# Patient Record
Sex: FEMALE | Race: WHITE | NOT HISPANIC OR LATINO | Employment: FULL TIME | URBAN - METROPOLITAN AREA
[De-identification: names, ages, dates, MRNs, and addresses within clinical notes are randomized per-mention and may not be internally consistent; named-entity substitution may affect disease eponyms.]

---

## 2019-03-25 ENCOUNTER — HOSPITAL ENCOUNTER (OUTPATIENT)
Facility: HOSPITAL | Age: 49
Setting detail: OBSERVATION
Discharge: HOME | End: 2019-03-26
Attending: EMERGENCY MEDICINE | Admitting: INTERNAL MEDICINE
Payer: COMMERCIAL

## 2019-03-25 ENCOUNTER — APPOINTMENT (EMERGENCY)
Dept: RADIOLOGY | Facility: HOSPITAL | Age: 49
End: 2019-03-25
Payer: COMMERCIAL

## 2019-03-25 DIAGNOSIS — R07.9 CHEST PAIN: Primary | ICD-10-CM

## 2019-03-25 DIAGNOSIS — R06.00 DYSPNEA: ICD-10-CM

## 2019-03-25 DIAGNOSIS — Z72.0 NICOTINE ABUSE: ICD-10-CM

## 2019-03-25 DIAGNOSIS — K21.9 GERD (GASTROESOPHAGEAL REFLUX DISEASE): ICD-10-CM

## 2019-03-25 LAB
ALBUMIN SERPL BCP-MCNC: 3.9 G/DL (ref 3.5–5)
ALP SERPL-CCNC: 108 U/L (ref 46–116)
ALT SERPL W P-5'-P-CCNC: 26 U/L (ref 12–78)
ANION GAP SERPL CALCULATED.3IONS-SCNC: 12 MMOL/L (ref 4–13)
APTT PPP: 27 SECONDS (ref 24–33)
AST SERPL W P-5'-P-CCNC: 8 U/L (ref 5–45)
BASOPHILS # BLD AUTO: 0.03 THOUSANDS/ΜL (ref 0–0.1)
BASOPHILS NFR BLD AUTO: 0 % (ref 0–1)
BILIRUB SERPL-MCNC: 0.4 MG/DL (ref 0.2–1)
BUN SERPL-MCNC: 17 MG/DL (ref 5–25)
CALCIUM SERPL-MCNC: 9.4 MG/DL (ref 8.3–10.1)
CHLORIDE SERPL-SCNC: 106 MMOL/L (ref 100–108)
CO2 SERPL-SCNC: 24 MMOL/L (ref 21–32)
CREAT SERPL-MCNC: 0.75 MG/DL (ref 0.6–1.3)
DEPRECATED D DIMER PPP: 710 NG/ML (FEU) (ref 190–520)
EOSINOPHIL # BLD AUTO: 0.05 THOUSAND/ΜL (ref 0–0.61)
EOSINOPHIL NFR BLD AUTO: 1 % (ref 0–6)
ERYTHROCYTE [DISTWIDTH] IN BLOOD BY AUTOMATED COUNT: 14 % (ref 11.6–15.1)
GFR SERPL CREATININE-BSD FRML MDRD: 93 ML/MIN/1.73SQ M
GLUCOSE SERPL-MCNC: 94 MG/DL (ref 65–140)
HCT VFR BLD AUTO: 46 % (ref 34.8–46.1)
HGB BLD-MCNC: 14.5 G/DL (ref 11.5–15.4)
IMM GRANULOCYTES # BLD AUTO: 0.03 THOUSAND/UL (ref 0–0.2)
IMM GRANULOCYTES NFR BLD AUTO: 0 % (ref 0–2)
INR PPP: 0.94 (ref 0.86–1.16)
LYMPHOCYTES # BLD AUTO: 1.94 THOUSANDS/ΜL (ref 0.6–4.47)
LYMPHOCYTES NFR BLD AUTO: 24 % (ref 14–44)
MCH RBC QN AUTO: 27.7 PG (ref 26.8–34.3)
MCHC RBC AUTO-ENTMCNC: 31.5 G/DL (ref 31.4–37.4)
MCV RBC AUTO: 88 FL (ref 82–98)
MONOCYTES # BLD AUTO: 0.47 THOUSAND/ΜL (ref 0.17–1.22)
MONOCYTES NFR BLD AUTO: 6 % (ref 4–12)
NEUTROPHILS # BLD AUTO: 5.47 THOUSANDS/ΜL (ref 1.85–7.62)
NEUTS SEG NFR BLD AUTO: 69 % (ref 43–75)
NRBC BLD AUTO-RTO: 0 /100 WBCS
NT-PROBNP SERPL-MCNC: 3114 PG/ML
PLATELET # BLD AUTO: 255 THOUSANDS/UL (ref 149–390)
PMV BLD AUTO: 10.5 FL (ref 8.9–12.7)
POTASSIUM SERPL-SCNC: 4 MMOL/L (ref 3.5–5.3)
PROT SERPL-MCNC: 7.8 G/DL (ref 6.4–8.2)
PROTHROMBIN TIME: 9.9 SECONDS (ref 9.4–11.7)
RBC # BLD AUTO: 5.23 MILLION/UL (ref 3.81–5.12)
SODIUM SERPL-SCNC: 142 MMOL/L (ref 136–145)
TROPONIN I SERPL-MCNC: <0.02 NG/ML
TSH SERPL DL<=0.05 MIU/L-ACNC: 2.01 UIU/ML (ref 0.36–3.74)
WBC # BLD AUTO: 7.99 THOUSAND/UL (ref 4.31–10.16)

## 2019-03-25 PROCEDURE — 94760 N-INVAS EAR/PLS OXIMETRY 1: CPT

## 2019-03-25 PROCEDURE — 84484 ASSAY OF TROPONIN QUANT: CPT | Performed by: PHYSICIAN ASSISTANT

## 2019-03-25 PROCEDURE — 84484 ASSAY OF TROPONIN QUANT: CPT | Performed by: EMERGENCY MEDICINE

## 2019-03-25 PROCEDURE — 83880 ASSAY OF NATRIURETIC PEPTIDE: CPT | Performed by: PHYSICIAN ASSISTANT

## 2019-03-25 PROCEDURE — 85025 COMPLETE CBC W/AUTO DIFF WBC: CPT | Performed by: EMERGENCY MEDICINE

## 2019-03-25 PROCEDURE — 85730 THROMBOPLASTIN TIME PARTIAL: CPT | Performed by: PHYSICIAN ASSISTANT

## 2019-03-25 PROCEDURE — 99285 EMERGENCY DEPT VISIT HI MDM: CPT

## 2019-03-25 PROCEDURE — 71275 CT ANGIOGRAPHY CHEST: CPT

## 2019-03-25 PROCEDURE — 71046 X-RAY EXAM CHEST 2 VIEWS: CPT

## 2019-03-25 PROCEDURE — 99220 PR INITIAL OBSERVATION CARE/DAY 70 MINUTES: CPT | Performed by: INTERNAL MEDICINE

## 2019-03-25 PROCEDURE — 80053 COMPREHEN METABOLIC PANEL: CPT | Performed by: EMERGENCY MEDICINE

## 2019-03-25 PROCEDURE — 85379 FIBRIN DEGRADATION QUANT: CPT | Performed by: PHYSICIAN ASSISTANT

## 2019-03-25 PROCEDURE — 87081 CULTURE SCREEN ONLY: CPT | Performed by: STUDENT IN AN ORGANIZED HEALTH CARE EDUCATION/TRAINING PROGRAM

## 2019-03-25 PROCEDURE — 85610 PROTHROMBIN TIME: CPT | Performed by: PHYSICIAN ASSISTANT

## 2019-03-25 PROCEDURE — 93005 ELECTROCARDIOGRAM TRACING: CPT

## 2019-03-25 PROCEDURE — 84484 ASSAY OF TROPONIN QUANT: CPT | Performed by: INTERNAL MEDICINE

## 2019-03-25 PROCEDURE — 84443 ASSAY THYROID STIM HORMONE: CPT | Performed by: INTERNAL MEDICINE

## 2019-03-25 PROCEDURE — 36415 COLL VENOUS BLD VENIPUNCTURE: CPT | Performed by: EMERGENCY MEDICINE

## 2019-03-25 RX ORDER — NICOTINE 21 MG/24HR
1 PATCH, TRANSDERMAL 24 HOURS TRANSDERMAL DAILY
Status: DISCONTINUED | OUTPATIENT
Start: 2019-03-26 | End: 2019-03-26 | Stop reason: HOSPADM

## 2019-03-25 RX ORDER — IPRATROPIUM BROMIDE AND ALBUTEROL SULFATE 2.5; .5 MG/3ML; MG/3ML
3 SOLUTION RESPIRATORY (INHALATION) EVERY 4 HOURS PRN
Status: DISCONTINUED | OUTPATIENT
Start: 2019-03-25 | End: 2019-03-26 | Stop reason: HOSPADM

## 2019-03-25 RX ORDER — ONDANSETRON 2 MG/ML
4 INJECTION INTRAMUSCULAR; INTRAVENOUS EVERY 6 HOURS PRN
Status: DISCONTINUED | OUTPATIENT
Start: 2019-03-25 | End: 2019-03-26 | Stop reason: HOSPADM

## 2019-03-25 RX ORDER — NITROGLYCERIN 0.4 MG/1
0.4 TABLET SUBLINGUAL ONCE
Status: COMPLETED | OUTPATIENT
Start: 2019-03-25 | End: 2019-03-25

## 2019-03-25 RX ORDER — ASPIRIN 81 MG/1
81 TABLET, CHEWABLE ORAL DAILY
Status: DISCONTINUED | OUTPATIENT
Start: 2019-03-26 | End: 2019-03-26 | Stop reason: HOSPADM

## 2019-03-25 RX ORDER — ASPIRIN 81 MG/1
324 TABLET, CHEWABLE ORAL ONCE
Status: COMPLETED | OUTPATIENT
Start: 2019-03-25 | End: 2019-03-25

## 2019-03-25 RX ORDER — METHYLPREDNISOLONE SODIUM SUCCINATE 40 MG/ML
20 INJECTION, POWDER, LYOPHILIZED, FOR SOLUTION INTRAMUSCULAR; INTRAVENOUS EVERY 8 HOURS
Status: DISCONTINUED | OUTPATIENT
Start: 2019-03-25 | End: 2019-03-26 | Stop reason: HOSPADM

## 2019-03-25 RX ORDER — SUMATRIPTAN 100 MG/1
100 TABLET, FILM COATED ORAL AS NEEDED
COMMUNITY
End: 2022-01-27

## 2019-03-25 RX ORDER — PANTOPRAZOLE SODIUM 40 MG/1
40 TABLET, DELAYED RELEASE ORAL
Status: DISCONTINUED | OUTPATIENT
Start: 2019-03-26 | End: 2019-03-26 | Stop reason: HOSPADM

## 2019-03-25 RX ORDER — ACETAMINOPHEN 325 MG/1
650 TABLET ORAL EVERY 4 HOURS PRN
Status: DISCONTINUED | OUTPATIENT
Start: 2019-03-25 | End: 2019-03-26 | Stop reason: HOSPADM

## 2019-03-25 RX ADMIN — IOHEXOL 85 ML: 350 INJECTION, SOLUTION INTRAVENOUS at 13:57

## 2019-03-25 RX ADMIN — METHYLPREDNISOLONE SODIUM SUCCINATE 20 MG: 40 INJECTION, POWDER, FOR SOLUTION INTRAMUSCULAR; INTRAVENOUS at 21:10

## 2019-03-25 RX ADMIN — ACETAMINOPHEN 650 MG: 325 TABLET, FILM COATED ORAL at 17:57

## 2019-03-25 RX ADMIN — NITROGLYCERIN 0.4 MG: 0.4 TABLET SUBLINGUAL at 12:31

## 2019-03-25 RX ADMIN — NITROGLYCERIN 0.4 MG: 0.4 TABLET SUBLINGUAL at 13:00

## 2019-03-25 RX ADMIN — ASPIRIN 81 MG 324 MG: 81 TABLET ORAL at 12:31

## 2019-03-25 NOTE — PLAN OF CARE
Problem: PAIN - ADULT  Goal: Verbalizes/displays adequate comfort level or baseline comfort level  Description  Interventions:  - Encourage patient to monitor pain and request assistance  - Assess pain using appropriate pain scale  - Administer analgesics based on type and severity of pain and evaluate response  - Implement non-pharmacological measures as appropriate and evaluate response  - Notify physician/advanced practitioner if interventions unsuccessful or patient reports new pain   Outcome: Progressing     Problem: INFECTION - ADULT  Goal: Absence or prevention of progression during hospitalization  Description  INTERVENTIONS:  - Assess and monitor for signs and symptoms of infection  - Monitor lab/diagnostic results  - Administer medications as ordered  - Instruct and encourage patient and family to use good hand hygiene technique  - Identify and instruct in appropriate isolation precautions for identified infection/condition   Outcome: Progressing     Problem: SAFETY ADULT  Goal: Patient will remain free of falls  Description  INTERVENTIONS:  - Assess patient frequently for physical needs  -  Identify cognitive and physical deficits and behaviors that affect risk of falls    -  [place] fall precautions as indicated by assessment   - Educate patient/family on patient safety including physical limitations  - Instruct patient to call for assistance with activity based on assessment  - Modify environment to reduce risk of injury  - Consider OT/PT consult to assist with strengthening/mobility  Outcome: Progressing  Goal: Maintain or return to baseline ADL function  Description  INTERVENTIONS:  -  Assess patient's ability to carry out ADLs; assess patient's baseline for ADL function and identify physical deficits which impact ability to perform ADLs (bathing, care of mouth/teeth, toileting, grooming, dressing, etc )  - Assess/evaluate cause of self-care deficits   - Assess range of motion  - Assess patient's mobility; develop plan if impaired  - Assess patient's need for assistive devices and provide as appropriate  - Encourage maximum independence but intervene and supervise when necessary  ¯ Involve family in performance of ADLs  ¯ Assess for home care needs following discharge   ¯ Request OT consult to assist with ADL evaluation and planning for discharge  ¯ Provide patient education as appropriate  Outcome: Progressing  Goal: Maintain or return mobility status to optimal level  Description  INTERVENTIONS:  - Assess patient's baseline mobility status (ambulation, transfers, stairs, etc )    - Identify cognitive and physical deficits and behaviors that affect mobility  - Identify mobility aids required to assist with transfers and/or ambulation (gait belt, sit-to-stand, lift, walker, cane, etc )  - [place] fall precautions as indicated by assessment  - Record patient progress and toleration of activity level on Mobility SBAR; progress patient to next Phase/Stage  - Instruct patient to call for assistance with activity based on assessment  - Request Rehabilitation consult to assist with strengthening/weightbearing, etc   Outcome: Progressing     Problem: DISCHARGE PLANNING  Goal: Discharge to home or other facility with appropriate resources  Description  INTERVENTIONS:  - Identify barriers to discharge w/patient and caregiver  - Arrange for needed discharge resources and transportation as appropriate  - Identify discharge learning needs (meds, wound care, etc )  - Refer to Case Management Department for coordinating discharge planning if the patient needs post-hospital services based on physician/advanced practitioner order or complex needs related to functional status, cognitive ability, or social support system   Outcome: Progressing     Problem: Knowledge Deficit  Goal: Patient/family/caregiver demonstrates understanding of disease process, treatment plan, medications, and discharge instructions  Description  Complete learning assessment and assess knowledge base    Interventions:  - Provide teaching at level of understanding  - Provide teaching via preferred learning methods  Outcome: Progressing

## 2019-03-25 NOTE — RESPIRATORY THERAPY NOTE
RT Protocol Note  Mati Aden 44 y o  female MRN: [id number]  Unit/Bed#: [address] [place], [place] [address] Encounter: [id number]    Assessment    Principal Problem:    Chest pain  Active Problems:    Dyspnea    Nicotine abuse    GERD (gastroesophageal reflux disease)      Home Pulmonary Medications:  none  Home Devices/Therapy: (none)    Past Medical History:   Diagnosis Date    GERD (gastroesophageal reflux disease)     HH (hiatus hernia)      Social History     Socioeconomic History    Marital status: /Civil Union     Spouse name: None    Number of children: None    Years of education: None    Highest education level: None   Occupational History    None   Social Needs    Financial resource strain: None    Food insecurity:     Worry: None     Inability: None    Transportation needs:     Medical: None     Non-medical: None   Tobacco Use    Smoking status: Current Every Day Smoker     Packs/day: 0 50     Years: 33 00     Pack years: 16 50     Types: Cigarettes    Smokeless tobacco: Never Used   Substance and Sexual Activity    Alcohol use:  Yes     Alcohol/week: 0 6 oz     Types: 1 Cans of beer per week     Frequency: Monthly or less    Drug use: No    Sexual activity: None   Lifestyle    Physical activity:     Days per week: None     Minutes per session: None    Stress: None   Relationships    Social connections:     Talks on phone: None     Gets together: None     Attends [employer] service: None     Active member of club or organization: None     Attends meetings of clubs or organizations: None     Relationship status: None    Intimate partner violence:     Fear of current or ex partner: None     Emotionally abused: None     Physically abused: None     Forced sexual activity: None   Other Topics Concern    None   Social History Narrative    None       Subjective    Subjective Data: pt states breathing is fine no distress noted    Objective    Physical Exam:   Assessment Type: Assess only  General Appearance: Alert, Awake  Respiratory Pattern: Normal  Chest Assessment: Chest expansion symmetrical  Bilateral Breath Sounds: Clear  R Breath Sounds: Clear  L Breath Sounds: Clear  Location Specific: No  Cough: Non-productive, Dry, Strong  O2 Device: ra    Vitals:  Blood pressure 133/80, pulse 87, temperature 98 2 °F (36 8 °C), resp  rate 20, height 5' 1" (1 549 m), weight 94 9 kg (209 lb 3 5 oz), SpO2 97 %  Imaging and other studies: I have personally reviewed pertinent reports        O2 Device: ra     Plan    Respiratory Plan: Discontinue Protocol        Resp Comments: pt states breathing is fine

## 2019-03-25 NOTE — H&P
H&P- Marcy Hylton 1970, 44 y o  female MRN: [id number]    Unit/Bed#: [address] [address] Encounter: [id number]    Primary Care Provider: Jc Orozco MD   Date and time admitted to hospital: 3/25/2019 12:13 PM        * Chest pain  Assessment & Plan  Atypical presentation for ACS  Patient's EKG was unremarkable and initial troponin was negative  Monitor serial cardiac enzymes  Check fasting lipid panel  Patient will be on aspirin  Cardiology evaluation  Patient did have an exercise stress test about 2 years ago which was reportedly normal  Patient was scheduled for nuclear stress test and echo in a   Dyspnea  Assessment & Plan  Patient complaining of intermittent dyspnea  CT of the chest rule out PE showed bronchiolitis the patchy air trapping in the areas of ground-glass opacities  Likely viral etiology  Patient will be started on Solu-Medrol 20 milligram IV q 8 hours and duo nebs q 4 hours p r n  For shortness of breath  Pulmonology evaluation    Nicotine abuse  Assessment & Plan  Nicotine patch 14 milligram daily  Smoking cessation    GERD (gastroesophageal reflux disease)  Assessment & Plan  Patient will be started on Protonix 40 milligram p o  Daily    VTE Prophylaxis: Low risk  / reason for no mechanical VTE prophylaxis Low risk   Code Status: Level 1 - Full Code    Anticipated Length of Stay:  Patient will be admitted on an Observation basis with an anticipated length of stay of  less than 2 midnights  Justification for Hospital Stay:  Chest pain, bronchiolitis    Total Time for Visit, including Counseling / Coordination of Care: 1 hour  Greater than 50% of this total time spent on direct patient counseling and coordination of care  Chief Complaint:   Chest Pain (Pt c/o chest pain on and off for a few weeks, this episode starting last pm with difficulty breathing    Pt states she has had indigestion along with ) and Shortness of Breath      History of Present Illness:    Marcy Hylton is a 44 y o  female with a PMH of GERD, hiatal hernia who presents with shortness of breath intermittently for the past 1 week  Patient also reported chest pain this morning  Patient reports she has been having intermittent shortness of breath where she gets really short of breath to the point that she cannot catch her breath  Patient does complain of cough which is dry without any mucus production  Patient denies any fever or chills  Shortness of breath is unrelated to exertion  Patient also reported that she had some chest pressure with some nausea on the left side of her chest this morning  Following which patient had another episode of severe shortness of breath and patient present to the ED  Patient denies any fever, chills, headache, dizziness, urinary complaints or diarrhea  In the ED patient was noted to be tachycardic with elevated blood pressure  Patient had CT of the chest rule out pulmonary embolism which showed ground-glass opacities  Patient's initial troponin was negative and EKG was unremarkable  Review of Systems:    Review of Systems   Constitutional: Negative for activity change, appetite change, chills, diaphoresis, fatigue, fever and unexpected weight change  HENT: Negative for congestion, ear discharge, ear pain, facial swelling, hearing loss, mouth sores, nosebleeds, postnasal drip, rhinorrhea, sinus pressure, sneezing, sore throat, tinnitus, trouble swallowing and voice change  Eyes: Negative for photophobia, discharge, redness and visual disturbance  Respiratory: Negative for cough, chest tightness, shortness of breath, wheezing and stridor  Cardiovascular: Negative for chest pain, palpitations and leg swelling  Gastrointestinal: Negative for abdominal distention, abdominal pain, anal bleeding, blood in stool, constipation, diarrhea, nausea and vomiting  Endocrine: Negative for polydipsia, polyphagia and polyuria     Genitourinary: Negative for decreased urine volume, difficulty urinating, dysuria, flank pain, hematuria, menstrual problem, pelvic pain, urgency, vaginal bleeding and vaginal discharge  Musculoskeletal: Negative for arthralgias, back pain and neck stiffness  Skin: Negative for pallor and rash  Neurological: Negative for dizziness, seizures, facial asymmetry, speech difficulty, light-headedness, numbness and headaches  Hematological: Negative for adenopathy  Psychiatric/Behavioral: Negative for agitation and confusion  Past Medical and Surgical History:     Past Medical History:   Diagnosis Date    GERD (gastroesophageal reflux disease)     HH (hiatus hernia)        Past Surgical History:   Procedure Laterality Date     SECTION      CHOLECYSTECTOMY      HYSTERECTOMY         Meds/Allergies:    Prior to Admission medications    Medication Sig Start Date End Date Taking? Authorizing Provider   SUMAtriptan (IMITREX) 100 mg tablet Take 100 mg by mouth as needed   Yes Historical Provider, MD       Allergies:    Allergies   Allergen Reactions    Percocet [Oxycodone-Acetaminophen]        Social History:     Marital Status: /Civil Union   Substance Use History:   Social History     Substance and Sexual Activity   Alcohol Use Yes    Alcohol/week: 0 6 oz    Types: 1 Cans of beer per week    Frequency: Monthly or less     Social History     Tobacco Use   Smoking Status Current Every Day Smoker    Packs/day: 0 50    Years: 33 00    Pack years: 16 50    Types: Cigarettes   Smokeless Tobacco Never Used     Social History     Substance and Sexual Activity   Drug Use No       Family History:    Family History   Problem Relation Age of Onset    Heart attack Mother     Heart attack Father        Physical Exam:     Vitals:   Blood Pressure: 133/80 (19 1626)  Pulse: 87 (19 1626)  Temperature: 98 2 °F (36 8 °C) (19 1626)  Temp Source: Tympanic (19 1204)  Respirations: 20 (19 1626)  Height: 5' 1" (154 9 cm) (03/25/19 1626)  Weight - Scale: 94 9 kg (209 lb 3 5 oz) (03/25/19 1626)  SpO2: 100 % (03/25/19 1626)    Physical Exam   Constitutional: No distress  HENT:   Head: Normocephalic and atraumatic  Nose: Nose normal    Eyes: Pupils are equal, round, and reactive to light  Conjunctivae are normal    Neck: Normal range of motion  Neck supple  Cardiovascular: Normal rate, regular rhythm and normal heart sounds  Pulmonary/Chest: Effort normal and breath sounds normal  No respiratory distress  She has no wheezes  She has no rales  Abdominal: Soft  Bowel sounds are normal  She exhibits no distension  There is no tenderness  There is no rebound and no guarding  Musculoskeletal: She exhibits no edema  Neurological: She is alert  No cranial nerve deficit  Skin: Skin is warm and dry  No rash noted  Additional Data:     Lab Results: I have personally reviewed pertinent films in PACS    Results from last 7 days   Lab Units 03/25/19  1227   WBC Thousand/uL 7 99   HEMOGLOBIN g/dL 14 5   HEMATOCRIT % 46 0   PLATELETS Thousands/uL 255   NEUTROS PCT % 69     Results from last 7 days   Lab Units 03/25/19  1227   SODIUM mmol/L 142   POTASSIUM mmol/L 4 0   CHLORIDE mmol/L 106   CO2 mmol/L 24   BUN mg/dL 17   CREATININE mg/dL 0 75   CALCIUM mg/dL 9 4   TOTAL BILIRUBIN mg/dL 0 40   ALK PHOS U/L 108   ALT U/L 26   AST U/L 8     Results from last 7 days   Lab Units 03/25/19  1228   INR  0 94     Results from last 7 days   Lab Units 03/25/19  1559   TROPONIN I ng/mL <0 02               Imaging: I have personally reviewed pertinent films in PACS    CTA ED chest PE study   Final Result by Chris Rosenberg MD (03/25 2459)      No evidence of pulmonary emboli  Probable bronchiolitis or reactive small airways disease with patchy areas of gas trapping within groundglass opacities in the lungs  Minimal right pleural effusion  Cholecystectomy        Colonic diverticulosis                  Workstation performed: [id number]         XR chest 2 views   Final Result by Buffy Lake MD (03/25 2914)      No active pulmonary disease on examination which is somewhat limited secondary to low lung volumes  Workstation performed: [id number]             CTA ED chest PE study   Final Result      No evidence of pulmonary emboli  Probable bronchiolitis or reactive small airways disease with patchy areas of gas trapping within groundglass opacities in the lungs  Minimal right pleural effusion  Cholecystectomy  Colonic diverticulosis                  Workstation performed: [id number]         XR chest 2 views   Final Result      No active pulmonary disease on examination which is somewhat limited secondary to low lung volumes  Workstation performed: [id number]             EKG, Pathology, and Other Studies Reviewed on Admission:   · EKG:  EKG shows sinus tachycardia with nonspecific ST-T changes    Allscripts / Epic Records Reviewed: Yes     ** Please Note: This note has been constructed using a voice recognition system   **

## 2019-03-25 NOTE — ED PROVIDER NOTES
History  Chief Complaint   Patient presents with    Chest Pain     Pt c/o chest pain on and off for a few weeks, this episode starting last pm with difficulty breathing  Pt states she has had indigestion along with   Shortness of Breath     49-year-old female presents with chest pain x1 week  She states she has had chest pain on and off for the past week  She states it feels like a heavy pressure on her chest in the middle  She is a half pack per day smoker  Her father  of a heart attack in his 62s  She recently traveled to [place] last month  She is not taking anything for the pain  She states she feels short of breath as well  The shortness of breath is not secondary to pain  She was recently treated for bronchitis  No ripping or tearing sensation in her back  She denies any fevers, chills, sore throat, abdominal pain, nausea, vomiting, diarrhea, constipation, headache, dizziness, lightheadedness, weakness  Prior to Admission Medications   Prescriptions Last Dose Informant Patient Reported? Taking? SUMAtriptan (IMITREX) 100 mg tablet   Yes No   Sig: Take 100 mg by mouth as needed      Facility-Administered Medications: None       Past Medical History:   Diagnosis Date    GERD (gastroesophageal reflux disease)     HH (hiatus hernia)        Past Surgical History:   Procedure Laterality Date     SECTION      CHOLECYSTECTOMY      HYSTERECTOMY         Family History   Problem Relation Age of Onset    Heart attack Mother     Heart attack Father      I have reviewed and agree with the history as documented  Social History     Tobacco Use    Smoking status: Current Every Day Smoker     Packs/day: 0 50     Years: 33 00     Pack years: 16 50     Types: Cigarettes    Smokeless tobacco: Never Used   Substance Use Topics    Alcohol use: No    Drug use: No        Review of Systems   Constitutional: Negative for chills and fever  HENT: Negative for sneezing and sore throat  Respiratory: Positive for shortness of breath and wheezing  Negative for cough  Cardiovascular: Positive for chest pain  Negative for palpitations and leg swelling  Gastrointestinal: Negative for abdominal pain, constipation, diarrhea, nausea and vomiting  Musculoskeletal: Negative for back pain, gait problem and joint swelling  Skin: Negative for color change, pallor, rash and wound  Neurological: Negative for dizziness, syncope, weakness, light-headedness, numbness and headaches  All other systems reviewed and are negative  Physical Exam  Physical Exam   Constitutional: She appears well-developed and well-nourished  No distress  HENT:   Head: Normocephalic and atraumatic  Nose: Nose normal    Eyes: EOM are normal    Neck: Normal range of motion  Cardiovascular: Normal rate, regular rhythm, normal heart sounds and intact distal pulses  Exam reveals no gallop and no friction rub  No murmur heard  Pulmonary/Chest: Effort normal  No stridor  No respiratory distress  She has wheezes in the right upper field and the left middle field  She has no rhonchi  She has no rales  Skin: Skin is warm  Capillary refill takes less than 2 seconds  No rash noted  She is not diaphoretic  No erythema  No pallor  Nursing note and vitals reviewed        Vital Signs  ED Triage Vitals [03/25/19 1204]   Temperature Pulse Respirations Blood Pressure SpO2   97 9 °F (36 6 °C) 98 18 (!) 194/110 97 %      Temp Source Heart Rate Source Patient Position - Orthostatic VS BP Location FiO2 (%)   Tympanic Monitor Lying Left arm --      Pain Score       7           Vitals:    03/25/19 1315 03/25/19 1415 03/25/19 1430 03/25/19 1445   BP: 107/59      Pulse: 76 90 86 86   Patient Position - Orthostatic VS:             Visual Acuity      ED Medications  Medications   aspirin chewable tablet 324 mg (324 mg Oral Given 3/25/19 1231)   nitroglycerin (NITROSTAT) SL tablet 0 4 mg (0 4 mg Sublingual Given 3/25/19 1231) nitroglycerin (NITROSTAT) SL tablet 0 4 mg (0 4 mg Sublingual Given 3/25/19 1300)   iohexol (OMNIPAQUE) 350 MG/ML injection (MULTI-DOSE) 85 mL (85 mL Intravenous Given 3/25/19 1357)       Diagnostic Studies  Results Reviewed     Procedure Component Value Units Date/Time    Troponin I [[id number]]     Lab Status:  No result Specimen:  Blood     B-type natriuretic peptide [[id number]]  (Abnormal) Collected:  03/25/19 1227    Lab Status:  Final result Specimen:  Blood from Arm, Right Updated:  03/25/19 1304     NT-proBNP 3,114 pg/mL     Troponin I [[id number]]  (Normal) Collected:  03/25/19 1227    Lab Status:  Final result Specimen:  Blood from Arm, Right Updated:  03/25/19 1302     Troponin I <0 02 ng/mL     Comprehensive metabolic panel [[id number]] Collected:  03/25/19 1227    Lab Status:  Final result Specimen:  Blood from Arm, Right Updated:  03/25/19 1257     Sodium 142 mmol/L      Potassium 4 0 mmol/L      Chloride 106 mmol/L      CO2 24 mmol/L      ANION GAP 12 mmol/L      BUN 17 mg/dL      Creatinine 0 75 mg/dL      Glucose 94 mg/dL      Calcium 9 4 mg/dL      AST 8 U/L      ALT 26 U/L      Alkaline Phosphatase 108 U/L      Total Protein 7 8 g/dL      Albumin 3 9 g/dL      Total Bilirubin 0 40 mg/dL      eGFR 93 ml/min/1 73sq m     Narrative:       National Kidney Disease Education Program recommendations are as follows:  GFR calculation is accurate only with a steady state creatinine  Chronic Kidney disease less than 60 ml/min/1 73 sq  meters  Kidney failure less than 15 ml/min/1 73 sq  meters      Protime-INR [[id number]]  (Normal) Collected:  03/25/19 1228    Lab Status:  Final result Specimen:  Blood from Arm, Right Updated:  03/25/19 1254     Protime 9 9 seconds      INR 0 94    APTT [[id number]]  (Normal) Collected:  03/25/19 1228    Lab Status:  Final result Specimen:  Blood from Arm, Right Updated:  03/25/19 1254     PTT 27 seconds     D-Dimer [[id number]]  (Abnormal) Collected:  03/25/19 1228    Lab Status:  Final result Specimen:  Blood from Arm, Right Updated:  03/25/19 1254     D-Dimer, Quant 710 ng/ml (FEU)     CBC and differential [[id number]]  (Abnormal) Collected:  03/25/19 1227    Lab Status:  Final result Specimen:  Blood from Arm, Right Updated:  03/25/19 1239     WBC 7 99 Thousand/uL      RBC 5 23 Million/uL      Hemoglobin 14 5 g/dL      Hematocrit 46 0 %      MCV 88 fL      MCH 27 7 pg      MCHC 31 5 g/dL      RDW 14 0 %      MPV 10 5 fL      Platelets 168 Thousands/uL      nRBC 0 /100 WBCs      Neutrophils Relative 69 %      Immat GRANS % 0 %      Lymphocytes Relative 24 %      Monocytes Relative 6 %      Eosinophils Relative 1 %      Basophils Relative 0 %      Neutrophils Absolute 5 47 Thousands/µL      Immature Grans Absolute 0 03 Thousand/uL      Lymphocytes Absolute 1 94 Thousands/µL      Monocytes Absolute 0 47 Thousand/µL      Eosinophils Absolute 0 05 Thousand/µL      Basophils Absolute 0 03 Thousands/µL                  CTA ED chest PE study   Final Result by Salena Asher MD (03/25 3611)      No evidence of pulmonary emboli  Probable bronchiolitis or reactive small airways disease with patchy areas of gas trapping within groundglass opacities in the lungs  Minimal right pleural effusion  Cholecystectomy  Colonic diverticulosis                  Workstation performed: [id number]         XR chest 2 views   Final Result by Ngozi Cheung MD (03/25 9081)      No active pulmonary disease on examination which is somewhat limited secondary to low lung volumes  Workstation performed: [id number]                    Procedures  ECG 12 Lead Documentation  Date/Time: 3/25/2019 2:52 PM  Performed by: Stewart Cadena PA-C  Authorized by:  Stewart Cadena PA-C     Indications / Diagnosis:  Chest pain  ECG reviewed by me, the ED Provider: yes    Patient location:  ED  Interpretation:     Interpretation: normal    Rate:     ECG rate assessment: tachycardic    Rhythm:     Rhythm: sinus tachycardia    Ectopy:     Ectopy: none    QRS:     QRS axis:  Normal    QRS intervals:  Normal  ST segments:     ST segments:  Normal  T waves:     T waves: non-specific             Phone Contacts  ED Phone Contact    ED Course         HEART Risk Score      Most Recent Value   History  1 Filed at: 03/25/2019 1221   ECG  1 Filed at: 03/25/2019 1221   Age  1 Filed at: 03/25/2019 1221   Risk Factors  2 Filed at: 03/25/2019 1221   Troponin  0 Filed at: 03/25/2019 1221   Heart Score Risk Calculator   History  1 Filed at: 03/25/2019 1221   ECG  1 Filed at: 03/25/2019 1221   Age  1 Filed at: 03/25/2019 1221   Risk Factors  2 Filed at: 03/25/2019 1221   Troponin  0 Filed at: 03/25/2019 1221   HEART Score  5 Filed at: 03/25/2019 1221   HEART Score  5 Filed at: 03/25/2019 1221                            MDM  Number of Diagnoses or Management Options  Chest pain:   Diagnosis management comments: Chest pain with HEART score 5, will admit for obs       Amount and/or Complexity of Data Reviewed  Clinical lab tests: ordered and reviewed  Tests in the radiology section of CPT®: ordered and reviewed  Tests in the medicine section of CPT®: reviewed and ordered  Discussion of test results with the performing providers: yes  Review and summarize past medical records: yes  Discuss the patient with other providers: yes  Independent visualization of images, tracings, or specimens: yes        Disposition  Final diagnoses:   Chest pain     Time reflects when diagnosis was documented in both MDM as applicable and the Disposition within this note     Time User Action Codes Description Comment    3/25/2019  2:45 PM Leela Gunn Add [R07 9] Chest pain       ED Disposition     ED Disposition Condition Date/Time Comment    Admit Stable Mon Mar 25, 2019  2:45 PM Case was discussed with Dr Maria Victoria Maurice and the patient's admission status was agreed to be Admission Status: observation status to the service of Dr Maria Victoria Maurice           Follow-up Information None         Patient's Medications   Discharge Prescriptions    No medications on file     No discharge procedures on file      ED Provider  Electronically Signed by           Petey Black PA-C  03/25/19 5313

## 2019-03-25 NOTE — ASSESSMENT & PLAN NOTE
Atypical presentation for ACS  Patient's EKG was unremarkable and initial troponin was negative  Monitor serial cardiac enzymes  Check fasting lipid panel  Patient will be on aspirin  Cardiology evaluation  Patient did have an exercise stress test about 2 years ago which was reportedly normal  Patient was scheduled for nuclear stress test and echo in a

## 2019-03-25 NOTE — ASSESSMENT & PLAN NOTE
Patient complaining of intermittent dyspnea  CT of the chest rule out PE showed bronchiolitis the patchy air trapping in the areas of ground-glass opacities  Likely viral etiology  Patient will be started on Solu-Medrol 20 milligram IV q 8 hours and duo nebs q 4 hours p r n   For shortness of breath  Pulmonology evaluation

## 2019-03-26 ENCOUNTER — APPOINTMENT (OUTPATIENT)
Dept: RADIOLOGY | Facility: HOSPITAL | Age: 49
End: 2019-03-26
Payer: COMMERCIAL

## 2019-03-26 ENCOUNTER — APPOINTMENT (OUTPATIENT)
Dept: NON INVASIVE DIAGNOSTICS | Facility: HOSPITAL | Age: 49
End: 2019-03-26
Payer: COMMERCIAL

## 2019-03-26 VITALS
WEIGHT: 209.22 LBS | OXYGEN SATURATION: 97 % | DIASTOLIC BLOOD PRESSURE: 95 MMHG | HEART RATE: 100 BPM | HEIGHT: 61 IN | TEMPERATURE: 98.8 F | RESPIRATION RATE: 18 BRPM | SYSTOLIC BLOOD PRESSURE: 126 MMHG | BODY MASS INDEX: 39.5 KG/M2

## 2019-03-26 PROBLEM — R07.9 CHEST PAIN: Status: RESOLVED | Noted: 2019-03-25 | Resolved: 2019-03-26

## 2019-03-26 PROBLEM — R06.00 DYSPNEA: Status: RESOLVED | Noted: 2019-03-25 | Resolved: 2019-03-26

## 2019-03-26 LAB
ANION GAP SERPL CALCULATED.3IONS-SCNC: 12 MMOL/L (ref 4–13)
ATRIAL RATE: 107 BPM
BASOPHILS # BLD AUTO: 0.02 THOUSANDS/ΜL (ref 0–0.1)
BASOPHILS NFR BLD AUTO: 0 % (ref 0–1)
BUN SERPL-MCNC: 14 MG/DL (ref 5–25)
CALCIUM SERPL-MCNC: 9.4 MG/DL (ref 8.3–10.1)
CHLORIDE SERPL-SCNC: 106 MMOL/L (ref 100–108)
CHOLEST SERPL-MCNC: 171 MG/DL (ref 50–200)
CO2 SERPL-SCNC: 22 MMOL/L (ref 21–32)
CREAT SERPL-MCNC: 0.71 MG/DL (ref 0.6–1.3)
CRP SERPL QL: 4 MG/L
EOSINOPHIL # BLD AUTO: 0 THOUSAND/ΜL (ref 0–0.61)
EOSINOPHIL NFR BLD AUTO: 0 % (ref 0–6)
ERYTHROCYTE [DISTWIDTH] IN BLOOD BY AUTOMATED COUNT: 13.7 % (ref 11.6–15.1)
ERYTHROCYTE [SEDIMENTATION RATE] IN BLOOD: 10 MM/HOUR (ref 2–25)
FLUAV AG SPEC QL: NOT DETECTED
FLUBV AG SPEC QL: NOT DETECTED
GFR SERPL CREATININE-BSD FRML MDRD: 99 ML/MIN/1.73SQ M
GLUCOSE P FAST SERPL-MCNC: 145 MG/DL (ref 65–99)
GLUCOSE SERPL-MCNC: 145 MG/DL (ref 65–140)
HCT VFR BLD AUTO: 45.4 % (ref 34.8–46.1)
HDLC SERPL-MCNC: 44 MG/DL (ref 40–60)
HGB BLD-MCNC: 14.1 G/DL (ref 11.5–15.4)
IMM GRANULOCYTES # BLD AUTO: 0.04 THOUSAND/UL (ref 0–0.2)
IMM GRANULOCYTES NFR BLD AUTO: 1 % (ref 0–2)
LDLC SERPL CALC-MCNC: 116 MG/DL (ref 0–100)
LYMPHOCYTES # BLD AUTO: 0.82 THOUSANDS/ΜL (ref 0.6–4.47)
LYMPHOCYTES NFR BLD AUTO: 12 % (ref 14–44)
MCH RBC QN AUTO: 27.4 PG (ref 26.8–34.3)
MCHC RBC AUTO-ENTMCNC: 31.1 G/DL (ref 31.4–37.4)
MCV RBC AUTO: 88 FL (ref 82–98)
MONOCYTES # BLD AUTO: 0.07 THOUSAND/ΜL (ref 0.17–1.22)
MONOCYTES NFR BLD AUTO: 1 % (ref 4–12)
NEUTROPHILS # BLD AUTO: 5.93 THOUSANDS/ΜL (ref 1.85–7.62)
NEUTS SEG NFR BLD AUTO: 86 % (ref 43–75)
NRBC BLD AUTO-RTO: 0 /100 WBCS
P AXIS: 37 DEGREES
PLATELET # BLD AUTO: 271 THOUSANDS/UL (ref 149–390)
PMV BLD AUTO: 11.3 FL (ref 8.9–12.7)
POTASSIUM SERPL-SCNC: 3.8 MMOL/L (ref 3.5–5.3)
PR INTERVAL: 150 MS
QRS AXIS: 20 DEGREES
QRSD INTERVAL: 86 MS
QT INTERVAL: 344 MS
QTC INTERVAL: 459 MS
RBC # BLD AUTO: 5.14 MILLION/UL (ref 3.81–5.12)
RSV B RNA SPEC QL NAA+PROBE: NOT DETECTED
SODIUM SERPL-SCNC: 140 MMOL/L (ref 136–145)
T WAVE AXIS: -3 DEGREES
TRIGL SERPL-MCNC: 53 MG/DL
VENTRICULAR RATE: 107 BPM
WBC # BLD AUTO: 6.88 THOUSAND/UL (ref 4.31–10.16)

## 2019-03-26 PROCEDURE — 99253 IP/OBS CNSLTJ NEW/EST LOW 45: CPT | Performed by: INTERNAL MEDICINE

## 2019-03-26 PROCEDURE — 86140 C-REACTIVE PROTEIN: CPT | Performed by: FAMILY MEDICINE

## 2019-03-26 PROCEDURE — 93017 CV STRESS TEST TRACING ONLY: CPT

## 2019-03-26 PROCEDURE — 99244 OFF/OP CNSLTJ NEW/EST MOD 40: CPT | Performed by: INTERNAL MEDICINE

## 2019-03-26 PROCEDURE — 80048 BASIC METABOLIC PNL TOTAL CA: CPT | Performed by: INTERNAL MEDICINE

## 2019-03-26 PROCEDURE — 80061 LIPID PANEL: CPT | Performed by: INTERNAL MEDICINE

## 2019-03-26 PROCEDURE — 87631 RESP VIRUS 3-5 TARGETS: CPT | Performed by: PHYSICIAN ASSISTANT

## 2019-03-26 PROCEDURE — A9502 TC99M TETROFOSMIN: HCPCS

## 2019-03-26 PROCEDURE — 85652 RBC SED RATE AUTOMATED: CPT | Performed by: FAMILY MEDICINE

## 2019-03-26 PROCEDURE — 99217 PR OBSERVATION CARE DISCHARGE MANAGEMENT: CPT | Performed by: FAMILY MEDICINE

## 2019-03-26 PROCEDURE — 78452 HT MUSCLE IMAGE SPECT MULT: CPT

## 2019-03-26 PROCEDURE — 93306 TTE W/DOPPLER COMPLETE: CPT | Performed by: INTERNAL MEDICINE

## 2019-03-26 PROCEDURE — 93010 ELECTROCARDIOGRAM REPORT: CPT | Performed by: INTERNAL MEDICINE

## 2019-03-26 PROCEDURE — 85025 COMPLETE CBC W/AUTO DIFF WBC: CPT | Performed by: INTERNAL MEDICINE

## 2019-03-26 RX ORDER — PREDNISONE 10 MG/1
TABLET ORAL
Qty: 30 TABLET | Refills: 0 | Status: SHIPPED | OUTPATIENT
Start: 2019-03-26 | End: 2019-06-23

## 2019-03-26 RX ORDER — NICOTINE 21 MG/24HR
1 PATCH, TRANSDERMAL 24 HOURS TRANSDERMAL DAILY
Qty: 28 PATCH | Refills: 0 | Status: SHIPPED | OUTPATIENT
Start: 2019-03-27 | End: 2019-06-23

## 2019-03-26 RX ORDER — PANTOPRAZOLE SODIUM 40 MG/1
40 TABLET, DELAYED RELEASE ORAL
Qty: 30 TABLET | Refills: 0 | Status: SHIPPED | OUTPATIENT
Start: 2019-03-27 | End: 2019-06-26 | Stop reason: HOSPADM

## 2019-03-26 RX ORDER — ALBUTEROL SULFATE 90 UG/1
2 AEROSOL, METERED RESPIRATORY (INHALATION) EVERY 4 HOURS PRN
Qty: 1 INHALER | Refills: 0 | Status: SHIPPED | OUTPATIENT
Start: 2019-03-26 | End: 2019-04-25

## 2019-03-26 RX ADMIN — PANTOPRAZOLE SODIUM 40 MG: 40 TABLET, DELAYED RELEASE ORAL at 05:02

## 2019-03-26 RX ADMIN — METHYLPREDNISOLONE SODIUM SUCCINATE 20 MG: 40 INJECTION, POWDER, FOR SOLUTION INTRAMUSCULAR; INTRAVENOUS at 13:02

## 2019-03-26 RX ADMIN — METHYLPREDNISOLONE SODIUM SUCCINATE 20 MG: 40 INJECTION, POWDER, FOR SOLUTION INTRAMUSCULAR; INTRAVENOUS at 05:00

## 2019-03-26 RX ADMIN — ASPIRIN 81 MG 81 MG: 81 TABLET ORAL at 08:56

## 2019-03-26 NOTE — ASSESSMENT & PLAN NOTE
Dsypnea after illness from January  It could be COPD related with smoking history vs  Influenza vs  viral illness vs  Aspiration from GERD  CT scan shows patchy areas of gas trapping within groundglass opacities in the lung  · Will check for influenza A/B  · Currently on Solumedrol 20 mg q8hr  · Continue with duoneb q4hr prn  · Will check Sed rate and CRP

## 2019-03-26 NOTE — UTILIZATION REVIEW
Initial Clinical Review    Admission: Date/Time/Statement:   Admission Orders (From admission, onward)    Ordered        19 1446  Place in Observation  Once     Order ID Start Status   [id number] 19 1446 Completed              Orders Placed This Encounter   Procedures    Place in Observation     Standing Status:   Standing     Number of Occurrences:   1     Order Specific Question:   Admitting Physician     Answer:   Nato Dolan [[id number]]     Order Specific Question:   Level of Care     Answer:   Med Surg [16]     ED: Date/Time/Mode of Arrival:   ED Arrival Information     Expected Arrival Acuity Means of Arrival Escorted By Service Admission Type    - 3/25/2019 11:59 Urgent Walk-In Self Hospitalist Urgent    Arrival Complaint    chest pain;trouble breathing        Chief Complaint:   Chief Complaint   Patient presents with    Chest Pain     Pt c/o chest pain on and off for a few weeks, this episode starting last pm with difficulty breathing  Pt states she has had indigestion along with   Shortness of Breath     Assessment/Plan:   78-year-old female presents with chest pain x1 week  She states she has had chest pain on and off for the past week  She states it feels like a heavy pressure on her chest in the middle  She is a half pack per day smoker  Her father  of a heart attack in his 62s  She recently traveled to [place] last month  She is not taking anything for the pain  She states she feels short of breath as well  The shortness of breath is not secondary to pain  She was recently treated for bronchitis  No ripping or tearing sensation in her back  She denies any fevers, chills, sore throat, abdominal pain, nausea, vomiting, diarrhea, constipation, headache, dizziness, lightheadedness, weakness    Chest pain  Assessment & Plan  Atypical presentation for ACS  Patient's EKG was unremarkable and initial troponin was negative  Monitor serial cardiac enzymes  Check fasting lipid panel  Patient will be on aspirin  Cardiology evaluation  Patient did have an exercise stress test about 2 years ago which was reportedly normal  Patient was scheduled for nuclear stress test and echo in a   Dyspnea  Assessment & Plan  Patient complaining of intermittent dyspnea  CT of the chest rule out PE showed bronchiolitis the patchy air trapping in the areas of ground-glass opacities  Likely viral etiology  Patient will be started on Solu-Medrol 20 milligram IV q 8 hours and duo nebs q 4 hours p r n  For shortness of breath  Pulmonology evaluation  ED Vital Signs:   ED Triage Vitals [03/25/19 1204]   Temperature Pulse Respirations Blood Pressure SpO2   97 9 °F (36 6 °C) 98 18 (!) 194/110 97 %      Temp Source Heart Rate Source Patient Position - Orthostatic VS BP Location FiO2 (%)   Tympanic Monitor Lying Left arm --      Pain Score       7        Wt Readings from Last 1 Encounters:   03/25/19 94 9 kg (209 lb 3 5 oz)     Vital Signs (abnormal):   Pertinent Labs/Diagnostic Test Results:   Trop neg  GLUC 145   CXR=No active pulmonary disease on examination which is somewhat limited secondary to low lung volumes  CTA CHEST=No evidence of pulmonary emboli  Probable bronchiolitis or reactive small airways disease with patchy areas of gas trapping within groundglass opacities in the lungs  Minimal right pleural effusion  Cholecystectomy    Colonic diverticulosis  EKG=ECG rate assessment: tachycardic    Rhythm:     Rhythm: sinus tachycardia    Ectopy:     Ectopy: none    QRS:     QRS axis:  Normal    QRS intervals:  Normal  ST segments:     ST segments:  Normal  T waves:     T waves: non-specific  ED Treatment:   Medication Administration from 03/25/2019 1159 to 03/25/2019 1612       Date/Time Order Dose Route Action Action by Comments     03/25/2019 1231 aspirin chewable tablet 324 mg 324 mg Oral Given Sindi Martinez RN      03/25/2019 1231 nitroglycerin (NITROSTAT) SL tablet 0 4 mg 0 4 mg Sublingual Given Kiara Sagastume RN      03/25/2019 1300 nitroglycerin (NITROSTAT) SL tablet 0 4 mg 0 4 mg Sublingual Given Kiara Sagastume RN      03/25/2019 1357 iohexol (OMNIPAQUE) 350 MG/ML injection (MULTI-DOSE) 85 mL 85 mL Intravenous Given Amanda Sipel V         Past Medical/Surgical History:    Active Ambulatory Problems     Diagnosis Date Noted    No Active Ambulatory Problems     Resolved Ambulatory Problems     Diagnosis Date Noted    No Resolved Ambulatory Problems     Past Medical History:   Diagnosis Date    GERD (gastroesophageal reflux disease)     HH (hiatus hernia)      Admitting Diagnosis: Chest pain [R07 9]  SOB (shortness of breath) [R06 02]  Age/Sex: 44 y o  female  Admission Orders:  TELEMETRY  CONSULT CARDIO  CONSULT PULMONARY  Scheduled Meds:   Current Facility-Administered Medications:  acetaminophen 650 mg Oral Q4H PRN Julia Gay MD   aspirin 81 mg Oral Daily Robert Centeno MD   ipratropium-albuterol 3 mL Nebulization Q4H PRN Julia Gay MD   methylPREDNISolone sodium succinate 20 mg Intravenous Q8H Robert Centeno MD   nicotine 1 patch Transdermal Daily Robert Centeno MD   ondansetron 4 mg Intravenous Q6H PRN Robert Centeno MD   pantoprazole 40 mg Oral Early Morning Robert Centeno MD     Continuous Infusions:    PRN Meds:   acetaminophen    ipratropium-albuterol    ondansetron

## 2019-03-26 NOTE — CONSULTS
Consultation - Pulmonary Medicine  Patt Fatima 44 y o  female MRN: [id number]  Unit/Bed#: 04007 [facility] 414-01 Encounter: [id number]    Assessment/Plan:    Dyspnea  [address] [place] after illness from January  It could be COPD related with smoking history vs  Influenza vs  viral illness vs  Aspiration from GERD  CT scan shows patchy areas of gas trapping within groundglass opacities in the lung  · Will check for influenza A/B  · Currently on Solumedrol 20 mg q8hr  · Continue with duoneb q4hr prn  · Will check Sed rate and CRP  Nicotine abuse  Assessment & Plan  Smokes half pack a day for the past 32 years  · Encourage smoking cessation  * Chest pain  Assessment & Plan  Cardiology consulted  Patient is getting stress test today  Thank you for this consultation; we will be happy to follow with you     ______________________________________________________________________      History of Present Illness   Physician Requesting Consult: Lexi Garcia DO  Reason for Consult / Principal Problem: Dyspnea   HX and PE limited by:     HPI:  Patt Fatima is a 44 y o  female  who presents with history of Nicotine Abuse, GERD presents with shortness of breath for the past 2 weeks  Patient also presented with chest pain  Shortness of breath is intermittent at rest and exertion  Yesterday it was the worse it has been  She does have a nonproductive cough since January  Denies any fevers or chills  Patient does admitted to be being diagnosed with bronchitis in January where she was given two rounds of antibiotics but never fully felt better  No sick contacts  In the ED, patient was tachycardiac with elevated blood pressure  CT chest was done to r/o PE  CT chest showed ground glass opacities  HPI    Smoking history: 1/2 pack a day for 32 years  Travel history: [place] 1 month ago         Review of Systems    Past Medical/Surgical History  Past Medical History:   Diagnosis Date    GERD (gastroesophageal reflux disease)     HH (hiatus hernia)      Past Surgical History:   Procedure Laterality Date     SECTION      CHOLECYSTECTOMY      HYSTERECTOMY         Social History  Social History     Substance and Sexual Activity   Alcohol Use Yes    Alcohol/week: 0 6 oz    Types: 1 Cans of beer per week    Frequency: Monthly or less       Social History     Substance and Sexual Activity   Drug Use No       Social History     Tobacco Use   Smoking Status Current Every Day Smoker    Packs/day: 0 50    Years: 33 00    Pack years: 16 50    Types: Cigarettes   Smokeless Tobacco Never Used       Family History  Family History   Problem Relation Age of Onset    Heart attack Mother     Heart attack Father        Allergies  Allergies   Allergen Reactions    Percocet [Oxycodone-Acetaminophen]        Home Meds:   Medications Prior to Admission   Medication Sig Dispense Refill Last Dose    SUMAtriptan (IMITREX) 100 mg tablet Take 100 mg by mouth as needed   Past Week at Unknown time     Current Meds:   Scheduled Meds:    Current Facility-Administered Medications:  acetaminophen 650 mg Oral Q4H PRN Carlton Stevens MD   aspirin 81 mg Oral Daily Carlton Stevens MD   ipratropium-albuterol 3 mL Nebulization Q4H PRN Julia Gay MD   methylPREDNISolone sodium succinate 20 mg Intravenous Q8H Carlton Stevens MD   nicotine 1 patch Transdermal Daily Carlton Stevens MD   ondansetron 4 mg Intravenous Q6H PRN Carlton Stevens MD   pantoprazole 40 mg Oral Early Morning Carlton Stevens MD     PRN Meds:    acetaminophen 650 mg Q4H PRN   ipratropium-albuterol 3 mL Q4H PRN   ondansetron 4 mg Q6H PRN       ____________________________________________________________________    Objective   Vitals:   Temp:  [97 9 °F (36 6 °C)-98 6 °F (37 °C)] 98 6 °F (37 °C)  HR:  [76-98] 86  Resp:  [12-21] 20  BP: (107-194)/() 118/78  Weight (last 2 days)     Date/Time   Weight    19 8635 94 9 (209 22)    03/25/19 1204   93 4 (206)            Oxygen Therapy  SpO2: 95 %    IV Infusions:        Nutrition:        Diet Orders   (From admission, onward)            Start     Ordered    03/26/19 0001  Diet Cardiovascular; Stress Test (1 Meal)  Diet effective midnight     Question Answer Comment   Diet Type Cardiovascular    Cardiac Stress Test (1 Meal)    RD to adjust diet per protocol? Yes        03/25/19 1706    03/25/19 1615  Room Service  Once     Question:  Type of Service  Answer:  Room Service-Appropriate    03/25/19 1614            Ins/Outs:   I/O     None            Lines/Drains:  Invasive Devices     Peripheral Intravenous Line            Peripheral IV 03/26/19 Left Hand less than 1 day                ____________________________________________________________________      Physical Exam   Constitutional: She is oriented to person, place, and time  She appears well-developed and well-nourished  No distress  HENT:   Head: Normocephalic and atraumatic  Nose: Nose normal    Mouth/Throat: Oropharynx is clear and moist  No oropharyngeal exudate  Eyes: EOM are normal  Right eye exhibits no discharge  Left eye exhibits no discharge  Neck: Neck supple  Cardiovascular: Normal rate, regular rhythm, normal heart sounds and intact distal pulses  No murmur heard  Pulmonary/Chest: Effort normal and breath sounds normal  No respiratory distress  Abdominal: Soft  Bowel sounds are normal  She exhibits no distension  There is no tenderness  Musculoskeletal: Normal range of motion  She exhibits no edema  Neurological: She is alert and oriented to person, place, and time  Skin: Skin is warm  Psychiatric: She has a normal mood and affect   Her behavior is normal        ____________________________________________________________________    Labs:   CBC:   Results from last 7 days   Lab Units 03/26/19  0458 03/25/19  1227   WBC Thousand/uL 6 88 7 99   HEMOGLOBIN g/dL 14 1 14 5   HEMATOCRIT % 45 4 46 0   MCV fL 88 88   PLATELETS Thousands/uL 271 255     CMP:   Results from last 7 days   Lab Units 03/26/19  0458 03/25/19  1227   POTASSIUM mmol/L 3 8 4 0   CHLORIDE mmol/L 106 106   CO2 mmol/L 22 24   BUN mg/dL 14 17   CREATININE mg/dL 0 71 0 75   CALCIUM mg/dL 9 4 9 4   AST U/L  --  8   ALT U/L  --  26   ALK PHOS U/L  --  108   EGFR ml/min/1 73sq m 99 93     Magnesium:     Phosphorous:     Troponin:   Results from last 7 days   Lab Units 03/25/19  1938 03/25/19  1559   TROPONIN I ng/mL <0 02 <0 02     PT/INR:   Results from last 7 days   Lab Units 03/25/19  1228   PTT seconds 27   INR  0 94     Lactic Acid:     BNP:   Results from last 7 days   Lab Units 03/25/19  1227   NT-PRO BNP pg/mL 3,114*     ABG:      Procalcitonin: Invalid input(s): PROCALCITONIN    Imaging:   CTA ED chest PE study   Final Result      No evidence of pulmonary emboli  Probable bronchiolitis or reactive small airways disease with patchy areas of gas trapping within groundglass opacities in the lungs  Minimal right pleural effusion  Cholecystectomy  Colonic diverticulosis                  Workstation performed: [id number]         XR chest 2 views   Final Result      No active pulmonary disease on examination which is somewhat limited secondary to low lung volumes              Workstation performed: [id number]             Micro: No results found for: [facility], [facility], [employer], Nya BanUniversity of New Mexico Hospitalster     ____________________________________________________________________      Code Status: Level 1 - Full Code

## 2019-03-26 NOTE — PLAN OF CARE
Problem: PAIN - ADULT  Goal: Verbalizes/displays adequate comfort level or baseline comfort level  Description  Interventions:  - Encourage patient to monitor pain and request assistance  - Assess pain using appropriate pain scale  - Administer analgesics based on type and severity of pain and evaluate response  - Implement non-pharmacological measures as appropriate and evaluate response  - Notify physician/advanced practitioner if interventions unsuccessful or patient reports new pain   Outcome: Progressing     Problem: INFECTION - ADULT  Goal: Absence or prevention of progression during hospitalization  Description  INTERVENTIONS:  - Assess and monitor for signs and symptoms of infection  - Monitor lab/diagnostic results  - Administer medications as ordered  - Instruct and encourage patient and family to use good hand hygiene technique  - Identify and instruct in appropriate isolation precautions for identified infection/condition   Outcome: Progressing     Problem: SAFETY ADULT  Goal: Patient will remain free of falls  Description  INTERVENTIONS:  - Assess patient frequently for physical needs  -  Identify cognitive and physical deficits and behaviors that affect risk of falls    -  Griffin fall precautions as indicated by assessment   - Educate patient/family on patient safety including physical limitations  - Instruct patient to call for assistance with activity based on assessment  - Modify environment to reduce risk of injury  - Consider OT/PT consult to assist with strengthening/mobility  Outcome: Progressing  Goal: Maintain or return to baseline ADL function  Description  INTERVENTIONS:  -  Assess patient's ability to carry out ADLs; assess patient's baseline for ADL function and identify physical deficits which impact ability to perform ADLs (bathing, care of mouth/teeth, toileting, grooming, dressing, etc )  - Assess/evaluate cause of self-care deficits   - Assess range of motion  - Assess patient's mobility; develop plan if impaired  - Assess patient's need for assistive devices and provide as appropriate  - Encourage maximum independence but intervene and supervise when necessary  ¯ Involve family in performance of ADLs  ¯ Assess for home care needs following discharge   ¯ Request OT consult to assist with ADL evaluation and planning for discharge  ¯ Provide patient education as appropriate  Outcome: Progressing  Goal: Maintain or return mobility status to optimal level  Description  INTERVENTIONS:  - Assess patient's baseline mobility status (ambulation, transfers, stairs, etc )    - Identify cognitive and physical deficits and behaviors that affect mobility  - Identify mobility aids required to assist with transfers and/or ambulation (gait belt, sit-to-stand, lift, walker, cane, etc )  - Portia fall precautions as indicated by assessment  - Record patient progress and toleration of activity level on Mobility SBAR; progress patient to next Phase/Stage  - Instruct patient to call for assistance with activity based on assessment  - Request Rehabilitation consult to assist with strengthening/weightbearing, etc   Outcome: Progressing     Problem: DISCHARGE PLANNING  Goal: Discharge to home or other facility with appropriate resources  Description  INTERVENTIONS:  - Identify barriers to discharge w/patient and caregiver  - Arrange for needed discharge resources and transportation as appropriate  - Identify discharge learning needs (meds, wound care, etc )  - Refer to Case Management Department for coordinating discharge planning if the patient needs post-hospital services based on physician/advanced practitioner order or complex needs related to functional status, cognitive ability, or social support system   Outcome: Progressing     Problem: Knowledge Deficit  Goal: Patient/family/caregiver demonstrates understanding of disease process, treatment plan, medications, and discharge instructions  Description  Complete learning assessment and assess knowledge base    Interventions:  - Provide teaching at level of understanding  - Provide teaching via preferred learning methods  Outcome: Progressing     Problem: RESPIRATORY - ADULT  Goal: Achieves optimal ventilation and oxygenation  Description  INTERVENTIONS:  - Assess for changes in respiratory status  - Assess for changes in mentation and behavior  - Position to facilitate oxygenation and minimize respiratory effort  - Oxygen administration by appropriate delivery method based on oxygen saturation (per order) or ABGs  - Initiate smoking cessation education as indicated  - Encourage broncho-pulmonary hygiene including cough, deep breathe, Incentive Spirometry  - Assess the need for suctioning and aspirate as needed  - Assess and instruct to report SOB or any respiratory difficulty  - Respiratory Therapy support as indicated  Outcome: Progressing

## 2019-03-26 NOTE — CONSULTS
Consultation - Cardiology   Juan Johnson 44 y o  female MRN: [id number]  Unit/Bed#: [address] [facility] 414-01 Encounter: [id number]    Assessment/Plan     Assessment:  1  Chest pain  2  Dyspnea with chest CT concerning for bronchiolitis   3  History of tobacco abuse  4  Family history of premature coronary artery disease    Plan:  Patient has been admitted to the hospitalist service  1  Patient will undergo exercise nuclear stress test to evaluate for any ischemia and also to evaluate her exercise tolerance  2  Continue steroids as per primary team  3  Encourage smoking cessation  4  Continue her home medications  History of Present Illness   Physician Requesting Consult: Armida Edwards DO  Reason for Consult / Principal Problem:  Chest pain, elevated blood pressure, family history of premature coronary artery disease    HPI: Juan Johnson is a 44y o  year old female who presented to the emergency room for evaluation of intermittent shortness of breath for a while and chest heaviness  Patient states that she had and upper respiratory tract infection in January for which she underwent 2 courses of antibiotics but felt she never quite recovered from the episode  She noted shortness of breath, and wheezing  Yesterday she began to feel as if there was a heaviness in the center of her chest and her blood pressure was elevated  She presented to the emergency room for further evaluation  Twelve lead EKG is unremarkable and troponins have been negative  Patient notes that she had a similar episode approximately 2 years ago for which she underwent and non nuclear exercise stress test at [facility] and was told that everything was normal     Patient is a smoker, she states she is extremely active  She does note both mother and father had a history of premature coronary artery disease and also hypertension    CT PE protocol was performed and was negative for PE but to was concerning for some small reactive airway disease  Patient states that since she had a dose of steroids her breathing has improved  Inpatient consult to Cardiology  Consult performed by: PALMA Wakefield  Consult ordered by: Stiven Davey MD          Review of Systems   Constitutional: Negative for activity change, chills, fatigue and unexpected weight change  HENT: Negative for congestion, ear pain, postnasal drip, sore throat and trouble swallowing  Eyes: Negative for photophobia, redness and visual disturbance  Respiratory: Positive for cough, shortness of breath and wheezing  Cardiovascular: Negative for palpitations and leg swelling  Chest heaviness   Gastrointestinal: Negative  Endocrine: Negative for polydipsia, polyphagia and polyuria  Genitourinary: Negative  Musculoskeletal: Negative  Neurological: Negative for dizziness, syncope, speech difficulty, weakness and headaches  Hematological: Negative  Psychiatric/Behavioral: Negative          Historical Information   Past Medical History:   Diagnosis Date    GERD (gastroesophageal reflux disease)     HH (hiatus hernia)      Past Surgical History:   Procedure Laterality Date     SECTION      CHOLECYSTECTOMY      HYSTERECTOMY       Social History     Substance and Sexual Activity   Alcohol Use Yes    Alcohol/week: 0 6 oz    Types: 1 Cans of beer per week    Frequency: Monthly or less     Social History     Substance and Sexual Activity   Drug Use No     Social History     Tobacco Use   Smoking Status Current Every Day Smoker    Packs/day: 0 50    Years: 33 00    Pack years: 16 50    Types: Cigarettes   Smokeless Tobacco Never Used     Family History:   Family History   Problem Relation Age of Onset    Heart attack Mother     Heart attack Father        Meds/Allergies   all current active meds have been reviewed, current meds:   Current Facility-Administered Medications   Medication Dose Route Frequency    acetaminophen (TYLENOL) tablet 650 mg  650 mg Oral Q4H PRN    aspirin chewable tablet 81 mg  81 mg Oral Daily    ipratropium-albuterol (DUO-NEB) 0 5-2 5 mg/3 mL inhalation solution 3 mL  3 mL Nebulization Q4H PRN    methylPREDNISolone sodium succinate (Solu-MEDROL) injection 20 mg  20 mg Intravenous Q8H    nicotine (NICODERM CQ) 14 mg/24hr TD 24 hr patch 1 patch  1 patch Transdermal Daily    ondansetron (ZOFRAN) injection 4 mg  4 mg Intravenous Q6H PRN    pantoprazole (PROTONIX) EC tablet 40 mg  40 mg Oral Early Morning    and PTA meds:   Prior to Admission Medications   Prescriptions Last Dose Informant Patient Reported? Taking? SUMAtriptan (IMITREX) 100 mg tablet Past Week at Unknown time  Yes Yes   Sig: Take 100 mg by mouth as needed      Facility-Administered Medications: None     Allergies   Allergen Reactions    Percocet [Oxycodone-Acetaminophen]        Objective   Vitals: Blood pressure 118/78, pulse 86, temperature 98 6 °F (37 °C), temperature source Tympanic, resp  rate 20, height 5' 1" (1 549 m), weight 94 9 kg (209 lb 3 5 oz), SpO2 95 %  Orthostatic Blood Pressures      Most Recent Value   Blood Pressure  118/78 filed at 03/26/2019 6941   Patient Position - Orthostatic VS  Lying filed at 03/26/2019 0734          No intake or output data in the 24 hours ending 03/26/19 0811    Invasive Devices     Peripheral Intravenous Line            Peripheral IV 03/25/19 Right Antecubital less than 1 day                Physical Exam   Constitutional: She is oriented to person, place, and time  She appears well-developed and well-nourished  No distress  HENT:   Head: Normocephalic and atraumatic  Right Ear: External ear normal    Left Ear: External ear normal    Eyes: Pupils are equal, round, and reactive to light  Conjunctivae and EOM are normal  Right eye exhibits no discharge  Left eye exhibits no discharge  No scleral icterus  Neck: Normal range of motion  No JVD present  No thyromegaly present     Cardiovascular: Normal rate, regular rhythm, normal heart sounds and intact distal pulses  Pulmonary/Chest: Effort normal and breath sounds normal  No respiratory distress  She has no wheezes  She has no rales  Abdominal: Soft  Bowel sounds are normal  She exhibits no distension  There is no guarding  Musculoskeletal: She exhibits no edema  Neurological: She is alert and oriented to person, place, and time  Skin: Skin is warm  Capillary refill takes less than 2 seconds  She is not diaphoretic  No pallor  Psychiatric: She has a normal mood and affect  Lab Results:   I have personally reviewed pertinent lab results      CBC with diff:   Results from last 7 days   Lab Units 03/26/19  0458   WBC Thousand/uL 6 88   RBC Million/uL 5 14*   HEMOGLOBIN g/dL 14 1   HEMATOCRIT % 45 4   MCV fL 88   MCH pg 27 4   MCHC g/dL 31 1*   RDW % 13 7   MPV fL 11 3   PLATELETS Thousands/uL 271     CMP:   Results from last 7 days   Lab Units 03/26/19  0458 03/25/19  1227   SODIUM mmol/L 140 142   POTASSIUM mmol/L 3 8 4 0   CHLORIDE mmol/L 106 106   CO2 mmol/L 22 24   BUN mg/dL 14 17   CREATININE mg/dL 0 71 0 75   CALCIUM mg/dL 9 4 9 4   AST U/L  --  8   ALT U/L  --  26   ALK PHOS U/L  --  108   EGFR ml/min/1 73sq m 99 93     Troponin:   0   Lab Value Date/Time    TROPONINI <0 02 03/25/2019 1938    TROPONINI <0 02 03/25/2019 1559    TROPONINI <0 02 03/25/2019 1227     BNP:   Results from last 7 days   Lab Units 03/26/19  0458   POTASSIUM mmol/L 3 8   CHLORIDE mmol/L 106   CO2 mmol/L 22   BUN mg/dL 14   CREATININE mg/dL 0 71   CALCIUM mg/dL 9 4   EGFR ml/min/1 73sq m 99     Coags:   Results from last 7 days   Lab Units 03/25/19  1228   PTT seconds 27   INR  0 94     TSH:   Results from last 7 days   Lab Units 03/25/19  1938   TSH 3RD GENERATON uIU/mL 2 006       Lipid Profile:   Results from last 7 days   Lab Units 03/26/19  0458   HDL mg/dL 44   LDL CALC mg/dL 116*   TRIGLYCERIDES mg/dL 53     Imaging: I have personally reviewed pertinent reports      EKG:  Sinus rhythm  VTE Prophylaxis: Sequential compression device Yuniel Constance)     Code Status: Level 1 - Full Code  Advance Directive and Living Will:      Power of :    POLST:      PALMA Perrin

## 2019-03-26 NOTE — DISCHARGE SUMMARY
Discharge Summary - [address] [facility]    Patient Information: Azalia Laws 44 y o  female MRN: [id number]  Unit/Bed#: [address] [address] [place] [address]-01 Encounter: [id number]    Discharging Physician / Practitioner: Grecia Larson DO  PCP: Maggy Donald MD  Admission Date: 3/25/2019  Discharge Date: 03/26/19    Reason for Admission: Chest Pain (Pt c/o chest pain on and off for a few weeks, this episode starting last pm with difficulty breathing  Pt states she has had indigestion along with ) and Shortness of Breath      Discharge Diagnoses:     Principal Problem (Resolved):    Chest pain  Active Problems:    Nicotine abuse    GERD (gastroesophageal reflux disease)  Resolved Problems:    Dyspnea        * Chest painresolved as of 3/26/2019  Assessment & Plan  Atypical presentation for ACS  Troponins negative  Patient did have an exercise stress test about 2 years ago which was reportedly normal  Nuclear stress test showed no ischemia  EF was noted to be 35% but as per Cardiology visually appears better  Outpatient echocardiogram and follow up with Cardiology after discharge    GERD (gastroesophageal reflux disease)  Assessment & Plan  Continue Protonix    Nicotine abuse  Assessment & Plan  Nicotine patch 14 milligram daily  Smoking cessation  Dyspnearesolved as of 3/26/2019  Assessment & Plan  CT of the chest rule out PE showed bronchiolitis the patchy air trapping in the areas of ground-glass opacities  Likely viral etiology  She was on IV Solu-Medrol and transitioned to prednisone taper on discharge  Pulmonary follow-up after discharge      Consultations During Hospital Stay:  [address]  IP CONSULT TO PULMONOLOGY    Procedures Performed:     · Stress test    Significant Findings:     · See hospital course and above    Imaging while in hospital:    Xr Chest 2 Views    Result Date: 3/25/2019  Narrative: CHEST INDICATION:   Chest Pain   COMPARISON:  2/7/2011 EXAM PERFORMED/VIEWS:  XR CHEST PA & LATERAL  The frontal view was performed utilizing dual energy radiographic technique  Images: 4 FINDINGS: Cardiomediastinal silhouette appears unremarkable  Lung markings are crowded secondary to low lung volumes  Within limitations of this examination there is no focal airspace opacity to suggest pneumonia  No pneumothorax or pleural effusion  Osseous structures appear within normal limits for patient age  Impression: No active pulmonary disease on examination which is somewhat limited secondary to low lung volumes  Workstation performed: [id number]     Cta Ed Chest Pe Study    Result Date: 3/25/2019  Narrative: CTA - CHEST WITH IV CONTRAST - PULMONARY ANGIOGRAM INDICATION:   chest pain, + d dimer  COMPARISON: None  TECHNIQUE: CTA examination of the chest was performed using angiographic technique according to a protocol specifically tailored to evaluate for pulmonary embolism  Axial, sagittal, and coronal 2D reformatted images were created from the source data and  submitted for interpretation  In addition, coronal 3D MIP postprocessing was performed on the acquisition scanner  Radiation dose length product (DLP) for this visit:  680 13 mGy-cm   This examination, like all CT scans performed in the [facility], was performed utilizing techniques to minimize radiation dose exposure, including the use of iterative  reconstruction and automated exposure control  IV Contrast:  85 mL of iohexol (OMNIPAQUE)  FINDINGS: PULMONARY ARTERIAL TREE:  No pulmonary embolus is seen  LUNGS:  There are groundglass opacities in much of both lower lobes and in both lung apices  There is platelike atelectasis in the lingula  Within the areas of groundglass attenuation there are geographic patchy lucent regions which may represent areas  of gas trapping  The overall pattern is suspicious for small airways disease, either bronchiolitis or reactive small airways disease  Correlate with clinical history  PLEURA:  There is a small right pleural effusion  HEART/GREAT VESSELS:  Unremarkable for patient's age  MEDIASTINUM AND LUANA:  Unremarkable  CHEST WALL AND LOWER NECK:   Unremarkable  VISUALIZED STRUCTURES IN THE UPPER ABDOMEN:  Cholecystectomy noted  No acute upper abdominal pathology identified  There is some colonic diverticulosis present  OSSEOUS STRUCTURES:  No acute fracture or destructive osseous lesion  Impression: No evidence of pulmonary emboli  Probable bronchiolitis or reactive small airways disease with patchy areas of gas trapping within groundglass opacities in the lungs  Minimal right pleural effusion  Cholecystectomy  Colonic diverticulosis Workstation performed: [id number]       Incidental Findings:   · none    Test Results Pending at Discharge (will require follow up):   · As per After Visit Summary     Outpatient Tests Requested:  · Echo    Complications:  See hospital course and above    Hospital Course:     Juan Johnson is a 44 y o  female patient who originally presented to the hospital on 3/25/2019 due to chest pain with intermittent shortness of breath  She also reported a dry cough  Patient was admitted and seen by Cardiology and Pulmonary while here  She underwent a stress test and will get echocardiogram on discharge  She was cleared by all consultants involved in her care prior to discharge      Please see above list of diagnoses and related plan for additional information  Condition at Discharge: stable     Discharge Day Visit / Exam:     Subjective:  Denies any chest pain, shortness of breath    Wants to go home    Vitals: Blood Pressure: 126/95 (03/26/19 1555)  Pulse: 100 (03/26/19 1555)  Temperature: 98 8 °F (37 1 °C) (03/26/19 1555)  Temp Source: Oral (03/26/19 1555)  Respirations: 18 (03/26/19 1555)  Height: 5' 1" (154 9 cm) (03/25/19 1626)  Weight - Scale: 94 9 kg (209 lb 3 5 oz) (03/25/19 1626)  SpO2: 97 % (03/26/19 1555)  Exam:   Physical Exam   Constitutional: She is oriented to person, place, and time  She appears well-developed and well-nourished  No distress  HENT:   Head: Normocephalic and atraumatic  Eyes: EOM are normal  Right eye exhibits no discharge  Left eye exhibits no discharge  No scleral icterus  Neck: Neck supple  Cardiovascular: Normal rate and regular rhythm  Pulmonary/Chest: Effort normal and breath sounds normal  No respiratory distress  She has no wheezes  She has no rales  Abdominal: Soft  Bowel sounds are normal  She exhibits no distension  There is no tenderness  Musculoskeletal: She exhibits no edema  Neurological: She is alert and oriented to person, place, and time  No cranial nerve deficit  Skin: Skin is dry  She is not diaphoretic  Discharge instructions/Information to patient and family:(Discharge Medications and Follow up):   See after visit summary for information provided to patient and family  Provisions for Follow-Up Care:  See after visit summary for information related to follow-up care and any pertinent home health orders  Disposition: Home    Planned Readmission:  No     Discharge Statement:  I spent 30 minutes discharging the patient  This time was spent on the day of discharge  I had direct contact with the patient on the day of discharge  Greater than 50% of the total time was spent examining patient, answering all patient questions, arranging and discussing plan of care with patient as well as directly providing post-discharge instructions  Additional time then spent on discharge activities  Discharge Medications:  See after visit summary for reconciled discharge medications provided to patient and family  ** Please Note: "This note has been constructed using a voice recognition system  Therefore there may be syntax, spelling, and/or grammatical errors   Please call if you have any questions  "**

## 2019-03-26 NOTE — PLAN OF CARE
Problem: PAIN - ADULT  Goal: Verbalizes/displays adequate comfort level or baseline comfort level  Description  Interventions:  - Encourage patient to monitor pain and request assistance  - Assess pain using appropriate pain scale  - Administer analgesics based on type and severity of pain and evaluate response  - Implement non-pharmacological measures as appropriate and evaluate response  - Notify physician/advanced practitioner if interventions unsuccessful or patient reports new pain   Outcome: Progressing     Problem: INFECTION - ADULT  Goal: Absence or prevention of progression during hospitalization  Description  INTERVENTIONS:  - Assess and monitor for signs and symptoms of infection  - Monitor lab/diagnostic results  - Administer medications as ordered  - Instruct and encourage patient and family to use good hand hygiene technique  - Identify and instruct in appropriate isolation precautions for identified infection/condition   Outcome: Progressing     Problem: SAFETY ADULT  Goal: Patient will remain free of falls  Description  INTERVENTIONS:  - Assess patient frequently for physical needs  -  Identify cognitive and physical deficits and behaviors that affect risk of falls    -  [place] fall precautions as indicated by assessment   - Educate patient/family on patient safety including physical limitations  - Instruct patient to call for assistance with activity based on assessment  - Modify environment to reduce risk of injury  - Consider OT/PT consult to assist with strengthening/mobility  Outcome: Progressing  Goal: Maintain or return to baseline ADL function  Description  INTERVENTIONS:  -  Assess patient's ability to carry out ADLs; assess patient's baseline for ADL function and identify physical deficits which impact ability to perform ADLs (bathing, care of mouth/teeth, toileting, grooming, dressing, etc )  - Assess/evaluate cause of self-care deficits   - Assess range of motion  - Assess patient's mobility; develop plan if impaired  - Assess patient's need for assistive devices and provide as appropriate  - Encourage maximum independence but intervene and supervise when necessary  ¯ Involve family in performance of ADLs  ¯ Assess for home care needs following discharge   ¯ Request OT consult to assist with ADL evaluation and planning for discharge  ¯ Provide patient education as appropriate  Outcome: Progressing  Goal: Maintain or return mobility status to optimal level  Description  INTERVENTIONS:  - Assess patient's baseline mobility status (ambulation, transfers, stairs, etc )    - Identify cognitive and physical deficits and behaviors that affect mobility  - Identify mobility aids required to assist with transfers and/or ambulation (gait belt, sit-to-stand, lift, walker, cane, etc )  - [place] fall precautions as indicated by assessment  - Record patient progress and toleration of activity level on Mobility SBAR; progress patient to next Phase/Stage  - Instruct patient to call for assistance with activity based on assessment  - Request Rehabilitation consult to assist with strengthening/weightbearing, etc   Outcome: Progressing     Problem: DISCHARGE PLANNING  Goal: Discharge to home or other facility with appropriate resources  Description  INTERVENTIONS:  - Identify barriers to discharge w/patient and caregiver  - Arrange for needed discharge resources and transportation as appropriate  - Identify discharge learning needs (meds, wound care, etc )  - Refer to Case Management Department for coordinating discharge planning if the patient needs post-hospital services based on physician/advanced practitioner order or complex needs related to functional status, cognitive ability, or social support system   Outcome: Progressing     Problem: Knowledge Deficit  Goal: Patient/family/caregiver demonstrates understanding of disease process, treatment plan, medications, and discharge instructions  Description  Complete learning assessment and assess knowledge base    Interventions:  - Provide teaching at level of understanding  - Provide teaching via preferred learning methods  Outcome: Progressing     Problem: RESPIRATORY - ADULT  Goal: Achieves optimal ventilation and oxygenation  Description  INTERVENTIONS:  - Assess for changes in respiratory status  - Assess for changes in mentation and behavior  - Position to facilitate oxygenation and minimize respiratory effort  - Oxygen administration by appropriate delivery method based on oxygen saturation (per order) or ABGs  - Initiate smoking cessation education as indicated  - Encourage broncho-pulmonary hygiene including cough, deep breathe, Incentive Spirometry  - Assess the need for suctioning and aspirate as needed  - Assess and instruct to report SOB or any respiratory difficulty  - Respiratory Therapy support as indicated  Outcome: Progressing

## 2019-03-26 NOTE — SOCIAL WORK
Per chart review, RN & provider rounds pt is A&Ox4, independently ambulating, no discharge needs identified at this time

## 2019-03-27 LAB
CHEST PAIN STATEMENT: NORMAL
MAX DIASTOLIC BP: 80 MMHG
MAX HEART RATE: 150 BPM
MAX PREDICTED HEART RATE: 169 BPM
MAX. SYSTOLIC BP: 174 MMHG
MRSA NOSE QL CULT: NORMAL
PROTOCOL NAME: NORMAL
REASON FOR TERMINATION: NORMAL
TARGET HR FORMULA: NORMAL
TEST INDICATION: NORMAL
TIME IN EXERCISE PHASE: NORMAL

## 2019-03-27 NOTE — ASSESSMENT & PLAN NOTE
CT of the chest rule out PE showed bronchiolitis the patchy air trapping in the areas of ground-glass opacities  Likely viral etiology  She was on IV Solu-Medrol and transitioned to prednisone taper on discharge      Pulmonary follow-up after discharge

## 2019-06-23 ENCOUNTER — HOSPITAL ENCOUNTER (INPATIENT)
Facility: HOSPITAL | Age: 49
LOS: 3 days | Discharge: HOME | DRG: 287 | End: 2019-06-26
Attending: EMERGENCY MEDICINE | Admitting: FAMILY MEDICINE
Payer: COMMERCIAL

## 2019-06-23 ENCOUNTER — APPOINTMENT (EMERGENCY)
Dept: RADIOLOGY | Facility: HOSPITAL | Age: 49
DRG: 287 | End: 2019-06-23
Payer: COMMERCIAL

## 2019-06-23 DIAGNOSIS — I42.8 NON-ISCHEMIC CARDIOMYOPATHY (HCC): ICD-10-CM

## 2019-06-23 DIAGNOSIS — I50.82 BIVENTRICULAR CONGESTIVE HEART FAILURE (HCC): ICD-10-CM

## 2019-06-23 DIAGNOSIS — I50.9 CHF (CONGESTIVE HEART FAILURE) (HCC): Primary | ICD-10-CM

## 2019-06-23 DIAGNOSIS — K57.92 DIVERTICULITIS: ICD-10-CM

## 2019-06-23 DIAGNOSIS — I50.41 ACUTE COMBINED SYSTOLIC AND DIASTOLIC CONGESTIVE HEART FAILURE (HCC): ICD-10-CM

## 2019-06-23 PROBLEM — E87.6 HYPOKALEMIA: Status: ACTIVE | Noted: 2019-06-23

## 2019-06-23 LAB
ALBUMIN SERPL BCP-MCNC: 3.3 G/DL (ref 3.5–5)
ALP SERPL-CCNC: 121 U/L (ref 46–116)
ALT SERPL W P-5'-P-CCNC: 99 U/L (ref 12–78)
ANION GAP SERPL CALCULATED.3IONS-SCNC: 9 MMOL/L (ref 4–13)
APTT PPP: 25 SECONDS (ref 24–33)
AST SERPL W P-5'-P-CCNC: 49 U/L (ref 5–45)
BACTERIA UR QL AUTO: ABNORMAL /HPF
BASOPHILS # BLD AUTO: 0.03 THOUSANDS/ΜL (ref 0–0.1)
BASOPHILS NFR BLD AUTO: 0 % (ref 0–1)
BILIRUB SERPL-MCNC: 0.7 MG/DL (ref 0.2–1)
BILIRUB UR QL STRIP: NEGATIVE
BUN SERPL-MCNC: 16 MG/DL (ref 5–25)
CALCIUM SERPL-MCNC: 8.3 MG/DL (ref 8.3–10.1)
CHLORIDE SERPL-SCNC: 108 MMOL/L (ref 100–108)
CLARITY UR: CLEAR
CO2 SERPL-SCNC: 23 MMOL/L (ref 21–32)
COLOR UR: YELLOW
CREAT SERPL-MCNC: 0.92 MG/DL (ref 0.6–1.3)
DEPRECATED D DIMER PPP: 710 NG/ML (FEU) (ref 190–520)
EOSINOPHIL # BLD AUTO: 0.03 THOUSAND/ΜL (ref 0–0.61)
EOSINOPHIL NFR BLD AUTO: 0 % (ref 0–6)
ERYTHROCYTE [DISTWIDTH] IN BLOOD BY AUTOMATED COUNT: 15.6 % (ref 11.6–15.1)
GFR SERPL CREATININE-BSD FRML MDRD: 72 ML/MIN/1.73SQ M
GLUCOSE SERPL-MCNC: 183 MG/DL (ref 65–140)
GLUCOSE UR STRIP-MCNC: NEGATIVE MG/DL
HCT VFR BLD AUTO: 39.7 % (ref 34.8–46.1)
HGB BLD-MCNC: 12.2 G/DL (ref 11.5–15.4)
HGB UR QL STRIP.AUTO: ABNORMAL
IMM GRANULOCYTES # BLD AUTO: 0.06 THOUSAND/UL (ref 0–0.2)
IMM GRANULOCYTES NFR BLD AUTO: 1 % (ref 0–2)
INR PPP: 1.09 (ref 0.86–1.16)
KETONES UR STRIP-MCNC: NEGATIVE MG/DL
LACTATE SERPL-SCNC: 1.6 MMOL/L (ref 0.5–2)
LEUKOCYTE ESTERASE UR QL STRIP: NEGATIVE
LIPASE SERPL-CCNC: 49 U/L (ref 73–393)
LYMPHOCYTES # BLD AUTO: 1.44 THOUSANDS/ΜL (ref 0.6–4.47)
LYMPHOCYTES NFR BLD AUTO: 19 % (ref 14–44)
MCH RBC QN AUTO: 27.1 PG (ref 26.8–34.3)
MCHC RBC AUTO-ENTMCNC: 30.7 G/DL (ref 31.4–37.4)
MCV RBC AUTO: 88 FL (ref 82–98)
MONOCYTES # BLD AUTO: 0.25 THOUSAND/ΜL (ref 0.17–1.22)
MONOCYTES NFR BLD AUTO: 3 % (ref 4–12)
MUCOUS THREADS UR QL AUTO: ABNORMAL
NEUTROPHILS # BLD AUTO: 5.75 THOUSANDS/ΜL (ref 1.85–7.62)
NEUTS SEG NFR BLD AUTO: 77 % (ref 43–75)
NITRITE UR QL STRIP: NEGATIVE
NON-SQ EPI CELLS URNS QL MICRO: ABNORMAL /HPF
NRBC BLD AUTO-RTO: 0 /100 WBCS
NT-PROBNP SERPL-MCNC: 7973 PG/ML
PH UR STRIP.AUTO: 6 [PH]
PLATELET # BLD AUTO: 266 THOUSANDS/UL (ref 149–390)
PMV BLD AUTO: 11 FL (ref 8.9–12.7)
POTASSIUM SERPL-SCNC: 3.3 MMOL/L (ref 3.5–5.3)
PROT SERPL-MCNC: 6.3 G/DL (ref 6.4–8.2)
PROT UR STRIP-MCNC: NEGATIVE MG/DL
PROTHROMBIN TIME: 11.4 SECONDS (ref 9.4–11.7)
RBC # BLD AUTO: 4.51 MILLION/UL (ref 3.81–5.12)
RBC #/AREA URNS AUTO: ABNORMAL /HPF
SODIUM SERPL-SCNC: 140 MMOL/L (ref 136–145)
SP GR UR STRIP.AUTO: <=1.005 (ref 1–1.03)
TROPONIN I SERPL-MCNC: 0.02 NG/ML
TROPONIN I SERPL-MCNC: <0.02 NG/ML
TROPONIN I SERPL-MCNC: <0.02 NG/ML
UROBILINOGEN UR QL STRIP.AUTO: 0.2 E.U./DL
WBC # BLD AUTO: 7.56 THOUSAND/UL (ref 4.31–10.16)
WBC #/AREA URNS AUTO: ABNORMAL /HPF

## 2019-06-23 PROCEDURE — 71275 CT ANGIOGRAPHY CHEST: CPT

## 2019-06-23 PROCEDURE — 99285 EMERGENCY DEPT VISIT HI MDM: CPT

## 2019-06-23 PROCEDURE — 80053 COMPREHEN METABOLIC PANEL: CPT | Performed by: EMERGENCY MEDICINE

## 2019-06-23 PROCEDURE — 74177 CT ABD & PELVIS W/CONTRAST: CPT

## 2019-06-23 PROCEDURE — 83690 ASSAY OF LIPASE: CPT | Performed by: EMERGENCY MEDICINE

## 2019-06-23 PROCEDURE — 84484 ASSAY OF TROPONIN QUANT: CPT | Performed by: EMERGENCY MEDICINE

## 2019-06-23 PROCEDURE — 85025 COMPLETE CBC W/AUTO DIFF WBC: CPT | Performed by: EMERGENCY MEDICINE

## 2019-06-23 PROCEDURE — 85610 PROTHROMBIN TIME: CPT | Performed by: EMERGENCY MEDICINE

## 2019-06-23 PROCEDURE — 85730 THROMBOPLASTIN TIME PARTIAL: CPT | Performed by: EMERGENCY MEDICINE

## 2019-06-23 PROCEDURE — 93005 ELECTROCARDIOGRAM TRACING: CPT

## 2019-06-23 PROCEDURE — 96374 THER/PROPH/DIAG INJ IV PUSH: CPT

## 2019-06-23 PROCEDURE — 87081 CULTURE SCREEN ONLY: CPT | Performed by: NURSE PRACTITIONER

## 2019-06-23 PROCEDURE — 85379 FIBRIN DEGRADATION QUANT: CPT | Performed by: EMERGENCY MEDICINE

## 2019-06-23 PROCEDURE — 36415 COLL VENOUS BLD VENIPUNCTURE: CPT | Performed by: EMERGENCY MEDICINE

## 2019-06-23 PROCEDURE — 87040 BLOOD CULTURE FOR BACTERIA: CPT | Performed by: EMERGENCY MEDICINE

## 2019-06-23 PROCEDURE — 99223 1ST HOSP IP/OBS HIGH 75: CPT | Performed by: FAMILY MEDICINE

## 2019-06-23 PROCEDURE — 84484 ASSAY OF TROPONIN QUANT: CPT | Performed by: NURSE PRACTITIONER

## 2019-06-23 PROCEDURE — 83605 ASSAY OF LACTIC ACID: CPT | Performed by: EMERGENCY MEDICINE

## 2019-06-23 PROCEDURE — 87505 NFCT AGENT DETECTION GI: CPT | Performed by: NURSE PRACTITIONER

## 2019-06-23 PROCEDURE — 81001 URINALYSIS AUTO W/SCOPE: CPT | Performed by: EMERGENCY MEDICINE

## 2019-06-23 PROCEDURE — 83880 ASSAY OF NATRIURETIC PEPTIDE: CPT | Performed by: EMERGENCY MEDICINE

## 2019-06-23 PROCEDURE — 87086 URINE CULTURE/COLONY COUNT: CPT | Performed by: EMERGENCY MEDICINE

## 2019-06-23 RX ORDER — FUROSEMIDE 10 MG/ML
40 INJECTION INTRAMUSCULAR; INTRAVENOUS DAILY
Status: DISCONTINUED | OUTPATIENT
Start: 2019-06-24 | End: 2019-06-23

## 2019-06-23 RX ORDER — FUROSEMIDE 10 MG/ML
40 INJECTION INTRAMUSCULAR; INTRAVENOUS ONCE
Status: COMPLETED | OUTPATIENT
Start: 2019-06-23 | End: 2019-06-23

## 2019-06-23 RX ORDER — SODIUM CHLORIDE 9 MG/ML
125 INJECTION, SOLUTION INTRAVENOUS CONTINUOUS
Status: DISCONTINUED | OUTPATIENT
Start: 2019-06-23 | End: 2019-06-23

## 2019-06-23 RX ORDER — ONDANSETRON 2 MG/ML
4 INJECTION INTRAMUSCULAR; INTRAVENOUS EVERY 6 HOURS PRN
Status: DISCONTINUED | OUTPATIENT
Start: 2019-06-23 | End: 2019-06-26 | Stop reason: HOSPADM

## 2019-06-23 RX ORDER — POTASSIUM CHLORIDE 20 MEQ/1
40 TABLET, EXTENDED RELEASE ORAL DAILY
Status: DISCONTINUED | OUTPATIENT
Start: 2019-06-23 | End: 2019-06-24

## 2019-06-23 RX ORDER — NICOTINE 21 MG/24HR
1 PATCH, TRANSDERMAL 24 HOURS TRANSDERMAL DAILY
Status: DISCONTINUED | OUTPATIENT
Start: 2019-06-24 | End: 2019-06-26 | Stop reason: HOSPADM

## 2019-06-23 RX ORDER — FUROSEMIDE 10 MG/ML
40 INJECTION INTRAMUSCULAR; INTRAVENOUS
Status: DISCONTINUED | OUTPATIENT
Start: 2019-06-24 | End: 2019-06-24

## 2019-06-23 RX ORDER — PANTOPRAZOLE SODIUM 40 MG/1
40 TABLET, DELAYED RELEASE ORAL
Status: DISCONTINUED | OUTPATIENT
Start: 2019-06-24 | End: 2019-06-26 | Stop reason: HOSPADM

## 2019-06-23 RX ORDER — CEFTRIAXONE 1 G/50ML
1000 INJECTION, SOLUTION INTRAVENOUS EVERY 24 HOURS
Status: DISCONTINUED | OUTPATIENT
Start: 2019-06-23 | End: 2019-06-26 | Stop reason: HOSPADM

## 2019-06-23 RX ADMIN — IOHEXOL 100 ML: 350 INJECTION, SOLUTION INTRAVENOUS at 14:15

## 2019-06-23 RX ADMIN — FUROSEMIDE 40 MG: 10 INJECTION, SOLUTION INTRAMUSCULAR; INTRAVENOUS at 14:44

## 2019-06-23 RX ADMIN — CEFTRIAXONE 1000 MG: 1 INJECTION, SOLUTION INTRAVENOUS at 21:42

## 2019-06-23 RX ADMIN — POTASSIUM CHLORIDE 40 MEQ: 1500 TABLET, EXTENDED RELEASE ORAL at 17:17

## 2019-06-23 RX ADMIN — SODIUM CHLORIDE 125 ML/HR: 0.9 INJECTION, SOLUTION INTRAVENOUS at 13:26

## 2019-06-23 RX ADMIN — METRONIDAZOLE 500 MG: 500 INJECTION, SOLUTION INTRAVENOUS at 17:17

## 2019-06-23 RX ADMIN — PIPERACILLIN SODIUM AND TAZOBACTAM SODIUM 3.38 G: 36; 4.5 INJECTION, POWDER, FOR SOLUTION INTRAVENOUS at 18:33

## 2019-06-24 ENCOUNTER — APPOINTMENT (INPATIENT)
Dept: NON INVASIVE DIAGNOSTICS | Facility: HOSPITAL | Age: 49
DRG: 287 | End: 2019-06-24
Payer: COMMERCIAL

## 2019-06-24 LAB
ALBUMIN SERPL BCP-MCNC: 3.1 G/DL (ref 3.5–5)
ALP SERPL-CCNC: 113 U/L (ref 46–116)
ALT SERPL W P-5'-P-CCNC: 91 U/L (ref 12–78)
ANION GAP SERPL CALCULATED.3IONS-SCNC: 8 MMOL/L (ref 4–13)
AST SERPL W P-5'-P-CCNC: 35 U/L (ref 5–45)
BASOPHILS # BLD AUTO: 0.04 THOUSANDS/ΜL (ref 0–0.1)
BASOPHILS NFR BLD AUTO: 1 % (ref 0–1)
BILIRUB DIRECT SERPL-MCNC: 0.2 MG/DL (ref 0–0.2)
BILIRUB SERPL-MCNC: 0.6 MG/DL (ref 0.2–1)
BUN SERPL-MCNC: 12 MG/DL (ref 5–25)
CALCIUM SERPL-MCNC: 8.6 MG/DL (ref 8.3–10.1)
CAMPYLOBACTER DNA SPEC NAA+PROBE: NORMAL
CHLORIDE SERPL-SCNC: 108 MMOL/L (ref 100–108)
CO2 SERPL-SCNC: 26 MMOL/L (ref 21–32)
CREAT SERPL-MCNC: 0.99 MG/DL (ref 0.6–1.3)
EOSINOPHIL # BLD AUTO: 0.07 THOUSAND/ΜL (ref 0–0.61)
EOSINOPHIL NFR BLD AUTO: 1 % (ref 0–6)
ERYTHROCYTE [DISTWIDTH] IN BLOOD BY AUTOMATED COUNT: 15.7 % (ref 11.6–15.1)
GFR SERPL CREATININE-BSD FRML MDRD: 66 ML/MIN/1.73SQ M
GLUCOSE SERPL-MCNC: 109 MG/DL (ref 65–140)
HCT VFR BLD AUTO: 40.6 % (ref 34.8–46.1)
HGB BLD-MCNC: 12.3 G/DL (ref 11.5–15.4)
IMM GRANULOCYTES # BLD AUTO: 0.02 THOUSAND/UL (ref 0–0.2)
IMM GRANULOCYTES NFR BLD AUTO: 0 % (ref 0–2)
LYMPHOCYTES # BLD AUTO: 1.96 THOUSANDS/ΜL (ref 0.6–4.47)
LYMPHOCYTES NFR BLD AUTO: 26 % (ref 14–44)
MAGNESIUM SERPL-MCNC: 2.2 MG/DL (ref 1.6–2.6)
MCH RBC QN AUTO: 26.9 PG (ref 26.8–34.3)
MCHC RBC AUTO-ENTMCNC: 30.3 G/DL (ref 31.4–37.4)
MCV RBC AUTO: 89 FL (ref 82–98)
MONOCYTES # BLD AUTO: 0.52 THOUSAND/ΜL (ref 0.17–1.22)
MONOCYTES NFR BLD AUTO: 7 % (ref 4–12)
NEUTROPHILS # BLD AUTO: 4.82 THOUSANDS/ΜL (ref 1.85–7.62)
NEUTS SEG NFR BLD AUTO: 65 % (ref 43–75)
NRBC BLD AUTO-RTO: 0 /100 WBCS
PLATELET # BLD AUTO: 239 THOUSANDS/UL (ref 149–390)
PMV BLD AUTO: 10.9 FL (ref 8.9–12.7)
POTASSIUM SERPL-SCNC: 3.8 MMOL/L (ref 3.5–5.3)
PROT SERPL-MCNC: 6.2 G/DL (ref 6.4–8.2)
RBC # BLD AUTO: 4.57 MILLION/UL (ref 3.81–5.12)
SALMONELLA DNA SPEC QL NAA+PROBE: NORMAL
SHIGA TOXIN STX GENE SPEC NAA+PROBE: NORMAL
SHIGELLA DNA SPEC QL NAA+PROBE: NORMAL
SODIUM SERPL-SCNC: 142 MMOL/L (ref 136–145)
TSH SERPL DL<=0.05 MIU/L-ACNC: 2.48 UIU/ML (ref 0.36–3.74)
WBC # BLD AUTO: 7.43 THOUSAND/UL (ref 4.31–10.16)

## 2019-06-24 PROCEDURE — 99254 IP/OBS CNSLTJ NEW/EST MOD 60: CPT | Performed by: INTERNAL MEDICINE

## 2019-06-24 PROCEDURE — 80048 BASIC METABOLIC PNL TOTAL CA: CPT | Performed by: NURSE PRACTITIONER

## 2019-06-24 PROCEDURE — 83735 ASSAY OF MAGNESIUM: CPT | Performed by: NURSE PRACTITIONER

## 2019-06-24 PROCEDURE — 85025 COMPLETE CBC W/AUTO DIFF WBC: CPT | Performed by: NURSE PRACTITIONER

## 2019-06-24 PROCEDURE — 84443 ASSAY THYROID STIM HORMONE: CPT | Performed by: NURSE PRACTITIONER

## 2019-06-24 PROCEDURE — 80076 HEPATIC FUNCTION PANEL: CPT | Performed by: PHYSICIAN ASSISTANT

## 2019-06-24 PROCEDURE — 99254 IP/OBS CNSLTJ NEW/EST MOD 60: CPT | Performed by: PHYSICIAN ASSISTANT

## 2019-06-24 PROCEDURE — 93306 TTE W/DOPPLER COMPLETE: CPT | Performed by: INTERNAL MEDICINE

## 2019-06-24 PROCEDURE — 93306 TTE W/DOPPLER COMPLETE: CPT

## 2019-06-24 PROCEDURE — 99232 SBSQ HOSP IP/OBS MODERATE 35: CPT | Performed by: NURSE PRACTITIONER

## 2019-06-24 RX ORDER — CARVEDILOL 3.12 MG/1
3.12 TABLET ORAL 2 TIMES DAILY WITH MEALS
Status: DISCONTINUED | OUTPATIENT
Start: 2019-06-24 | End: 2019-06-25

## 2019-06-24 RX ORDER — SPIRONOLACTONE 25 MG/1
25 TABLET ORAL DAILY
Status: DISCONTINUED | OUTPATIENT
Start: 2019-06-24 | End: 2019-06-26 | Stop reason: HOSPADM

## 2019-06-24 RX ORDER — FUROSEMIDE 10 MG/ML
20 INJECTION INTRAMUSCULAR; INTRAVENOUS
Status: DISCONTINUED | OUTPATIENT
Start: 2019-06-24 | End: 2019-06-26

## 2019-06-24 RX ORDER — LOSARTAN POTASSIUM 25 MG/1
25 TABLET ORAL DAILY
Status: DISCONTINUED | OUTPATIENT
Start: 2019-06-24 | End: 2019-06-25 | Stop reason: SDUPTHER

## 2019-06-24 RX ADMIN — SPIRONOLACTONE 25 MG: 25 TABLET ORAL at 10:13

## 2019-06-24 RX ADMIN — LOSARTAN POTASSIUM 25 MG: 25 TABLET, FILM COATED ORAL at 10:14

## 2019-06-24 RX ADMIN — METRONIDAZOLE 500 MG: 500 INJECTION, SOLUTION INTRAVENOUS at 10:04

## 2019-06-24 RX ADMIN — METRONIDAZOLE 500 MG: 500 INJECTION, SOLUTION INTRAVENOUS at 00:55

## 2019-06-24 RX ADMIN — ENOXAPARIN SODIUM 40 MG: 40 INJECTION SUBCUTANEOUS at 10:15

## 2019-06-24 RX ADMIN — CEFTRIAXONE 1000 MG: 1 INJECTION, SOLUTION INTRAVENOUS at 20:27

## 2019-06-24 RX ADMIN — PANTOPRAZOLE SODIUM 40 MG: 40 TABLET, DELAYED RELEASE ORAL at 06:26

## 2019-06-24 RX ADMIN — NICOTINE 1 PATCH: 14 PATCH TRANSDERMAL at 10:16

## 2019-06-24 RX ADMIN — METRONIDAZOLE 500 MG: 500 INJECTION, SOLUTION INTRAVENOUS at 17:21

## 2019-06-24 RX ADMIN — CARVEDILOL 3.12 MG: 3.12 TABLET, FILM COATED ORAL at 17:21

## 2019-06-24 RX ADMIN — FUROSEMIDE 40 MG: 10 INJECTION, SOLUTION INTRAMUSCULAR; INTRAVENOUS at 10:05

## 2019-06-24 RX ADMIN — FUROSEMIDE 20 MG: 10 INJECTION, SOLUTION INTRAMUSCULAR; INTRAVENOUS at 17:21

## 2019-06-25 ENCOUNTER — APPOINTMENT (INPATIENT)
Dept: NON INVASIVE DIAGNOSTICS | Facility: HOSPITAL | Age: 49
DRG: 287 | End: 2019-06-25
Payer: COMMERCIAL

## 2019-06-25 LAB
ANION GAP SERPL CALCULATED.3IONS-SCNC: 10 MMOL/L (ref 4–13)
BACTERIA UR CULT: ABNORMAL
BACTERIA UR CULT: ABNORMAL
BASOPHILS # BLD AUTO: 0.04 THOUSANDS/ΜL (ref 0–0.1)
BASOPHILS NFR BLD AUTO: 1 % (ref 0–1)
BUN SERPL-MCNC: 11 MG/DL (ref 5–25)
CALCIUM SERPL-MCNC: 8.5 MG/DL (ref 8.3–10.1)
CHLORIDE SERPL-SCNC: 106 MMOL/L (ref 100–108)
CO2 SERPL-SCNC: 28 MMOL/L (ref 21–32)
CREAT SERPL-MCNC: 0.97 MG/DL (ref 0.6–1.3)
EOSINOPHIL # BLD AUTO: 0.07 THOUSAND/ΜL (ref 0–0.61)
EOSINOPHIL NFR BLD AUTO: 1 % (ref 0–6)
ERYTHROCYTE [DISTWIDTH] IN BLOOD BY AUTOMATED COUNT: 15.6 % (ref 11.6–15.1)
GFR SERPL CREATININE-BSD FRML MDRD: 68 ML/MIN/1.73SQ M
GLUCOSE SERPL-MCNC: 105 MG/DL (ref 65–140)
HCT VFR BLD AUTO: 40.5 % (ref 34.8–46.1)
HGB BLD-MCNC: 12.5 G/DL (ref 11.5–15.4)
IMM GRANULOCYTES # BLD AUTO: 0.03 THOUSAND/UL (ref 0–0.2)
IMM GRANULOCYTES NFR BLD AUTO: 1 % (ref 0–2)
LYMPHOCYTES # BLD AUTO: 1.82 THOUSANDS/ΜL (ref 0.6–4.47)
LYMPHOCYTES NFR BLD AUTO: 28 % (ref 14–44)
MCH RBC QN AUTO: 27.1 PG (ref 26.8–34.3)
MCHC RBC AUTO-ENTMCNC: 30.9 G/DL (ref 31.4–37.4)
MCV RBC AUTO: 88 FL (ref 82–98)
MONOCYTES # BLD AUTO: 0.41 THOUSAND/ΜL (ref 0.17–1.22)
MONOCYTES NFR BLD AUTO: 6 % (ref 4–12)
MRSA NOSE QL CULT: NORMAL
NEUTROPHILS # BLD AUTO: 4.11 THOUSANDS/ΜL (ref 1.85–7.62)
NEUTS SEG NFR BLD AUTO: 63 % (ref 43–75)
NRBC BLD AUTO-RTO: 0 /100 WBCS
PLATELET # BLD AUTO: 244 THOUSANDS/UL (ref 149–390)
PMV BLD AUTO: 11 FL (ref 8.9–12.7)
POTASSIUM SERPL-SCNC: 3 MMOL/L (ref 3.5–5.3)
RBC # BLD AUTO: 4.62 MILLION/UL (ref 3.81–5.12)
SODIUM SERPL-SCNC: 144 MMOL/L (ref 136–145)
TROPONIN I SERPL-MCNC: 0.03 NG/ML
WBC # BLD AUTO: 6.48 THOUSAND/UL (ref 4.31–10.16)

## 2019-06-25 PROCEDURE — B2111ZZ FLUOROSCOPY OF MULTIPLE CORONARY ARTERIES USING LOW OSMOLAR CONTRAST: ICD-10-PCS | Performed by: INTERNAL MEDICINE

## 2019-06-25 PROCEDURE — C1769 GUIDE WIRE: HCPCS

## 2019-06-25 PROCEDURE — 93458 L HRT ARTERY/VENTRICLE ANGIO: CPT

## 2019-06-25 PROCEDURE — 80048 BASIC METABOLIC PNL TOTAL CA: CPT | Performed by: NURSE PRACTITIONER

## 2019-06-25 PROCEDURE — 93005 ELECTROCARDIOGRAM TRACING: CPT | Performed by: STUDENT IN AN ORGANIZED HEALTH CARE EDUCATION/TRAINING PROGRAM

## 2019-06-25 PROCEDURE — 84484 ASSAY OF TROPONIN QUANT: CPT | Performed by: STUDENT IN AN ORGANIZED HEALTH CARE EDUCATION/TRAINING PROGRAM

## 2019-06-25 PROCEDURE — 85025 COMPLETE CBC W/AUTO DIFF WBC: CPT | Performed by: NURSE PRACTITIONER

## 2019-06-25 PROCEDURE — 4A023N7 MEASUREMENT OF CARDIAC SAMPLING AND PRESSURE, LEFT HEART, PERCUTANEOUS APPROACH: ICD-10-PCS | Performed by: INTERNAL MEDICINE

## 2019-06-25 PROCEDURE — 99232 SBSQ HOSP IP/OBS MODERATE 35: CPT | Performed by: NURSE PRACTITIONER

## 2019-06-25 PROCEDURE — 99152 MOD SED SAME PHYS/QHP 5/>YRS: CPT | Performed by: INTERNAL MEDICINE

## 2019-06-25 PROCEDURE — 99232 SBSQ HOSP IP/OBS MODERATE 35: CPT | Performed by: INTERNAL MEDICINE

## 2019-06-25 PROCEDURE — 93458 L HRT ARTERY/VENTRICLE ANGIO: CPT | Performed by: INTERNAL MEDICINE

## 2019-06-25 PROCEDURE — 94762 N-INVAS EAR/PLS OXIMTRY CONT: CPT

## 2019-06-25 PROCEDURE — B2151ZZ FLUOROSCOPY OF LEFT HEART USING LOW OSMOLAR CONTRAST: ICD-10-PCS | Performed by: INTERNAL MEDICINE

## 2019-06-25 RX ORDER — VERAPAMIL HCL 2.5 MG/ML
AMPUL (ML) INTRAVENOUS CODE/TRAUMA/SEDATION MEDICATION
Status: COMPLETED | OUTPATIENT
Start: 2019-06-25 | End: 2019-06-25

## 2019-06-25 RX ORDER — LIDOCAINE HYDROCHLORIDE 10 MG/ML
INJECTION, SOLUTION INFILTRATION; PERINEURAL CODE/TRAUMA/SEDATION MEDICATION
Status: COMPLETED | OUTPATIENT
Start: 2019-06-25 | End: 2019-06-25

## 2019-06-25 RX ORDER — CARVEDILOL 6.25 MG/1
6.25 TABLET ORAL 2 TIMES DAILY WITH MEALS
Status: DISCONTINUED | OUTPATIENT
Start: 2019-06-25 | End: 2019-06-26 | Stop reason: HOSPADM

## 2019-06-25 RX ORDER — POTASSIUM CHLORIDE 20 MEQ/1
40 TABLET, EXTENDED RELEASE ORAL 2 TIMES DAILY
Status: COMPLETED | OUTPATIENT
Start: 2019-06-25 | End: 2019-06-25

## 2019-06-25 RX ORDER — MIDAZOLAM HYDROCHLORIDE 1 MG/ML
INJECTION INTRAMUSCULAR; INTRAVENOUS CODE/TRAUMA/SEDATION MEDICATION
Status: COMPLETED | OUTPATIENT
Start: 2019-06-25 | End: 2019-06-25

## 2019-06-25 RX ORDER — HEPARIN SODIUM 1000 [USP'U]/ML
INJECTION, SOLUTION INTRAVENOUS; SUBCUTANEOUS CODE/TRAUMA/SEDATION MEDICATION
Status: COMPLETED | OUTPATIENT
Start: 2019-06-25 | End: 2019-06-25

## 2019-06-25 RX ORDER — FUROSEMIDE 10 MG/ML
INJECTION INTRAMUSCULAR; INTRAVENOUS CODE/TRAUMA/SEDATION MEDICATION
Status: COMPLETED | OUTPATIENT
Start: 2019-06-25 | End: 2019-06-25

## 2019-06-25 RX ORDER — NITROGLYCERIN 20 MG/100ML
INJECTION INTRAVENOUS CODE/TRAUMA/SEDATION MEDICATION
Status: COMPLETED | OUTPATIENT
Start: 2019-06-25 | End: 2019-06-25

## 2019-06-25 RX ORDER — FENTANYL CITRATE 50 UG/ML
INJECTION, SOLUTION INTRAMUSCULAR; INTRAVENOUS CODE/TRAUMA/SEDATION MEDICATION
Status: COMPLETED | OUTPATIENT
Start: 2019-06-25 | End: 2019-06-25

## 2019-06-25 RX ADMIN — PANTOPRAZOLE SODIUM 40 MG: 40 TABLET, DELAYED RELEASE ORAL at 05:29

## 2019-06-25 RX ADMIN — MIDAZOLAM HYDROCHLORIDE 1 MG: 1 INJECTION, SOLUTION INTRAMUSCULAR; INTRAVENOUS at 13:58

## 2019-06-25 RX ADMIN — METRONIDAZOLE 500 MG: 500 INJECTION, SOLUTION INTRAVENOUS at 09:04

## 2019-06-25 RX ADMIN — IOHEXOL 95 ML: 350 INJECTION, SOLUTION INTRAVENOUS at 14:19

## 2019-06-25 RX ADMIN — NICOTINE 1 PATCH: 14 PATCH TRANSDERMAL at 09:02

## 2019-06-25 RX ADMIN — FUROSEMIDE 20 MG: 10 INJECTION, SOLUTION INTRAMUSCULAR; INTRAVENOUS at 14:23

## 2019-06-25 RX ADMIN — LIDOCAINE HYDROCHLORIDE 2 ML: 10 INJECTION, SOLUTION INFILTRATION; PERINEURAL at 13:57

## 2019-06-25 RX ADMIN — VERAPAMIL HYDROCHLORIDE 2.5 MG: 2.5 INJECTION, SOLUTION INTRAVENOUS at 13:59

## 2019-06-25 RX ADMIN — SPIRONOLACTONE 25 MG: 25 TABLET ORAL at 08:59

## 2019-06-25 RX ADMIN — METRONIDAZOLE 500 MG: 500 INJECTION, SOLUTION INTRAVENOUS at 00:38

## 2019-06-25 RX ADMIN — FUROSEMIDE 20 MG: 10 INJECTION, SOLUTION INTRAMUSCULAR; INTRAVENOUS at 09:48

## 2019-06-25 RX ADMIN — FENTANYL CITRATE 100 MCG: 50 INJECTION, SOLUTION INTRAMUSCULAR; INTRAVENOUS at 13:57

## 2019-06-25 RX ADMIN — HEPARIN SODIUM 3000 UNITS: 1000 INJECTION, SOLUTION INTRAVENOUS; SUBCUTANEOUS at 13:58

## 2019-06-25 RX ADMIN — LOSARTAN POTASSIUM 25 MG: 25 TABLET, FILM COATED ORAL at 08:58

## 2019-06-25 RX ADMIN — CARVEDILOL 6.25 MG: 6.25 TABLET, FILM COATED ORAL at 16:08

## 2019-06-25 RX ADMIN — NITROGLYCERIN 200 MCG: 20 INJECTION INTRAVENOUS at 13:59

## 2019-06-25 RX ADMIN — FUROSEMIDE 20 MG: 10 INJECTION, SOLUTION INTRAMUSCULAR; INTRAVENOUS at 16:50

## 2019-06-25 RX ADMIN — POTASSIUM CHLORIDE 40 MEQ: 1500 TABLET, EXTENDED RELEASE ORAL at 17:00

## 2019-06-25 RX ADMIN — CARVEDILOL 3.12 MG: 3.12 TABLET, FILM COATED ORAL at 08:59

## 2019-06-25 RX ADMIN — POTASSIUM CHLORIDE 40 MEQ: 1500 TABLET, EXTENDED RELEASE ORAL at 09:10

## 2019-06-25 RX ADMIN — CEFTRIAXONE 1000 MG: 1 INJECTION, SOLUTION INTRAVENOUS at 20:27

## 2019-06-25 RX ADMIN — METRONIDAZOLE 500 MG: 500 INJECTION, SOLUTION INTRAVENOUS at 16:55

## 2019-06-26 VITALS
OXYGEN SATURATION: 93 % | DIASTOLIC BLOOD PRESSURE: 77 MMHG | HEIGHT: 61 IN | SYSTOLIC BLOOD PRESSURE: 125 MMHG | RESPIRATION RATE: 18 BRPM | TEMPERATURE: 98.2 F | HEART RATE: 88 BPM | WEIGHT: 210.76 LBS | BODY MASS INDEX: 39.79 KG/M2

## 2019-06-26 PROBLEM — I42.8 NON-ISCHEMIC CARDIOMYOPATHY (HCC): Status: ACTIVE | Noted: 2019-06-26

## 2019-06-26 PROBLEM — I50.41 ACUTE COMBINED SYSTOLIC AND DIASTOLIC CONGESTIVE HEART FAILURE (HCC): Status: ACTIVE | Noted: 2019-06-23

## 2019-06-26 LAB
ALBUMIN SERPL BCP-MCNC: 3.1 G/DL (ref 3.5–5)
ALP SERPL-CCNC: 101 U/L (ref 46–116)
ALT SERPL W P-5'-P-CCNC: 61 U/L (ref 12–78)
ANION GAP SERPL CALCULATED.3IONS-SCNC: 11 MMOL/L (ref 4–13)
AST SERPL W P-5'-P-CCNC: 21 U/L (ref 5–45)
BASOPHILS # BLD AUTO: 0.03 THOUSANDS/ΜL (ref 0–0.1)
BASOPHILS NFR BLD AUTO: 1 % (ref 0–1)
BILIRUB DIRECT SERPL-MCNC: 0.1 MG/DL (ref 0–0.2)
BILIRUB SERPL-MCNC: 0.4 MG/DL (ref 0.2–1)
BUN SERPL-MCNC: 11 MG/DL (ref 5–25)
CALCIUM SERPL-MCNC: 8.7 MG/DL (ref 8.3–10.1)
CHLORIDE SERPL-SCNC: 105 MMOL/L (ref 100–108)
CO2 SERPL-SCNC: 27 MMOL/L (ref 21–32)
CREAT SERPL-MCNC: 0.95 MG/DL (ref 0.6–1.3)
EOSINOPHIL # BLD AUTO: 0.05 THOUSAND/ΜL (ref 0–0.61)
EOSINOPHIL NFR BLD AUTO: 1 % (ref 0–6)
ERYTHROCYTE [DISTWIDTH] IN BLOOD BY AUTOMATED COUNT: 15.6 % (ref 11.6–15.1)
GFR SERPL CREATININE-BSD FRML MDRD: 70 ML/MIN/1.73SQ M
GLUCOSE SERPL-MCNC: 110 MG/DL (ref 65–140)
HCT VFR BLD AUTO: 41.7 % (ref 34.8–46.1)
HGB BLD-MCNC: 12.8 G/DL (ref 11.5–15.4)
IMM GRANULOCYTES # BLD AUTO: 0.03 THOUSAND/UL (ref 0–0.2)
IMM GRANULOCYTES NFR BLD AUTO: 1 % (ref 0–2)
LYMPHOCYTES # BLD AUTO: 1.43 THOUSANDS/ΜL (ref 0.6–4.47)
LYMPHOCYTES NFR BLD AUTO: 22 % (ref 14–44)
MCH RBC QN AUTO: 26.8 PG (ref 26.8–34.3)
MCHC RBC AUTO-ENTMCNC: 30.7 G/DL (ref 31.4–37.4)
MCV RBC AUTO: 87 FL (ref 82–98)
MONOCYTES # BLD AUTO: 0.4 THOUSAND/ΜL (ref 0.17–1.22)
MONOCYTES NFR BLD AUTO: 6 % (ref 4–12)
NEUTROPHILS # BLD AUTO: 4.46 THOUSANDS/ΜL (ref 1.85–7.62)
NEUTS SEG NFR BLD AUTO: 69 % (ref 43–75)
NRBC BLD AUTO-RTO: 0 /100 WBCS
PLATELET # BLD AUTO: 257 THOUSANDS/UL (ref 149–390)
PMV BLD AUTO: 10.8 FL (ref 8.9–12.7)
POTASSIUM SERPL-SCNC: 3.7 MMOL/L (ref 3.5–5.3)
PROT SERPL-MCNC: 6.2 G/DL (ref 6.4–8.2)
RBC # BLD AUTO: 4.77 MILLION/UL (ref 3.81–5.12)
SODIUM SERPL-SCNC: 143 MMOL/L (ref 136–145)
WBC # BLD AUTO: 6.4 THOUSAND/UL (ref 4.31–10.16)

## 2019-06-26 PROCEDURE — 99239 HOSP IP/OBS DSCHRG MGMT >30: CPT | Performed by: NURSE PRACTITIONER

## 2019-06-26 PROCEDURE — 80076 HEPATIC FUNCTION PANEL: CPT | Performed by: NURSE PRACTITIONER

## 2019-06-26 PROCEDURE — 99232 SBSQ HOSP IP/OBS MODERATE 35: CPT | Performed by: INTERNAL MEDICINE

## 2019-06-26 PROCEDURE — 85025 COMPLETE CBC W/AUTO DIFF WBC: CPT | Performed by: NURSE PRACTITIONER

## 2019-06-26 PROCEDURE — 80048 BASIC METABOLIC PNL TOTAL CA: CPT | Performed by: NURSE PRACTITIONER

## 2019-06-26 RX ORDER — METRONIDAZOLE 500 MG/1
500 TABLET ORAL EVERY 8 HOURS SCHEDULED
Qty: 21 TABLET | Refills: 0 | Status: SHIPPED | OUTPATIENT
Start: 2019-06-26 | End: 2019-07-03

## 2019-06-26 RX ORDER — SPIRONOLACTONE 25 MG/1
25 TABLET ORAL DAILY
Qty: 30 TABLET | Refills: 0 | Status: SHIPPED | OUTPATIENT
Start: 2019-06-27 | End: 2019-07-01 | Stop reason: SDUPTHER

## 2019-06-26 RX ORDER — CEFUROXIME AXETIL 500 MG/1
500 TABLET ORAL EVERY 12 HOURS SCHEDULED
Qty: 14 TABLET | Refills: 0 | Status: SHIPPED | OUTPATIENT
Start: 2019-06-26 | End: 2019-07-03

## 2019-06-26 RX ORDER — TORSEMIDE 10 MG/1
10 TABLET ORAL EVERY OTHER DAY
Status: DISCONTINUED | OUTPATIENT
Start: 2019-06-27 | End: 2019-06-26 | Stop reason: HOSPADM

## 2019-06-26 RX ORDER — TORSEMIDE 10 MG/1
10 TABLET ORAL DAILY
Status: DISCONTINUED | OUTPATIENT
Start: 2019-06-27 | End: 2019-06-26

## 2019-06-26 RX ORDER — TORSEMIDE 10 MG/1
10 TABLET ORAL EVERY OTHER DAY
Qty: 15 TABLET | Refills: 0 | Status: SHIPPED | OUTPATIENT
Start: 2019-06-27 | End: 2019-07-01 | Stop reason: SDUPTHER

## 2019-06-26 RX ORDER — TORSEMIDE 20 MG/1
20 TABLET ORAL DAILY
Status: DISCONTINUED | OUTPATIENT
Start: 2019-06-27 | End: 2019-06-26

## 2019-06-26 RX ORDER — POTASSIUM CHLORIDE 20 MEQ/1
40 TABLET, EXTENDED RELEASE ORAL ONCE
Status: COMPLETED | OUTPATIENT
Start: 2019-06-26 | End: 2019-06-26

## 2019-06-26 RX ORDER — CARVEDILOL 6.25 MG/1
6.25 TABLET ORAL 2 TIMES DAILY WITH MEALS
Qty: 60 TABLET | Refills: 0 | Status: SHIPPED | OUTPATIENT
Start: 2019-06-27 | End: 2019-07-01 | Stop reason: SDUPTHER

## 2019-06-26 RX ADMIN — PANTOPRAZOLE SODIUM 40 MG: 40 TABLET, DELAYED RELEASE ORAL at 06:04

## 2019-06-26 RX ADMIN — CARVEDILOL 6.25 MG: 6.25 TABLET, FILM COATED ORAL at 07:30

## 2019-06-26 RX ADMIN — METRONIDAZOLE 500 MG: 500 INJECTION, SOLUTION INTRAVENOUS at 16:26

## 2019-06-26 RX ADMIN — METRONIDAZOLE 500 MG: 500 INJECTION, SOLUTION INTRAVENOUS at 00:47

## 2019-06-26 RX ADMIN — METRONIDAZOLE 500 MG: 500 INJECTION, SOLUTION INTRAVENOUS at 08:24

## 2019-06-26 RX ADMIN — FUROSEMIDE 20 MG: 10 INJECTION, SOLUTION INTRAMUSCULAR; INTRAVENOUS at 07:30

## 2019-06-26 RX ADMIN — SPIRONOLACTONE 25 MG: 25 TABLET ORAL at 08:26

## 2019-06-26 RX ADMIN — ENOXAPARIN SODIUM 40 MG: 40 INJECTION SUBCUTANEOUS at 08:26

## 2019-06-26 RX ADMIN — SACUBITRIL AND VALSARTAN 1 TABLET: 24; 26 TABLET, FILM COATED ORAL at 17:29

## 2019-06-26 RX ADMIN — POTASSIUM CHLORIDE 40 MEQ: 1500 TABLET, EXTENDED RELEASE ORAL at 16:23

## 2019-06-26 RX ADMIN — CARVEDILOL 6.25 MG: 6.25 TABLET, FILM COATED ORAL at 16:23

## 2019-06-26 RX ADMIN — NICOTINE 1 PATCH: 14 PATCH TRANSDERMAL at 08:29

## 2019-06-26 RX ADMIN — SACUBITRIL AND VALSARTAN 1 TABLET: 24; 26 TABLET, FILM COATED ORAL at 09:35

## 2019-06-27 ENCOUNTER — TELEPHONE (OUTPATIENT)
Dept: CARDIOLOGY CLINIC | Facility: CLINIC | Age: 49
End: 2019-06-27

## 2019-06-28 LAB
ATRIAL RATE: 104 BPM
ATRIAL RATE: 89 BPM
BACTERIA BLD CULT: NORMAL
BACTERIA BLD CULT: NORMAL
P AXIS: 19 DEGREES
PR INTERVAL: 178 MS
QRS AXIS: 28 DEGREES
QRS AXIS: 30 DEGREES
QRSD INTERVAL: 84 MS
QRSD INTERVAL: 94 MS
QT INTERVAL: 454 MS
QT INTERVAL: 486 MS
QTC INTERVAL: 552 MS
QTC INTERVAL: 651 MS
T WAVE AXIS: -56 DEGREES
T WAVE AXIS: 39 DEGREES
VENTRICULAR RATE: 108 BPM
VENTRICULAR RATE: 89 BPM

## 2019-06-28 PROCEDURE — 93010 ELECTROCARDIOGRAM REPORT: CPT | Performed by: INTERNAL MEDICINE

## 2019-07-01 ENCOUNTER — OFFICE VISIT (OUTPATIENT)
Dept: CARDIOLOGY CLINIC | Facility: CLINIC | Age: 49
End: 2019-07-01
Payer: COMMERCIAL

## 2019-07-01 VITALS
OXYGEN SATURATION: 98 % | HEIGHT: 61 IN | DIASTOLIC BLOOD PRESSURE: 66 MMHG | WEIGHT: 203 LBS | BODY MASS INDEX: 38.33 KG/M2 | HEART RATE: 88 BPM | SYSTOLIC BLOOD PRESSURE: 124 MMHG

## 2019-07-01 DIAGNOSIS — I50.41 ACUTE COMBINED SYSTOLIC AND DIASTOLIC CONGESTIVE HEART FAILURE (HCC): ICD-10-CM

## 2019-07-01 DIAGNOSIS — R94.31 ABNORMAL EKG: ICD-10-CM

## 2019-07-01 DIAGNOSIS — I50.1 HEART FAILURE, LEFT, WITH LVEF <=30% (HCC): Primary | ICD-10-CM

## 2019-07-01 DIAGNOSIS — I42.8 NON-ISCHEMIC CARDIOMYOPATHY (HCC): ICD-10-CM

## 2019-07-01 PROCEDURE — 99215 OFFICE O/P EST HI 40 MIN: CPT | Performed by: INTERNAL MEDICINE

## 2019-07-01 RX ORDER — SPIRONOLACTONE 25 MG/1
25 TABLET ORAL DAILY
Qty: 30 TABLET | Refills: 1 | Status: SHIPPED | OUTPATIENT
Start: 2019-07-01 | End: 2019-09-18 | Stop reason: SDUPTHER

## 2019-07-01 RX ORDER — CARVEDILOL 6.25 MG/1
6.25 TABLET ORAL 2 TIMES DAILY WITH MEALS
Qty: 60 TABLET | Refills: 1 | Status: SHIPPED | OUTPATIENT
Start: 2019-07-01 | End: 2019-08-12 | Stop reason: SDUPTHER

## 2019-07-01 RX ORDER — TORSEMIDE 10 MG/1
10 TABLET ORAL EVERY OTHER DAY
Qty: 15 TABLET | Refills: 1 | Status: SHIPPED | OUTPATIENT
Start: 2019-07-01 | End: 2019-08-12

## 2019-07-02 NOTE — PROGRESS NOTES
Cardiology Follow Up  Jonny Rodriguez  1970  [id number]  [address] [facility]  [address] [facility]  [address] [facility], [facility] [address]  [place] [address] [phone]    1  Heart failure, left, with LVEF <=30% [facility])  Ambulatory  Referral to Cardiac Rehabilitation    Basic metabolic panel    Compression Stocking   2  Abnormal EKG  Basic metabolic panel    Compression Stocking   3  Acute combined systolic and diastolic congestive heart failure (HCC)  carvedilol (COREG) 6 25 mg tablet    spironolactone (ALDACTONE) 25 mg tablet    torsemide (DEMADEX) 10 mg tablet   4  Non-ischemic cardiomyopathy (HCC)  carvedilol (COREG) 6 25 mg tablet      Discussion/Plan:  Nonischemic CM EF<30%- improved fluid status  Continue wearing LifeVest   Repeat lab work shows potassium and creatinine function or normal   She will begin cardiac rehab  Tolerating Entresto therapy  Plan for slow up titration if her blood pressures are stable  She will call back in 1 week  She will continue carvedilol 6 25 mg twice a day  She will continue spironolactone 25 mg daily  I discussed with patient the need to avoid prolonged standing  She will need to wear compression socks  She will complete torsemide 10 mg every other day  She will continue low sodium diet  Unclear trigger for the patient's cardiomyopathy  Possibly postviral   If her EF remains below 30% consideration for AICD  Will repeat blood work in 1 month  She will be traveling for wedding  No contraindication as long as she continues her medications  Avoid significant sodium intake    Interval History:  She denies having significant lower extremity edema since discharge  She denies feeling dizziness or lightheadedness  She denies having chest discomfort  Denies having syncopal episodes  Denies having any irregular heart rhythm      Patient Active Problem List   Diagnosis    Nicotine abuse    GERD (gastroesophageal reflux disease)    Acute combined systolic and diastolic congestive heart failure (HCC)    Diverticulitis    Hypokalemia    Non-ischemic cardiomyopathy (HCC)     Past Medical History:   Diagnosis Date    GERD (gastroesophageal reflux disease)     HH (hiatus hernia)      Social History     Socioeconomic History    Marital status: /Civil Union     Spouse name: Not on file    Number of children: Not on file    Years of education: Not on file    Highest education level: Not on file   Occupational History    Not on file   Social Needs    Financial resource strain: Not on file    Food insecurity:     Worry: Not on file     Inability: Not on file    Transportation needs:     Medical: Not on file     Non-medical: Not on file   Tobacco Use    Smoking status: Former Smoker     Packs/day: 0 50     Years: 33 00     Pack years: 16 50     Types: Cigarettes     Last attempt to quit: 2019     Years since quittin 0    Smokeless tobacco: Never Used   Substance and Sexual Activity    Alcohol use: Yes     Frequency: Monthly or less     Comment: 2x year    Drug use: No    Sexual activity: Not on file   Lifestyle    Physical activity:     Days per week: Not on file     Minutes per session: Not on file    Stress: Not on file   Relationships    Social connections:     Talks on phone: Not on file     Gets together: Not on file     Attends Mandaen service: Not on file     Active member of club or organization: Not on file     Attends meetings of clubs or organizations: Not on file     Relationship status: Not on file    Intimate partner violence:     Fear of current or ex partner: Not on file     Emotionally abused: Not on file     Physically abused: Not on file     Forced sexual activity: Not on file   Other Topics Concern    Not on file   Social History Narrative    Not on file      Family History   Problem Relation Age of Onset    Heart attack Mother     Heart attack Father Past Surgical History:   Procedure Laterality Date     SECTION      CHOLECYSTECTOMY      HYSTERECTOMY         Current Outpatient Medications:     carvedilol (COREG) 6 25 mg tablet, Take 1 tablet (6 25 mg total) by mouth 2 (two) times a day with meals, Disp: 60 tablet, Rfl: 1    cefuroxime (CEFTIN) 500 mg tablet, Take 1 tablet (500 mg total) by mouth every 12 (twelve) hours for 7 days, Disp: 14 tablet, Rfl: 0    metroNIDAZOLE (FLAGYL) 500 mg tablet, Take 1 tablet (500 mg total) by mouth every 8 (eight) hours for 7 days, Disp: 21 tablet, Rfl: 0    sacubitril-valsartan (ENTRESTO) 24-26 MG TABS, Take 1 tablet by mouth 2 (two) times a day, Disp: 60 tablet, Rfl: 0    spironolactone (ALDACTONE) 25 mg tablet, Take 1 tablet (25 mg total) by mouth daily, Disp: 30 tablet, Rfl: 1    SUMAtriptan (IMITREX) 100 mg tablet, Take 100 mg by mouth as needed, Disp: , Rfl:     torsemide (DEMADEX) 10 mg tablet, Take 1 tablet (10 mg total) by mouth every other day, Disp: 15 tablet, Rfl: 1  Allergies   Allergen Reactions    Percocet [Oxycodone-Acetaminophen]        Review of Systems:  Review of Systems   Constitutional: Negative  Negative for activity change, appetite change, chills, diaphoresis, fatigue, fever and unexpected weight change  HENT: Negative  Negative for congestion, dental problem, drooling, ear discharge, ear pain, facial swelling, hearing loss, mouth sores, nosebleeds, postnasal drip, rhinorrhea, sinus pressure, sinus pain, sneezing, sore throat, tinnitus, trouble swallowing and voice change  Eyes: Negative  Negative for photophobia, pain, redness, itching and visual disturbance  Respiratory: Positive for shortness of breath  Negative for apnea, cough, choking, chest tightness, wheezing and stridor  Cardiovascular: Positive for leg swelling  Negative for chest pain and palpitations  Gastrointestinal: Negative    Negative for abdominal distention, abdominal pain, anal bleeding, blood in stool, constipation, diarrhea, nausea, rectal pain and vomiting  Endocrine: Negative  Negative for cold intolerance, heat intolerance, polydipsia, polyphagia and polyuria  Genitourinary: Negative  Negative for decreased urine volume, difficulty urinating, dyspareunia, dysuria, enuresis, flank pain, frequency, genital sores, hematuria, menstrual problem, pelvic pain, urgency, vaginal bleeding, vaginal discharge and vaginal pain  Musculoskeletal: Negative  Negative for arthralgias, back pain, gait problem, joint swelling, myalgias, neck pain and neck stiffness  Skin: Negative  Negative for color change, pallor, rash and wound  Allergic/Immunologic: Negative  Negative for environmental allergies, food allergies and immunocompromised state  Neurological: Negative  Negative for dizziness, tremors, seizures, syncope, facial asymmetry, speech difficulty, weakness, light-headedness, numbness and headaches  Hematological: Negative  Negative for adenopathy  Does not bruise/bleed easily  Psychiatric/Behavioral: Negative  Negative for agitation, behavioral problems, confusion, decreased concentration, dysphoric mood, hallucinations, self-injury, sleep disturbance and suicidal ideas  The patient is not nervous/anxious and is not hyperactive  All other systems reviewed and are negative  Vitals:    07/01/19 1552   BP: 124/66   BP Location: Left arm   Patient Position: Sitting   Cuff Size: Standard   Pulse: 88   SpO2: 98%   Weight: 92 1 kg (203 lb)   Height: 5' 1" (1 549 m)     Physical Exam:  Physical Exam   Constitutional: She is oriented to person, place, and time  No distress  HENT:   Head: Normocephalic and atraumatic  Right Ear: External ear normal    Left Ear: External ear normal    Eyes: Pupils are equal, round, and reactive to light  Conjunctivae are normal  Right eye exhibits no discharge  Left eye exhibits no discharge  No scleral icterus  Neck: Normal range of motion  Neck supple  No JVD present  No tracheal deviation present  No thyromegaly present  Cardiovascular: Normal rate and regular rhythm  Exam reveals gallop  Exam reveals no friction rub  No murmur heard  Pulmonary/Chest: Effort normal and breath sounds normal  No stridor  No respiratory distress  She has no wheezes  She has no rales  She exhibits no tenderness  Abdominal: Soft  Bowel sounds are normal  She exhibits no distension and no mass  There is no tenderness  There is no rebound and no guarding  Musculoskeletal: Normal range of motion  She exhibits no edema, tenderness or deformity  Neurological: She is alert and oriented to person, place, and time  She has normal reflexes  She displays normal reflexes  No cranial nerve deficit  She exhibits normal muscle tone  Coordination normal    Skin: Skin is warm and dry  No rash noted  She is not diaphoretic  No erythema  No pallor  Psychiatric: She has a normal mood and affect  Her behavior is normal  Judgment and thought content normal        Labs:     Lab Results   Component Value Date    WBC 6 40 06/26/2019    HGB 12 8 06/26/2019    HCT 41 7 06/26/2019    MCV 87 06/26/2019     06/26/2019     Lab Results   Component Value Date    K 3 7 06/26/2019     06/26/2019    CO2 27 06/26/2019    BUN 11 06/26/2019    CREATININE 0 95 06/26/2019    GLUF 145 (H) 03/26/2019    CALCIUM 8 7 06/26/2019    AST 21 06/26/2019    ALT 61 06/26/2019    ALKPHOS 101 06/26/2019    EGFR 70 06/26/2019     No results found for: CHOL  Lab Results   Component Value Date    HDL 44 03/26/2019     Lab Results   Component Value Date    LDLCALC 116 (H) 03/26/2019     Lab Results   Component Value Date    TRIG 53 03/26/2019     No results found for: HGBA1C    Imaging & Testing   I have personally reviewed pertinent reports  EKG: Personally reviewed        Cardiac testing:   Results for orders placed during the hospital encounter of 06/23/19   Echo complete with contrast if indicated Narrative [address]  [address] [facility] [address]  [phone]    Transthoracic Echocardiogram  2D, M-mode, Doppler, and Color Doppler    Study date:  2019    Patient: Denny Hood  MR number: [id number]  Account number: [de-identified]  : 1970  Age: 44 years  Gender: Female  Status: Inpatient  Location: Bedside  Height: 61 in  Weight: 219 6 lb  BP: 118/ 80 mmHg    Indications: Heart Failure    Diagnoses: I50 9 - Heart failure, unspecified    Sonographer:  ISABELL Bella  Primary Physician:  Que Peacock MD  Referring Physician:  Christianne Benítez DO  Group:  [facility]  Interpreting Physician:  Joseph Perez MD    SUMMARY    LEFT VENTRICLE:  Systolic function was markedly reduced  Ejection fraction was estimated in the range of 25 % to 30 % to be 30 %  There was severe diffuse hypokinesis  Doppler parameters were consistent with abnormal left ventricular relaxation (grade 1 diastolic dysfunction)  RIGHT VENTRICLE:  Systolic function was moderately reduced  LEFT ATRIUM:  The atrium was mildly dilated  RIGHT ATRIUM:  The atrium was mildly dilated  MITRAL VALVE:  There was trace regurgitation  IVC, HEPATIC VEINS:  The inferior vena cava was dilated  HISTORY: PRIOR HISTORY: GERD, Hiatal Hernia    PROCEDURE: The procedure was performed at the bedside  This was a routine study  The transthoracic approach was used  The study included complete 2D imaging, M-mode, complete spectral Doppler, and color Doppler  The heart rate was 92 bpm,  at the start of the study  Image quality was adequate  LEFT VENTRICLE: Size was normal  Systolic function was markedly reduced  Ejection fraction was estimated in the range of 25 % to 30 % to be 30 %  There was severe diffuse hypokinesis  Wall thickness was normal  No evidence of apical  thrombus   DOPPLER: Doppler parameters were consistent with abnormal left ventricular relaxation (grade 1 diastolic dysfunction)  RIGHT VENTRICLE: The size was normal  Systolic function was moderately reduced  Wall thickness was normal     LEFT ATRIUM: The atrium was mildly dilated  RIGHT ATRIUM: The atrium was mildly dilated  MITRAL VALVE: Valve structure was normal  There was normal leaflet separation  DOPPLER: The transmitral velocity was within the normal range  There was no evidence for stenosis  There was trace regurgitation  AORTIC VALVE: The valve was trileaflet  Leaflets exhibited normal thickness and normal cuspal separation  DOPPLER: Transaortic velocity was within the normal range  There was no evidence for stenosis  There was no significant  regurgitation  TRICUSPID VALVE: The valve structure was normal  There was normal leaflet separation  DOPPLER: The transtricuspid velocity was within the normal range  There was no evidence for stenosis  There was no significant regurgitation  PULMONIC VALVE: Leaflets exhibited normal thickness, no calcification, and normal cuspal separation  DOPPLER: The transpulmonic velocity was within the normal range  There was no significant regurgitation  PERICARDIUM: There was no pericardial effusion  The pericardium was normal in appearance  AORTA: The root exhibited normal size  SYSTEMIC VEINS: IVC: The inferior vena cava was dilated  SYSTEM MEASUREMENT TABLES    2D mode  AoR Diam 2D: 3 2 cm  LA Diam (2D): 4 5 cm  LA/Ao (2D): 1 41  FS (2D Teich): 13 7 %  IVSd (2D): 0 82 cm  LVDEV: 193 cmï¾³  LVESV: 138 cmï¾³  LVIDd(2D): 6 19 cm  LVISd (2D): 5 34 cm  LVPWd (2D): 0 81 cm  SV (Teich): 55 cmï¾³    Apical four chamber  LVEF A4C: 28 %    Unspecified Scan Mode  MV Peak E Zach   Mean: 848 mm/s  MVA (PHT): 4 15 cmï¾²  PHT: 53 ms  Max P mm[Hg]  V Max: 2440 mm/s  Vmax: 2500 mm/s  RA Area: 18 1 cmï¾²  RA Volume: 48 8 cmï¾³  TAPSE: 1 1 cm    [employer] Accredited Echocardiography Laboratory    Prepared and electronically signed by    [employer] Morales Ramirez MD  Signed 24-Jun-2019 13:10:35       No results found for this or any previous visit  Shaggy Luna  Please call with any questions or suggestions    A description of the counseling:   Goals and Barriers:  Patient's ability to self care:  Medication side effect reviewed with patient in detail and all their questions answered  "This note has been constructed using a voice recognition system  Therefore there may be syntax, spelling, and/or grammatical errors   Please call if you have any questions  "

## 2019-07-08 ENCOUNTER — TELEPHONE (OUTPATIENT)
Dept: CARDIOLOGY CLINIC | Facility: CLINIC | Age: 49
End: 2019-07-08

## 2019-07-08 NOTE — TELEPHONE ENCOUNTER
Dr Fred Teague, patient called with an update of blood pressures per your request    Blood pressure readings as follows  7/2   105/74  am           104/76  pm     7/3     101/75 am   99/67   pm    7/4       104/74   am   102/74  pm    7/5         94/71   am   99/74     pm    7/6         99/74   am   99/68    pm    7/7         107/80  am    102/74    Pm    Carla Flores denies any associated symptoms  Can you further advise on Entresto dosage?

## 2019-07-08 NOTE — TELEPHONE ENCOUNTER
I would like her to continue the same entresto dosage  Do not raise it  Instead I would like her to increase her carvedilol to 1 5 tablet twice a day for two weeks than increase to 2 tablets (12 5mg) twice a day for two weeks

## 2019-07-11 NOTE — TELEPHONE ENCOUNTER
Patient called back with facial swelling  She states her eyes are puffy, and she feels as though she has sunburn  She states her skin is dry  Also checking in on a letter she states for flying on a plane for her son's wedding

## 2019-07-12 NOTE — TELEPHONE ENCOUNTER
She should hold her Entresto, which could possibly cause facial swelling, and see if it resolves  Who is her letter supposed to go to? Dr Bailey Ornelas indicated she could fly on his last note  However, she should be scheduled again with him as soon as he gets back, so he can decide on whether to resume Entresto or something else  For facial swelling does not improve, she should see her primary care physician

## 2019-07-15 ENCOUNTER — TELEPHONE (OUTPATIENT)
Dept: CARDIOLOGY CLINIC | Facility: CLINIC | Age: 49
End: 2019-07-15

## 2019-07-15 NOTE — TELEPHONE ENCOUNTER
LM for patient to return call and discuss this, will be offered appt with Dr Akash Maurice on 7/18, or in [facility] with Dr Akash Maurice, 7/19  Also printed office note indicating that she could travel

## 2019-07-15 NOTE — TELEPHONE ENCOUNTER
SW patient made aware of recommendations  She states that her reaction has subsided, hence she does not need to discuss medications with physician  She will be in to  office note

## 2019-08-02 LAB
BUN SERPL-MCNC: 20 MG/DL (ref 6–24)
BUN/CREAT SERPL: 20 (ref 9–23)
CALCIUM SERPL-MCNC: 9.4 MG/DL (ref 8.7–10.2)
CHLORIDE SERPL-SCNC: 107 MMOL/L (ref 96–106)
CO2 SERPL-SCNC: 20 MMOL/L (ref 20–29)
CREAT SERPL-MCNC: 1.02 MG/DL (ref 0.57–1)
GLUCOSE SERPL-MCNC: 110 MG/DL (ref 65–99)
POTASSIUM SERPL-SCNC: 4.7 MMOL/L (ref 3.5–5.2)
SL AMB EGFR AFRICAN AMERICAN: 74 ML/MIN/1.73
SL AMB EGFR NON AFRICAN AMERICAN: 64 ML/MIN/1.73
SODIUM SERPL-SCNC: 142 MMOL/L (ref 134–144)

## 2019-08-05 ENCOUNTER — CLINICAL SUPPORT (OUTPATIENT)
Dept: CARDIAC REHAB | Facility: CLINIC | Age: 49
End: 2019-08-05
Payer: COMMERCIAL

## 2019-08-05 VITALS — HEIGHT: 61 IN | BODY MASS INDEX: 37.72 KG/M2 | WEIGHT: 199.8 LBS

## 2019-08-05 DIAGNOSIS — I50.1 HEART FAILURE, LEFT, WITH LVEF <=30% (HCC): ICD-10-CM

## 2019-08-05 PROCEDURE — 93797 PHYS/QHP OP CAR RHAB WO ECG: CPT

## 2019-08-05 NOTE — PROGRESS NOTES
CARDIAC REHAB ASSESSMENT    Today's date: 2019  Patient name: Angélica Denny     : 1970       MRN: [id number]  PCP: Imani Carpenter MD  Referring Physician: Rosie Ortiz MD  Cardiologist: Rosie Ortiz  Surgeon: N/A  Dx:   Encounter Diagnosis   Name Primary?     Heart failure, left, with LVEF <=30% ([facility])        Date of onset: 2019  Cultural needs: none    Height:    Wt Readings from Last 1 Encounters:   19 90 6 kg (199 lb 12 8 oz)      Weight:   Ht Readings from Last 1 Encounters:   19 5' 1" (1 549 m)     Medical History:   Past Medical History:   Diagnosis Date    GERD (gastroesophageal reflux disease)     HH (hiatus hernia)          Physical Limitations: occasional right knee discomfort    Risk Factors   Cholesterol: No  Smoking: Recent user, quit in last 6 months  Relapsed: average ppd:1-2 cigarettes a day  HTN: No  DM: No  Obesity: Yes   Inactivity: Yes  Stress:  perceived  stress: 0/10   Stressors:denies any stress   Goals for Stress Management:continue to be stress free    Family History:  Family History   Problem Relation Age of Onset    Heart attack Mother     Heart attack Father        Allergies: Percocet [oxycodone-acetaminophen]  ETOH:   Social History     Substance and Sexual Activity   Alcohol Use Yes    Frequency: Monthly or less    Comment: 2x year         Current Medications:   Current Outpatient Medications   Medication Sig Dispense Refill    carvedilol (COREG) 6 25 mg tablet Take 1 tablet (6 25 mg total) by mouth 2 (two) times a day with meals 60 tablet 1    sacubitril-valsartan (ENTRESTO) 24-26 MG TABS Take 1 tablet by mouth 2 (two) times a day 60 tablet 0    spironolactone (ALDACTONE) 25 mg tablet Take 1 tablet (25 mg total) by mouth daily 30 tablet 1    SUMAtriptan (IMITREX) 100 mg tablet Take 100 mg by mouth as needed      torsemide (DEMADEX) 10 mg tablet Take 1 tablet (10 mg total) by mouth every other day 15 tablet 1     No current facility-administered medications for this visit  Functional Status Prior to Diagnosis for Treatment   Occupation: full time job   Recreation: gathering with family and friends, outdoor [address] [employer], [place]: able to perform self-care  [place]: able to perform self-care  Exercise: walked 1 to 2 miles 1 to 2 days a week 4 years ago  Other: has treadmill at home    Current Functional Status  Occupation: full time job   Recreation: gathering with family and friends, outdoor [address] [employer], [place]: able to perform self-care  [place]: able to perform self-care  Exercise: walked 1 to 2 miles 1 to 2 days a week 4 years ago  Other: has treadmill at home      Patient Specific Goals:  continue to be active    Short Term Program Goals: continue to follow a low sodium diet    Long Term Goals: continue to decrease weight 5 to 10 pounds in 12 weeks, improve EF    Ability to reach goals/rehabilitation potential:  Very Good     Projected return to function: 8-12 weeks  Objective tests: sub-max TM ETT  6 MWT  sit to stand  arm curl      Nutritional   Reviewed details of Rate your Plate  Discussed key elements of heart healthy eating  Reviewed patient goals for dietary modifications and their clinical implications  Reviewed most recent lipid profile       Goals for dietary modification: low sodium      Emotional/Social  Patient reports feeling some depression since d/c  Reports sufficient emotional support    SOCIAL SUPPORT NETWORK    Marital status:     Rate 1-5:    Marriage: 0   Family: 0   Financial: 0   Relationships: 0   Spirituality: 0   Intellectual: 0      Domestic Violence Screening: states she is safe and free of harm    Comments: initial evaluation completed

## 2019-08-05 NOTE — PROGRESS NOTES
Cardiac Rehabilitation Plan of Care   Care Plan       Today's date: 2019   Visits: Initial 1  Patient name: Dinh Keen      : 1970  Age: 44 y o  MRN: [id number]  Referring Physician: Kelvin Lora MD  Provider: Dmitry Amaral  Clinician: Marino Booker, RN    Dx:   Encounter Diagnosis   Name Primary?  [facility] left, with [facility]      Date of onset: 2019      SUMMARY OF PROGRESS:  Completed initial evaluation  Explained cardiac rehabilitation, cardiac risk factors, how the heart functions, Ejection fraction, and smoking  She stated she is not buying cigarettes but has had a few since she quit on 2019  Completed 6 minute walk , sub max treadmill, arm curl and chair to stand test  Obtained BMI, body fat% and waist measurements  Telemetry monitor NSR with BBB at rest and with exercise  Tolerated exercise well  Denied any complaint of discomfort  She stated she is non compliant with wearing life vest  She sis not wear it for two weeks  Will continue to monitor  Medication compliance: Yes   Comments: she is compliant with medications  Fall Risk: Low   Comments: ambulates with a steady gait    EKG changes: NSR with BBB      EXERCISE ASSESSMENT and PLAN    Current Exercise Program in Rehab:       Frequency: 1 days/week        Minutes: 26         METS: 3 1            HR:    RPE: 3-7         Modalities: Treadmill and Room walking      Exercise Progression 30 Day Goals :    Frequency: 2-3days/week   Minutes: 35-45   METS: 3 1   HR:     RPE: 4-6   Modalities: Treadmill, UBE, Lifecycle, NuStep and Recumbent bike    Strength trainin-3 days / week  12-15 repetitions  1-2 sets per modality   Will be added following 2-3 weeks of monitored exercise sessions    Modalities: Lateral Raise, Arm Curl, Upright Rows, Front Raises and Shoulder Shrugs    Progressing:   In Progress    Home Exercise: Type: walking    Goals: Improved 6MWT distance by 10%  Education: Benefit of exercise for CAD risk factors, home exercise guidelines, signs and sxs and RPE scale   Plan:home exercise 30+ mins 2 days opposite CR  Readiness to change: Contemplation      NUTRITION ASSESSMENT AND PLAN    Weight control:    Starting weight: 199 8   Current weight: Weight - Scale: 90 6 kg (199 lb 12 8 oz)   Waist circumference:    Startin 5   Current: Waist circumference (inches): 45 5 Inches  Diabetes: none  Lipid management: Discussed diet and lipid management and Last lipid profile 3/26/2019  Chol 171  TRG 53  HDL 44    Goals:continue to lose weight 5 to 10 pounds in 12 weeks  Education: heart healthy eating  low sodium diet  Progressing: In Progress  Plan: Decrease SFA  Readiness to change: Action      PSYCHOSOCIAL ASSESSMENT AND PLAN    Emotional: PHQ9 1          1-4 = Minimal Depression  Self-reported stress level: 0   Social support: Very Good  Goals:  increased energy  Education: signs/sxs of depression, benefits of positive support system and coping mechanisms  Progressing:Goal Met  Plan: Practice relaxation techniques  Readiness to change: Action      OTHER CORE COMPONENTS     Tobacco:   Social History     Tobacco Use   Smoking Status Former Smoker    Packs/day: 0 50    Years: 33 00    Pack years: 16 50    Types: Cigarettes    Last attempt to quit: 2019    Years since quittin 1   Smokeless Tobacco Never Used       Tobacco Use Intervention: Referral to tobacco expert:   [place] Quit Line 1-800-QUIT-NOW  Brief counseling by cardiac rehab professional  Date: 2019    Blood pressure:    Restin/74   Exercise: 146/74    Goals: consistent BP < 130/80, reduced dietary sodium <2300mg and Abstain from smoking  Education:  understanding HTN and CAD, low sodium diet and HTN and smoking and heart disease  Progressing: In Progress  Plan: Complete abstention from smoking  Readiness to change: Preparation

## 2019-08-05 NOTE — PROGRESS NOTES
Jeffery Ford   1970     Risk: low     Pre Post % Change Goal   Date: 8/5/2019      Physical       Sub Max ETT (mets) 5 8   10% increase   6MWT (feet) 1430   10% increase   Arm curls 20      Chair to Stands 16      [employer] (est peak O2) 7 25      Peak exercise CR/NJ (mets) 3 1   40% increase   Emotional       PHQ9 (> 10 refer to MD) 1   4 pt decrease   [place] (lower score = improvement)       Total 19   < 27   Feelings 2   < 3   Physical Fitness 4   < 3   Social Support 1   < 3   Daily Activities 2   < 3   Social Activities 1   < 3   Pain 1   < 3   Overall Health 3   < 3   Quality of Life 2   < 3   Change in Health 1   < 3   Dietary       Rate your plate 46   > 58   Measurements       Weight 199 5   2 5 - 5%   BMI 37 7   19 - 25   Waist Circ  45 5   < 40 M / < 35 F   % Body fat 49 9   < 25 M / < 33 F   BP left arm               (systolic) 319   < 861   (diastolic) 64   < 90   Smoking #/day  (if applicable) 1-2   0   Lipids/Glucose (Date) 3/26/2019      Total cholesterol 171   50 - 200   Triglycerides 53   < 150   HDL 44   40 - 60      < 100   A1C    4 0 - 5 6%   Fasting

## 2019-08-07 ENCOUNTER — CLINICAL SUPPORT (OUTPATIENT)
Dept: CARDIAC REHAB | Facility: CLINIC | Age: 49
End: 2019-08-07
Payer: COMMERCIAL

## 2019-08-07 DIAGNOSIS — I50.41 ACUTE COMBINED SYSTOLIC AND DIASTOLIC CONGESTIVE HEART FAILURE (HCC): ICD-10-CM

## 2019-08-07 PROCEDURE — 93798 PHYS/QHP OP CAR RHAB W/ECG: CPT

## 2019-08-12 ENCOUNTER — CLINICAL SUPPORT (OUTPATIENT)
Dept: CARDIAC REHAB | Facility: CLINIC | Age: 49
End: 2019-08-12
Payer: COMMERCIAL

## 2019-08-12 ENCOUNTER — OFFICE VISIT (OUTPATIENT)
Dept: CARDIOLOGY CLINIC | Facility: CLINIC | Age: 49
End: 2019-08-12
Payer: COMMERCIAL

## 2019-08-12 VITALS
SYSTOLIC BLOOD PRESSURE: 104 MMHG | BODY MASS INDEX: 37.19 KG/M2 | WEIGHT: 197 LBS | DIASTOLIC BLOOD PRESSURE: 58 MMHG | HEART RATE: 84 BPM | OXYGEN SATURATION: 96 % | HEIGHT: 61 IN

## 2019-08-12 DIAGNOSIS — I42.8 NON-ISCHEMIC CARDIOMYOPATHY (HCC): ICD-10-CM

## 2019-08-12 DIAGNOSIS — I50.1 HEART FAILURE, LEFT, WITH LVEF <=30% (HCC): Primary | ICD-10-CM

## 2019-08-12 DIAGNOSIS — I50.41 ACUTE COMBINED SYSTOLIC AND DIASTOLIC CONGESTIVE HEART FAILURE (HCC): ICD-10-CM

## 2019-08-12 PROCEDURE — 93798 PHYS/QHP OP CAR RHAB W/ECG: CPT

## 2019-08-12 PROCEDURE — 93000 ELECTROCARDIOGRAM COMPLETE: CPT | Performed by: INTERNAL MEDICINE

## 2019-08-12 PROCEDURE — 99214 OFFICE O/P EST MOD 30 MIN: CPT | Performed by: INTERNAL MEDICINE

## 2019-08-12 RX ORDER — TORSEMIDE 10 MG/1
TABLET ORAL
Qty: 30 TABLET | Refills: 1 | Status: SHIPPED | OUTPATIENT
Start: 2019-08-12 | End: 2019-10-01 | Stop reason: SDUPTHER

## 2019-08-12 RX ORDER — CARVEDILOL 6.25 MG/1
TABLET ORAL
Qty: 180 TABLET | Refills: 1 | Status: SHIPPED | OUTPATIENT
Start: 2019-08-12 | End: 2019-11-25 | Stop reason: SDUPTHER

## 2019-08-12 NOTE — PROGRESS NOTES
Cardiology Follow Up  Rajeev Range  1970  [id number]  [address] [facility]  [address] [facility]  [address] [facility], [facility] [address]  [place] [address] [phone]    1  Heart failure, left, with LVEF <=30% (HCC)  POCT ECG   2  Acute combined systolic and diastolic congestive heart failure (HCC)  POCT ECG   3  Non-ischemic cardiomyopathy (HCC)  POCT ECG      Discussion/Plan:  Nonischemic CM EF<30% NYHA class 2-  No shortness of breath  Continue wearing LifeVest   Repeat lab work shows potassium and creatinine function or normal- K 4 7  Tolerating Entresto therapy- 49-51mg (She will call back to verify)  She will continue carvedilol 6 25 mg twice a day  She will continue spironolactone 25 mg daily  Watch sodium intake- 2gm  She will need to wear compression socks  She will continue low sodium diet  Unclear trigger for the patient's cardiomyopathy  Possibly postviral   No device firing  We will repeat 2D echo  She is in [address] [place] [address] St class 2 at this point  Dry weight is 199 lb  Hyperkalemia- she will watch her potassium intake      Interval History:  She denies having significant lower extremity edema since discharge  She denies feeling dizziness or lightheadedness  She denies having chest discomfort  Denies having syncopal episodes  Denies having any irregular heart rhythm  8/12:  No edema  No syncope  No dizziness or light-headness  Compliant with meds  She has had no device firings  She has been compliant with the up titration of the Entresto  She reports trying to increase her carvedilol but did not feel right  She understands about orthostatic hypotension in the risk of prolonged standing  She has been watching her sodium intake  Her weight has been down trending      Patient Active Problem List   Diagnosis    Nicotine abuse    GERD (gastroesophageal reflux disease)    Acute combined systolic and diastolic congestive heart failure (HCC)    Diverticulitis    Hypokalemia    Non-ischemic cardiomyopathy (HCC)     Past Medical History:   Diagnosis Date    GERD (gastroesophageal reflux disease)     HH (hiatus hernia)      Social History     Socioeconomic History    Marital status: /Civil Union     Spouse name: Not on file    Number of children: Not on file    Years of education: Not on file    Highest education level: Not on file   Occupational History    Not on file   Social Needs    Financial resource strain: Not on file    Food insecurity:     Worry: Not on file     Inability: Not on file    Transportation needs:     Medical: Not on file     Non-medical: Not on file   Tobacco Use    Smoking status: Former Smoker     Packs/day: 0 50     Years: 33 00     Pack years: 16 50     Types: Cigarettes     Last attempt to quit: 2019     Years since quittin 1    Smokeless tobacco: Never Used   Substance and Sexual Activity    Alcohol use: Yes     Frequency: Monthly or less     Comment: 2x year    Drug use: No    Sexual activity: Not on file   Lifestyle    Physical activity:     Days per week: Not on file     Minutes per session: Not on file    Stress: Not on file   Relationships    Social connections:     Talks on phone: Not on file     Gets together: Not on file     Attends Confucianist service: Not on file     Active member of club or organization: Not on file     Attends meetings of clubs or organizations: Not on file     Relationship status: Not on file    Intimate partner violence:     Fear of current or ex partner: Not on file     Emotionally abused: Not on file     Physically abused: Not on file     Forced sexual activity: Not on file   Other Topics Concern    Not on file   Social History Narrative    Not on file      Family History   Problem Relation Age of Onset    Heart attack Mother     Heart attack Father      Past Surgical History:   Procedure Laterality Date     SECTION  CHOLECYSTECTOMY      HYSTERECTOMY         Current Outpatient Medications:     carvedilol (COREG) 6 25 mg tablet, Take 1 tablet (6 25 mg total) by mouth 2 (two) times a day with meals, Disp: 60 tablet, Rfl: 1    sacubitril-valsartan (ENTRESTO) 24-26 MG TABS, Take 1 tablet by mouth 2 (two) times a day, Disp: 60 tablet, Rfl: 0    spironolactone (ALDACTONE) 25 mg tablet, Take 1 tablet (25 mg total) by mouth daily, Disp: 30 tablet, Rfl: 1    SUMAtriptan (IMITREX) 100 mg tablet, Take 100 mg by mouth as needed, Disp: , Rfl:     torsemide (DEMADEX) 10 mg tablet, Take 1 tablet (10 mg total) by mouth every other day, Disp: 15 tablet, Rfl: 1  Allergies   Allergen Reactions    Percocet [Oxycodone-Acetaminophen]        Review of Systems:  Review of Systems   Constitutional: Negative  Negative for activity change, appetite change, chills, diaphoresis, fatigue, fever and unexpected weight change  HENT: Negative  Negative for congestion, dental problem, drooling, ear discharge, ear pain, facial swelling, hearing loss, mouth sores, nosebleeds, postnasal drip, rhinorrhea, sinus pressure, sinus pain, sneezing, sore throat, tinnitus, trouble swallowing and voice change  Eyes: Negative  Negative for photophobia, pain, redness, itching and visual disturbance  Respiratory: Positive for shortness of breath  Negative for apnea, cough, choking, chest tightness, wheezing and stridor  Cardiovascular: Negative for chest pain, palpitations and leg swelling  Gastrointestinal: Negative  Negative for abdominal distention, abdominal pain, anal bleeding, blood in stool, constipation, diarrhea, nausea, rectal pain and vomiting  Endocrine: Negative  Negative for cold intolerance, heat intolerance, polydipsia, polyphagia and polyuria  Genitourinary: Negative    Negative for decreased urine volume, difficulty urinating, dyspareunia, dysuria, enuresis, flank pain, frequency, genital sores, hematuria, menstrual problem, pelvic pain, urgency, vaginal bleeding, vaginal discharge and vaginal pain  Musculoskeletal: Negative  Negative for arthralgias, back pain, gait problem, joint swelling, myalgias, neck pain and neck stiffness  Skin: Negative  Negative for color change, pallor, rash and wound  Allergic/Immunologic: Negative  Negative for environmental allergies, food allergies and immunocompromised state  Neurological: Negative  Negative for dizziness, tremors, seizures, syncope, facial asymmetry, speech difficulty, weakness, light-headedness, numbness and headaches  Hematological: Negative  Negative for adenopathy  Does not bruise/bleed easily  Psychiatric/Behavioral: Negative  Negative for agitation, behavioral problems, confusion, decreased concentration, dysphoric mood, hallucinations, self-injury, sleep disturbance and suicidal ideas  The patient is not nervous/anxious and is not hyperactive  All other systems reviewed and are negative  Vitals:    08/12/19 1320   BP: 104/58   BP Location: Left arm   Patient Position: Sitting   Cuff Size: Large   Pulse: 84   SpO2: 96%   Weight: 89 4 kg (197 lb)   Height: 5' 1" (1 549 m)     Physical Exam:  Physical Exam   Constitutional: She is oriented to person, place, and time  She appears well-developed and well-nourished  No distress  HENT:   Head: Normocephalic and atraumatic  Right Ear: External ear normal    Left Ear: External ear normal    Eyes: Pupils are equal, round, and reactive to light  Conjunctivae are normal  Right eye exhibits no discharge  Left eye exhibits no discharge  No scleral icterus  Neck: Normal range of motion  Neck supple  No JVD present  No tracheal deviation present  No thyromegaly present  Cardiovascular: Normal rate and regular rhythm  Exam reveals gallop  Exam reveals no friction rub  No murmur heard  Pulmonary/Chest: Effort normal and breath sounds normal  No stridor  No respiratory distress  She has no wheezes   She has no rales  She exhibits no tenderness  Abdominal: Soft  Bowel sounds are normal  She exhibits no distension and no mass  There is no tenderness  There is no rebound and no guarding  Musculoskeletal: Normal range of motion  She exhibits no edema, tenderness or deformity  Neurological: She is alert and oriented to person, place, and time  She has normal reflexes  She displays normal reflexes  No cranial nerve deficit  She exhibits normal muscle tone  Coordination normal    Skin: Skin is warm and dry  No rash noted  She is not diaphoretic  No erythema  No pallor  Psychiatric: She has a normal mood and affect  Her behavior is normal  Judgment and thought content normal        Labs:     Lab Results   Component Value Date    WBC 6 40 2019    HGB 12 8 2019    HCT 41 7 2019    MCV 87 2019     2019     Lab Results   Component Value Date    K 4 7 2019     (H) 2019    CO2 20 2019    BUN 20 2019    CREATININE 1 02 (H) 2019    GLUF 145 (H) 2019    CALCIUM 8 7 2019    AST 21 2019    ALT 61 2019    ALKPHOS 101 2019    EGFR 70 2019     No results found for: CHOL  Lab Results   Component Value Date    HDL 44 2019     Lab Results   Component Value Date    LDLCALC 116 (H) 2019     Lab Results   Component Value Date    TRIG 53 2019     No results found for: HGBA1C    Imaging & Testing   I have personally reviewed pertinent reports  EKG: Personally reviewed        Cardiac testing:   Results for orders placed during the hospital encounter of 19   Echo complete with contrast if indicated    Narrative [address]  [address] [facility] 6  [phone]    Transthoracic Echocardiogram  2D, M-mode, Doppler, and Color Doppler    Study date:  2019    Patient: Eileen Ware  MR number: [id number]  Account number: [de-identified]  : 1970  Age: 44 years  Gender: Female  Status: Inpatient  Location: Bedside  Height: 61 in  Weight: 219 6 lb  BP: 118/ 80 mmHg    Indications: Heart Failure    Diagnoses: I50 9 - Heart failure, unspecified    Sonographer:  ISABELL Robins  Primary Physician:  Dhiraj Gil MD  Referring Physician:  Viral Salinas DO  Group:  [facility]  Interpreting Physician:  Sanam Chandra MD    SUMMARY    LEFT VENTRICLE:  Systolic function was markedly reduced  Ejection fraction was estimated in the range of 25 % to 30 % to be 30 %  There was severe diffuse hypokinesis  Doppler parameters were consistent with abnormal left ventricular relaxation (grade 1 diastolic dysfunction)  RIGHT VENTRICLE:  Systolic function was moderately reduced  LEFT ATRIUM:  The atrium was mildly dilated  RIGHT ATRIUM:  The atrium was mildly dilated  MITRAL VALVE:  There was trace regurgitation  IVC, HEPATIC VEINS:  The inferior vena cava was dilated  HISTORY: PRIOR HISTORY: GERD, Hiatal Hernia    PROCEDURE: The procedure was performed at the bedside  This was a routine study  The transthoracic approach was used  The study included complete 2D imaging, M-mode, complete spectral Doppler, and color Doppler  The heart rate was 92 bpm,  at the start of the study  Image quality was adequate  LEFT VENTRICLE: Size was normal  Systolic function was markedly reduced  Ejection fraction was estimated in the range of 25 % to 30 % to be 30 %  There was severe diffuse hypokinesis  Wall thickness was normal  No evidence of apical  thrombus  DOPPLER: Doppler parameters were consistent with abnormal left ventricular relaxation (grade 1 diastolic dysfunction)  RIGHT VENTRICLE: The size was normal  Systolic function was moderately reduced  Wall thickness was normal     LEFT ATRIUM: The atrium was mildly dilated  RIGHT ATRIUM: The atrium was mildly dilated  MITRAL VALVE: Valve structure was normal  There was normal leaflet separation  DOPPLER: The transmitral velocity was within the normal range  There was no evidence for stenosis  There was trace regurgitation  AORTIC VALVE: The valve was trileaflet  Leaflets exhibited normal thickness and normal cuspal separation  DOPPLER: Transaortic velocity was within the normal range  There was no evidence for stenosis  There was no significant  regurgitation  TRICUSPID VALVE: The valve structure was normal  There was normal leaflet separation  DOPPLER: The transtricuspid velocity was within the normal range  There was no evidence for stenosis  There was no significant regurgitation  PULMONIC VALVE: Leaflets exhibited normal thickness, no calcification, and normal cuspal separation  DOPPLER: The transpulmonic velocity was within the normal range  There was no significant regurgitation  PERICARDIUM: There was no pericardial effusion  The pericardium was normal in appearance  AORTA: The root exhibited normal size  SYSTEMIC VEINS: IVC: The inferior vena cava was dilated  SYSTEM MEASUREMENT TABLES    2D mode  AoR Diam 2D: 3 2 cm  LA Diam (2D): 4 5 cm  LA/Ao (2D): 1 41  FS (2D Teich): 13 7 %  IVSd (2D): 0 82 cm  LVDEV: 193 cmï¾³  LVESV: 138 cmï¾³  LVIDd(2D): 6 19 cm  LVISd (2D): 5 34 cm  LVPWd (2D): 0 81 cm  SV (Teich): 55 cmï¾³    Apical four chamber  LVEF A4C: 28 %    Unspecified Scan Mode  MV Peak E Zach  Mean: 848 mm/s  MVA (PHT): 4 15 cmï¾²  PHT: 53 ms  Max P mm[Hg]  V Max: 2440 mm/s  Vmax: 2500 mm/s  RA Area: 18 1 cmï¾²  RA Volume: 48 8 cmï¾³  TAPSE: 1 1 cm    [employer] Accredited Echocardiography Laboratory    Prepared and electronically signed by    Marychuy Stinson MD  Signed 2019 13:10:35       No results found for this or any previous visit      Marychuy Stinson MD [facility]  Please call with any questions or suggestions    A description of the counseling:   Goals and Barriers:  Patient's ability to self care:  Medication side effect reviewed with patient in detail and all their questions answered  "This note has been constructed using a voice recognition system  Therefore there may be syntax, spelling, and/or grammatical errors   Please call if you have any questions  "

## 2019-08-14 ENCOUNTER — CLINICAL SUPPORT (OUTPATIENT)
Dept: CARDIAC REHAB | Facility: CLINIC | Age: 49
End: 2019-08-14
Payer: COMMERCIAL

## 2019-08-14 DIAGNOSIS — I50.41 ACUTE COMBINED SYSTOLIC AND DIASTOLIC CONGESTIVE HEART FAILURE (HCC): ICD-10-CM

## 2019-08-14 PROCEDURE — 93798 PHYS/QHP OP CAR RHAB W/ECG: CPT

## 2019-08-19 ENCOUNTER — CLINICAL SUPPORT (OUTPATIENT)
Dept: CARDIAC REHAB | Facility: CLINIC | Age: 49
End: 2019-08-19
Payer: COMMERCIAL

## 2019-08-19 DIAGNOSIS — I50.41 ACUTE COMBINED SYSTOLIC AND DIASTOLIC CONGESTIVE HEART FAILURE (HCC): ICD-10-CM

## 2019-08-19 PROCEDURE — 93798 PHYS/QHP OP CAR RHAB W/ECG: CPT

## 2019-08-21 ENCOUNTER — CLINICAL SUPPORT (OUTPATIENT)
Dept: CARDIAC REHAB | Facility: CLINIC | Age: 49
End: 2019-08-21
Payer: COMMERCIAL

## 2019-08-21 DIAGNOSIS — I50.41 ACUTE COMBINED SYSTOLIC AND DIASTOLIC CONGESTIVE HEART FAILURE (HCC): ICD-10-CM

## 2019-08-21 PROCEDURE — 93798 PHYS/QHP OP CAR RHAB W/ECG: CPT

## 2019-08-26 ENCOUNTER — CLINICAL SUPPORT (OUTPATIENT)
Dept: CARDIAC REHAB | Facility: CLINIC | Age: 49
End: 2019-08-26
Payer: COMMERCIAL

## 2019-08-26 DIAGNOSIS — I50.41 ACUTE COMBINED SYSTOLIC AND DIASTOLIC CONGESTIVE HEART FAILURE (HCC): ICD-10-CM

## 2019-08-26 PROCEDURE — 93798 PHYS/QHP OP CAR RHAB W/ECG: CPT

## 2019-08-28 ENCOUNTER — CLINICAL SUPPORT (OUTPATIENT)
Dept: CARDIAC REHAB | Facility: CLINIC | Age: 49
End: 2019-08-28
Payer: COMMERCIAL

## 2019-08-28 DIAGNOSIS — I50.41 ACUTE COMBINED SYSTOLIC AND DIASTOLIC CONGESTIVE HEART FAILURE (HCC): ICD-10-CM

## 2019-08-28 PROCEDURE — 93798 PHYS/QHP OP CAR RHAB W/ECG: CPT

## 2019-09-02 ENCOUNTER — APPOINTMENT (OUTPATIENT)
Dept: CARDIAC REHAB | Facility: CLINIC | Age: 49
End: 2019-09-02
Payer: COMMERCIAL

## 2019-09-04 ENCOUNTER — OFFICE VISIT (OUTPATIENT)
Dept: GASTROENTEROLOGY | Facility: CLINIC | Age: 49
End: 2019-09-04
Payer: COMMERCIAL

## 2019-09-04 ENCOUNTER — CLINICAL SUPPORT (OUTPATIENT)
Dept: CARDIAC REHAB | Facility: CLINIC | Age: 49
End: 2019-09-04
Payer: COMMERCIAL

## 2019-09-04 VITALS
DIASTOLIC BLOOD PRESSURE: 78 MMHG | HEART RATE: 78 BPM | TEMPERATURE: 98.2 F | SYSTOLIC BLOOD PRESSURE: 124 MMHG | WEIGHT: 196.25 LBS | BODY MASS INDEX: 37.05 KG/M2 | HEIGHT: 61 IN

## 2019-09-04 DIAGNOSIS — I50.41 ACUTE COMBINED SYSTOLIC AND DIASTOLIC CONGESTIVE HEART FAILURE (HCC): ICD-10-CM

## 2019-09-04 DIAGNOSIS — K57.32 DIVERTICULITIS OF LARGE INTESTINE WITHOUT PERFORATION OR ABSCESS WITHOUT BLEEDING: ICD-10-CM

## 2019-09-04 DIAGNOSIS — K21.9 GASTROESOPHAGEAL REFLUX DISEASE, ESOPHAGITIS PRESENCE NOT SPECIFIED: Primary | ICD-10-CM

## 2019-09-04 PROCEDURE — 93798 PHYS/QHP OP CAR RHAB W/ECG: CPT

## 2019-09-04 PROCEDURE — 99214 OFFICE O/P EST MOD 30 MIN: CPT | Performed by: PHYSICIAN ASSISTANT

## 2019-09-04 NOTE — ASSESSMENT & PLAN NOTE
Patient appears asymptomatic at this point, but reports she had long-standing GERD issues for years prior to her hospitalization in June with CHF and diverticulitis, and she has not had EGD for over 5 years  She says she was told about a hiatal hernia at that time  Should rule out underlying Hsu's esophagus or malignancy    -  Will plan for EGD in addition to colonoscopy    -  Patient was explained about  the risks and benefits of the procedure  Risks including but not limited to bleeding, infection, perforation were explained in detail  Also explained about less than 100% sensitivity with the exam and other alternatives  -  Patient was explained about the lifestyle and dietary modifications  Advised to avoid fatty foods, chocolates, caffeine, alcohol and any other triggering foods  Avoid eating for at least 3 hours before going to bed

## 2019-09-04 NOTE — PROGRESS NOTES
Cardiac Rehabilitation Plan of Care   30 day       Today's date: 2019   Visits: 9  Patient name: Pamela Richardson      : 1970  Age: 44 y o  MRN: [id number]  Referring Physician: Margot Mcdonald MD  Provider: Jules Grove  Clinician: Lm Mcgee RN    Dx:   Encounter Diagnosis   Name Primary?  Acute combined systolic and diastolic congestive heart failure [facility])      Date of onset: 2019      SUMMARY OF PROGRESS:  Mrs Radha Tate completed 9 sessions of the cardiac rehabilitation program  She stated she is not buying cigarettes but continues to smoke 0 -2 cigarettes a day  She has the patch at the home and will restart using the patch  She stated prior she quit smoking for 6 weeks  Discussed alternative tips to smoking and relaxation tips  Also discussed Telemetry monitor NSR with BBB at rest and with exercise  Tolerated exercise well  Exercise MET level increased from 3 1 to 3 8 MET's  She complained of discomfort in her neck  Provided her with neck stretches  Denied any complaint of chest discomfort  She stated she is non compliant with wearing life vest  Will continue to monitor       Medication compliance: Yes   Comments: she is compliant with medications  Fall Risk: Low   Comments: ambulates with a steady gait    EKG changes: NSR with BBB      EXERCISE ASSESSMENT and PLAN    Current Exercise Program in Rehab:       Frequency: 2-3 days/week        Minutes: 45         METS: 3 8            HR:    RPE: 3-7         Modalities: Treadmill, UBE and Recumbent bike      Exercise Progression 30 Day Goals :    Frequency: 2-3days/week   Minutes: 45-50   METS: 3 8-4 8   HR:     RPE: 4-6   Modalities: Treadmill, UBE, Lifecycle, NuStep and Recumbent bike    Strength trainin-3 days / week  12-15 repetitions  1-2 sets per modality   Will be added following 2-3 weeks of monitored exercise sessions    Modalities: Lateral Raise, Arm Curl, Upright Rows, Front Raises and Shoulder Dominique    Progressing: In Progress    Home Exercise: Type: walking    Goals: Improved 6MWT distance by 10%  Education: Benefit of exercise for CAD risk factors, home exercise guidelines, signs and sxs and RPE scale   Plan:home exercise 30+ mins 2 days opposite CR  Readiness to change: Contemplation      NUTRITION ASSESSMENT AND PLAN    Weight control:    Starting weight: 199 8   Current weight:     Waist circumference:    Startin 5   Current:    Diabetes: none  Lipid management: Discussed diet and lipid management and Last lipid profile 3/26/2019  Chol 171  TRG 53  HDL 44    Goals:continue to lose weight 5 to 10 pounds in 12 weeks  Education: heart healthy eating  low sodium diet  Progressing: In Progress  Plan: Decrease SFA  Readiness to change: Action      PSYCHOSOCIAL ASSESSMENT AND PLAN    Emotional: PHQ9 1          1-4 = Minimal Depression  Self-reported stress level: 0   Social support: Very Good  Goals:  increased energy  Education: signs/sxs of depression, benefits of positive support system and coping mechanisms  Progressing:Goal Met  Plan: Practice relaxation techniques  Readiness to change: Action      OTHER CORE COMPONENTS     Tobacco:   Social History     Tobacco Use   Smoking Status Former Smoker    Packs/day: 0 50    Years: 33 00    Pack years: 16 50    Types: Cigarettes    Last attempt to quit: 2019    Years since quittin 2   Smokeless Tobacco Never Used       Tobacco Use Intervention: Referral to tobacco expert:   [place] Quit Line 1-800-QUIT-NOW  Brief counseling by cardiac rehab professional  Date: 2019    Blood pressure:    Restin/74   Exercise: 118/60    Goals: consistent BP < 130/80, reduced dietary sodium <2300mg and Abstain from smoking  Education:  understanding HTN and CAD, low sodium diet and HTN and smoking and heart disease  Progressing: In Progress  Plan: Complete abstention from smoking  Readiness to change: Preparation

## 2019-09-04 NOTE — PROGRESS NOTES
Follow-up Note -  [facility]  Jorge Ricks 1970 44 y o  female         Reason:  Follow-up; recent hospitalization with diverticulitis, history of GERD    HPI:  57-year-old female with history of tobacco abuse, nonischemic CMO with EF <30%, GERD who presents for follow-up; she was hospitalized at the end of June after presenting with increasing shortness of breath, lower extremity swelling, weight gain; At that time she had recently been diagnosed with CHF with echocardiogram estimating ejection fraction of 25-30%  During hospitalization in June, she also was noted with evidence of noncomplicated sigmoid diverticulitis on the CT scan, and she was treated with IV antibiotics  She said her last colonoscopy was over 5 years earlier; denied any prior history of diverticulitis or any family history of colon cancer  Patient says she feels significantly better than she did 3 months ago, she is not having any nausea, vomiting, denies any significant shortness of breath at this time  She Says she occasionally gets some mild left-sided abdominal discomfort which can last for a few hours, but this is uncommon and certainly does not happen every day  She said she used to have severe issues with GERD, acid reflux, lying on Tums, but although this had been going on for a long time, it stopped after her hospitalization in June  She also says she used to have issues with abdominal distention and bloating, but this also improved after her treatment for diverticulitis  She says that she had EGD before, but this was over 5 years ago, and not the same time as her last colonoscopy which was also over 5 years ago  REVIEW OF SYSTEMS:      CONSTITUTIONAL: Denies any fever, chills, or rigors  Good appetite, and no recent weight loss  HEENT: No earache or tinnitus  Denies hearing loss or visual disturbances  CARDIOVASCULAR: No chest pain or palpitations     RESPIRATORY: Denies any cough, hemoptysis, shortness of breath or dyspnea on exertion  GASTROINTESTINAL: As noted in the History of Present Illness  GENITOURINARY: No problems with urination  Denies any hematuria or dysuria  NEUROLOGIC: No dizziness or vertigo, denies headaches  MUSCULOSKELETAL: Denies any muscle or joint pain  SKIN: Denies skin rashes or itching  ENDOCRINE: Denies excessive thirst  Denies intolerance to heat or cold  PSYCHOSOCIAL: Denies depression or anxiety  Denies any recent memory loss       Past Medical History:   Diagnosis Date    CHF (congestive heart failure) (HCC)     Diverticulitis of colon     GERD (gastroesophageal reflux disease)     HH (hiatus hernia)       Past Surgical History:   Procedure Laterality Date     SECTION      CHOLECYSTECTOMY      HYSTERECTOMY       Social History     Socioeconomic History    Marital status: /Civil Union     Spouse name: Not on file    Number of children: Not on file    Years of education: Not on file    Highest education level: Not on file   Occupational History    Not on file   Social Needs    Financial resource strain: Not on file    Food insecurity:     Worry: Not on file     Inability: Not on file    Transportation needs:     Medical: Not on file     Non-medical: Not on file   Tobacco Use    Smoking status: Former Smoker     Packs/day: 0 50     Years: 33 00     Pack years: 16 50     Types: Cigarettes     Last attempt to quit: 2019     Years since quittin 2    Smokeless tobacco: Never Used   Substance and Sexual Activity    Alcohol use: Yes     Frequency: Monthly or less     Comment: 2x year    Drug use: No    Sexual activity: Not on file   Lifestyle    Physical activity:     Days per week: Not on file     Minutes per session: Not on file    Stress: Not on file   Relationships    Social connections:     Talks on phone: Not on file     Gets together: Not on file     Attends [employer] service: Not on file     Active member of club or organization: Not on file     Attends meetings of clubs or organizations: Not on file     Relationship status: Not on file    Intimate partner violence:     Fear of current or ex partner: Not on file     Emotionally abused: Not on file     Physically abused: Not on file     Forced sexual activity: Not on file   Other Topics Concern    Not on file   Social History Narrative    Not on file     Family History   Problem Relation Age of Onset    Heart attack Mother     Heart attack Father      Percocet [oxycodone-acetaminophen]  Current Outpatient Medications   Medication Sig Dispense Refill    carvedilol (COREG) 6 25 mg tablet Take one tablet twice a day 180 tablet 1    sacubitril-valsartan (ENTRESTO) 49-51 MG TABS Take 1 tablet by mouth 2 (two) times a day 180 tablet 3    spironolactone (ALDACTONE) 25 mg tablet Take 1 tablet (25 mg total) by mouth daily 30 tablet 1    SUMAtriptan (IMITREX) 100 mg tablet Take 100 mg by mouth as needed      torsemide (DEMADEX) 10 mg tablet Take one tablet once a day as needed for swelling 30 tablet 1     No current facility-administered medications for this visit  Blood pressure 124/78, pulse 78, temperature 98 2 °F (36 8 °C), height 5' 1" (1 549 m), weight 89 kg (196 lb 4 oz)  PHYSICAL EXAM:      General Appearance:   Alert, cooperative, no distress, appears stated age    HEENT:   Normocephalic, atraumatic, anicteric      Neck:  Supple, symmetrical, trachea midline, no adenopathy;    thyroid: no enlargement/tenderness/nodules; no carotid  bruit or JVD    Lungs:   Clear to auscultation bilaterally; no rales, rhonchi or wheezing; respirations unlabored    Heart[de-identified]   S1 and S2 normal; regular rate and rhythm; no murmur, rub, or gallop     Abdomen:   Soft, obese, Minimally tender in the left lower quadrant without guarding or peritoneal signs, non-distended; normal bowel sounds; no masses, no organomegaly    Extremities: No edema, erythema, wounds, rashes   Rectal:   Deferred                      Lab Results   Component Value Date    WBC 6 40 06/26/2019    HGB 12 8 06/26/2019    HCT 41 7 06/26/2019    MCV 87 06/26/2019     06/26/2019     Lab Results   Component Value Date    CALCIUM 8 7 06/26/2019    K 4 7 07/31/2019    CO2 20 07/31/2019     (H) 07/31/2019    BUN 20 07/31/2019    CREATININE 1 02 (H) 07/31/2019     Lab Results   Component Value Date    ALT 61 06/26/2019    AST 21 06/26/2019    ALKPHOS 101 06/26/2019     Lab Results   Component Value Date    INR 1 09 06/23/2019    INR 0 94 03/25/2019    PROTIME 11 4 06/23/2019    PROTIME 9 9 03/25/2019       Pe Study With Ct Abdomen And Pelvis With Contrast    Result Date: 6/23/2019  Impression: No pulmonary emboli  The constellation of findings in the chest combined with the clinical history suggest possible right and left heart failure with pleural effusions and pulmonary edema and contrast refluxing into IVC and hepatic veins  Mild mediastinal adenopathy  This might be on a passive basis from the congestive changes  Mild acute diverticulitis at the junction of descending and sigmoid colon, without secondary complications  Cholecystectomy and hysterectomy  Minimal free fluid in the abdomen  Workstation performed: [id number]       ASSESSMENT & PLAN:    Diverticulitis of large intestine without perforation or abscess without bleeding  Patient was treated in June for her first known episode of sigmoid diverticulitis; She appears significantly improved after antibiotic therapy, admitting to only occasional mild left lower quadrant discomfort which is not a daily occurrence  Abdominal exam appears benign at this time  Rule out any underlying inflammatory polyps or malignancy, she has not had colonoscopy for over 5 years    - Will schedule patient for colonoscopy  - Patient was explained about  the risks and benefits of the procedure   Risks including but not limited to bleeding, infection, perforation were explained in detail  Also explained about less than 100% sensitivity with the exam and other alternatives  - Patient was given instructions about the colonoscopy prep  -  Advised patient that she may reintroduce high-fiber foods to her diet if she continues to feel well    - I also advised patient that she develops significant left lower quadrant discomfort which is persistent or accompanied with new symptoms such as fevers/chills, night sweats, vomiting, etc , then she should go to the emergency room for further evaluation        GERD (gastroesophageal reflux disease)  Patient appears asymptomatic at this point, but reports she had long-standing GERD issues for years prior to her hospitalization in June with CHF and diverticulitis, and she has not had EGD for over 5 years  She says she was told about a hiatal hernia at that time  Should rule out underlying Hsu's esophagus or malignancy    -  Will plan for EGD in addition to colonoscopy    -  Patient was explained about  the risks and benefits of the procedure  Risks including but not limited to bleeding, infection, perforation were explained in detail  Also explained about less than 100% sensitivity with the exam and other alternatives  -  Patient was explained about the lifestyle and dietary modifications  Advised to avoid fatty foods, chocolates, caffeine, alcohol and any other triggering foods  Avoid eating for at least 3 hours before going to bed

## 2019-09-04 NOTE — ASSESSMENT & PLAN NOTE
Patient was treated in June for her first known episode of sigmoid diverticulitis; She appears significantly improved after antibiotic therapy, admitting to only occasional mild left lower quadrant discomfort which is not a daily occurrence  Abdominal exam appears benign at this time  Rule out any underlying inflammatory polyps or malignancy, she has not had colonoscopy for over 5 years    - Will schedule patient for colonoscopy  - Patient was explained about  the risks and benefits of the procedure  Risks including but not limited to bleeding, infection, perforation were explained in detail  Also explained about less than 100% sensitivity with the exam and other alternatives  - Patient was given instructions about the colonoscopy prep      -  Advised patient that she may reintroduce high-fiber foods to her diet if she continues to feel well    - I also advised patient that she develops significant left lower quadrant discomfort which is persistent or accompanied with new symptoms such as fevers/chills, night sweats, vomiting, etc , then she should go to the emergency room for further evaluation

## 2019-09-05 LAB
BUN SERPL-MCNC: 25 MG/DL (ref 6–24)
BUN/CREAT SERPL: 26 (ref 9–23)
CALCIUM SERPL-MCNC: 9.6 MG/DL (ref 8.7–10.2)
CHLORIDE SERPL-SCNC: 104 MMOL/L (ref 96–106)
CO2 SERPL-SCNC: 20 MMOL/L (ref 20–29)
CREAT SERPL-MCNC: 0.95 MG/DL (ref 0.57–1)
GLUCOSE SERPL-MCNC: 114 MG/DL (ref 65–99)
POTASSIUM SERPL-SCNC: 4.2 MMOL/L (ref 3.5–5.2)
SL AMB EGFR AFRICAN AMERICAN: 80 ML/MIN/1.73
SL AMB EGFR NON AFRICAN AMERICAN: 70 ML/MIN/1.73
SODIUM SERPL-SCNC: 142 MMOL/L (ref 134–144)

## 2019-09-06 ENCOUNTER — APPOINTMENT (OUTPATIENT)
Dept: CARDIAC REHAB | Facility: CLINIC | Age: 49
End: 2019-09-06
Payer: COMMERCIAL

## 2019-09-09 ENCOUNTER — CLINICAL SUPPORT (OUTPATIENT)
Dept: CARDIAC REHAB | Facility: CLINIC | Age: 49
End: 2019-09-09
Payer: COMMERCIAL

## 2019-09-09 DIAGNOSIS — I50.41 ACUTE COMBINED SYSTOLIC AND DIASTOLIC CONGESTIVE HEART FAILURE (HCC): ICD-10-CM

## 2019-09-09 PROCEDURE — 93798 PHYS/QHP OP CAR RHAB W/ECG: CPT

## 2019-09-11 ENCOUNTER — CLINICAL SUPPORT (OUTPATIENT)
Dept: CARDIAC REHAB | Facility: CLINIC | Age: 49
End: 2019-09-11
Payer: COMMERCIAL

## 2019-09-11 DIAGNOSIS — I50.41 ACUTE COMBINED SYSTOLIC AND DIASTOLIC CONGESTIVE HEART FAILURE (HCC): ICD-10-CM

## 2019-09-11 PROCEDURE — 93798 PHYS/QHP OP CAR RHAB W/ECG: CPT

## 2019-09-12 ENCOUNTER — HOSPITAL ENCOUNTER (OUTPATIENT)
Dept: NON INVASIVE DIAGNOSTICS | Facility: HOSPITAL | Age: 49
Discharge: HOME | End: 2019-09-12
Attending: INTERNAL MEDICINE
Payer: COMMERCIAL

## 2019-09-12 DIAGNOSIS — I50.41 ACUTE COMBINED SYSTOLIC AND DIASTOLIC CONGESTIVE HEART FAILURE (HCC): ICD-10-CM

## 2019-09-12 DIAGNOSIS — I50.1 HEART FAILURE, LEFT, WITH LVEF <=30% (HCC): ICD-10-CM

## 2019-09-12 PROCEDURE — 93308 TTE F-UP OR LMTD: CPT

## 2019-09-13 ENCOUNTER — CLINICAL SUPPORT (OUTPATIENT)
Dept: CARDIAC REHAB | Facility: CLINIC | Age: 49
End: 2019-09-13
Payer: COMMERCIAL

## 2019-09-13 DIAGNOSIS — I50.41 ACUTE COMBINED SYSTOLIC AND DIASTOLIC CONGESTIVE HEART FAILURE (HCC): ICD-10-CM

## 2019-09-13 PROCEDURE — 93325 DOPPLER ECHO COLOR FLOW MAPG: CPT | Performed by: INTERNAL MEDICINE

## 2019-09-13 PROCEDURE — 93798 PHYS/QHP OP CAR RHAB W/ECG: CPT

## 2019-09-13 PROCEDURE — 93308 TTE F-UP OR LMTD: CPT | Performed by: INTERNAL MEDICINE

## 2019-09-13 PROCEDURE — 93321 DOPPLER ECHO F-UP/LMTD STD: CPT | Performed by: INTERNAL MEDICINE

## 2019-09-16 ENCOUNTER — CLINICAL SUPPORT (OUTPATIENT)
Dept: CARDIAC REHAB | Facility: CLINIC | Age: 49
End: 2019-09-16
Payer: COMMERCIAL

## 2019-09-16 DIAGNOSIS — I50.41 ACUTE COMBINED SYSTOLIC AND DIASTOLIC CONGESTIVE HEART FAILURE (HCC): ICD-10-CM

## 2019-09-16 PROCEDURE — 93798 PHYS/QHP OP CAR RHAB W/ECG: CPT

## 2019-09-18 ENCOUNTER — CLINICAL SUPPORT (OUTPATIENT)
Dept: CARDIAC REHAB | Facility: CLINIC | Age: 49
End: 2019-09-18
Payer: COMMERCIAL

## 2019-09-18 DIAGNOSIS — I50.41 ACUTE COMBINED SYSTOLIC AND DIASTOLIC CONGESTIVE HEART FAILURE (HCC): ICD-10-CM

## 2019-09-18 PROCEDURE — 93798 PHYS/QHP OP CAR RHAB W/ECG: CPT

## 2019-09-18 RX ORDER — SPIRONOLACTONE 25 MG/1
25 TABLET ORAL DAILY
Qty: 90 TABLET | Refills: 1 | Status: SHIPPED | OUTPATIENT
Start: 2019-09-18 | End: 2019-12-26 | Stop reason: SDUPTHER

## 2019-09-18 NOTE — PROGRESS NOTES
Progress Note - Cardiology Office  [facility]    Forrest Helms 44 y o  female MRN: [id number]  : 1970  Encounter: [id number]      Assessment:     1  Acute combined systolic and diastolic congestive heart failure ([facility] )    2  Non-ischemic cardiomyopathy ([facility] )    3  Nicotine abuse        Discussion Summary and Plan:      Please call [phone] if any questions  Counseling :  A description of the counseling  Goals and Barriers  Patient's ability to self care: Yes  Medication side effect reviewed with patient in detail and all their questions answered to their satisfaction  HPI :     Forrest Helms is a 44y o  year old female who came for follow up   Patient has    Review of Systems    Historical Information   Past Medical History:   Diagnosis Date    CHF (congestive heart failure) ([facility] )     Diverticulitis of colon     GERD (gastroesophageal reflux disease)     HH (hiatus hernia)      Past Surgical History:   Procedure Laterality Date     SECTION      CHOLECYSTECTOMY      HYSTERECTOMY       Social History     Substance and Sexual Activity   Alcohol Use Yes    Frequency: Monthly or less    Comment: 2x year     Social History     Substance and Sexual Activity   Drug Use No     Social History     Tobacco Use   Smoking Status Former Smoker    Packs/day: 0 50    Years: 33 00    Pack years: 16 50    Types: Cigarettes    Last attempt to quit: 2019    Years since quittin 2   Smokeless Tobacco Never Used     Family History:   Family History   Problem Relation Age of Onset    Heart attack Mother     Heart attack Father        Meds/Allergies     Allergies   Allergen Reactions    Percocet [Oxycodone-Acetaminophen]        Current Outpatient Medications:     carvedilol (COREG) 6 25 mg tablet, Take one tablet twice a day, Disp: 180 tablet, Rfl: 1    sacubitril-valsartan (ENTRESTO) 49-51 MG TABS, Take 1 tablet by mouth 2 (two) times a day, Disp: 180 tablet, Rfl: 3    spironolactone (ALDACTONE) 25 mg tablet, Take 1 tablet (25 mg total) by mouth daily, Disp: 90 tablet, Rfl: 1    SUMAtriptan (IMITREX) 100 mg tablet, Take 100 mg by mouth as needed, Disp: , Rfl:     torsemide (DEMADEX) 10 mg tablet, Take one tablet once a day as needed for swelling, Disp: 30 tablet, Rfl: 1    Vitals: There were no vitals taken for this visit  There is no height or weight on file to calculate BMI  There were no vitals filed for this visit  BP Readings from Last 3 Encounters:   19 124/78   19 104/58   19 124/66       Physical Exam:          Diagnostic Studies Review Cardio:      EKG:    Cardiac testing:   Results for orders placed during the hospital encounter of 19   Echo complete with contrast if indicated    Gladys Barron [address]  [address] [facility]  [phone]    Transthoracic Echocardiogram  2D, M-mode, Doppler, and Color Doppler    Study date:  2019    Patient: Carlene Guevara  MR number: [id number]  Account number: [de-identified]  : 1970  Age: 44 years  Gender: Female  Status: Inpatient  Location: Bedside  Height: 61 in  Weight: 219 6 lb  BP: 118/ 80 mmHg    Indications: Heart Failure    Diagnoses: I50 9 - Heart failure, unspecified    Sonographer:  ISABELL Sethi  Primary Physician:  Bryce Jacques MD  Referring Physician:  Fantasma Valdovinos DO  Group:  [facility]  Interpreting Physician:  Calos Silva MD    SUMMARY    LEFT VENTRICLE:  Systolic function was markedly reduced  Ejection fraction was estimated in the range of 25 % to 30 % to be 30 %  There was severe diffuse hypokinesis  Doppler parameters were consistent with abnormal left ventricular relaxation (grade 1 diastolic dysfunction)  RIGHT VENTRICLE:  Systolic function was moderately reduced  LEFT ATRIUM:  The atrium was mildly dilated  RIGHT ATRIUM:  The atrium was mildly dilated      MITRAL VALVE:  There was trace regurgitation  IVC, HEPATIC VEINS:  The inferior vena cava was dilated  HISTORY: PRIOR HISTORY: GERD, Hiatal Hernia    PROCEDURE: The procedure was performed at the bedside  This was a routine study  The transthoracic approach was used  The study included complete 2D imaging, M-mode, complete spectral Doppler, and color Doppler  The heart rate was 92 bpm,  at the start of the study  Image quality was adequate  LEFT VENTRICLE: Size was normal  Systolic function was markedly reduced  Ejection fraction was estimated in the range of 25 % to 30 % to be 30 %  There was severe diffuse hypokinesis  Wall thickness was normal  No evidence of apical  thrombus  DOPPLER: Doppler parameters were consistent with abnormal left ventricular relaxation (grade 1 diastolic dysfunction)  RIGHT VENTRICLE: The size was normal  Systolic function was moderately reduced  Wall thickness was normal     LEFT ATRIUM: The atrium was mildly dilated  RIGHT ATRIUM: The atrium was mildly dilated  MITRAL VALVE: Valve structure was normal  There was normal leaflet separation  DOPPLER: The transmitral velocity was within the normal range  There was no evidence for stenosis  There was trace regurgitation  AORTIC VALVE: The valve was trileaflet  Leaflets exhibited normal thickness and normal cuspal separation  DOPPLER: Transaortic velocity was within the normal range  There was no evidence for stenosis  There was no significant  regurgitation  TRICUSPID VALVE: The valve structure was normal  There was normal leaflet separation  DOPPLER: The transtricuspid velocity was within the normal range  There was no evidence for stenosis  There was no significant regurgitation  PULMONIC VALVE: Leaflets exhibited normal thickness, no calcification, and normal cuspal separation  DOPPLER: The transpulmonic velocity was within the normal range  There was no significant regurgitation  PERICARDIUM: There was no pericardial effusion  The pericardium was normal in appearance  AORTA: The root exhibited normal size  SYSTEMIC VEINS: IVC: The inferior vena cava was dilated  SYSTEM MEASUREMENT TABLES    2D mode  AoR Diam 2D: 3 2 cm  LA Diam (2D): 4 5 cm  LA/Ao (2D): 1 41  FS (2D Teich): 13 7 %  IVSd (2D): 0 82 cm  LVDEV: 193 cmï¾³  LVESV: 138 cmï¾³  LVIDd(2D): 6 19 cm  LVISd (2D): 5 34 cm  LVPWd (2D): 0 81 cm  SV (Teich): 55 cmï¾³    Apical four chamber  LVEF A4C: 28 %    Unspecified Scan Mode  MV Peak E Zach  Mean: 848 mm/s  MVA (PHT): 4 15 cmï¾²  PHT: 53 ms  Max P mm[Hg]  V Max: 2440 mm/s  Vmax: 2500 mm/s  RA Area: 18 1 cmï¾²  RA Volume: 48 8 cmï¾³  TAPSE: 1 1 cm    [facility] Accredited Echocardiography Laboratory    Prepared and electronically signed by    Ramón Berumen MD  Signed 2019 13:10:35         Results for orders placed during the hospital encounter of 19   NM Myocardial Perfusion Spect (Pharmacological Induced Stress and/or Rest)    Narrative Beatrice 39  [address] [facility] [phone]    Rest/Stress Gated SPECT Myocardial Perfusion Imaging After Exercise    Patient: Rola Richey  MR number: [id number]  Account number: [de-identified]  : 1970  Age: 44 years  Gender: Female  Status: Outpatient  Location: Stress lab  Height: 61 in  Weight: 209 lb  BP: 142/ 80 mmHg    Allergies: OXYCODONE-ACETAMINOPHEN    Diagnosis: R06 02 - Shortness of breath, R07 9 - Chest pain, unspecified    Primary Physician:  Evelyn Bah MD  Technician:  Fany Bhandari  RN:  TERRANCE Sutherland  Group:  Elsa Hodges  Report Prepared By[de-identified]  TERRANCE Sutherland  Interpreting Physician:  Cherelle Marley DO    INDICATIONS: Evaluation for coronary artery disease  HISTORY: The patient is a 44year old  female  Chest pain status: chest pain  Other symptoms: dyspnea  Coronary artery disease risk factors: smoking and family history of premature coronary artery disease  Cardiovascular history:  none significant  Co-morbidity: obesity  Medications: aspirin  PHYSICAL EXAM: Baseline physical exam screening: normal     REST ECG: Normal sinus rhythm  Normal baseline ECG  The ECG showed rare premature ventricular contractions  PROCEDURE: The study was performed in the the Stress lab  Treadmill exercise testing was performed, using the Sal protocol  Gated SPECT myocardial perfusion imaging was performed after stress and at rest  Systolic blood pressure was 142  mmHg, at the start of the study  Diastolic blood pressure was 80 mmHg, at the start of the study  The heart rate was 95 bpm, at the start of the study  Patient was not experiencing chest pain at the time of the injection of the  radiopharmaceutical  IV double checked  SAL PROTOCOL:  HR bpm SBP mmHg DBP mmHg Symptoms ST change Rhythm/conduct  Baseline 95 142 80 none none NSR, no ectopy, NSR, rare PVC's  Stage 1 137 154 80 mild dyspnea -- --  Stage 2 150 160 80 moderate dyspnea -- --  Immediate 131 174 80 same as above -- --  Recovery 1 102 156 80 subsiding -- --  Recovery 2 97 130 80 none -- --  No medications or fluids given  STRESS SUMMARY: Duration of exercise was 6 min  The patient exercised to protocol stage 2  Maximal work rate was 7 METs  Functional capacity was normal  Maximal heart rate during stress was 150 bpm ( 89 % of maximal predicted heart rate)  The heart rate response to stress was normal  There was normal resting blood pressure with an appropriate response to stress  The rate-pressure product for the peak heart rate and blood pressure was 55203  There was no chest pain during  stress  The stress test was terminated due to achievement of target heart rate and moderate dyspnea  Pre oxygen saturation: 97 %  Peak oxygen saturation: 96 %  The stress ECG was normal  There were no stress arrhythmias or conduction  abnormalities      ISOTOPE ADMINISTRATION:  Resting isotope administration Stress isotope administration  Agent Sestamibi Sestamibi  Dose 10 9 mCi 30 4 mCi  Date 03/26/2019 03/26/2019  Injection time 08:30 10:55    The radiopharmaceutical was injected at the peak effect of pharmacologic stress  Radiopharmaceutical was administered 4 min, 30 sec into the stress protocol  There was 2 min of exercise after the injection  MYOCARDIAL PERFUSION IMAGING:  The image quality was good  Left ventricular size was normal     PERFUSION DEFECTS:  -  There were no perfusion defects  GATED SPECT:  The calculated left ventricular ejection fraction was 35 %  Left ventricular ejection fraction was within normal limits by visual estimate  There was no diagnostic evidence for left ventricular regional abnormality  SUMMARY:  -  Stress results: Duration of exercise was 6 min  Target heart rate was achieved  There was no chest pain during stress  -  ECG conclusions: The stress ECG was normal   -  Perfusion imaging: There were no perfusion defects   -  Gated SPECT: The calculated left ventricular ejection fraction was 35 %  Left ventricular ejection fraction was within normal limits by visual estimate  There was no diagnostic evidence for left ventricular regional abnormality  IMPRESSIONS: Probably normal study after maximal exercise  There was no evidence of ischemia or infarction present  The calculated EF was 35% but visually appears better  Recommend 2D echocardiogram for further evaluation  Prepared and signed by    Mary Cunningham DO  Signed 03/26/2019 15:30:54           Imaging:  Chest X-Ray:   Xr Chest 2 Views    Result Date: 3/25/2019  Impression No active pulmonary disease on examination which is somewhat limited secondary to low lung volumes  Workstation performed: [id number]       CT-scan of the chest:     Pe Study With Ct Abdomen And Pelvis With Contrast    Result Date: 6/23/2019  Impression No pulmonary emboli   The constellation of findings in the chest combined with the clinical history suggest possible right and left heart failure with pleural effusions and pulmonary edema and contrast refluxing into IVC and hepatic veins  Mild mediastinal adenopathy  This might be on a passive basis from the congestive changes  Mild acute diverticulitis at the junction of descending and sigmoid colon, without secondary complications  Cholecystectomy and hysterectomy  Minimal free fluid in the abdomen  Workstation performed: [id number]     Lab Review   Lab Results   Component Value Date    WBC 6 40 06/26/2019    HGB 12 8 06/26/2019    HCT 41 7 06/26/2019    MCV 87 06/26/2019    RDW 15 6 (H) 06/26/2019     06/26/2019     BMP:  Lab Results   Component Value Date    SODIUM 142 09/04/2019    K 4 2 09/04/2019     09/04/2019    CO2 20 09/04/2019    BUN 25 (H) 09/04/2019    CREATININE 0 95 09/04/2019    GLUC 114 (H) 09/04/2019    GLUF 145 (H) 03/26/2019    CALCIUM 8 7 06/26/2019    EGFR 70 06/26/2019    MG 2 2 06/24/2019     LFT:  Lab Results   Component Value Date    AST 21 06/26/2019    ALT 61 06/26/2019    ALKPHOS 101 06/26/2019    TP 6 2 (L) 06/26/2019    ALB 3 1 (L) 06/26/2019      Lab Results   Component Value Date    HTL7LYQEEHFP 2 482 06/24/2019     No results found for: HGBA1C  Lipid Profile:   Lab Results   Component Value Date    CHOLESTEROL 171 03/26/2019    HDL 44 03/26/2019    LDLCALC 116 (H) 03/26/2019    TRIG 53 03/26/2019     Lab Results   Component Value Date    CHOLESTEROL 171 03/26/2019     Lab Results   Component Value Date    TROPONINI 0 03 06/25/2019     Lab Results   Component Value Date    NTBNP 7,973 (H) 06/23/2019              Dr Ivett Aguayo MD [facility]      "This note has been constructed using a voice recognition system  Therefore there may be syntax, spelling, and/or grammatical errors   Please call if you have any questions  "

## 2019-09-18 NOTE — TELEPHONE ENCOUNTER
----- Message from Gaudencio Syed MD sent at 9/18/2019 12:59 PM EDT -----  Jaz Meneses is normal and stable   Tried to call her about echo 2d results

## 2019-09-20 ENCOUNTER — CLINICAL SUPPORT (OUTPATIENT)
Dept: CARDIAC REHAB | Facility: CLINIC | Age: 49
End: 2019-09-20
Payer: COMMERCIAL

## 2019-09-20 DIAGNOSIS — I50.41 ACUTE COMBINED SYSTOLIC AND DIASTOLIC CONGESTIVE HEART FAILURE (HCC): ICD-10-CM

## 2019-09-20 PROCEDURE — 93798 PHYS/QHP OP CAR RHAB W/ECG: CPT

## 2019-09-23 ENCOUNTER — OFFICE VISIT (OUTPATIENT)
Dept: CARDIOLOGY CLINIC | Facility: CLINIC | Age: 49
End: 2019-09-23
Payer: COMMERCIAL

## 2019-09-23 ENCOUNTER — CLINICAL SUPPORT (OUTPATIENT)
Dept: CARDIAC REHAB | Facility: CLINIC | Age: 49
End: 2019-09-23
Payer: COMMERCIAL

## 2019-09-23 VITALS
HEIGHT: 61 IN | SYSTOLIC BLOOD PRESSURE: 98 MMHG | OXYGEN SATURATION: 96 % | BODY MASS INDEX: 36.82 KG/M2 | DIASTOLIC BLOOD PRESSURE: 60 MMHG | HEART RATE: 72 BPM | WEIGHT: 195 LBS

## 2019-09-23 DIAGNOSIS — Z72.0 NICOTINE ABUSE: ICD-10-CM

## 2019-09-23 DIAGNOSIS — I50.41 ACUTE COMBINED SYSTOLIC AND DIASTOLIC CONGESTIVE HEART FAILURE (HCC): Primary | ICD-10-CM

## 2019-09-23 DIAGNOSIS — I50.41 ACUTE COMBINED SYSTOLIC AND DIASTOLIC CONGESTIVE HEART FAILURE (HCC): ICD-10-CM

## 2019-09-23 DIAGNOSIS — I50.1 HEART FAILURE, LEFT, WITH LVEF <=30% (HCC): ICD-10-CM

## 2019-09-23 DIAGNOSIS — I42.8 NON-ISCHEMIC CARDIOMYOPATHY (HCC): ICD-10-CM

## 2019-09-23 PROCEDURE — 93798 PHYS/QHP OP CAR RHAB W/ECG: CPT

## 2019-09-23 PROCEDURE — 99214 OFFICE O/P EST MOD 30 MIN: CPT | Performed by: INTERNAL MEDICINE

## 2019-09-23 NOTE — PROGRESS NOTES
Cardiology Follow Up  Lelia Cooks  1970  [id number]  [address] [facility]  [address]  [address] [facility], [facility] [address]  [place] [phone]    1  Acute combined systolic and diastolic congestive heart failure ([address] )     2  Non-ischemic cardiomyopathy ([facility] [address] )     3  Nicotine abuse        Discussion/Plan:  Nonischemic CM EF<30% NYHA class 2-  Repeat echo with decreased LV size- EF remains near 30%  Does not want to wear lifevest  No shortness of breath  LifeVest   Repeat lab work shows potassium and creatinine function or normal- K 4 2  Tolerating Entresto therapy- 49-51mg (She will call back to verify)  She will continue carvedilol 6 25 mg twice a day 1 1/2 tablet twice a day  She will continue spironolactone 25 mg daily  Watch sodium intake- 2gm  She will need to wear compression socks  Cardiac cath negative cad  She will continue low sodium diet  Unclear trigger for the patient's cardiomyopathy  No significant recovery? Possible dilated familial cardiomyopathy? Cath was negative for CAD  Reports multiple family members with MI/heart failure  TSH  No alcohol  No HIV risk Dry weight is 199 lb  We will perform a MRI in two months for EF assessment/scar burden  Consideration for AICD    Hyperkalemia- she will watch her potassium intake      Interval History:  She denies having significant lower extremity edema since discharge  She denies feeling dizziness or lightheadedness  She denies having chest discomfort  Denies having syncopal episodes  Denies having any irregular heart rhythm  8/12:  No edema  No syncope  No dizziness or light-headness  Compliant with meds  She has had no device firings  She has been compliant with the up titration of the Entresto  She reports trying to increase her carvedilol but did not feel right    She understands about orthostatic hypotension in the risk of prolonged standing  She has been watching her sodium intake  Her weight has been down trending      Patient Active Problem List   Diagnosis    Nicotine abuse    GERD (gastroesophageal reflux disease)    Acute combined systolic and diastolic congestive heart failure (HCC)    Diverticulitis    Hypokalemia    Non-ischemic cardiomyopathy ([facility] [address] )    Diverticulitis of large intestine without perforation or abscess without bleeding     Past Medical History:   Diagnosis Date    CHF (congestive heart failure) (HCC)     Diverticulitis of colon     GERD (gastroesophageal reflux disease)     HH (hiatus hernia)      Social History     Socioeconomic History    Marital status: /Civil Union     Spouse name: Not on file    Number of children: Not on file    Years of education: Not on file    Highest education level: Not on file   Occupational History    Not on file   Social Needs    Financial resource strain: Not on file    Food insecurity:     Worry: Not on file     Inability: Not on file    Transportation needs:     Medical: Not on file     Non-medical: Not on file   Tobacco Use    Smoking status: Former Smoker     Packs/day: 0 50     Years: 33 00     Pack years: 16 50     Types: Cigarettes     Last attempt to quit: 2019     Years since quittin 2    Smokeless tobacco: Never Used   Substance and Sexual Activity    Alcohol use: Yes     Frequency: Monthly or less     Comment: 2x year    Drug use: No    Sexual activity: Not on file   Lifestyle    Physical activity:     Days per week: Not on file     Minutes per session: Not on file    Stress: Not on file   Relationships    Social connections:     Talks on phone: Not on file     Gets together: Not on file     Attends Sabianist service: Not on file     Active member of club or organization: Not on file     Attends meetings of clubs or organizations: Not on file     Relationship status: Not on file    Intimate partner violence:     Fear of current or ex partner: Not on file     Emotionally abused: Not on file     Physically abused: Not on file     Forced sexual activity: Not on file   Other Topics Concern    Not on file   Social History Narrative    Not on file      Family History   Problem Relation Age of Onset    Heart attack Mother     Heart attack Father      Past Surgical History:   Procedure Laterality Date     SECTION      CHOLECYSTECTOMY      HYSTERECTOMY         Current Outpatient Medications:     carvedilol (COREG) 6 25 mg tablet, Take one tablet twice a day, Disp: 180 tablet, Rfl: 1    sacubitril-valsartan (ENTRESTO) 49-51 MG TABS, Take 1 tablet by mouth 2 (two) times a day, Disp: 180 tablet, Rfl: 3    spironolactone (ALDACTONE) 25 mg tablet, Take 1 tablet (25 mg total) by mouth daily, Disp: 90 tablet, Rfl: 1    torsemide (DEMADEX) 10 mg tablet, Take one tablet once a day as needed for swelling, Disp: 30 tablet, Rfl: 1    SUMAtriptan (IMITREX) 100 mg tablet, Take 100 mg by mouth as needed, Disp: , Rfl:   Allergies   Allergen Reactions    Percocet [Oxycodone-Acetaminophen]        Review of Systems:  Review of Systems   Constitutional: Negative  Negative for activity change, appetite change, chills, diaphoresis, fatigue, fever and unexpected weight change  HENT: Negative  Negative for congestion, dental problem, drooling, ear discharge, ear pain, facial swelling, hearing loss, mouth sores, nosebleeds, postnasal drip, rhinorrhea, sinus pressure, sinus pain, sneezing, sore throat, tinnitus, trouble swallowing and voice change  Eyes: Negative  Negative for photophobia, pain, redness, itching and visual disturbance  Respiratory: Positive for shortness of breath  Negative for apnea, cough, choking, chest tightness, wheezing and stridor  Cardiovascular: Negative for chest pain, palpitations and leg swelling  Gastrointestinal: Negative    Negative for abdominal distention, abdominal pain, anal bleeding, blood in stool, constipation, diarrhea, nausea, rectal pain and vomiting  Endocrine: Negative  Negative for cold intolerance, heat intolerance, polydipsia, polyphagia and polyuria  Genitourinary: Negative  Negative for decreased urine volume, difficulty urinating, dyspareunia, dysuria, enuresis, flank pain, frequency, genital sores, hematuria, menstrual problem, pelvic pain, urgency, vaginal bleeding, vaginal discharge and vaginal pain  Musculoskeletal: Negative  Negative for arthralgias, back pain, gait problem, joint swelling, myalgias, neck pain and neck stiffness  Skin: Negative  Negative for color change, pallor, rash and wound  Allergic/Immunologic: Negative  Negative for environmental allergies, food allergies and immunocompromised state  Neurological: Negative  Negative for dizziness, tremors, seizures, syncope, facial asymmetry, speech difficulty, weakness, light-headedness, numbness and headaches  Hematological: Negative  Negative for adenopathy  Does not bruise/bleed easily  Psychiatric/Behavioral: Negative  Negative for agitation, behavioral problems, confusion, decreased concentration, dysphoric mood, hallucinations, self-injury, sleep disturbance and suicidal ideas  The patient is not nervous/anxious and is not hyperactive  All other systems reviewed and are negative  Vitals:    09/23/19 1552   BP: 98/60   BP Location: Right arm   Patient Position: Sitting   Cuff Size: Standard   Pulse: 72   SpO2: 96%   Weight: 88 5 kg (195 lb)   Height: 5' 1" (1 549 m)     Physical Exam:  Physical Exam   Constitutional: She is oriented to person, place, and time  She appears well-developed and well-nourished  No distress  HENT:   Head: Normocephalic and atraumatic  Right Ear: External ear normal    Left Ear: External ear normal    Eyes: Pupils are equal, round, and reactive to light  Conjunctivae are normal  Right eye exhibits no discharge  Left eye exhibits no discharge  No scleral icterus  Neck: Normal range of motion  Neck supple  No JVD present  No tracheal deviation present  No thyromegaly present  Cardiovascular: Normal rate and regular rhythm  Exam reveals gallop  Exam reveals no friction rub  No murmur heard  Pulmonary/Chest: Effort normal and breath sounds normal  No stridor  No respiratory distress  She has no wheezes  She has no rales  She exhibits no tenderness  Abdominal: Soft  Bowel sounds are normal  She exhibits no distension and no mass  There is no tenderness  There is no rebound and no guarding  Musculoskeletal: Normal range of motion  She exhibits no edema, tenderness or deformity  Neurological: She is alert and oriented to person, place, and time  She has normal reflexes  She displays normal reflexes  No cranial nerve deficit  She exhibits normal muscle tone  Coordination normal    Skin: Skin is warm and dry  No rash noted  She is not diaphoretic  No erythema  No pallor  Psychiatric: She has a normal mood and affect  Her behavior is normal  Judgment and thought content normal        Labs:     Lab Results   Component Value Date    WBC 6 40 06/26/2019    HGB 12 8 06/26/2019    HCT 41 7 06/26/2019    MCV 87 06/26/2019     06/26/2019     Lab Results   Component Value Date    K 4 2 09/04/2019     09/04/2019    CO2 20 09/04/2019    BUN 25 (H) 09/04/2019    CREATININE 0 95 09/04/2019    GLUF 145 (H) 03/26/2019    CALCIUM 8 7 06/26/2019    AST 21 06/26/2019    ALT 61 06/26/2019    ALKPHOS 101 06/26/2019    EGFR 70 06/26/2019     No results found for: CHOL  Lab Results   Component Value Date    HDL 44 03/26/2019     Lab Results   Component Value Date    LDLCALC 116 (H) 03/26/2019     Lab Results   Component Value Date    TRIG 53 03/26/2019     No results found for: HGBA1C    Imaging & Testing   I have personally reviewed pertinent reports  EKG: Personally reviewed        Cardiac testing:   Results for orders placed during the hospital encounter of 06/23/19 Echo complete with contrast if indicated    Narrative [address]  [address] [facility]  [phone]    Transthoracic Echocardiogram  2D, M-mode, Doppler, and Color Doppler    Study date:  2019    Patient: Dianna Fall  MR number: [id number]  Account number: [de-identified]  : 1970  Age: 44 years  Gender: Female  Status: Inpatient  Location: Bedside  Height: 61 in  Weight: 219 6 lb  BP: 118/ 80 mmHg    Indications: Heart Failure    Diagnoses: I50 9 - Heart failure, unspecified    Sonographer:  ISABELL Lockwood  Primary Physician:  Cheri Osullivan MD  Referring Physician:  Cortney Tesfaye DO  Group:  [facility]  Interpreting Physician:  Leela Lloyd MD    SUMMARY    LEFT VENTRICLE:  Systolic function was markedly reduced  Ejection fraction was estimated in the range of 25 % to 30 % to be 30 %  There was severe diffuse hypokinesis  Doppler parameters were consistent with abnormal left ventricular relaxation (grade 1 diastolic dysfunction)  RIGHT VENTRICLE:  Systolic function was moderately reduced  LEFT ATRIUM:  The atrium was mildly dilated  RIGHT ATRIUM:  The atrium was mildly dilated  MITRAL VALVE:  There was trace regurgitation  IVC, HEPATIC VEINS:  The inferior vena cava was dilated  HISTORY: PRIOR HISTORY: GERD, Hiatal Hernia    PROCEDURE: The procedure was performed at the bedside  This was a routine study  The transthoracic approach was used  The study included complete 2D imaging, M-mode, complete spectral Doppler, and color Doppler  The heart rate was 92 bpm,  at the start of the study  Image quality was adequate  LEFT VENTRICLE: Size was normal  Systolic function was markedly reduced  Ejection fraction was estimated in the range of 25 % to 30 % to be 30 %  There was severe diffuse hypokinesis  Wall thickness was normal  No evidence of apical  thrombus   DOPPLER: Doppler parameters were consistent with abnormal left ventricular relaxation (grade 1 diastolic dysfunction)  RIGHT VENTRICLE: The size was normal  Systolic function was moderately reduced  Wall thickness was normal     LEFT ATRIUM: The atrium was mildly dilated  RIGHT ATRIUM: The atrium was mildly dilated  MITRAL VALVE: Valve structure was normal  There was normal leaflet separation  DOPPLER: The transmitral velocity was within the normal range  There was no evidence for stenosis  There was trace regurgitation  AORTIC VALVE: The valve was trileaflet  Leaflets exhibited normal thickness and normal cuspal separation  DOPPLER: Transaortic velocity was within the normal range  There was no evidence for stenosis  There was no significant  regurgitation  TRICUSPID VALVE: The valve structure was normal  There was normal leaflet separation  DOPPLER: The transtricuspid velocity was within the normal range  There was no evidence for stenosis  There was no significant regurgitation  PULMONIC VALVE: Leaflets exhibited normal thickness, no calcification, and normal cuspal separation  DOPPLER: The transpulmonic velocity was within the normal range  There was no significant regurgitation  PERICARDIUM: There was no pericardial effusion  The pericardium was normal in appearance  AORTA: The root exhibited normal size  SYSTEMIC VEINS: IVC: The inferior vena cava was dilated  SYSTEM MEASUREMENT TABLES    2D mode  AoR Diam 2D: 3 2 cm  LA Diam (2D): 4 5 cm  LA/Ao (2D): 1 41  FS (2D Teich): 13 7 %  IVSd (2D): 0 82 cm  LVDEV: 193 cmï¾³  LVESV: 138 cmï¾³  LVIDd(2D): 6 19 cm  LVISd (2D): 5 34 cm  LVPWd (2D): 0 81 cm  SV (Teich): 55 cmï¾³    Apical four chamber  LVEF A4C: 28 %    Unspecified Scan Mode  MV Peak E Zach   Mean: 848 mm/s  MVA (PHT): 4 15 cmï¾²  PHT: 53 ms  Max P mm[Hg]  V Max: 2440 mm/s  Vmax: 2500 mm/s  RA Area: 18 1 cmï¾²  RA Volume: 48 8 cmï¾³  TAPSE: 1 1 cm    [facility]    Prepared and electronically signed by    Tony Syed MD  Signed 24-Jun-2019 13:10:35       No results found for this or any previous visit  Tony Luna  Please call with any questions or suggestions    A description of the counseling:   Goals and Barriers:  Patient's ability to self care:  Medication side effect reviewed with patient in detail and all their questions answered  "This note has been constructed using a voice recognition system  Therefore there may be syntax, spelling, and/or grammatical errors   Please call if you have any questions  "

## 2019-09-25 ENCOUNTER — CLINICAL SUPPORT (OUTPATIENT)
Dept: CARDIAC REHAB | Facility: CLINIC | Age: 49
End: 2019-09-25
Payer: COMMERCIAL

## 2019-09-25 ENCOUNTER — TELEPHONE (OUTPATIENT)
Dept: CARDIOLOGY CLINIC | Facility: CLINIC | Age: 49
End: 2019-09-25

## 2019-09-25 DIAGNOSIS — I50.41 ACUTE COMBINED SYSTOLIC AND DIASTOLIC CONGESTIVE HEART FAILURE (HCC): ICD-10-CM

## 2019-09-25 PROCEDURE — 93798 PHYS/QHP OP CAR RHAB W/ECG: CPT

## 2019-09-25 NOTE — TELEPHONE ENCOUNTER
Dr Vana Buerger,   For clarification  Patient was seen this week and given entresto samples of 24/26  Should she be doubling up to equal the 49/51 as indicated on your office note?

## 2019-09-27 ENCOUNTER — CLINICAL SUPPORT (OUTPATIENT)
Dept: CARDIAC REHAB | Facility: CLINIC | Age: 49
End: 2019-09-27
Payer: COMMERCIAL

## 2019-09-27 DIAGNOSIS — I50.41 ACUTE COMBINED SYSTOLIC AND DIASTOLIC CONGESTIVE HEART FAILURE (HCC): ICD-10-CM

## 2019-09-27 PROCEDURE — 93798 PHYS/QHP OP CAR RHAB W/ECG: CPT

## 2019-09-30 ENCOUNTER — CLINICAL SUPPORT (OUTPATIENT)
Dept: CARDIAC REHAB | Facility: CLINIC | Age: 49
End: 2019-09-30
Payer: COMMERCIAL

## 2019-09-30 DIAGNOSIS — I50.41 ACUTE COMBINED SYSTOLIC AND DIASTOLIC CONGESTIVE HEART FAILURE (HCC): ICD-10-CM

## 2019-09-30 PROCEDURE — 93798 PHYS/QHP OP CAR RHAB W/ECG: CPT

## 2019-10-01 DIAGNOSIS — I50.41 ACUTE COMBINED SYSTOLIC AND DIASTOLIC CONGESTIVE HEART FAILURE (HCC): ICD-10-CM

## 2019-10-01 RX ORDER — TORSEMIDE 10 MG/1
TABLET ORAL
Qty: 90 TABLET | Refills: 3 | Status: SHIPPED | OUTPATIENT
Start: 2019-10-01 | End: 2019-11-25

## 2019-10-02 ENCOUNTER — CLINICAL SUPPORT (OUTPATIENT)
Dept: CARDIAC REHAB | Facility: CLINIC | Age: 49
End: 2019-10-02
Payer: COMMERCIAL

## 2019-10-02 DIAGNOSIS — I50.41 ACUTE COMBINED SYSTOLIC AND DIASTOLIC CONGESTIVE HEART FAILURE (HCC): ICD-10-CM

## 2019-10-02 PROCEDURE — 93798 PHYS/QHP OP CAR RHAB W/ECG: CPT

## 2019-10-02 NOTE — PROGRESS NOTES
Cardiac Rehabilitation Plan of Care   60 day      Today's date: 10/2/2019   Visits: 20  Patient name: Puneet Hyatt      : 1970  Age: 44 y o  MRN: [id number]  Referring Physician: Primitivo Williamson MD  Provider: Virgilio Lew  Clinician: Rafita Boyer RN    Dx:   Encounter Diagnosis   Name Primary?  Acute combined systolic and diastolic congestive heart failure [facility])      Date of onset: 2019      SUMMARY OF PROGRESS:  Mrs Raquel Stapleton completed 20 sessions of the cardiac rehabilitation program  She stated she is not buying cigarettes but continues to smoke 0 -2 cigarettes a day  She has the patch at the home and will restart using the patch  She stated prior she quit smoking for 6 weeks  Discussed alternative tips to smoking and relaxation tips  Also discussed Telemetry monitor NSR with BBB and PVC at rest and with exercise  Tolerated exercise well  Exercise MET level increased from 3 1 to 3 8 MET's  She complained of discomfort in her neck  Provided her with neck stretches  Denied any complaint of chest discomfort  She stated she is non compliant with wearing life vest  Will continue to monitor       Medication compliance: Yes   Comments: she is compliant with medications  Fall Risk: Low   Comments: ambulates with a steady gait    EKG changes: NSR with BBB      EXERCISE ASSESSMENT and PLAN    Current Exercise Program in Rehab:       Frequency: 2-3 days/week        Minutes: 45         METS: 3 8            HR:    RPE: 3-7         Modalities: Treadmill, UBE and Recumbent bike      Exercise Progression 30 Day Goals :    Frequency: 2-3days/week   Minutes: 45-50   METS: 3 8-4 8   HR:     RPE: 4-6   Modalities: Treadmill, UBE, Lifecycle, NuStep and Recumbent bike    Strength trainin-3 days / week  12-15 repetitions  1-2 sets per modality   Will be added following 2-3 weeks of monitored exercise sessions    Modalities: Lateral Raise, Arm Curl, Upright Rows, Front Raises and Shoulder Dominique    Progressing: In Progress    Home Exercise: Type: walking    Goals: Improved 6MWT distance by 10%  Education: Benefit of exercise for CAD risk factors, home exercise guidelines, signs and sxs and RPE scale   Plan:home exercise 30+ mins 2 days opposite CR  Readiness to change: Contemplation      NUTRITION ASSESSMENT AND PLAN    Weight control:    Starting weight: 199 8   Current weight:     Waist circumference:    Startin 5   Current:    Diabetes: none  Lipid management: Discussed diet and lipid management and Last lipid profile 3/26/2019  Chol 171  TRG 53  HDL 44    Goals:continue to lose weight 5 to 10 pounds in 12 weeks  Education: heart healthy eating  low sodium diet  How to read food labels  My plate  Healthy fats  Progressing: In Progress  Plan: Decrease SFA  Readiness to change: Action      PSYCHOSOCIAL ASSESSMENT AND PLAN    Emotional: PHQ9 1          1-4 = Minimal Depression  Self-reported stress level: 0   Social support: Very Good  Goals:  increased energy  Education: signs/sxs of depression, benefits of positive support system and coping mechanisms, stress and relaxation tips  Progressing:Goal Met  Plan: Practice relaxation techniques  Readiness to change: Action      OTHER CORE COMPONENTS     Tobacco:   Social History     Tobacco Use   Smoking Status Former Smoker    Packs/day: 0 50    Years: 33 00    Pack years: 16 50    Types: Cigarettes    Last attempt to quit: 2019    Years since quittin 2   Smokeless Tobacco Never Used       Tobacco Use Intervention: Referral to tobacco expert:   [place] Quit Line 1-800-QUIT-NOW  Brief counseling by cardiac rehab professional  Date: 2019    Blood pressure:    Restin/70   Exercise: 98/60    Goals: consistent BP < 130/80, reduced dietary sodium <2300mg and Abstain from smoking  Education:  Common, medication, cardiac risk factorsunderstanding HTN and CAD, low sodium diet and HTN and smoking and heart disease  Progressing: In Progress  Plan: Complete abstention from smoking  Readiness to change: Preparation

## 2019-10-04 ENCOUNTER — CLINICAL SUPPORT (OUTPATIENT)
Dept: CARDIAC REHAB | Facility: CLINIC | Age: 49
End: 2019-10-04
Payer: COMMERCIAL

## 2019-10-04 DIAGNOSIS — I50.41 ACUTE COMBINED SYSTOLIC AND DIASTOLIC CONGESTIVE HEART FAILURE (HCC): ICD-10-CM

## 2019-10-04 PROCEDURE — 93798 PHYS/QHP OP CAR RHAB W/ECG: CPT

## 2019-10-07 ENCOUNTER — CLINICAL SUPPORT (OUTPATIENT)
Dept: CARDIAC REHAB | Facility: CLINIC | Age: 49
End: 2019-10-07
Payer: COMMERCIAL

## 2019-10-07 DIAGNOSIS — I50.41 ACUTE COMBINED SYSTOLIC AND DIASTOLIC CONGESTIVE HEART FAILURE (HCC): ICD-10-CM

## 2019-10-07 PROCEDURE — 93798 PHYS/QHP OP CAR RHAB W/ECG: CPT

## 2019-10-09 ENCOUNTER — CLINICAL SUPPORT (OUTPATIENT)
Dept: CARDIAC REHAB | Facility: CLINIC | Age: 49
End: 2019-10-09
Payer: COMMERCIAL

## 2019-10-09 DIAGNOSIS — I50.41 ACUTE COMBINED SYSTOLIC AND DIASTOLIC CONGESTIVE HEART FAILURE (HCC): ICD-10-CM

## 2019-10-09 PROCEDURE — 93798 PHYS/QHP OP CAR RHAB W/ECG: CPT

## 2019-10-11 ENCOUNTER — APPOINTMENT (OUTPATIENT)
Dept: CARDIAC REHAB | Facility: CLINIC | Age: 49
End: 2019-10-11
Payer: COMMERCIAL

## 2019-10-14 ENCOUNTER — TELEPHONE (OUTPATIENT)
Dept: GASTROENTEROLOGY | Facility: CLINIC | Age: 49
End: 2019-10-14

## 2019-10-14 ENCOUNTER — CLINICAL SUPPORT (OUTPATIENT)
Dept: CARDIAC REHAB | Facility: CLINIC | Age: 49
End: 2019-10-14
Payer: COMMERCIAL

## 2019-10-14 DIAGNOSIS — K57.32 DIVERTICULITIS LARGE INTESTINE W/O PERFORATION OR ABSCESS W/O BLEEDING: Primary | ICD-10-CM

## 2019-10-14 DIAGNOSIS — I50.41 ACUTE COMBINED SYSTOLIC AND DIASTOLIC CONGESTIVE HEART FAILURE (HCC): ICD-10-CM

## 2019-10-14 PROCEDURE — 93798 PHYS/QHP OP CAR RHAB W/ECG: CPT

## 2019-10-14 NOTE — TELEPHONE ENCOUNTER
Patient came into the office requesting the prep for her colonoscopy sent to her pharmacy the [facility] in [place] stated she was supposed to receive the suprep for her apt on 10/28

## 2019-10-16 ENCOUNTER — CLINICAL SUPPORT (OUTPATIENT)
Dept: CARDIAC REHAB | Facility: CLINIC | Age: 49
End: 2019-10-16
Payer: COMMERCIAL

## 2019-10-16 DIAGNOSIS — I50.41 ACUTE COMBINED SYSTOLIC AND DIASTOLIC CONGESTIVE HEART FAILURE (HCC): ICD-10-CM

## 2019-10-16 PROCEDURE — 93798 PHYS/QHP OP CAR RHAB W/ECG: CPT

## 2019-10-18 ENCOUNTER — CLINICAL SUPPORT (OUTPATIENT)
Dept: CARDIAC REHAB | Facility: CLINIC | Age: 49
End: 2019-10-18
Payer: COMMERCIAL

## 2019-10-18 DIAGNOSIS — I50.41 ACUTE COMBINED SYSTOLIC AND DIASTOLIC CONGESTIVE HEART FAILURE (HCC): ICD-10-CM

## 2019-10-18 PROCEDURE — 93798 PHYS/QHP OP CAR RHAB W/ECG: CPT

## 2019-10-21 ENCOUNTER — CLINICAL SUPPORT (OUTPATIENT)
Dept: CARDIAC REHAB | Facility: CLINIC | Age: 49
End: 2019-10-21
Payer: COMMERCIAL

## 2019-10-21 DIAGNOSIS — I50.41 ACUTE COMBINED SYSTOLIC AND DIASTOLIC CONGESTIVE HEART FAILURE (HCC): ICD-10-CM

## 2019-10-21 PROCEDURE — 93798 PHYS/QHP OP CAR RHAB W/ECG: CPT

## 2019-10-23 ENCOUNTER — CLINICAL SUPPORT (OUTPATIENT)
Dept: CARDIAC REHAB | Facility: CLINIC | Age: 49
End: 2019-10-23
Payer: COMMERCIAL

## 2019-10-23 DIAGNOSIS — I50.41 ACUTE COMBINED SYSTOLIC AND DIASTOLIC CONGESTIVE HEART FAILURE (HCC): ICD-10-CM

## 2019-10-23 PROCEDURE — 93798 PHYS/QHP OP CAR RHAB W/ECG: CPT

## 2019-10-25 ENCOUNTER — APPOINTMENT (OUTPATIENT)
Dept: CARDIAC REHAB | Facility: CLINIC | Age: 49
End: 2019-10-25
Payer: COMMERCIAL

## 2019-10-28 ENCOUNTER — CLINICAL SUPPORT (OUTPATIENT)
Dept: CARDIAC REHAB | Facility: CLINIC | Age: 49
End: 2019-10-28
Payer: COMMERCIAL

## 2019-10-28 DIAGNOSIS — I50.41 ACUTE COMBINED SYSTOLIC AND DIASTOLIC CONGESTIVE HEART FAILURE (HCC): ICD-10-CM

## 2019-10-28 PROCEDURE — 93798 PHYS/QHP OP CAR RHAB W/ECG: CPT

## 2019-10-30 ENCOUNTER — CLINICAL SUPPORT (OUTPATIENT)
Dept: CARDIAC REHAB | Facility: CLINIC | Age: 49
End: 2019-10-30
Payer: COMMERCIAL

## 2019-10-30 DIAGNOSIS — I50.41 ACUTE COMBINED SYSTOLIC AND DIASTOLIC CONGESTIVE HEART FAILURE (HCC): ICD-10-CM

## 2019-10-30 PROCEDURE — 93798 PHYS/QHP OP CAR RHAB W/ECG: CPT

## 2019-11-01 ENCOUNTER — APPOINTMENT (OUTPATIENT)
Dept: CARDIAC REHAB | Facility: CLINIC | Age: 49
End: 2019-11-01
Payer: COMMERCIAL

## 2019-11-01 NOTE — PROGRESS NOTES
Cardiac Rehabilitation Plan of Care   90 day       Today's date: 2019   Visits: 30  Patient name: Viridiana Villarreal      : 1970  Age: 44 y o  MRN: [id number]  Referring Physician: Paulo Nelson MD  Provider: Priscilla Caba  Clinician: Ghada Delaney RN    Dx:   Encounter Diagnosis   Name Primary?  Acute combined systolic and diastolic congestive heart failure [facility])      Date of onset: 2019      SUMMARY OF PROGRESS:  Mrs Jannet Scheuermann completed 30 sessions of the cardiac rehabilitation program  She stated she is not buying cigarettes but continues to smoke 4-5 cigarettes a day  She has the patch at the home and will restart using the patch  She stated prior she quit smoking for 6 weeks  Discussed alternative tips to smoking and relaxation tips  Also discussed Telemetry monitor NSR with BBB and PVC at rest and with exercise  Tolerated exercise well  Exercise MET level increased from 3 1 to 3 8 MET's  Denied any complaint of chest discomfort  Will continue to monitor  Medication compliance: Yes   Comments: she is compliant with medications  Fall Risk: Low   Comments: ambulates with a steady gait    EKG changes: NSR with BBB      EXERCISE ASSESSMENT and PLAN    Current Exercise Program in Rehab:       Frequency: 2-3 days/week        Minutes: 50         METS: 3 8            HR:    RPE: 3-7         Modalities: Treadmill, UBE and Recumbent bike      Exercise Progression 30 Day Goals :    Frequency: 2-3days/week   Minutes: 45-50   METS: 3 8-4 8   HR:     RPE: 4-6   Modalities: Treadmill, UBE, Lifecycle, NuStep and Recumbent bike    Strength trainin-3 days / week  12-15 repetitions  1-2 sets per modality   Will be added following 2-3 weeks of monitored exercise sessions    Modalities: Lateral Raise, Arm Curl, Upright Rows, Front Raises and Shoulder Shrugs    Progressing:   In Progress    Home Exercise: Type: walking    Goals: Improved 6MWT distance by 10%  Education: Benefit of exercise for CAD risk factors, home exercise guidelines, signs and sxs and RPE scale   Plan:home exercise 30+ mins 2 days opposite CR  Readiness to change: Contemplation      NUTRITION ASSESSMENT AND PLAN    Weight control:    Starting weight: 199 8   Current weight:     Waist circumference:    Startin 5   Current:    Diabetes: none  Lipid management: Discussed diet and lipid management and Last lipid profile 3/26/2019  Chol 171  TRG 53  HDL 44    Goals:continue to lose weight 5 to 10 pounds in 12 weeks  Education: heart healthy eating  low sodium diet  How to read food labels  My plate  Healthy fats  Low sodium recipes  Progressing: In Progress  Plan: Decrease SFA  Readiness to change: Action      PSYCHOSOCIAL ASSESSMENT AND PLAN    Emotional: PHQ9 1          1-4 = Minimal Depression  Self-reported stress level: 0   Social support: Very Good  Goals:  increased energy  Education: signs/sxs of depression, benefits of positive support system and coping mechanisms, stress and relaxation tips  Progressing:Goal Met  Plan: Practice relaxation techniques  Readiness to change: Action      OTHER CORE COMPONENTS     Tobacco:   Social History     Tobacco Use   Smoking Status Former Smoker    Packs/day: 0 50    Years: 33 00    Pack years: 16 50    Types: Cigarettes    Last attempt to quit: 2019    Years since quittin 3   Smokeless Tobacco Never Used       Tobacco Use Intervention: Referral to tobacco expert:   [place] Quit Line 1-800-QUIT-NOW  Brief counseling by cardiac rehab professional  Date: 2019    Blood pressure:    Restin/72   Exercise: 118/68    Goals: consistent BP < 130/80, reduced dietary sodium <2300mg and Abstain from smoking  Education:  Common, medication, cardiac risk factorsunderstanding HTN and CAD, low sodium diet and HTN and smoking and heart disease  Progressing: In Progress  Plan: Complete abstention from smoking  Readiness to change: Preparation

## 2019-11-04 ENCOUNTER — CLINICAL SUPPORT (OUTPATIENT)
Dept: CARDIAC REHAB | Facility: CLINIC | Age: 49
End: 2019-11-04
Payer: COMMERCIAL

## 2019-11-04 DIAGNOSIS — I50.41 ACUTE COMBINED SYSTOLIC AND DIASTOLIC CONGESTIVE HEART FAILURE (HCC): ICD-10-CM

## 2019-11-04 PROCEDURE — 93798 PHYS/QHP OP CAR RHAB W/ECG: CPT

## 2019-11-06 ENCOUNTER — CLINICAL SUPPORT (OUTPATIENT)
Dept: CARDIAC REHAB | Facility: CLINIC | Age: 49
End: 2019-11-06
Payer: COMMERCIAL

## 2019-11-06 DIAGNOSIS — I50.41 ACUTE COMBINED SYSTOLIC AND DIASTOLIC CONGESTIVE HEART FAILURE (HCC): ICD-10-CM

## 2019-11-06 PROCEDURE — 93798 PHYS/QHP OP CAR RHAB W/ECG: CPT

## 2019-11-08 ENCOUNTER — APPOINTMENT (OUTPATIENT)
Dept: CARDIAC REHAB | Facility: CLINIC | Age: 49
End: 2019-11-08
Payer: COMMERCIAL

## 2019-11-11 ENCOUNTER — CLINICAL SUPPORT (OUTPATIENT)
Dept: CARDIAC REHAB | Facility: CLINIC | Age: 49
End: 2019-11-11
Payer: COMMERCIAL

## 2019-11-11 DIAGNOSIS — I50.41 ACUTE COMBINED SYSTOLIC AND DIASTOLIC CONGESTIVE HEART FAILURE (HCC): ICD-10-CM

## 2019-11-11 PROCEDURE — 93798 PHYS/QHP OP CAR RHAB W/ECG: CPT

## 2019-11-13 ENCOUNTER — CLINICAL SUPPORT (OUTPATIENT)
Dept: CARDIAC REHAB | Facility: CLINIC | Age: 49
End: 2019-11-13
Payer: COMMERCIAL

## 2019-11-13 DIAGNOSIS — I50.41 ACUTE COMBINED SYSTOLIC AND DIASTOLIC CONGESTIVE HEART FAILURE (HCC): ICD-10-CM

## 2019-11-13 PROCEDURE — 93798 PHYS/QHP OP CAR RHAB W/ECG: CPT

## 2019-11-15 ENCOUNTER — CLINICAL SUPPORT (OUTPATIENT)
Dept: CARDIAC REHAB | Facility: CLINIC | Age: 49
End: 2019-11-15
Payer: COMMERCIAL

## 2019-11-15 VITALS — HEIGHT: 61 IN | BODY MASS INDEX: 37.19 KG/M2 | WEIGHT: 197 LBS

## 2019-11-15 DIAGNOSIS — I50.41 ACUTE COMBINED SYSTOLIC AND DIASTOLIC CONGESTIVE HEART FAILURE (HCC): ICD-10-CM

## 2019-11-15 PROCEDURE — 93798 PHYS/QHP OP CAR RHAB W/ECG: CPT

## 2019-11-18 ENCOUNTER — CLINICAL SUPPORT (OUTPATIENT)
Dept: CARDIAC REHAB | Facility: CLINIC | Age: 49
End: 2019-11-18
Payer: COMMERCIAL

## 2019-11-18 DIAGNOSIS — I50.41 ACUTE COMBINED SYSTOLIC AND DIASTOLIC CONGESTIVE HEART FAILURE (HCC): ICD-10-CM

## 2019-11-18 PROCEDURE — 93798 PHYS/QHP OP CAR RHAB W/ECG: CPT

## 2019-11-18 NOTE — PROGRESS NOTES
Marco Jarrett   1970     Risk: low     Pre Post % Change Goal   Date: 8/5/2019 11/15/2019     Physical       Sub Max ETT (mets) 5 8 5 8 0 0% 10% increase   6MWT (feet) 1430 1440 0 7% 10% increase   Arm curls 20 24     Chair to Stands 16 16     NOVA Al (est peak O2) 7 25 7 99 10 2%    Peak exercise CR/SD (mets) 3 1 3 8 22 6% 40% increase   Emotional       PHQ9 (> 10 refer to MD) 1 0 -100 0% 4 pt decrease   [place] (lower score = improvement)       Total 19 16 -15 8% < 27   Feelings 2 1 -50 0% < 3   Physical Fitness 4 3 -25 0% < 3   Social Support 1 1 0 0% < 3   Daily Activities 2 1 -50 0% < 3   Social Activities 1 1 0 0% < 3   Pain 1 3 200 0% < 3   Overall Health 3 3 0 0% < 3   Quality of Life 2 2 0 0% < 3   Change in Health 1 1 0 0% < 3   Dietary       Rate your plate 46 55 79 7% > 58   Measurements       Weight 199 5 197 -1 3% 2 5 - 5%   BMI 37 7 37 3 -1 1% 19 - 25   Waist Circ  45 5 44 -3 3% < 40 M / < 35 F   % Body fat 49 9 49 7 -0 4% < 25 M / < 33 F   BP left arm               (systolic) 746 921 -0 3% < 448   (diastolic) 64 68 7 1% < 90   Smoking #/day  (if applicable) 1-2 4-5  0   Lipids/Glucose (Date) 3/26/2019      Total cholesterol 171   50 - 200   Triglycerides 53   < 150   HDL 44   40 - 60      < 100   A1C    4 0 - 5 6%   Fasting

## 2019-11-18 NOTE — PROGRESS NOTES
Cardiac Rehabilitation Plan of Care   Discharge      Today's date: 2019   Visits: 36  Patient name: Tati Dumont      : 1970  Age: 44 y o  MRN: [id number]  Referring Physician: Rangel Gannon MD  Provider: Homer Hughes  Clinician: Dayton Hager RN    Dx:   Encounter Diagnosis   Name Primary?  Acute combined systolic and diastolic congestive heart failure [facility])      Date of onset: 2019      SUMMARY OF PROGRESS:  Mrs Reid Wright completed the 36 sessions of the cardiac rehabilitation program  She stated she is not buying cigarettes but continues to smoke 4-5 cigarettes a day  She has the patch at the home and will restart using the patch  She stated prior she quit smoking for 6 weeks  Discussed alternative tips to smoking and relaxation tips  Also discussed Telemetry monitor NSR with BBB and PVC at rest and with exercise  Tolerated exercise well  Exercise MET level increased from 3 1 to 3 8 MET's  Denied any complaint of chest discomfort  6 minutes walk test slightly increased from 1430 to 1440 feet in 6 minutes  Sub max treadmill test remained the same at 5 8 MET's  Upper body strength improved  She increased her arm curls from 20 to 24 curls in 30 seconds  [facility] activity score improved from 7 25 to 7 99 MET's  [facility] score improved from 19 to 16 score  Rate your plate score improved from 46 to 55 score  She will continue to exercise at a gym 3 times a week for 60 minutes  She will continue to work on cessation of smoking habit  She stated she feels 100% better  She notice an improvement in her breathing and energy   She will continue to follow up with her cardiologist      Medication compliance: Yes   Comments: she is compliant with medications  Fall Risk: Low   Comments: ambulates with a steady gait    EKG changes: NSR with BBB      EXERCISE ASSESSMENT and PLAN    Current Exercise Program in Rehab:       Frequency: 2-3 days/week        Minutes: 50         METS: 3 8            HR:    RPE: 3-7         Modalities: Treadmill, UBE and Recumbent bike          Home Exercise: Type: walking and fitness center    Goals: Improved 6MWT distance by 10% (did not meet goal)  Education: Benefit of exercise for CAD risk factors, home exercise guidelines, signs and sxs and RPE scale   Plan:home exercise 30+ mins 2 days opposite CR  Readiness to change: Action      NUTRITION ASSESSMENT AND PLAN    Weight control:    Starting weight: 199 8   Current weight:     Waist circumference:    Startin 5   Current:    Diabetes: none  Lipid management: Discussed diet and lipid management and Last lipid profile 3/26/2019  Chol 171  TRG 53  HDL 44    Goals:continue to lose weight 5 to 10 pounds in 12 weeks  Education: heart healthy eating  low sodium diet  How to read food labels  My plate  Healthy fats  Low sodium recipes  Progressing: In Progress  Plan: Decrease SFA  Readiness to change: Action      PSYCHOSOCIAL ASSESSMENT AND PLAN    Emotional: PHQ9 1          1-4 = Minimal Depression  Self-reported stress level: 0   Social support: Very Good  Goals:  increased energy  Education: signs/sxs of depression, benefits of positive support system and coping mechanisms, stress and relaxation tips  Progressing:Goal Met  Plan: Practice relaxation techniques  Readiness to change: Action      OTHER CORE COMPONENTS     Tobacco:   Social History     Tobacco Use   Smoking Status Former Smoker    Packs/day: 0 50    Years: 33 00    Pack years: 16 50    Types: Cigarettes    Last attempt to quit: 2019    Years since quittin 4   Smokeless Tobacco Never Used       Tobacco Use Intervention: Referral to tobacco expert:   [place] Quit Line 1-800-QUIT-NOW  Brief counseling by cardiac rehab professional  Date: 2019    Blood pressure:    Restin/60   Exercise: 110/60    Goals: consistent BP < 130/80, reduced dietary sodium <2300mg and Abstain from smoking(continues to smoke)  Education: Common, medication, cardiac risk factorsunderstanding HTN and CAD, low sodium diet and HTN and smoking and heart disease  Progressing: In Progress  Plan: Complete abstention from smoking  Readiness to change: Preparation

## 2019-11-21 LAB
BUN SERPL-MCNC: 29 MG/DL (ref 6–24)
BUN/CREAT SERPL: 23 (ref 9–23)
CALCIUM SERPL-MCNC: 9.4 MG/DL (ref 8.7–10.2)
CHLORIDE SERPL-SCNC: 105 MMOL/L (ref 96–106)
CO2 SERPL-SCNC: 22 MMOL/L (ref 20–29)
CREAT SERPL-MCNC: 1.25 MG/DL (ref 0.57–1)
GLUCOSE SERPL-MCNC: 103 MG/DL (ref 65–99)
POTASSIUM SERPL-SCNC: 4.2 MMOL/L (ref 3.5–5.2)
SL AMB EGFR AFRICAN AMERICAN: 58 ML/MIN/1.73
SL AMB EGFR NON AFRICAN AMERICAN: 50 ML/MIN/1.73
SODIUM SERPL-SCNC: 140 MMOL/L (ref 134–144)

## 2019-11-25 ENCOUNTER — APPOINTMENT (OUTPATIENT)
Dept: CARDIAC REHAB | Facility: CLINIC | Age: 49
End: 2019-11-25
Payer: COMMERCIAL

## 2019-11-25 ENCOUNTER — OFFICE VISIT (OUTPATIENT)
Dept: CARDIOLOGY CLINIC | Facility: CLINIC | Age: 49
End: 2019-11-25
Payer: COMMERCIAL

## 2019-11-25 VITALS
BODY MASS INDEX: 37.38 KG/M2 | DIASTOLIC BLOOD PRESSURE: 70 MMHG | WEIGHT: 198 LBS | SYSTOLIC BLOOD PRESSURE: 120 MMHG | OXYGEN SATURATION: 97 % | HEART RATE: 71 BPM | HEIGHT: 61 IN

## 2019-11-25 DIAGNOSIS — I50.41 ACUTE COMBINED SYSTOLIC AND DIASTOLIC CONGESTIVE HEART FAILURE (HCC): ICD-10-CM

## 2019-11-25 DIAGNOSIS — I42.8 NON-ISCHEMIC CARDIOMYOPATHY (HCC): Primary | ICD-10-CM

## 2019-11-25 DIAGNOSIS — N17.9 ACUTE RENAL FAILURE SUPERIMPOSED ON STAGE 3 CHRONIC KIDNEY DISEASE, UNSPECIFIED ACUTE RENAL FAILURE TYPE: ICD-10-CM

## 2019-11-25 DIAGNOSIS — I50.1 HEART FAILURE, LEFT, WITH LVEF <=30% (HCC): ICD-10-CM

## 2019-11-25 DIAGNOSIS — N18.3 ACUTE RENAL FAILURE SUPERIMPOSED ON STAGE 3 CHRONIC KIDNEY DISEASE, UNSPECIFIED ACUTE RENAL FAILURE TYPE: ICD-10-CM

## 2019-11-25 PROCEDURE — 93000 ELECTROCARDIOGRAM COMPLETE: CPT | Performed by: INTERNAL MEDICINE

## 2019-11-25 PROCEDURE — 99214 OFFICE O/P EST MOD 30 MIN: CPT | Performed by: INTERNAL MEDICINE

## 2019-11-25 RX ORDER — CARVEDILOL 6.25 MG/1
TABLET ORAL
Qty: 270 TABLET | Refills: 3 | Status: SHIPPED | OUTPATIENT
Start: 2019-11-25 | End: 2020-05-13 | Stop reason: ALTCHOICE

## 2019-11-25 NOTE — PROGRESS NOTES
Cardiology Follow Up  Mercedes Bonilla  1970  [id number]  [facility]  [address]  [address] [facility], [address] [address] [phone]    1  Non-ischemic cardiomyopathy (HCC)  POCT ECG    carvedilol (COREG) 6 25 mg tablet    NM cardiac blood pool MUGA (at rest)    Basic metabolic panel    UA (URINE) with reflex to Scope    Protein / creatinine ratio, urine   2  Acute combined systolic and diastolic congestive heart failure (HCC)  carvedilol (COREG) 6 25 mg tablet    NM cardiac blood pool MUGA (at rest)    Basic metabolic panel    UA (URINE) with reflex to Scope    Protein / creatinine ratio, urine   3  Heart failure, left, with LVEF <=30% (HCC)  NM cardiac blood pool MUGA (at rest)    Basic metabolic panel    UA (URINE) with reflex to Scope    Protein / creatinine ratio, urine   4  Acute renal failure superimposed on stage 3 chronic kidney disease, unspecified acute renal failure type (HCC)  Basic metabolic panel    UA (URINE) with reflex to Scope    Protein / creatinine ratio, urine      Discussion/Plan:  Nonischemic CM EF<30% NYHA class 2-  Repeat echo with decreased LV size- EF remains near 30%  Does not want to wear lifevest  No shortness of breath  LifeVest   Repeat lab work shows potassium and creatinine function or normal- K 4 2  Tolerating Entresto therapy- 49-51mg (She will call back to verify)  Mildly rising creatinine- we will need to monitor  She will continue carvedilol 6 25 mg twice a day 1 1/2 tablet twice a day  She will continue spironolactone 25 mg daily  Watch sodium intake- 2gm  She will need to wear compression socks  Cardiac cath negative cad  She will continue low sodium diet  Unclear trigger for the patient's cardiomyopathy  No significant recovery? Possible dilated familial cardiomyopathy? Cath was negative for CAD  Reports multiple family members with MI/heart failure  TSH  No alcohol  No HIV risk Dry weight is 199 lb  She is doing cardiac rehab  MUGA scan for EF calculation- echo with difficult to measure biplane  Consideration for AICD    Acute on chronic kidney disease- cardio-renal  Monitor  Repeat bmp in one month    Diverticulitis- currently high risk for procedure  Would delay until improved EF  Hyperkalemia- she will watch her potassium intake      Interval History:  She denies having significant lower extremity edema since discharge  She denies feeling dizziness or lightheadedness  She denies having chest discomfort  Denies having syncopal episodes  Denies having any irregular heart rhythm  8/12:  No edema  No syncope  No dizziness or light-headness  Compliant with meds  She has had no device firings  She has been compliant with the up titration of the Entresto  She reports trying to increase her carvedilol but did not feel right  She understands about orthostatic hypotension in the risk of prolonged standing  She has been watching her sodium intake  Her weight has been down trending  11/25:  She is up titrated her carvedilol without significant dizziness lightheadedness  She does feel some orthostatic symptoms  She stands up slowly  She denies having significant lower extremity swelling  She is compliant with her medications  She is wearing compression socks when she is at work      Patient Active Problem List   Diagnosis    Nicotine abuse    GERD (gastroesophageal reflux disease)    Acute combined systolic and diastolic congestive heart failure (HCC)    Diverticulitis    Hypokalemia    Non-ischemic cardiomyopathy ([address] )    Diverticulitis of large intestine without perforation or abscess without bleeding     Past Medical History:   Diagnosis Date    CHF (congestive heart failure) (HCC)     Diverticulitis of colon     GERD (gastroesophageal reflux disease)     HH (hiatus hernia)      Social History     Socioeconomic History    Marital status: /Civil Union     Spouse name: Not on file    Number of children: Not on file    Years of education: Not on file    Highest education level: Not on file   Occupational History    Not on file   Social Needs    Financial resource strain: Not on file    Food insecurity:     Worry: Not on file     Inability: Not on file    Transportation needs:     Medical: Not on file     Non-medical: Not on file   Tobacco Use    Smoking status: Former Smoker     Packs/day: 0 50     Years: 33 00     Pack years: 16 50     Types: Cigarettes     Last attempt to quit: 2019     Years since quittin 4    Smokeless tobacco: Never Used   Substance and Sexual Activity    Alcohol use: Yes     Frequency: Monthly or less     Comment: 2x year    Drug use: No    Sexual activity: Not on file   Lifestyle    Physical activity:     Days per week: Not on file     Minutes per session: Not on file    Stress: Not on file   Relationships    Social connections:     Talks on phone: Not on file     Gets together: Not on file     Attends [employer] service: Not on file     Active member of club or organization: Not on file     Attends meetings of clubs or organizations: Not on file     Relationship status: Not on file    Intimate partner violence:     Fear of current or ex partner: Not on file     Emotionally abused: Not on file     Physically abused: Not on file     Forced sexual activity: Not on file   Other Topics Concern    Not on file   Social History Narrative    Not on file      Family History   Problem Relation Age of Onset    Heart attack Mother     Heart attack Father      Past Surgical History:   Procedure Laterality Date     SECTION      CHOLECYSTECTOMY      HYSTERECTOMY         Current Outpatient Medications:     carvedilol (COREG) 6 25 mg tablet, Take one tablet twice a day, Disp: 180 tablet, Rfl: 1    sacubitril-valsartan (ENTRESTO) 49-51 MG TABS, Take 1 tablet by mouth 2 (two) times a day, Disp: 180 tablet, Rfl: 3    spironolactone (ALDACTONE) 25 mg tablet, Take 1 tablet (25 mg total) by mouth daily, Disp: 90 tablet, Rfl: 1    torsemide (DEMADEX) 10 mg tablet, Take one tablet once a day as needed for swelling, Disp: 90 tablet, Rfl: 3    Na Sulfate-K Sulfate-Mg Sulf (SUPREP BOWEL PREP KIT) 17 5-3 13-1 6 GM/177ML SOLN, Take 2 Bottles by mouth see administration instructions Suprep bowel prep  Please follow the instructions from the office (Patient not taking: Reported on 11/25/2019), Disp: 2 Bottle, Rfl: 0    SUMAtriptan (IMITREX) 100 mg tablet, Take 100 mg by mouth as needed, Disp: , Rfl:   Allergies   Allergen Reactions    Percocet [Oxycodone-Acetaminophen]        Review of Systems:  Review of Systems   Constitutional: Negative  Negative for activity change, appetite change, chills, diaphoresis, fatigue, fever and unexpected weight change  HENT: Negative  Negative for congestion, dental problem, drooling, ear discharge, ear pain, facial swelling, hearing loss, mouth sores, nosebleeds, postnasal drip, rhinorrhea, sinus pressure, sinus pain, sneezing, sore throat, tinnitus, trouble swallowing and voice change  Eyes: Negative  Negative for photophobia, pain, redness, itching and visual disturbance  Respiratory: Positive for shortness of breath  Negative for apnea, cough, choking, chest tightness, wheezing and stridor  Cardiovascular: Negative for chest pain, palpitations and leg swelling  Gastrointestinal: Negative  Negative for abdominal distention, abdominal pain, anal bleeding, blood in stool, constipation, diarrhea, nausea, rectal pain and vomiting  Endocrine: Negative  Negative for cold intolerance, heat intolerance, polydipsia, polyphagia and polyuria  Genitourinary: Negative    Negative for decreased urine volume, difficulty urinating, dyspareunia, dysuria, enuresis, flank pain, frequency, genital sores, hematuria, menstrual problem, pelvic pain, urgency, vaginal bleeding, vaginal discharge and vaginal pain  Musculoskeletal: Negative  Negative for arthralgias, back pain, gait problem, joint swelling, myalgias, neck pain and neck stiffness  Skin: Negative  Negative for color change, pallor, rash and wound  Allergic/Immunologic: Negative  Negative for environmental allergies, food allergies and immunocompromised state  Neurological: Negative  Negative for dizziness, tremors, seizures, syncope, facial asymmetry, speech difficulty, weakness, light-headedness, numbness and headaches  Hematological: Negative  Negative for adenopathy  Does not bruise/bleed easily  Psychiatric/Behavioral: Negative  Negative for agitation, behavioral problems, confusion, decreased concentration, dysphoric mood, hallucinations, self-injury, sleep disturbance and suicidal ideas  The patient is not nervous/anxious and is not hyperactive  All other systems reviewed and are negative  Vitals:    11/25/19 1534   BP: 120/70   BP Location: Right arm   Patient Position: Sitting   Cuff Size: Large   Pulse: 71   SpO2: 97%   Weight: 89 8 kg (198 lb)   Height: 5' 1" (1 549 m)     Physical Exam:  Physical Exam   Constitutional: She is oriented to person, place, and time  She appears well-developed and well-nourished  No distress  HENT:   Head: Normocephalic and atraumatic  Right Ear: External ear normal    Left Ear: External ear normal    Eyes: Pupils are equal, round, and reactive to light  Conjunctivae are normal  Right eye exhibits no discharge  Left eye exhibits no discharge  No scleral icterus  Neck: Normal range of motion  Neck supple  No JVD present  No tracheal deviation present  No thyromegaly present  Cardiovascular: Normal rate and regular rhythm  Exam reveals gallop  Exam reveals no friction rub  No murmur heard  Pulmonary/Chest: Effort normal and breath sounds normal  No stridor  No respiratory distress  She has no wheezes  She has no rales   She exhibits no tenderness  Abdominal: Soft  Bowel sounds are normal  She exhibits no distension and no mass  There is no tenderness  There is no rebound and no guarding  Musculoskeletal: Normal range of motion  She exhibits no edema, tenderness or deformity  Neurological: She is alert and oriented to person, place, and time  She has normal reflexes  She displays normal reflexes  No cranial nerve deficit  She exhibits normal muscle tone  Coordination normal    Skin: Skin is warm and dry  No rash noted  She is not diaphoretic  No erythema  No pallor  Psychiatric: She has a normal mood and affect  Her behavior is normal  Judgment and thought content normal        Labs:     Lab Results   Component Value Date    WBC 6 40 2019    HGB 12 8 2019    HCT 41 7 2019    MCV 87 2019     2019     Lab Results   Component Value Date    K 4 2 2019     2019    CO2 22 2019    BUN 29 (H) 2019    CREATININE 1 25 (H) 2019    GLUF 145 (H) 2019    CALCIUM 8 7 2019    AST 21 2019    ALT 61 2019    ALKPHOS 101 2019    EGFR 70 2019     No results found for: CHOL  Lab Results   Component Value Date    HDL 44 2019     Lab Results   Component Value Date    LDLCALC 116 (H) 2019     Lab Results   Component Value Date    TRIG 53 2019     No results found for: HGBA1C    Imaging & Testing   I have personally reviewed pertinent reports  EKG: Personally reviewed        Cardiac testing:   Results for orders placed during the hospital encounter of 19   Echo complete with contrast if indicated    [address]  [address] [facility]  [phone]    Transthoracic Echocardiogram  2D, M-mode, Doppler, and Color Doppler    Study date:  2019    Patient: Nimo Prieto  MR number: [id number]  Account number: [de-identified]  : 1970  Age: 44 years  Gender: Female  Status: Inpatient  Location: Bedside  Height: 61 in  Weight: 219 6 lb  BP: 118/ 80 mmHg    Indications: Heart Failure    Diagnoses: I50 9 - Heart failure, unspecified    Sonographer:  ISABELL Kessler  Primary Physician:  Neil Elizabeth MD  Referring Physician:  Tami Stanton DO  Group:  [id number]  Interpreting Physician:  Meghan Cameron MD    SUMMARY    LEFT VENTRICLE:  Systolic function was markedly reduced  Ejection fraction was estimated in the range of 25 % to 30 % to be 30 %  There was severe diffuse hypokinesis  Doppler parameters were consistent with abnormal left ventricular relaxation (grade 1 diastolic dysfunction)  RIGHT VENTRICLE:  Systolic function was moderately reduced  LEFT ATRIUM:  The atrium was mildly dilated  RIGHT ATRIUM:  The atrium was mildly dilated  MITRAL VALVE:  There was trace regurgitation  IVC, HEPATIC VEINS:  The inferior vena cava was dilated  HISTORY: PRIOR HISTORY: GERD, Hiatal Hernia    PROCEDURE: The procedure was performed at the bedside  This was a routine study  The transthoracic approach was used  The study included complete 2D imaging, M-mode, complete spectral Doppler, and color Doppler  The heart rate was 92 bpm,  at the start of the study  Image quality was adequate  LEFT VENTRICLE: Size was normal  Systolic function was markedly reduced  Ejection fraction was estimated in the range of 25 % to 30 % to be 30 %  There was severe diffuse hypokinesis  Wall thickness was normal  No evidence of apical  thrombus  DOPPLER: Doppler parameters were consistent with abnormal left ventricular relaxation (grade 1 diastolic dysfunction)  RIGHT VENTRICLE: The size was normal  Systolic function was moderately reduced  Wall thickness was normal     LEFT ATRIUM: The atrium was mildly dilated  RIGHT ATRIUM: The atrium was mildly dilated  MITRAL VALVE: Valve structure was normal  There was normal leaflet separation   DOPPLER: The transmitral velocity was within the normal range  There was no evidence for stenosis  There was trace regurgitation  AORTIC VALVE: The valve was trileaflet  Leaflets exhibited normal thickness and normal cuspal separation  DOPPLER: Transaortic velocity was within the normal range  There was no evidence for stenosis  There was no significant  regurgitation  TRICUSPID VALVE: The valve structure was normal  There was normal leaflet separation  DOPPLER: The transtricuspid velocity was within the normal range  There was no evidence for stenosis  There was no significant regurgitation  PULMONIC VALVE: Leaflets exhibited normal thickness, no calcification, and normal cuspal separation  DOPPLER: The transpulmonic velocity was within the normal range  There was no significant regurgitation  PERICARDIUM: There was no pericardial effusion  The pericardium was normal in appearance  AORTA: The root exhibited normal size  SYSTEMIC VEINS: IVC: The inferior vena cava was dilated  SYSTEM MEASUREMENT TABLES    2D mode  AoR Diam 2D: 3 2 cm  LA Diam (2D): 4 5 cm  LA/Ao (2D): 1 41  FS (2D Teich): 13 7 %  IVSd (2D): 0 82 cm  LVDEV: 193 cmï¾³  LVESV: 138 cmï¾³  LVIDd(2D): 6 19 cm  LVISd (2D): 5 34 cm  LVPWd (2D): 0 81 cm  SV (Teich): 55 cmï¾³    Apical four chamber  LVEF A4C: 28 %    Unspecified Scan Mode  MV Peak E Zach  Mean: 848 mm/s  MVA (PHT): 4 15 cmï¾²  PHT: 53 ms  Max P mm[Hg]  V Max: 2440 mm/s  Vmax: 2500 mm/s  RA Area: 18 1 cmï¾²  RA Volume: 48 8 cmï¾³  TAPSE: 1 1 cm    [employer] Accredited Echocardiography Laboratory    Prepared and electronically signed by    Carin Sawyer MD  Signed 2019 13:10:35       Carin Luna  Please call with any questions or suggestions    A description of the counseling:   Goals and Barriers:  Patient's ability to self care:  Medication side effect reviewed with patient in detail and all their questions answered      "This note has been constructed using a voice recognition system  Therefore there may be syntax, spelling, and/or grammatical errors   Please call if you have any questions  "

## 2019-12-02 ENCOUNTER — TELEPHONE (OUTPATIENT)
Dept: CARDIOLOGY CLINIC | Facility: CLINIC | Age: 49
End: 2019-12-02

## 2019-12-02 NOTE — TELEPHONE ENCOUNTER
Pharmacist stated the Coreg 6 25 mg tablet take 1 5 tablet take twice per day was picked up today at 1:58 pm

## 2019-12-02 NOTE — TELEPHONE ENCOUNTER
Please call pharm: patient is down 5 pills and they will not refill this medication for her     carvedilol (COREG) 6 25 mg tablet

## 2019-12-09 ENCOUNTER — HOSPITAL ENCOUNTER (OUTPATIENT)
Dept: RADIOLOGY | Facility: HOSPITAL | Age: 49
Discharge: HOME | End: 2019-12-09
Attending: INTERNAL MEDICINE
Payer: COMMERCIAL

## 2019-12-09 DIAGNOSIS — I50.41 ACUTE COMBINED SYSTOLIC AND DIASTOLIC CONGESTIVE HEART FAILURE (HCC): ICD-10-CM

## 2019-12-09 DIAGNOSIS — I42.8 NON-ISCHEMIC CARDIOMYOPATHY (HCC): ICD-10-CM

## 2019-12-09 DIAGNOSIS — I50.1 HEART FAILURE, LEFT, WITH LVEF <=30% (HCC): ICD-10-CM

## 2019-12-09 PROCEDURE — 78472 GATED HEART PLANAR SINGLE: CPT

## 2019-12-09 PROCEDURE — A9560 TC99M LABELED RBC: HCPCS

## 2019-12-10 ENCOUNTER — TELEPHONE (OUTPATIENT)
Dept: CARDIOLOGY CLINIC | Facility: CLINIC | Age: 49
End: 2019-12-10

## 2019-12-10 NOTE — TELEPHONE ENCOUNTER
FATOU patient; made aware  Patient also inquired about quantity of carvedilolol  Spoke with pharmacy in regards to a ninety day supply     Pharmacy will push  For a ninety day supply next time

## 2019-12-10 NOTE — TELEPHONE ENCOUNTER
----- Message from Helen Plascencia MD sent at 12/10/2019  3:46 PM EST -----  Her EF has improved from <30% to 34% on recent MUGA study  She need to continue with her heart failure meds and low salt diet   They are helping

## 2019-12-26 ENCOUNTER — TELEPHONE (OUTPATIENT)
Dept: CARDIOLOGY CLINIC | Facility: CLINIC | Age: 49
End: 2019-12-26

## 2019-12-26 DIAGNOSIS — I50.41 ACUTE COMBINED SYSTOLIC AND DIASTOLIC CONGESTIVE HEART FAILURE (HCC): ICD-10-CM

## 2019-12-26 LAB
APPEARANCE UR: CLEAR
BACTERIA URNS QL MICRO: ABNORMAL
BILIRUB UR QL STRIP: NEGATIVE
BUN SERPL-MCNC: 19 MG/DL (ref 6–24)
BUN/CREAT SERPL: 20 (ref 9–23)
CALCIUM SERPL-MCNC: 9.6 MG/DL (ref 8.7–10.2)
CHLORIDE SERPL-SCNC: 103 MMOL/L (ref 96–106)
CO2 SERPL-SCNC: 21 MMOL/L (ref 20–29)
COLOR UR: YELLOW
CREAT SERPL-MCNC: 0.93 MG/DL (ref 0.57–1)
CREAT UR-MCNC: 155.6 MG/DL
EPI CELLS #/AREA URNS HPF: ABNORMAL /HPF (ref 0–10)
GLUCOSE SERPL-MCNC: 104 MG/DL (ref 65–99)
GLUCOSE UR QL: NEGATIVE
HGB UR QL STRIP: NEGATIVE
KETONES UR QL STRIP: NEGATIVE
LEUKOCYTE ESTERASE UR QL STRIP: ABNORMAL
MICRO URNS: ABNORMAL
MUCOUS THREADS URNS QL MICRO: PRESENT
NITRITE UR QL STRIP: NEGATIVE
PH UR STRIP: 5.5 [PH] (ref 5–7.5)
POTASSIUM SERPL-SCNC: 5.2 MMOL/L (ref 3.5–5.2)
PROT UR QL STRIP: NEGATIVE
PROT UR-MCNC: 16.2 MG/DL
PROT/CREAT UR: 104 MG/G CREAT (ref 0–200)
RBC #/AREA URNS HPF: ABNORMAL /HPF (ref 0–2)
SL AMB EGFR AFRICAN AMERICAN: 82 ML/MIN/1.73
SL AMB EGFR NON AFRICAN AMERICAN: 71 ML/MIN/1.73
SODIUM SERPL-SCNC: 139 MMOL/L (ref 134–144)
SP GR UR: 1.02 (ref 1–1.03)
UROBILINOGEN UR STRIP-ACNC: 0.2 MG/DL (ref 0.2–1)
WBC #/AREA URNS HPF: ABNORMAL /HPF (ref 0–5)

## 2019-12-26 RX ORDER — SPIRONOLACTONE 25 MG/1
25 TABLET ORAL EVERY OTHER DAY
Qty: 45 TABLET | Refills: 1
Start: 2019-12-26 | End: 2020-01-20 | Stop reason: SDUPTHER

## 2019-12-26 NOTE — TELEPHONE ENCOUNTER
----- Message from Rod Jc MD sent at 12/26/2019  4:44 PM EST -----  Her labs look good except for her potassium  She need to cut out high potassium food such as tomatoes, potatoes, and bananas? Can she do this?  I will also have her take the spironolactone every other day for now

## 2020-01-20 ENCOUNTER — OFFICE VISIT (OUTPATIENT)
Dept: CARDIOLOGY CLINIC | Facility: CLINIC | Age: 50
End: 2020-01-20
Payer: COMMERCIAL

## 2020-01-20 ENCOUNTER — TELEPHONE (OUTPATIENT)
Dept: CARDIOLOGY CLINIC | Facility: CLINIC | Age: 50
End: 2020-01-20

## 2020-01-20 VITALS
DIASTOLIC BLOOD PRESSURE: 60 MMHG | SYSTOLIC BLOOD PRESSURE: 102 MMHG | HEART RATE: 68 BPM | WEIGHT: 200 LBS | HEIGHT: 61 IN | OXYGEN SATURATION: 95 % | BODY MASS INDEX: 37.76 KG/M2

## 2020-01-20 DIAGNOSIS — I50.1 HEART FAILURE, LEFT, WITH LVEF 31-40% (HCC): ICD-10-CM

## 2020-01-20 DIAGNOSIS — I42.8 NON-ISCHEMIC CARDIOMYOPATHY (HCC): Primary | ICD-10-CM

## 2020-01-20 DIAGNOSIS — R07.9 CHEST PAIN IN ADULT: ICD-10-CM

## 2020-01-20 DIAGNOSIS — I50.1 HEART FAILURE, LEFT, WITH LVEF <=30% (HCC): ICD-10-CM

## 2020-01-20 DIAGNOSIS — I50.41 ACUTE COMBINED SYSTOLIC AND DIASTOLIC CONGESTIVE HEART FAILURE (HCC): ICD-10-CM

## 2020-01-20 DIAGNOSIS — E87.5 HYPERKALEMIA: ICD-10-CM

## 2020-01-20 PROCEDURE — 99214 OFFICE O/P EST MOD 30 MIN: CPT | Performed by: INTERNAL MEDICINE

## 2020-01-20 RX ORDER — SPIRONOLACTONE 25 MG/1
25 TABLET ORAL EVERY OTHER DAY
Qty: 45 TABLET | Refills: 1
Start: 2020-01-20 | End: 2020-03-23

## 2020-01-20 RX ORDER — CARVEDILOL 12.5 MG/1
12.5 TABLET ORAL 2 TIMES DAILY WITH MEALS
Qty: 60 TABLET | Refills: 1 | Status: SHIPPED | OUTPATIENT
Start: 2020-01-20 | End: 2020-02-12

## 2020-01-20 NOTE — PROGRESS NOTES
Cardiology Follow Up  Sharmila Ortega  1970  [id number]  [address] [facility]  [address]  [address] [facility], [facility] [address]  [place] [address] [phone]    1  Non-ischemic cardiomyopathy ([address] )     2  Acute combined systolic and diastolic congestive heart failure (HCC)     3  Heart failure, left, with LVEF 31-40% (HCC)        Discussion/Plan:  Nonischemic CM EF34% NYHA class 2-  Does not want to wear lifevest  No shortness of breath  LifeVest   Repeat lab work shows potassium and creatinine function or normal- K 4 2  Tolerating Entresto therapy- 49-51mg (She will call back to verify)  Mildly rising creatinine- we will need to monitor  She will continue carvedilol 6 25 mg twice a day- increase gradually to 12 5mg in morning and 12 5mg at night  She will continue spironolactone 25 mg daily  Watch sodium intake- 2gm  She will need to wear compression socks  Cardiac cath negative cad  She will continue low sodium diet  Unclear trigger for the patient's cardiomyopathy  No significant recovery? Possible dilated familial cardiomyopathy? Cath was negative for CAD  Reports multiple family members with MI/heart failure  TSH  No alcohol  No HIV risk Dry weight is 199 lb  She is doing cardiac rehab  She is able to walk a mile without symptoms  No current indication for AICD  Holter monitor one week to rule out significant arrhythmia/indication for AICD  Acute on chronic kidney disease- improved  Recheck bmp    Diverticulitis- currently high risk for procedure  Would delay until improved EF  Hyperkalemia- she will watch her potassium intake      Interval History:  She denies having significant lower extremity edema since discharge  She denies feeling dizziness or lightheadedness  She denies having chest discomfort  Denies having syncopal episodes  Denies having any irregular heart rhythm  8/12:  No edema   No syncope  No dizziness or light-headness  Compliant with meds  She has had no device firings  She has been compliant with the up titration of the Entresto  She reports trying to increase her carvedilol but did not feel right  She understands about orthostatic hypotension in the risk of prolonged standing  She has been watching her sodium intake  Her weight has been down trending  11/25:  She is up titrated her carvedilol without significant dizziness lightheadedness  She does feel some orthostatic symptoms  She stands up slowly  She denies having significant lower extremity swelling  She is compliant with her medications  She is wearing compression socks when she is at work  01/20/2020:  Reviewed through her imaging study which shows her EF has improved to near 34%  She is walking a mi without significant shortness of breath  She denies having any dizziness or lightheadedness  She is wearing her compression socks at work  She is compliant with her medications  Her last blood work showed mildly elevated potassium  She is watching her potassium foods  Her kidney function has returned back to normal Holter a shin      Patient Active Problem List   Diagnosis    Nicotine abuse    GERD (gastroesophageal reflux disease)    Acute combined systolic and diastolic congestive heart failure (HCC)    Diverticulitis    Hypokalemia    Non-ischemic cardiomyopathy ([facility] [address] [facility] )    Diverticulitis of large intestine without perforation or abscess without bleeding     Past Medical History:   Diagnosis Date    CHF (congestive heart failure) (HCC)     Diverticulitis of colon     GERD (gastroesophageal reflux disease)     HH (hiatus hernia)      Social History     Socioeconomic History    Marital status: /Civil Union     Spouse name: Not on file    Number of children: Not on file    Years of education: Not on file    Highest education level: Not on file   Occupational History    Not on file   Social Needs    Financial resource strain: Not on file    Food insecurity:     Worry: Not on file     Inability: Not on file    Transportation needs:     Medical: Not on file     Non-medical: Not on file   Tobacco Use    Smoking status: Former Smoker     Packs/day: 0 50     Years: 33 00     Pack years: 16 50     Types: Cigarettes     Last attempt to quit: 2019     Years since quittin 5    Smokeless tobacco: Never Used   Substance and Sexual Activity    Alcohol use: Yes     Frequency: Monthly or less     Comment: 2x year    Drug use: No    Sexual activity: Not on file   Lifestyle    Physical activity:     Days per week: Not on file     Minutes per session: Not on file    Stress: Not on file   Relationships    Social connections:     Talks on phone: Not on file     Gets together: Not on file     Attends Baptism service: Not on file     Active member of club or organization: Not on file     Attends meetings of clubs or organizations: Not on file     Relationship status: Not on file    Intimate partner violence:     Fear of current or ex partner: Not on file     Emotionally abused: Not on file     Physically abused: Not on file     Forced sexual activity: Not on file   Other Topics Concern    Not on file   Social History Narrative    Not on file      Family History   Problem Relation Age of Onset    Heart attack Mother     Heart attack Father      Past Surgical History:   Procedure Laterality Date     SECTION      CHOLECYSTECTOMY      HYSTERECTOMY         Current Outpatient Medications:     carvedilol (COREG) 6 25 mg tablet, Take 1 5 tablets twice a day, Disp: 270 tablet, Rfl: 3    sacubitril-valsartan (ENTRESTO) 49-51 MG TABS, Take 1 tablet by mouth 2 (two) times a day, Disp: 180 tablet, Rfl: 3    spironolactone (ALDACTONE) 25 mg tablet, Take 1 tablet (25 mg total) by mouth every other day, Disp: 45 tablet, Rfl: 1    Na Sulfate-K Sulfate-Mg Sulf (SUPREP BOWEL PREP KIT) 17 5-3 13-1 6 GM/177ML SOLN, Take 2 Bottles by mouth see administration instructions Suprep bowel prep  Please follow the instructions from the office (Patient not taking: Reported on 11/25/2019), Disp: 2 Bottle, Rfl: 0    SUMAtriptan (IMITREX) 100 mg tablet, Take 100 mg by mouth as needed, Disp: , Rfl:   Allergies   Allergen Reactions    Percocet [Oxycodone-Acetaminophen]        Review of Systems:  Review of Systems   Constitutional: Negative  Negative for activity change, appetite change, chills, diaphoresis, fatigue, fever and unexpected weight change  HENT: Negative  Negative for congestion, dental problem, drooling, ear discharge, ear pain, facial swelling, hearing loss, mouth sores, nosebleeds, postnasal drip, rhinorrhea, sinus pressure, sinus pain, sneezing, sore throat, tinnitus, trouble swallowing and voice change  Eyes: Negative  Negative for photophobia, pain, redness, itching and visual disturbance  Respiratory: Positive for shortness of breath  Negative for apnea, cough, choking, chest tightness, wheezing and stridor  Cardiovascular: Negative for chest pain, palpitations and leg swelling  Gastrointestinal: Negative  Negative for abdominal distention, abdominal pain, anal bleeding, blood in stool, constipation, diarrhea, nausea, rectal pain and vomiting  Endocrine: Negative  Negative for cold intolerance, heat intolerance, polydipsia, polyphagia and polyuria  Genitourinary: Negative  Negative for decreased urine volume, difficulty urinating, dyspareunia, dysuria, enuresis, flank pain, frequency, genital sores, hematuria, menstrual problem, pelvic pain, urgency, vaginal bleeding, vaginal discharge and vaginal pain  Musculoskeletal: Negative  Negative for arthralgias, back pain, gait problem, joint swelling, myalgias, neck pain and neck stiffness  Skin: Negative  Negative for color change, pallor, rash and wound  Allergic/Immunologic: Negative    Negative for environmental allergies, food allergies and immunocompromised state  Neurological: Negative  Negative for dizziness, tremors, seizures, syncope, facial asymmetry, speech difficulty, weakness, light-headedness, numbness and headaches  Hematological: Negative  Negative for adenopathy  Does not bruise/bleed easily  Psychiatric/Behavioral: Negative  Negative for agitation, behavioral problems, confusion, decreased concentration, dysphoric mood, hallucinations, self-injury, sleep disturbance and suicidal ideas  The patient is not nervous/anxious and is not hyperactive  All other systems reviewed and are negative  Vitals:    01/20/20 1249   BP: 102/60   BP Location: Right arm   Patient Position: Sitting   Cuff Size: Large   Pulse: 68   SpO2: 95%   Weight: 90 7 kg (200 lb)   Height: 5' 1" (1 549 m)     Physical Exam:  Physical Exam   Constitutional: She is oriented to person, place, and time  She appears well-developed and well-nourished  No distress  HENT:   Head: Normocephalic and atraumatic  Right Ear: External ear normal    Left Ear: External ear normal    Eyes: Pupils are equal, round, and reactive to light  Conjunctivae are normal  Right eye exhibits no discharge  Left eye exhibits no discharge  No scleral icterus  Neck: Normal range of motion  Neck supple  No JVD present  No tracheal deviation present  No thyromegaly present  Cardiovascular: Normal rate and regular rhythm  Exam reveals gallop  Exam reveals no friction rub  No murmur heard  Pulmonary/Chest: Effort normal and breath sounds normal  No stridor  No respiratory distress  She has no wheezes  She has no rales  She exhibits no tenderness  Abdominal: Soft  Bowel sounds are normal  She exhibits no distension and no mass  There is no tenderness  There is no rebound and no guarding  Musculoskeletal: Normal range of motion  She exhibits no edema, tenderness or deformity  Neurological: She is alert and oriented to person, place, and time   She has normal reflexes  She displays normal reflexes  No cranial nerve deficit  She exhibits normal muscle tone  Coordination normal    Skin: Skin is warm and dry  No rash noted  She is not diaphoretic  No erythema  No pallor  Psychiatric: She has a normal mood and affect  Her behavior is normal  Judgment and thought content normal        Labs:     Lab Results   Component Value Date    WBC 6 40 2019    HGB 12 8 2019    HCT 41 7 2019    MCV 87 2019     2019     Lab Results   Component Value Date    K 5 2 2019     2019    CO2 21 2019    BUN 19 2019    CREATININE 0 93 2019    GLUF 145 (H) 2019    CALCIUM 8 7 2019    AST 21 2019    ALT 61 2019    ALKPHOS 101 2019    EGFR 70 2019     No results found for: CHOL  Lab Results   Component Value Date    HDL 44 2019     Lab Results   Component Value Date    LDLCALC 116 (H) 2019     Lab Results   Component Value Date    TRIG 53 2019     No results found for: HGBA1C    Imaging & Testing   I have personally reviewed pertinent reports  EKG: Personally reviewed        Cardiac testing:   Results for orders placed during the hospital encounter of 19   Echo complete with contrast if indicated    Narrative [address]  [address] [facility] 6 [phone]    Transthoracic Echocardiogram  2D, M-mode, Doppler, and Color Doppler    Study date:  2019    Patient: Guanaco Galo  MR number: [id number]  Account number: [de-identified]  : 1970  Age: 44 years  Gender: Female  Status: Inpatient  Location: Bedside  Height: 61 in  Weight: 219 6 lb  BP: 118/ 80 mmHg    Indications: Heart Failure    Diagnoses: I50 9 - Heart failure, unspecified    Sonographer:  ISABELL Katz  Primary Physician:  Edwina Kraus MD  Referring Physician:  Luzmaria Heller DO  Group:  [facility]  Interpreting Physician: Belen mEmanuel MD    SUMMARY    LEFT VENTRICLE:  Systolic function was markedly reduced  Ejection fraction was estimated in the range of 25 % to 30 % to be 30 %  There was severe diffuse hypokinesis  Doppler parameters were consistent with abnormal left ventricular relaxation (grade 1 diastolic dysfunction)  RIGHT VENTRICLE:  Systolic function was moderately reduced  LEFT ATRIUM:  The atrium was mildly dilated  RIGHT ATRIUM:  The atrium was mildly dilated  MITRAL VALVE:  There was trace regurgitation  IVC, HEPATIC VEINS:  The inferior vena cava was dilated  HISTORY: PRIOR HISTORY: GERD, Hiatal Hernia    PROCEDURE: The procedure was performed at the bedside  This was a routine study  The transthoracic approach was used  The study included complete 2D imaging, M-mode, complete spectral Doppler, and color Doppler  The heart rate was 92 bpm,  at the start of the study  Image quality was adequate  LEFT VENTRICLE: Size was normal  Systolic function was markedly reduced  Ejection fraction was estimated in the range of 25 % to 30 % to be 30 %  There was severe diffuse hypokinesis  Wall thickness was normal  No evidence of apical  thrombus  DOPPLER: Doppler parameters were consistent with abnormal left ventricular relaxation (grade 1 diastolic dysfunction)  RIGHT VENTRICLE: The size was normal  Systolic function was moderately reduced  Wall thickness was normal     LEFT ATRIUM: The atrium was mildly dilated  RIGHT ATRIUM: The atrium was mildly dilated  MITRAL VALVE: Valve structure was normal  There was normal leaflet separation  DOPPLER: The transmitral velocity was within the normal range  There was no evidence for stenosis  There was trace regurgitation  AORTIC VALVE: The valve was trileaflet  Leaflets exhibited normal thickness and normal cuspal separation  DOPPLER: Transaortic velocity was within the normal range  There was no evidence for stenosis   There was no significant  regurgitation  TRICUSPID VALVE: The valve structure was normal  There was normal leaflet separation  DOPPLER: The transtricuspid velocity was within the normal range  There was no evidence for stenosis  There was no significant regurgitation  PULMONIC VALVE: Leaflets exhibited normal thickness, no calcification, and normal cuspal separation  DOPPLER: The transpulmonic velocity was within the normal range  There was no significant regurgitation  PERICARDIUM: There was no pericardial effusion  The pericardium was normal in appearance  AORTA: The root exhibited normal size  SYSTEMIC VEINS: IVC: The inferior vena cava was dilated  SYSTEM MEASUREMENT TABLES    2D mode  AoR Diam 2D: 3 2 cm  LA Diam (2D): 4 5 cm  LA/Ao (2D): 1 41  FS (2D Teich): 13 7 %  IVSd (2D): 0 82 cm  LVDEV: 193 cmï¾³  LVESV: 138 cmï¾³  LVIDd(2D): 6 19 cm  LVISd (2D): 5 34 cm  LVPWd (2D): 0 81 cm  SV (Teich): 55 cmï¾³    Apical four chamber  LVEF A4C: 28 %    Unspecified Scan Mode  MV Peak E Zach  Mean: 848 mm/s  MVA (PHT): 4 15 cmï¾²  PHT: 53 ms  Max P mm[Hg]  V Max: 2440 mm/s  Vmax: 2500 mm/s  RA Area: 18 1 cmï¾²  RA Volume: 48 8 cmï¾³  TAPSE: 1 1 cm    [employer] Accredited Echocardiography Laboratory    Prepared and electronically signed by    Abdi Pierce MD  Signed 2019 13:10:35       Abdi Pierce MD [facility]  Please call with any questions or suggestions    A description of the counseling:   Goals and Barriers:  Patient's ability to self care:  Medication side effect reviewed with patient in detail and all their questions answered  "This note has been constructed using a voice recognition system  Therefore there may be syntax, spelling, and/or grammatical errors   Please call if you have any questions  "

## 2020-02-12 ENCOUNTER — TELEPHONE (OUTPATIENT)
Dept: CARDIOLOGY CLINIC | Facility: CLINIC | Age: 50
End: 2020-02-12

## 2020-02-12 DIAGNOSIS — I50.1 HEART FAILURE, LEFT, WITH LVEF 31-40% (HCC): ICD-10-CM

## 2020-02-12 DIAGNOSIS — I50.41 ACUTE COMBINED SYSTOLIC AND DIASTOLIC CONGESTIVE HEART FAILURE (HCC): ICD-10-CM

## 2020-02-12 DIAGNOSIS — I42.8 NON-ISCHEMIC CARDIOMYOPATHY (HCC): ICD-10-CM

## 2020-02-12 RX ORDER — CARVEDILOL 12.5 MG/1
TABLET ORAL
Qty: 60 TABLET | Refills: 1 | Status: SHIPPED | OUTPATIENT
Start: 2020-02-12 | End: 2020-03-10

## 2020-02-12 NOTE — TELEPHONE ENCOUNTER
----- Message from Jaren Dawkins MD sent at 2/11/2020  5:34 PM EST -----  Regarding: Event monitor  Her event monitor was negative for significant arrhythmias  This is very good news  There is no current indication for a defibrillator  She should continue with her cardiac medications

## 2020-02-21 ENCOUNTER — CLINICAL SUPPORT (OUTPATIENT)
Dept: CARDIOLOGY CLINIC | Facility: CLINIC | Age: 50
End: 2020-02-21
Payer: COMMERCIAL

## 2020-02-21 DIAGNOSIS — E87.5 HYPERKALEMIA: ICD-10-CM

## 2020-02-21 DIAGNOSIS — R07.9 CHEST PAIN IN ADULT: ICD-10-CM

## 2020-02-21 DIAGNOSIS — I50.1 HEART FAILURE, LEFT, WITH LVEF <=30% (HCC): ICD-10-CM

## 2020-02-21 DIAGNOSIS — I42.8 NON-ISCHEMIC CARDIOMYOPATHY (HCC): ICD-10-CM

## 2020-02-21 DIAGNOSIS — I50.1 HEART FAILURE, LEFT, WITH LVEF 31-40% (HCC): ICD-10-CM

## 2020-02-21 PROCEDURE — 0298T PR EXT ECG > 48HR TO 21 DAY REVIEW AND INTERPRETATN: CPT | Performed by: INTERNAL MEDICINE

## 2020-03-10 DIAGNOSIS — G47.33 OSA (OBSTRUCTIVE SLEEP APNEA): Primary | ICD-10-CM

## 2020-03-10 DIAGNOSIS — G47.19 EXCESSIVE DAYTIME SLEEPINESS: ICD-10-CM

## 2020-03-10 DIAGNOSIS — I50.1 HEART FAILURE, LEFT, WITH LVEF 31-40% (HCC): ICD-10-CM

## 2020-03-10 DIAGNOSIS — I42.8 NON-ISCHEMIC CARDIOMYOPATHY (HCC): ICD-10-CM

## 2020-03-10 DIAGNOSIS — I50.41 ACUTE COMBINED SYSTOLIC AND DIASTOLIC CONGESTIVE HEART FAILURE (HCC): ICD-10-CM

## 2020-03-10 DIAGNOSIS — R06.83 SNORING: ICD-10-CM

## 2020-03-10 RX ORDER — CARVEDILOL 12.5 MG/1
TABLET ORAL
Qty: 60 TABLET | Refills: 1 | Status: SHIPPED | OUTPATIENT
Start: 2020-03-10 | End: 2020-04-05

## 2020-03-13 DIAGNOSIS — I50.41 ACUTE COMBINED SYSTOLIC AND DIASTOLIC CONGESTIVE HEART FAILURE (HCC): ICD-10-CM

## 2020-03-19 ENCOUNTER — TELEPHONE (OUTPATIENT)
Dept: SLEEP CENTER | Facility: CLINIC | Age: 50
End: 2020-03-19

## 2020-03-19 NOTE — TELEPHONE ENCOUNTER
----- Message from Arne Spatz, DO sent at 3/18/2020  4:14 PM EDT -----  approved  ----- Message -----  From: Kimmy Og MA  Sent: 3/17/2020   8:08 AM EDT  To: Sleep Medicine [facility] Provider    This sleep study needs approval  Res Med Results from [facility] 6/25/19  If approved please sign and return to clerical pool  If denied please include reasons why  Also provide alternative testing if warranted  Please sign and return to clerical pool

## 2020-03-23 RX ORDER — SPIRONOLACTONE 25 MG/1
TABLET ORAL
Qty: 90 TABLET | Refills: 1 | Status: SHIPPED | OUTPATIENT
Start: 2020-03-23 | End: 2020-09-10

## 2020-04-02 ENCOUNTER — TELEPHONE (OUTPATIENT)
Dept: CARDIOLOGY CLINIC | Facility: CLINIC | Age: 50
End: 2020-04-02

## 2020-04-04 DIAGNOSIS — I50.41 ACUTE COMBINED SYSTOLIC AND DIASTOLIC CONGESTIVE HEART FAILURE (HCC): ICD-10-CM

## 2020-04-04 DIAGNOSIS — I50.1 HEART FAILURE, LEFT, WITH LVEF 31-40% (HCC): ICD-10-CM

## 2020-04-04 DIAGNOSIS — I42.8 NON-ISCHEMIC CARDIOMYOPATHY (HCC): ICD-10-CM

## 2020-04-05 RX ORDER — CARVEDILOL 12.5 MG/1
TABLET ORAL
Qty: 60 TABLET | Refills: 1 | Status: SHIPPED | OUTPATIENT
Start: 2020-04-05 | End: 2020-05-13 | Stop reason: ALTCHOICE

## 2020-04-13 ENCOUNTER — TELEPHONE (OUTPATIENT)
Dept: SLEEP CENTER | Facility: CLINIC | Age: 50
End: 2020-04-13

## 2020-04-17 ENCOUNTER — TELEMEDICINE (OUTPATIENT)
Dept: SLEEP CENTER | Facility: CLINIC | Age: 50
End: 2020-04-17
Payer: COMMERCIAL

## 2020-04-17 DIAGNOSIS — I42.8 NON-ISCHEMIC CARDIOMYOPATHY (HCC): ICD-10-CM

## 2020-04-17 DIAGNOSIS — I50.41 ACUTE COMBINED SYSTOLIC AND DIASTOLIC CONGESTIVE HEART FAILURE (HCC): ICD-10-CM

## 2020-04-17 DIAGNOSIS — R06.81 WITNESSED EPISODE OF APNEA: ICD-10-CM

## 2020-04-17 DIAGNOSIS — K21.9 GASTROESOPHAGEAL REFLUX DISEASE, ESOPHAGITIS PRESENCE NOT SPECIFIED: ICD-10-CM

## 2020-04-17 DIAGNOSIS — R06.3 CENTRAL SLEEP APNEA DUE TO CHEYNE-STOKES RESPIRATION: ICD-10-CM

## 2020-04-17 DIAGNOSIS — G47.33 OSA (OBSTRUCTIVE SLEEP APNEA): Primary | ICD-10-CM

## 2020-04-17 DIAGNOSIS — E66.9 OBESITY (BMI 30-39.9): ICD-10-CM

## 2020-04-17 PROCEDURE — 99204 OFFICE O/P NEW MOD 45 MIN: CPT | Performed by: INTERNAL MEDICINE

## 2020-04-29 DIAGNOSIS — I50.1 HEART FAILURE, LEFT, WITH LVEF 31-40% (HCC): ICD-10-CM

## 2020-04-29 DIAGNOSIS — I50.41 ACUTE COMBINED SYSTOLIC AND DIASTOLIC CONGESTIVE HEART FAILURE (HCC): ICD-10-CM

## 2020-04-29 DIAGNOSIS — I42.8 NON-ISCHEMIC CARDIOMYOPATHY (HCC): ICD-10-CM

## 2020-05-04 ENCOUNTER — TELEMEDICINE (OUTPATIENT)
Dept: CARDIOLOGY CLINIC | Facility: CLINIC | Age: 50
End: 2020-05-04
Payer: COMMERCIAL

## 2020-05-04 VITALS — WEIGHT: 200 LBS | BODY MASS INDEX: 37.79 KG/M2

## 2020-05-04 DIAGNOSIS — I50.1 HEART FAILURE, LEFT, WITH LVEF 31-40% (HCC): Primary | ICD-10-CM

## 2020-05-04 PROCEDURE — 99213 OFFICE O/P EST LOW 20 MIN: CPT | Performed by: INTERNAL MEDICINE

## 2020-05-04 RX ORDER — MELATONIN
1000 DAILY
COMMUNITY

## 2020-05-04 RX ORDER — ASCORBIC ACID 500 MG
500 TABLET ORAL DAILY
COMMUNITY

## 2020-05-06 RX ORDER — CARVEDILOL 12.5 MG/1
TABLET ORAL
Qty: 60 TABLET | Refills: 1 | OUTPATIENT
Start: 2020-05-06

## 2020-05-13 DIAGNOSIS — I42.8 NON-ISCHEMIC CARDIOMYOPATHY (HCC): ICD-10-CM

## 2020-05-13 DIAGNOSIS — I50.41 ACUTE COMBINED SYSTOLIC AND DIASTOLIC CONGESTIVE HEART FAILURE (HCC): ICD-10-CM

## 2020-05-13 DIAGNOSIS — I50.1 HEART FAILURE, LEFT, WITH LVEF 31-40% (HCC): ICD-10-CM

## 2020-05-15 RX ORDER — CARVEDILOL 12.5 MG/1
12.5 TABLET ORAL 2 TIMES DAILY WITH MEALS
Qty: 180 TABLET | Refills: 3 | Status: SHIPPED | OUTPATIENT
Start: 2020-05-15 | End: 2020-09-10 | Stop reason: SDUPTHER

## 2020-05-29 ENCOUNTER — HOSPITAL ENCOUNTER (OUTPATIENT)
Dept: SLEEP CENTER | Facility: CLINIC | Age: 50
Discharge: HOME | End: 2020-05-29
Payer: COMMERCIAL

## 2020-05-29 DIAGNOSIS — R06.81 WITNESSED EPISODE OF APNEA: ICD-10-CM

## 2020-05-29 DIAGNOSIS — R06.3 CENTRAL SLEEP APNEA DUE TO CHEYNE-STOKES RESPIRATION: ICD-10-CM

## 2020-05-29 DIAGNOSIS — I42.8 NON-ISCHEMIC CARDIOMYOPATHY (HCC): ICD-10-CM

## 2020-05-29 DIAGNOSIS — G47.33 OSA (OBSTRUCTIVE SLEEP APNEA): ICD-10-CM

## 2020-05-29 PROCEDURE — 95810 POLYSOM 6/> YRS 4/> PARAM: CPT

## 2020-06-03 ENCOUNTER — TELEPHONE (OUTPATIENT)
Dept: SLEEP CENTER | Facility: CLINIC | Age: 50
End: 2020-06-03

## 2020-06-06 ENCOUNTER — OFFICE VISIT (OUTPATIENT)
Dept: SLEEP CENTER | Facility: CLINIC | Age: 50
End: 2020-06-06
Payer: COMMERCIAL

## 2020-06-06 VITALS
BODY MASS INDEX: 38.33 KG/M2 | SYSTOLIC BLOOD PRESSURE: 99 MMHG | HEART RATE: 68 BPM | WEIGHT: 203 LBS | DIASTOLIC BLOOD PRESSURE: 65 MMHG | HEIGHT: 61 IN

## 2020-06-06 DIAGNOSIS — I50.41 ACUTE COMBINED SYSTOLIC AND DIASTOLIC CONGESTIVE HEART FAILURE (HCC): ICD-10-CM

## 2020-06-06 DIAGNOSIS — G47.33 OSA (OBSTRUCTIVE SLEEP APNEA): Primary | ICD-10-CM

## 2020-06-06 DIAGNOSIS — G47.31 CENTRAL SLEEP APNEA: ICD-10-CM

## 2020-06-06 DIAGNOSIS — K21.9 GASTROESOPHAGEAL REFLUX DISEASE, ESOPHAGITIS PRESENCE NOT SPECIFIED: ICD-10-CM

## 2020-06-06 DIAGNOSIS — I42.8 NON-ISCHEMIC CARDIOMYOPATHY (HCC): ICD-10-CM

## 2020-06-06 DIAGNOSIS — G47.09 OTHER INSOMNIA: ICD-10-CM

## 2020-06-06 DIAGNOSIS — E66.9 OBESITY (BMI 30-39.9): ICD-10-CM

## 2020-06-06 PROCEDURE — 99214 OFFICE O/P EST MOD 30 MIN: CPT | Performed by: INTERNAL MEDICINE

## 2020-06-09 ENCOUNTER — TELEPHONE (OUTPATIENT)
Dept: SLEEP CENTER | Facility: CLINIC | Age: 50
End: 2020-06-09

## 2020-08-07 LAB
BUN SERPL-MCNC: 22 MG/DL (ref 6–24)
BUN/CREAT SERPL: 27 (ref 9–23)
CALCIUM SERPL-MCNC: 9.2 MG/DL (ref 8.7–10.2)
CHLORIDE SERPL-SCNC: 106 MMOL/L (ref 96–106)
CO2 SERPL-SCNC: 21 MMOL/L (ref 20–29)
CREAT SERPL-MCNC: 0.82 MG/DL (ref 0.57–1)
GLUCOSE SERPL-MCNC: 104 MG/DL (ref 65–99)
POTASSIUM SERPL-SCNC: 4.3 MMOL/L (ref 3.5–5.2)
SL AMB EGFR AFRICAN AMERICAN: 95 ML/MIN/1.73
SL AMB EGFR NON AFRICAN AMERICAN: 83 ML/MIN/1.73
SODIUM SERPL-SCNC: 141 MMOL/L (ref 134–144)

## 2020-08-20 ENCOUNTER — OFFICE VISIT (OUTPATIENT)
Dept: SLEEP CENTER | Facility: CLINIC | Age: 50
End: 2020-08-20
Payer: COMMERCIAL

## 2020-08-20 VITALS
WEIGHT: 206 LBS | SYSTOLIC BLOOD PRESSURE: 97 MMHG | BODY MASS INDEX: 38.89 KG/M2 | HEART RATE: 59 BPM | DIASTOLIC BLOOD PRESSURE: 60 MMHG | HEIGHT: 61 IN

## 2020-08-20 DIAGNOSIS — G47.09 OTHER INSOMNIA: ICD-10-CM

## 2020-08-20 DIAGNOSIS — E66.9 OBESITY (BMI 30-39.9): ICD-10-CM

## 2020-08-20 DIAGNOSIS — I42.8 NON-ISCHEMIC CARDIOMYOPATHY (HCC): ICD-10-CM

## 2020-08-20 DIAGNOSIS — I50.41 ACUTE COMBINED SYSTOLIC AND DIASTOLIC CONGESTIVE HEART FAILURE (HCC): ICD-10-CM

## 2020-08-20 DIAGNOSIS — K21.9 GASTROESOPHAGEAL REFLUX DISEASE, ESOPHAGITIS PRESENCE NOT SPECIFIED: ICD-10-CM

## 2020-08-20 DIAGNOSIS — G47.33 OSA (OBSTRUCTIVE SLEEP APNEA): Primary | ICD-10-CM

## 2020-08-20 DIAGNOSIS — G47.31 CENTRAL SLEEP APNEA: ICD-10-CM

## 2020-08-20 PROCEDURE — 99214 OFFICE O/P EST MOD 30 MIN: CPT | Performed by: INTERNAL MEDICINE

## 2020-08-20 NOTE — PROGRESS NOTES
Follow-Up Note - [place] Elroy Landers  44 y o  female  :1970  TEN:[id number]    CC: I saw this patient for follow-up in clinic today for Sleep disordered breathing, Coexisting Sleep and Medical Problems  Results of prior study:  A diagnostic study in April of this year demonstrated severe obstructive sleep apnea: AHI 43 /hour made up of central and obstructive events  Minimum oxygen saturation 80 % and 8 6 minutes of total sleep time was spent with saturations less than 90%  There were mild periodic limb movements of sleep  She had difficulty both initiating and maintaining sleep     PFSH, Problem List, Medications & Allergies were reviewed in EMR  Interval changes: none reported  She  has a past medical history of CHF (congestive heart failure) ([facility] [address] ), Diverticulitis of colon, GERD (gastroesophageal reflux disease), and HH (hiatus hernia)  She has a current medication list which includes the following prescription(s): ascorbic acid, carvedilol, cholecalciferol, na sulfate-k sulfate-mg sulf, sacubitril-valsartan, spironolactone, and sumatriptan  ROS: constitutional, psychiatric, ENT, respiratory,CVS, GI, UGS, CNS, MSK, integumentary, endocrine, hematological reviewed  Significant for approximately 3 lb weight gain since her last visit  She reports no respiratory or cardiac symptoms  Enid Og DATA REVIEWED:  using PAP > 4 hours/night 80% of the time  Estimated NANDO 6 9/hour at pressure of 12 1cm H2O @90th percentile  SUBJECTIVE: Regarding use of PAP, Jone Huerta reports:   · She is experiencing some adverse effects: dry mouth/throat, dry nose, nasal congestion, mask leaks  and pressure too high  · She is   benefiting from use: no longer snoring / having breathing difficulties   Sleep Routine: She reports getting 4-6 hrs sleep  ; she has difficulty initiating and maintaining sleep   She awakens spontaneously and is not always refreshed  She denied excessive drowsiness    She rated herself at Total score: 0 /24 on the Kimmell sleepiness scale  Habits: reports that she quit smoking about 13 months ago  Her smoking use included cigarettes  She has a 16 50 pack-year smoking history  She has never used smokeless tobacco ,  reports current alcohol use ,  reports no history of drug use , Caffeine use: excessive until around 6:00 p m , Exercise routine: regular    EXAM: BP 97/60   Pulse 59   Ht 5' 1" (1 549 m)   Wt 93 4 kg (206 lb)   BMI 38 92 kg/m²     Patient is well groomed; well appearing  Skin/Extrem: warm & dry; col & hydration normal; no edema  Psych: cooperativeand in no distress  Mental state appears normal   CNS: Alert, orientated, clear & coherent speech  H&N: EOMI; NC/AT:no facial pressure marks, no rashes  cm    ENMT Mucus membranes appear normal Nasal airway:patent  Oral airway:  crowded  Resp:effort is normal CVS: RRR ABD:truncal obesity MSK:Gait normal     IMPRESSION: Primary Sleep/Secondary(to Medical or Psych conditions) & comorbidities   1  TAE (obstructive sleep apnea)  PAP DME Pressure Change    PAP DME Resupply/Reorder   2  Central sleep apnea     3  Other insomnia     4  Non-ischemic cardiomyopathy ([facility] [address] )     5  Acute combined systolic and diastolic congestive heart failure ([facility] [address] )     6  Gastroesophageal reflux disease, esophagitis presence not specified     7  Obesity (BMI 30-39  9)       PLAN:  1  Treatment with  PAP is medically necessary and Getachew Islas is agreable to continue use  2  Care of equipment, methods to improve comfort using PAP and importance of compliance with therapy were discussed  3  Pressure setting: continue 9-14 cmH2O     4  Rx provided to replace supplies and Care coordinated with DME provider  5  Cognitive behavioral therapy, Sleep Hygiene and behavioral techniques to manage Insomnia were discussed  6  Strategies for weight reduction were discussed      7  Follow-up is advised in 1 year or sooner if needed to monitor progress, compliance and to adjust therapy  Thank you for allowing me to participate in the care of this patient      Sincerely,    Authenticated electronically by Carrol Viramontes MD on 46/38/29   Board Certified Specialist

## 2020-08-20 NOTE — PATIENT INSTRUCTIONS

## 2020-08-20 NOTE — PROGRESS NOTES
Review of Systems      Genitourinary none   Cardiology none   Gastrointestinal none   Neurology frequent headaches and awaken with headache   Constitutional none   Integumentary none   Psychiatry none   Musculoskeletal none   Pulmonary none   ENT throat clearing   Endocrine none   Hematological none

## 2020-08-21 ENCOUNTER — TELEPHONE (OUTPATIENT)
Dept: SLEEP CENTER | Facility: CLINIC | Age: 50
End: 2020-08-21

## 2020-08-21 NOTE — TELEPHONE ENCOUNTER
Juan's representative aware of DME pressure change     DME resupply script sent to [facility]

## 2020-09-10 ENCOUNTER — OFFICE VISIT (OUTPATIENT)
Dept: CARDIOLOGY CLINIC | Facility: CLINIC | Age: 50
End: 2020-09-10
Payer: COMMERCIAL

## 2020-09-10 VITALS
BODY MASS INDEX: 38.51 KG/M2 | HEIGHT: 61 IN | OXYGEN SATURATION: 97 % | WEIGHT: 204 LBS | DIASTOLIC BLOOD PRESSURE: 72 MMHG | HEART RATE: 64 BPM | TEMPERATURE: 97.4 F | SYSTOLIC BLOOD PRESSURE: 104 MMHG

## 2020-09-10 DIAGNOSIS — I50.41 ACUTE COMBINED SYSTOLIC AND DIASTOLIC CONGESTIVE HEART FAILURE (HCC): ICD-10-CM

## 2020-09-10 DIAGNOSIS — I42.8 NON-ISCHEMIC CARDIOMYOPATHY (HCC): ICD-10-CM

## 2020-09-10 DIAGNOSIS — R07.89 CHEST TIGHTNESS: Primary | ICD-10-CM

## 2020-09-10 DIAGNOSIS — G47.33 OSA (OBSTRUCTIVE SLEEP APNEA): ICD-10-CM

## 2020-09-10 DIAGNOSIS — I50.1 HEART FAILURE, LEFT, WITH LVEF 31-40% (HCC): ICD-10-CM

## 2020-09-10 PROCEDURE — 99214 OFFICE O/P EST MOD 30 MIN: CPT | Performed by: INTERNAL MEDICINE

## 2020-09-10 PROCEDURE — 93000 ELECTROCARDIOGRAM COMPLETE: CPT | Performed by: INTERNAL MEDICINE

## 2020-09-10 RX ORDER — CARVEDILOL 12.5 MG/1
12.5 TABLET ORAL 2 TIMES DAILY WITH MEALS
Qty: 180 TABLET | Refills: 3 | Status: SHIPPED | OUTPATIENT
Start: 2020-09-10 | End: 2021-03-15 | Stop reason: SDUPTHER

## 2020-09-10 RX ORDER — SPIRONOLACTONE 25 MG/1
TABLET ORAL
Qty: 90 TABLET | Refills: 1 | Status: SHIPPED | OUTPATIENT
Start: 2020-09-10 | End: 2021-03-15 | Stop reason: SDUPTHER

## 2020-09-10 NOTE — PROGRESS NOTES
Cardiology Follow Up  Paulette Todd  1970  [id number]  [address] [facility]  [address]  [address] [facility], [address]  [place] [address] [phone]    1  Chest tightness  POCT ECG   2  Non-ischemic cardiomyopathy ([address] )  POCT ECG   3  Acute combined systolic and diastolic congestive heart failure (HCC)  POCT ECG   4  Heart failure, left, with LVEF 31-40% (HCC)  POCT ECG   5  TAE (obstructive sleep apnea)  POCT ECG      Discussion/Plan:  Nonischemic CM EF34% NYHA class 2-  Does not want to wear lifevest  No shortness of breath  LifeVest   Repeat lab work shows potassium and creatinine function or normal- K 4 2  Tolerating Entresto therapy- 49-51mg (   Mildly rising creatinine- we will need to monitor  Carvedilol 12 5mg in morning and 12 5mg at night  She will continue spironolactone 25 mg daily  Watch sodium intake- 2gm  She will need to wear compression socks  Cardiac cath negative cad  She will continue low sodium diet  Unclear trigger for the patient's cardiomyopathy  No significant recovery? Possible dilated familial cardiomyopathy? Cath was negative for CAD  Reports multiple family members with MI/heart failure  TSH  No alcohol  No HIV risk Dry weight is 204ilbs  Acute on chronic kidney disease- improved  Electrolytes stable  Diverticulitis- currently high risk for procedure  Would delay until improved EF  Hyperkalemia- she will watch her potassium intake    TAE- trouble with cpap  Positive pressure  Interval History:  She denies having significant lower extremity edema since discharge  She denies feeling dizziness or lightheadedness  She denies having chest discomfort  Denies having syncopal episodes  Denies having any irregular heart rhythm  8/12:  No edema  No syncope  No dizziness or light-headness  Compliant with meds  She has had no device firings    She has been compliant with the up titration of the Entresto  She reports trying to increase her carvedilol but did not feel right  She understands about orthostatic hypotension in the risk of prolonged standing  She has been watching her sodium intake  Her weight has been down trending  11/25:  She is up titrated her carvedilol without significant dizziness lightheadedness  She does feel some orthostatic symptoms  She stands up slowly  She denies having significant lower extremity swelling  She is compliant with her medications  She is wearing compression socks when she is at work  01/20/2020:  Reviewed through her imaging study which shows her EF has improved to near 34%  She is walking a mi without significant shortness of breath  She denies having any dizziness or lightheadedness  She is wearing her compression socks at work  She is compliant with her medications  Her last blood work showed mildly elevated potassium  She is watching her potassium foods  Her kidney function has returned back to normal Holter a shin  9/10/20:  Still with trouble with cpap  Feels gassy  No dizziness or light-headness        Patient Active Problem List   Diagnosis    Nicotine abuse    GERD (gastroesophageal reflux disease)    Acute combined systolic and diastolic congestive heart failure (HCC)    Diverticulitis    Hypokalemia    Non-ischemic cardiomyopathy ([address] )    Diverticulitis of large intestine without perforation or abscess without bleeding    TAE (obstructive sleep apnea)     Past Medical History:   Diagnosis Date    CHF (congestive heart failure) (HCC)     Diverticulitis of colon     GERD (gastroesophageal reflux disease)     HH (hiatus hernia)      Social History     Socioeconomic History    Marital status: /Civil Union     Spouse name: Not on file    Number of children: Not on file    Years of education: Not on file    Highest education level: Not on file   Occupational History    Not on file Social Needs    Financial resource strain: Not on file    Food insecurity     Worry: Not on file     Inability: Not on file    Transportation needs     Medical: Not on file     Non-medical: Not on file   Tobacco Use    Smoking status: Former Smoker     Packs/day: 0 50     Years: 33 00     Pack years: 16 50     Types: Cigarettes     Last attempt to quit: 2019     Years since quittin 2    Smokeless tobacco: Never Used   Substance and Sexual Activity    Alcohol use: Yes     Frequency: Monthly or less     Comment: 2x year    Drug use: No    Sexual activity: Not on file   Lifestyle    Physical activity     Days per week: Not on file     Minutes per session: Not on file    Stress: Not on file   Relationships    Social connections     Talks on phone: Not on file     Gets together: Not on file     Attends Scientologist service: Not on file     Active member of club or organization: Not on file     Attends meetings of clubs or organizations: Not on file     Relationship status: Not on file    Intimate partner violence     Fear of current or ex partner: Not on file     Emotionally abused: Not on file     Physically abused: Not on file     Forced sexual activity: Not on file   Other Topics Concern    Not on file   Social History Narrative    Not on file      Family History   Problem Relation Age of Onset    Heart attack Mother     Heart attack Father      Past Surgical History:   Procedure Laterality Date     SECTION      CHOLECYSTECTOMY      HYSTERECTOMY         Current Outpatient Medications:     ascorbic acid (VITAMIN C) 500 mg tablet, Take 500 mg by mouth daily, Disp: , Rfl:     carvedilol (COREG) 12 5 mg tablet, Take 1 tablet (12 5 mg total) by mouth 2 (two) times a day with meals, Disp: 180 tablet, Rfl: 3    cholecalciferol (VITAMIN D3) 1,000 units tablet, Take 1,000 Units by mouth daily, Disp: , Rfl:     sacubitril-valsartan (ENTRESTO) 49-51 MG TABS, Take 1 tablet by mouth 2 (two) times a day, Disp: 180 tablet, Rfl: 3    spironolactone (ALDACTONE) 25 mg tablet, TAKE 1 TABLET BY MOUTH EVERY DAY, Disp: 90 tablet, Rfl: 1    Na Sulfate-K Sulfate-Mg Sulf (SUPREP BOWEL PREP KIT) 17 5-3 13-1 6 GM/177ML SOLN, Take 2 Bottles by mouth see administration instructions Suprep bowel prep  Please follow the instructions from the office (Patient not taking: Reported on 5/4/2020), Disp: 2 Bottle, Rfl: 0    SUMAtriptan (IMITREX) 100 mg tablet, Take 100 mg by mouth as needed, Disp: , Rfl:   Allergies   Allergen Reactions    Percocet [Oxycodone-Acetaminophen]        Review of Systems:  Review of Systems   Constitutional: Positive for fatigue  Negative for activity change, appetite change, chills, diaphoresis, fever and unexpected weight change  HENT: Negative  Negative for congestion, dental problem, drooling, ear discharge, ear pain, facial swelling, hearing loss, mouth sores, nosebleeds, postnasal drip, rhinorrhea, sinus pressure, sinus pain, sneezing, sore throat, tinnitus, trouble swallowing and voice change  Eyes: Negative  Negative for photophobia, pain, redness, itching and visual disturbance  Respiratory: Positive for shortness of breath  Negative for apnea, cough, choking, chest tightness, wheezing and stridor  Cardiovascular: Negative for chest pain, palpitations and leg swelling  Gastrointestinal: Negative  Negative for abdominal distention, abdominal pain, anal bleeding, blood in stool, constipation, diarrhea, nausea, rectal pain and vomiting  Endocrine: Negative  Negative for cold intolerance, heat intolerance, polydipsia, polyphagia and polyuria  Genitourinary: Negative  Negative for decreased urine volume, difficulty urinating, dyspareunia, dysuria, enuresis, flank pain, frequency, genital sores, hematuria, menstrual problem, pelvic pain, urgency, vaginal bleeding, vaginal discharge and vaginal pain  Musculoskeletal: Negative    Negative for arthralgias, back pain, gait problem, joint swelling, myalgias, neck pain and neck stiffness  Skin: Negative  Negative for color change, pallor, rash and wound  Allergic/Immunologic: Negative  Negative for environmental allergies, food allergies and immunocompromised state  Neurological: Negative  Negative for dizziness, tremors, seizures, syncope, facial asymmetry, speech difficulty, weakness, light-headedness, numbness and headaches  Hematological: Negative  Negative for adenopathy  Does not bruise/bleed easily  Psychiatric/Behavioral: Negative  Negative for agitation, behavioral problems, confusion, decreased concentration, dysphoric mood, hallucinations, self-injury, sleep disturbance and suicidal ideas  The patient is not nervous/anxious and is not hyperactive  All other systems reviewed and are negative  Vitals:    09/10/20 1506   BP: 104/72   BP Location: Right arm   Patient Position: Sitting   Cuff Size: Large   Pulse: 64   Temp: (!) 97 4 °F (36 3 °C)   SpO2: 97%   Weight: 92 5 kg (204 lb)   Height: 5' 1" (1 549 m)     Physical Exam:  Physical Exam  Constitutional:       General: She is not in acute distress  Appearance: She is well-developed  She is not diaphoretic  HENT:      Head: Normocephalic and atraumatic  Right Ear: External ear normal       Left Ear: External ear normal    Eyes:      General: No scleral icterus  Right eye: No discharge  Left eye: No discharge  Conjunctiva/sclera: Conjunctivae normal       Pupils: Pupils are equal, round, and reactive to light  Neck:      Musculoskeletal: Normal range of motion and neck supple  Thyroid: No thyromegaly  Vascular: No JVD  Trachea: No tracheal deviation  Cardiovascular:      Rate and Rhythm: Normal rate and regular rhythm  Heart sounds: No murmur  No friction rub  Gallop present  Pulmonary:      Effort: Pulmonary effort is normal  No respiratory distress  Breath sounds: Normal breath sounds   No stridor  No wheezing or rales  Chest:      Chest wall: No tenderness  Abdominal:      General: Bowel sounds are normal  There is no distension  Palpations: Abdomen is soft  There is no mass  Tenderness: There is no abdominal tenderness  There is no guarding or rebound  Musculoskeletal: Normal range of motion  General: No tenderness or deformity  Skin:     General: Skin is warm and dry  Coloration: Skin is not pale  Findings: No erythema or rash  Neurological:      Mental Status: She is alert and oriented to person, place, and time  Cranial Nerves: No cranial nerve deficit  Motor: No abnormal muscle tone  Coordination: Coordination normal       Deep Tendon Reflexes: Reflexes are normal and symmetric  Reflexes normal    Psychiatric:         Behavior: Behavior normal          Thought Content: Thought content normal          Judgment: Judgment normal          Labs:     Lab Results   Component Value Date    WBC 6 40 06/26/2019    HGB 12 8 06/26/2019    HCT 41 7 06/26/2019    MCV 87 06/26/2019     06/26/2019     Lab Results   Component Value Date    K 4 3 08/04/2020     08/04/2020    CO2 21 08/04/2020    BUN 22 08/04/2020    CREATININE 0 82 08/04/2020    GLUF 145 (H) 03/26/2019    CALCIUM 8 7 06/26/2019    AST 21 06/26/2019    ALT 61 06/26/2019    ALKPHOS 101 06/26/2019    EGFR 70 06/26/2019     No results found for: CHOL  Lab Results   Component Value Date    HDL 44 03/26/2019     Lab Results   Component Value Date    LDLCALC 116 (H) 03/26/2019     Lab Results   Component Value Date    TRIG 53 03/26/2019     No results found for: HGBA1C    Imaging & Testing   I have personally reviewed pertinent reports  EKG: Personally reviewed        Cardiac testing:   Results for orders placed during the hospital encounter of 06/23/19   Echo complete with contrast if indicated    Narrative [address] [facility] [address]  [phone]    Transthoracic Echocardiogram  2D, M-mode, Doppler, and Color Doppler    Study date:  2019    Patient: Deisy Sunshine  MR number: [id number]  Account number: [de-identified]  : 1970  Age: 44 years  Gender: Female  Status: Inpatient  Location: Bedside  Height: 61 in  Weight: 219 6 lb  BP: 118/ 80 mmHg    Indications: Heart Failure    Diagnoses: I50 9 - Heart failure, unspecified    Sonographer:  ISABELL Adrian  Primary Physician:  Nadeem Mathew MD  Referring Physician:  Aditi Lucia DO  Group:  [facility]  Interpreting Physician:  Martinez Tolentino MD    SUMMARY    LEFT VENTRICLE:  Systolic function was markedly reduced  Ejection fraction was estimated in the range of 25 % to 30 % to be 30 %  There was severe diffuse hypokinesis  Doppler parameters were consistent with abnormal left ventricular relaxation (grade 1 diastolic dysfunction)  RIGHT VENTRICLE:  Systolic function was moderately reduced  LEFT ATRIUM:  The atrium was mildly dilated  RIGHT ATRIUM:  The atrium was mildly dilated  MITRAL VALVE:  There was trace regurgitation  IVC, HEPATIC VEINS:  The inferior vena cava was dilated  HISTORY: PRIOR HISTORY: GERD, Hiatal Hernia    PROCEDURE: The procedure was performed at the bedside  This was a routine study  The transthoracic approach was used  The study included complete 2D imaging, M-mode, complete spectral Doppler, and color Doppler  The heart rate was 92 bpm,  at the start of the study  Image quality was adequate  LEFT VENTRICLE: Size was normal  Systolic function was markedly reduced  Ejection fraction was estimated in the range of 25 % to 30 % to be 30 %  There was severe diffuse hypokinesis  Wall thickness was normal  No evidence of apical  thrombus  DOPPLER: Doppler parameters were consistent with abnormal left ventricular relaxation (grade 1 diastolic dysfunction)      RIGHT VENTRICLE: The size was normal  Systolic function was moderately reduced  Wall thickness was normal     LEFT ATRIUM: The atrium was mildly dilated  RIGHT ATRIUM: The atrium was mildly dilated  MITRAL VALVE: Valve structure was normal  There was normal leaflet separation  DOPPLER: The transmitral velocity was within the normal range  There was no evidence for stenosis  There was trace regurgitation  AORTIC VALVE: The valve was trileaflet  Leaflets exhibited normal thickness and normal cuspal separation  DOPPLER: Transaortic velocity was within the normal range  There was no evidence for stenosis  There was no significant  regurgitation  TRICUSPID VALVE: The valve structure was normal  There was normal leaflet separation  DOPPLER: The transtricuspid velocity was within the normal range  There was no evidence for stenosis  There was no significant regurgitation  PULMONIC VALVE: Leaflets exhibited normal thickness, no calcification, and normal cuspal separation  DOPPLER: The transpulmonic velocity was within the normal range  There was no significant regurgitation  PERICARDIUM: There was no pericardial effusion  The pericardium was normal in appearance  AORTA: The root exhibited normal size  SYSTEMIC VEINS: IVC: The inferior vena cava was dilated  SYSTEM MEASUREMENT TABLES    2D mode  AoR Diam 2D: 3 2 cm  LA Diam (2D): 4 5 cm  LA/Ao (2D): 1 41  FS (2D Teich): 13 7 %  IVSd (2D): 0 82 cm  LVDEV: 193 cmï¾³  LVESV: 138 cmï¾³  LVIDd(2D): 6 19 cm  LVISd (2D): 5 34 cm  LVPWd (2D): 0 81 cm  SV (Teich): 55 cmï¾³    Apical four chamber  LVEF A4C: 28 %    Unspecified Scan Mode  MV Peak E Zach   Mean: 848 mm/s  MVA (PHT): 4 15 cmï¾²  PHT: 53 ms  Max P mm[Hg]  V Max: 2440 mm/s  Vmax: 2500 mm/s  RA Area: 18 1 cmï¾²  RA Volume: 48 8 cmï¾³  TAPSE: 1 1 cm    [employer] Accredited Echocardiography Laboratory    Prepared and electronically signed by    Valery Foreman MD  Signed 2019 13:10:35       ekg sinus rhythm with LBBB periodic bean Luna  Please call with any questions or suggestions    A description of the counseling:   Goals and Barriers:  Patient's ability to self care:  Medication side effect reviewed with patient in detail and all their questions answered  "This note has been constructed using a voice recognition system  Therefore there may be syntax, spelling, and/or grammatical errors   Please call if you have any questions  "

## 2020-11-12 ENCOUNTER — APPOINTMENT (OUTPATIENT)
Dept: RADIOLOGY | Facility: CLINIC | Age: 50
End: 2020-11-12
Payer: COMMERCIAL

## 2020-11-12 ENCOUNTER — OFFICE VISIT (OUTPATIENT)
Dept: URGENT CARE | Facility: CLINIC | Age: 50
End: 2020-11-12
Payer: COMMERCIAL

## 2020-11-12 VITALS
DIASTOLIC BLOOD PRESSURE: 72 MMHG | HEIGHT: 61 IN | HEART RATE: 72 BPM | TEMPERATURE: 98.6 F | WEIGHT: 211 LBS | BODY MASS INDEX: 39.84 KG/M2 | SYSTOLIC BLOOD PRESSURE: 124 MMHG

## 2020-11-12 DIAGNOSIS — M79.671 RIGHT FOOT PAIN: ICD-10-CM

## 2020-11-12 DIAGNOSIS — S92.154A CLOSED NONDISPLACED AVULSION FRACTURE OF RIGHT TALUS, INITIAL ENCOUNTER: Primary | ICD-10-CM

## 2020-11-12 DIAGNOSIS — Z23 NEED FOR INFLUENZA VACCINATION: ICD-10-CM

## 2020-11-12 PROCEDURE — 29405 APPL SHORT LEG CAST: CPT

## 2020-11-12 PROCEDURE — 73630 X-RAY EXAM OF FOOT: CPT

## 2020-11-12 PROCEDURE — 99213 OFFICE O/P EST LOW 20 MIN: CPT | Performed by: PHYSICIAN ASSISTANT

## 2020-11-12 PROCEDURE — NC001 PR NO CHARGE

## 2020-11-12 PROCEDURE — 90682 RIV4 VACC RECOMBINANT DNA IM: CPT

## 2020-11-12 PROCEDURE — 90471 IMMUNIZATION ADMIN: CPT

## 2020-11-25 ENCOUNTER — OFFICE VISIT (OUTPATIENT)
Dept: OBGYN CLINIC | Facility: CLINIC | Age: 50
End: 2020-11-25
Payer: COMMERCIAL

## 2020-11-25 ENCOUNTER — APPOINTMENT (OUTPATIENT)
Dept: RADIOLOGY | Facility: CLINIC | Age: 50
End: 2020-11-25
Payer: COMMERCIAL

## 2020-11-25 VITALS
WEIGHT: 212.2 LBS | HEIGHT: 61 IN | SYSTOLIC BLOOD PRESSURE: 115 MMHG | DIASTOLIC BLOOD PRESSURE: 76 MMHG | BODY MASS INDEX: 40.06 KG/M2 | HEART RATE: 74 BPM

## 2020-11-25 DIAGNOSIS — M79.671 PAIN IN RIGHT FOOT: Primary | ICD-10-CM

## 2020-11-25 DIAGNOSIS — S92.154A CLOSED NONDISPLACED AVULSION FRACTURE OF RIGHT TALUS, INITIAL ENCOUNTER: ICD-10-CM

## 2020-11-25 DIAGNOSIS — M79.671 PAIN IN RIGHT FOOT: ICD-10-CM

## 2020-11-25 DIAGNOSIS — S93.409A SPRAIN OF ANKLE, INITIAL ENCOUNTER: ICD-10-CM

## 2020-11-25 PROCEDURE — 99204 OFFICE O/P NEW MOD 45 MIN: CPT | Performed by: ORTHOPAEDIC SURGERY

## 2020-11-25 PROCEDURE — 73630 X-RAY EXAM OF FOOT: CPT

## 2020-12-31 ENCOUNTER — TELEPHONE (OUTPATIENT)
Dept: OBGYN CLINIC | Facility: HOSPITAL | Age: 50
End: 2020-12-31

## 2021-01-07 ENCOUNTER — EVALUATION (OUTPATIENT)
Dept: PHYSICAL THERAPY | Facility: CLINIC | Age: 51
End: 2021-01-07
Payer: COMMERCIAL

## 2021-01-07 DIAGNOSIS — S93.409A SPRAIN OF ANKLE, INITIAL ENCOUNTER: ICD-10-CM

## 2021-01-07 DIAGNOSIS — S92.154A CLOSED NONDISPLACED AVULSION FRACTURE OF RIGHT TALUS, INITIAL ENCOUNTER: ICD-10-CM

## 2021-01-07 PROCEDURE — 97161 PT EVAL LOW COMPLEX 20 MIN: CPT

## 2021-01-07 NOTE — PROGRESS NOTES
PT Evaluation     Today's date: 2021  Patient name: Sunny Luevano  : 1970  MRN: [id number]  Referring provider: Destiny Diaz  Dx:   Encounter Diagnosis     ICD-10-CM    1  Sprain of ankle, initial encounter  S93 409A Ambulatory referral to Physical Therapy   2  Closed nondisplaced avulsion fracture of right talus, initial encounter  S92 154A Ambulatory referral to Physical Therapy                  Assessment  Assessment details: Sunny Luevano is a 44 y o  female who presents with signs and symptoms consistent with the referring diagnoses of sprain of right ankle and closed nondisplaced avulsion fracture of right talus resulting from a fall that occurred in late 2020  Patient presents with the following impairments: right ankle pain, decreased strength, decreased ROM, decreased flexibility of gastrocs musculature and increased edema  Due to these impairments, patient has difficulty performing the following: ADL's, recreational activities, work-related activities, engaging in social activities, ambulation, stair negotiation, lifting/carrying, prolonged standing, lying supine, squatting, household chores, yard work, and sleeping  Patient has been educated in home exercise program and plan of care  Patient would benefit from skilled physical therapy services to address the above functional limitations and progress towards prior level of function and independence with home exercise program   Impairments: abnormal gait, abnormal muscle firing, abnormal or restricted ROM, activity intolerance, impaired balance, impaired physical strength, lacks appropriate home exercise program, pain with function, safety issue, weight-bearing intolerance, poor posture  and poor body mechanics  Understanding of Dx/Px/POC: good   Prognosis: good    Goals  Short Term Goals (3 weeks; target date: 2/15/21)  1  Patient will be independent with HEP    2  Patient will demonstrate an increase in right ankle dorsiflexion AROM by 5°    3  Patient will demonstrate an increase in right ankle strength of 1/2 grade on MMT  Long Term Goals (6 weeks; target date: 3/15/21)  1  Patient will demonstrate an increase in right ankle AROM to WNL in order to promote self-care activities pain-free  2  Patient will demonstrate an increase in right ankle strength of 4+/5 on MMT  3  Patient will be able to perform a full, functional squat with proper mechanics  4  Patient will be able to ascend/descend 15 stairs reciprocally without pain  5  Patient will no longer have disturbed sleep due to right ankle/foot pain  Plan  Plan details: Patient has been educated on the facility's COVID precautions and procedures  Patient has also been educated on the facility's cancellation policy and has verbalized agreement with this     Patient would benefit from: skilled physical therapy  Planned modality interventions: cryotherapy, electrical stimulation/Russian stimulation, TENS, ultrasound, high voltage pulsed current: pain management, thermotherapy: hydrocollator packs, unattended electrical stimulation and high voltage pulsed current: spasm management  Planned therapy interventions: flexibility, functional ROM exercises, graded exercise, home exercise program, joint mobilization, manual therapy, neuromuscular re-education, patient education, strengthening, stretching, therapeutic exercise, therapeutic activities, activity modification, postural training, body mechanics training, graded activity, self care, muscle pump exercises, abdominal trunk stabilization, ADL retraining, ADL training, IADL retraining, breathing training, behavior modification, therapeutic training, transfer training, Rogers taping, gait training and graded motor  Frequency: 2x week  Duration in weeks: 6  Plan of Care beginning date: 1/7/2021  Plan of Care expiration date: 4/7/2021  Treatment plan discussed with: patient        Subjective Evaluation    History of Present Illness  Date of onset: 10/27/2020  Mechanism of injury: Pt reports chronic right ankle pain due to a fall that happened on 10/27/20 while walking outside  Pt states that near her home, she was walking downhill on wet grass on a rainy day and slipped  Pt states her right ankle turned inward (demonstrated inversion) and she fell down  Pt denies hitting her head or LOC  Pt states she has a past right knee problems that were also aggravated by this fall  Pt states that about 2 weeks after the fall, she went to urgent care and x-rays were (+) for fracture in the ankle (talus)  Pt states she was given a temporary splint, which she states was not that supportive  Pt states she was only able to keep this on for two days because she got it wet  Pt states she was also advised to use crutches, however she did not use them  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  Pt states her primary complaint is "burning" and "throbbing" along the anterior aspect of her right foot  Pt states that going down the stairs and especially lying down relaxed and sleeping are painful and difficult  Pt states she feels the "black and blue" in this area has not improved since the injury  Pt saw orthopedics in 2020 and new x-rays at this time were (+) for fracture of the talus  Pt states that she was unable to start PT due to exposure to COVID while at work  Pt states she pushed back her follow up appointment with Dr Katty Crouch to 20 due to just beginning PT now     Pain  Current pain ratin  At best pain ratin  At worst pain ratin  Location: Right foot  Quality: burning and throbbing  Relieving factors: ice and medications  Aggravating factors: stair climbing  Progression: no change    Social Support  Stairs in house: yes   12    Employment status: working (Beautician)  Exercise history: Not in a while; walks when nice out, stationary bike at home (not lately)      Diagnostic Tests  X-ray: abnormal  Treatments  Previous treatment: immobilization (Temporary splint for 2 days)  Patient Goals  Patient goals for therapy: decreased pain          Objective     Tenderness     Right Ankle/Foot   Tenderness in the anterior ankle  No tenderness in the anterior talofibular ligament and lateral malleolus  Active Range of Motion     Right Ankle/Foot   Dorsiflexion (kf): 0 degrees   Plantar flexion: 50 degrees with pain  Inversion: 15 degrees with pain  Eversion: 10 degrees with pain    Additional Active Range of Motion Details  Pt notes pain in the anterior TC joint with eversion > inversion > plantarflexion  Pt notes no pain in the lateral ankle     Strength/Myotome Testing     Right Ankle/Foot   Dorsiflexion: 4  Plantar flexion: 4-  Inversion: 3+  Eversion: 3    Functional Assessment        Comments  Squat: bends forward at the waist and avoids knee flexion/dorsiflexion  Step up: [facility] bilaterally  Ambulates with right out-toeing and mod pronation    General Comments:       Ankle/Foot Comments   Mild to moderate swelling at the anterior TC joint             Precautions: Cardiac       EVAL 2 3 4 5   Manuals 1/7/21       STM/MFR        Manual flexibility        Joint mobs ankle        Neuro Re-Ed        Balance        Clamshells        SLR        Lateral walks        Bridging                        Ther Ex        Upright bike        Ankle pumps Instructed       Ankle circles Instructed       Gastroc stretch Instructed Prostretch      Ankle 4-way        Towel toe curls        Rockerboard  Seated      Step up        Step down                        Ther Activity        Pt education POC, HEP       Stairs        Squats        Gait                                Modalities

## 2021-01-12 ENCOUNTER — OFFICE VISIT (OUTPATIENT)
Dept: PHYSICAL THERAPY | Facility: CLINIC | Age: 51
End: 2021-01-12
Payer: COMMERCIAL

## 2021-01-12 DIAGNOSIS — S92.154A CLOSED NONDISPLACED AVULSION FRACTURE OF RIGHT TALUS, INITIAL ENCOUNTER: ICD-10-CM

## 2021-01-12 DIAGNOSIS — S93.409A SPRAIN OF ANKLE, INITIAL ENCOUNTER: Primary | ICD-10-CM

## 2021-01-12 PROCEDURE — 97110 THERAPEUTIC EXERCISES: CPT

## 2021-01-12 PROCEDURE — 97140 MANUAL THERAPY 1/> REGIONS: CPT

## 2021-01-12 NOTE — PROGRESS NOTES
Daily Note     Today's date: 2021  Patient name: Janelle Heller  : 1970  MRN: [id number]  Referring provider: Renard Tatum  Dx:   Encounter Diagnosis     ICD-10-CM    1  Sprain of ankle, initial encounter  S93 409A    2  Closed nondisplaced avulsion fracture of right talus, initial encounter  S92 154A              Subjective: Pain level is 3/10  Objective: See treatment diary below      Assessment: Tolerated treatment well  Patient demonstrated fatigue post treatment, exhibited good technique with therapeutic exercises and would benefit from continued PT      Plan: Continue per plan of care  Precautions: Cardiac       EVAL 2 3 4 5   Manuals 21      STM/MFR  IM       Manual flexibility  IM       Joint mobs ankle        Neuro Re-Ed        Balance        Clamshells  2 x 15       SLR  2 x 10      Lateral walks        Bridging  2 x 10                       Ther Ex        Upright bike  10 min lvl 2       Ankle pumps Instructed       Ankle circles Instructed       Gastroc stretch Instructed Prostretch 10s x 4      Ankle 4-way  2 x 15 ea   GTB      Towel toe curls  X 30       Rockerboard  Seated      Step up  1 insert x 10       Step down  1 insert x 10                       Ther Activity        Pt education POC, HEP       Stairs        Squats        Gait                                Modalities

## 2021-01-14 ENCOUNTER — OFFICE VISIT (OUTPATIENT)
Dept: PHYSICAL THERAPY | Facility: CLINIC | Age: 51
End: 2021-01-14
Payer: COMMERCIAL

## 2021-01-14 DIAGNOSIS — S92.154A CLOSED NONDISPLACED AVULSION FRACTURE OF RIGHT TALUS, INITIAL ENCOUNTER: ICD-10-CM

## 2021-01-14 DIAGNOSIS — S93.409A SPRAIN OF ANKLE, INITIAL ENCOUNTER: Primary | ICD-10-CM

## 2021-01-14 PROCEDURE — 97140 MANUAL THERAPY 1/> REGIONS: CPT

## 2021-01-14 PROCEDURE — 97110 THERAPEUTIC EXERCISES: CPT

## 2021-01-14 PROCEDURE — 97112 NEUROMUSCULAR REEDUCATION: CPT

## 2021-01-14 NOTE — PROGRESS NOTES
Daily Note      Today's date: 2021  Patient name: Mercedes Bonilla  : 1970  MRN: [id number]  Referring provider: Deborah Benitez  Dx:   Encounter Diagnosis     ICD-10-CM    1  Sprain of ankle, initial encounter  S93 409A    2  Closed nondisplaced avulsion fracture of right talus, initial encounter  S92 154A        Subjective: Pt reports that her ankle has been feeling good  Pt states that she has pain while lying down on her right side in the lateral right ankle  Objective: See treatment diary below    Assessment: Pt tolerated treatment well  Pt introduced to standing rockerboard and felt hesitant/cautious to attempt this  Pt educated to perform this exercise while holding on and with PT nearby  Pt noted that due to past hx of vertigo, she sometimes feels dizzy after standing up quickly from supine and with stepping backwards  Pt introduced to standing heel/toe raises and noted limited strength  Plan: Continue per plan of care  Progress treatment as tolerated  Precautions: Cardiac, hx of vertigo        EVAL 2 3 4 5   Manuals 21     STM/MFR  IM       Manual flexibility  IM  R gastrocs     Joint mobs ankle   TC distraction, posterior glide     PROM R ankle   DF, INV, EV     Neuro Re-Ed        Balance        Clamshells  2 x 15  20x supine green     SLR  2 x 10      Lateral walks        Bridging  2 x 10  2x10                      Ther Ex        Upright bike  10 min lvl 2  Nustep 10' L1     Ankle pumps Instructed       Ankle circles Instructed       Gastroc stretch Instructed Prostretch 10s x 4 5x10s     Ankle 4-way  2 x 15 ea  GTB      Towel toe curls  X 30       Rockerboard  Seated Standing A-P and M-L 20x holding bar     Step up  1 insert x 10  20x 6"     Step down  1 insert x 10  20x 4"     Standing heel/toe raises   20x ea                Ther Activity        Pt education POC, HEP       Stairs        Squats        Gait                                Modalities

## 2021-01-19 ENCOUNTER — OFFICE VISIT (OUTPATIENT)
Dept: PHYSICAL THERAPY | Facility: CLINIC | Age: 51
End: 2021-01-19
Payer: COMMERCIAL

## 2021-01-19 DIAGNOSIS — S93.409A SPRAIN OF ANKLE, INITIAL ENCOUNTER: Primary | ICD-10-CM

## 2021-01-19 DIAGNOSIS — S92.154A CLOSED NONDISPLACED AVULSION FRACTURE OF RIGHT TALUS, INITIAL ENCOUNTER: ICD-10-CM

## 2021-01-19 PROCEDURE — 97110 THERAPEUTIC EXERCISES: CPT

## 2021-01-19 PROCEDURE — 97140 MANUAL THERAPY 1/> REGIONS: CPT

## 2021-01-19 PROCEDURE — 97112 NEUROMUSCULAR REEDUCATION: CPT

## 2021-01-19 NOTE — PROGRESS NOTES
Daily Note      Today's date: 2021  Patient name: Kwadwo Aguirre  : 1970  MRN: [id number]  Referring provider: Cloyce Kayser  Dx:   Encounter Diagnosis     ICD-10-CM    1  Sprain of ankle, initial encounter  S93 409A    2  Closed nondisplaced avulsion fracture of right talus, initial encounter  S92 154A        Subjective: Pt reports that "everything is getting better" with the R ankle  Pt states that she currently has no pain  Pt notes that she will be following up with orthopedics soon  Pt notes that she feels that the right hip and knee are also feeling better, but acknowledges her right knee has "a long way to go "       Objective: See treatment diary below    Assessment: Pt tolerated treatment well  Pt presents with pain free right ankle motion  Pt continues to be limited in ankle eversion AROM and ankle strength in all planes  Pt introduced to lateral step downs and noted no difference between pain on right or left ankle or hip  Pt is progressing well so far and would benefit from continued PT in order to improve ankle strength and static/dynamic balance  Plan: Continue per plan of care  Progress treatment as tolerated  Precautions: Cardiac, hx of vertigo        EVAL 2 3 4 5   Manuals 21    STM/MFR  IM       Manual flexibility  IM  R gastrocs R gastrocs    Joint mobs ankle   TC distraction, posterior glide TC distraction, posterior glide    PROM R ankle   DF, INV, EV DF, INV, EV    Neuro Re-Ed        Balance        Clamshells  2 x 15  20x supine green     SLR  2 x 10      Lateral walks    4x10 ft RTB at ankles    Bridging  2 x 10  2x10  2x10    Glute med heel tap    20x B/L 4" step    SLS        Tandem walk        Ther Ex        Upright bike  10 min lvl 2  Nustep 10' L1 Bike 10' L3    Ankle pumps Instructed       Ankle circles Instructed       Gastroc stretch Instructed Prostretch 10s x 4 5x10s 5x10s    Ankle 4-way  2 x 15 ea   GTB  10x all planes PT resistance    Towel toe curls  X 30       Rockerboard  Seated Standing A-P and M-L 20x holding bar Seated 20x A-P and M-L    Step up  1 insert x 10  20x 6" 20x 6"    Step down  1 insert x 10  20x 4" 20x 4"    Standing heel/toe raises   20x ea    20x            Ther Activity        Pt education POC, HEP   Reviewed HEP    Squats        Gait                                Modalities

## 2021-01-21 ENCOUNTER — OFFICE VISIT (OUTPATIENT)
Dept: PHYSICAL THERAPY | Facility: CLINIC | Age: 51
End: 2021-01-21
Payer: COMMERCIAL

## 2021-01-21 DIAGNOSIS — S93.409A SPRAIN OF ANKLE, INITIAL ENCOUNTER: Primary | ICD-10-CM

## 2021-01-21 DIAGNOSIS — S92.154A CLOSED NONDISPLACED AVULSION FRACTURE OF RIGHT TALUS, INITIAL ENCOUNTER: ICD-10-CM

## 2021-01-21 PROCEDURE — 97140 MANUAL THERAPY 1/> REGIONS: CPT

## 2021-01-21 PROCEDURE — 97110 THERAPEUTIC EXERCISES: CPT

## 2021-01-21 NOTE — PROGRESS NOTES
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, to stand for long periods, to walk up and down stairs, and to do household chores  Pt states she follows up with orthopedics early next week  Pt states she feels ready to continue exercises independently  Objective: See treatment diary below    Ankle AROM  Right   Dorsiflexion 3°   Plantarflexion 50°   Inversion 24°   Eversion 18°     Ankle MMT  Right   Dorsiflexion 4/5   Plantarflexion 4/5   Inversion 4/5   Eversion 4-/5     FOTO score (21): 99    Assessment:   Cherry Kirby has been seen in skilled outpatient physical therapy for 5 sessions for the referring diagnosis of right ankle sprain and right talus avulsion fracture  Patient has received therapeutic exercises, neuromuscular re-education, therapeutic activities, and manual therapy  Patient is progressing nicely toward short term and long term goals and notes that her right ankle no longer impacts her daily function  Pt notes that prolonged standing, hair dressing, household chores, and shopping no longer provoke ankle pain  Pt has been educated in a comprehensive HEP this visit  Pt is scheduled to follow up with orthopedics on 21  Pt will be discharged from PT pending follow up exam by orthopedics  Plan: Continue per plan of care  Discussed D/C planning pending ortho examination           Precautions: Cardiac, hx of vertigo        EVAL 2 3 4 5   Manuals 21 1/19/21 1/21/21   STM/MFR  IM       Manual flexibility  IM  R gastrocs R gastrocs R gastrocs   Joint mobs ankle   TC distraction, posterior glide TC distraction, posterior glide TC distraction, posterior glide   PROM R ankle   DF, INV, EV DF, INV, EV DF, INV, EV   Neuro Re-Ed        Balance        Clamshells  2 x 15  20x supine green     SLR  2 x 10      Lateral walks    4x10 ft RTB at ankles    Bridging  2 x 10  2x10  2x10    Glute med heel tap    20x B/L 4" step 20x B/L 4" step   SLS        Tandem walk        Ther Ex        Upright bike  10 min lvl 2  Nustep 10' L1 Bike 10' L3 10' L3   Ankle pumps Instructed       Ankle circles Instructed       Gastroc stretch Instructed Prostretch 10s x 4 5x10s 5x10s 5x15s   Ankle 4-way  2 x 15 ea  GTB  10x all planes PT resistance 20x RTB   Towel toe curls  X 30       Rockerboard  Seated Standing A-P and M-L 20x holding bar Seated 20x A-P and M-L Seated 20x ea  Step up  1 insert x 10  20x 6" 20x 6" 20x 6"   Step down  1 insert x 10  20x 4" 20x 4" 20x 4"    Standing heel/toe raises   20x ea  20x 20x ea              Ther Activity        Pt education POC, HEP   Reviewed HEP Comprehensive HEP and D/C planning   Squats     2x10 bar squats   Gait                                Modalities

## 2021-01-25 ENCOUNTER — OFFICE VISIT (OUTPATIENT)
Dept: OBGYN CLINIC | Facility: CLINIC | Age: 51
End: 2021-01-25
Payer: COMMERCIAL

## 2021-01-25 VITALS
WEIGHT: 210.8 LBS | DIASTOLIC BLOOD PRESSURE: 69 MMHG | HEIGHT: 61 IN | HEART RATE: 59 BPM | SYSTOLIC BLOOD PRESSURE: 110 MMHG | BODY MASS INDEX: 39.8 KG/M2

## 2021-01-25 DIAGNOSIS — S93.409D SPRAIN OF ANKLE, SUBSEQUENT ENCOUNTER: ICD-10-CM

## 2021-01-25 DIAGNOSIS — S92.154A CLOSED NONDISPLACED AVULSION FRACTURE OF RIGHT TALUS, INITIAL ENCOUNTER: Primary | ICD-10-CM

## 2021-01-25 PROCEDURE — 99213 OFFICE O/P EST LOW 20 MIN: CPT | Performed by: ORTHOPAEDIC SURGERY

## 2021-01-25 NOTE — PROGRESS NOTES
Assessment/Plan:  1  Closed nondisplaced avulsion fracture of right talus, initial encounter     2  Sprain of ankle, subsequent encounter         Scribe Attestation    I,:  Haydee Wu am acting as a scribe while in the presence of the attending physician :       I,:  Candelario Habermann, MD personally performed the services described in this documentation    as scribed in my presence :             Oneida's physical therapy reports were reviewed  She is doing very well  The discomfort while sleeping is due to her injury and should resolve over time  She will continue with her home exercise program provided by her therapist   She can follow up with me as needed  Subjective:   Doyle Casey is a 46 y o  female who presents to the office today for follow-up evaluation of her right ankle talus avulsion fracture and lateral ankle sprain  Elida Starks states she is doing very well  She has completed physical therapy  She has no reports of excessive pain  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 without difficulty throughout her day  Her only complains of the intermittent pain described as an ache about the anterior lateral aspect of the right ankle when the foot rests in an everted position while sleeping  This pain will wake her at night and she is questioning why it continues  She denies distal paresthesias  Review of Systems   Constitutional: Negative for chills, fever and unexpected weight change  HENT: Negative for hearing loss, nosebleeds and sore throat  Eyes: Negative for pain, redness and visual disturbance  Respiratory: Negative for cough, shortness of breath and wheezing  Cardiovascular: Negative for chest pain, palpitations and leg swelling  Gastrointestinal: Negative for abdominal pain, nausea and vomiting  Endocrine: Negative for polydipsia and polyuria  Genitourinary: Negative for dysuria and hematuria     Musculoskeletal:        See HPI   Skin: Negative for rash and wound  Neurological: Negative for dizziness, numbness and headaches  Psychiatric/Behavioral: Negative for decreased concentration and suicidal ideas  The patient is not nervous/anxious  Past Medical History:   Diagnosis Date    CHF (congestive heart failure) (HCC)     Diverticulitis of colon     GERD (gastroesophageal reflux disease)     HH (hiatus hernia)        Past Surgical History:   Procedure Laterality Date     SECTION      CHOLECYSTECTOMY      HYSTERECTOMY         Family History   Problem Relation Age of Onset    Heart attack Mother     Heart attack Father        Social History     Occupational History    Not on file   Tobacco Use    Smoking status: Former Smoker     Packs/day: 0 50     Years: 33 00     Pack years: 16 50     Types: Cigarettes     Quit date: 2019     Years since quittin 5    Smokeless tobacco: Never Used   Substance and Sexual Activity    Alcohol use: Yes     Frequency: Monthly or less     Comment: 2x year    Drug use: No    Sexual activity: Not on file         Current Outpatient Medications:     ascorbic acid (VITAMIN C) 500 mg tablet, Take 500 mg by mouth daily, Disp: , Rfl:     carvedilol (COREG) 12 5 mg tablet, Take 1 tablet (12 5 mg total) by mouth 2 (two) times a day with meals, Disp: 180 tablet, Rfl: 3    cholecalciferol (VITAMIN D3) 1,000 units tablet, Take 1,000 Units by mouth daily, Disp: , Rfl:     Na Sulfate-K Sulfate-Mg Sulf (SUPREP BOWEL PREP KIT) 17 5-3 13-1 6 GM/177ML SOLN, Take 2 Bottles by mouth see administration instructions Suprep bowel prep    Please follow the instructions from the office, Disp: 2 Bottle, Rfl: 0    sacubitril-valsartan (ENTRESTO) 49-51 MG TABS, Take 1 tablet by mouth 2 (two) times a day, Disp: 180 tablet, Rfl: 3    spironolactone (ALDACTONE) 25 mg tablet, TAKE 1 TABLET BY MOUTH EVERY DAY, Disp: 90 tablet, Rfl: 1    SUMAtriptan (IMITREX) 100 mg tablet, Take 100 mg by mouth as needed, Disp: , Rfl:     Allergies   Allergen Reactions    Percocet [Oxycodone-Acetaminophen]        Objective:  Vitals:    01/25/21 1058   BP: 110/69   Pulse: 59       Right Ankle Exam     Tenderness   The patient is experiencing tenderness in the ATF  Swelling: mild    Range of Motion   Dorsiflexion: normal   Plantar flexion: normal   Eversion: normal   Inversion: normal     Muscle Strength   The patient has normal right ankle strength  Dorsiflexion:  5/5  Plantar flexion:  5/5  Anterior tibial:  5/5  Posterior tibial:  5/5  Gastrocsoleus:  5/5  Peroneal muscle:  5/5    Tests   Anterior drawer: negative  Varus tilt: negative    Other   Erythema: absent  Sensation: normal  Pulse: present (2+ DP)           Strength/Myotome Testing     Right Ankle/Foot   Normal strength  Dorsiflexion: 5  Plantar flexion: 5      Physical Exam  Vitals signs reviewed  Constitutional:       Appearance: She is well-developed  HENT:      Head: Normocephalic and atraumatic  Eyes:      General:         Right eye: No discharge  Left eye: No discharge  Conjunctiva/sclera: Conjunctivae normal    Neck:      Musculoskeletal: Normal range of motion and neck supple  Cardiovascular:      Rate and Rhythm: Regular rhythm  Pulmonary:      Effort: Pulmonary effort is normal  No respiratory distress  Breath sounds: No stridor  Skin:     General: Skin is warm and dry  Neurological:      Mental Status: She is alert and oriented to person, place, and time  Psychiatric:         Behavior: Behavior normal          I have personally reviewed pertinent films in PACS and my interpretation is as follows:   No images to review today

## 2021-01-26 ENCOUNTER — APPOINTMENT (OUTPATIENT)
Dept: PHYSICAL THERAPY | Facility: CLINIC | Age: 51
End: 2021-01-26
Payer: COMMERCIAL

## 2021-01-28 ENCOUNTER — APPOINTMENT (OUTPATIENT)
Dept: PHYSICAL THERAPY | Facility: CLINIC | Age: 51
End: 2021-01-28
Payer: COMMERCIAL

## 2021-02-21 DIAGNOSIS — I50.1 HEART FAILURE, LEFT, WITH LVEF <=30% (HCC): ICD-10-CM

## 2021-02-21 DIAGNOSIS — I50.41 ACUTE COMBINED SYSTOLIC AND DIASTOLIC CONGESTIVE HEART FAILURE (HCC): ICD-10-CM

## 2021-02-22 RX ORDER — SACUBITRIL AND VALSARTAN 49; 51 MG/1; MG/1
TABLET, FILM COATED ORAL
Qty: 180 TABLET | Refills: 3 | Status: SHIPPED | OUTPATIENT
Start: 2021-02-22 | End: 2021-10-28 | Stop reason: SDUPTHER

## 2021-03-10 DIAGNOSIS — Z23 ENCOUNTER FOR IMMUNIZATION: ICD-10-CM

## 2021-03-10 LAB
BUN SERPL-MCNC: 17 MG/DL (ref 6–24)
BUN/CREAT SERPL: 19 (ref 9–23)
CALCIUM SERPL-MCNC: 9.7 MG/DL (ref 8.7–10.2)
CHLORIDE SERPL-SCNC: 101 MMOL/L (ref 96–106)
CO2 SERPL-SCNC: 24 MMOL/L (ref 20–29)
CREAT SERPL-MCNC: 0.9 MG/DL (ref 0.57–1)
GLUCOSE SERPL-MCNC: 106 MG/DL (ref 65–99)
POTASSIUM SERPL-SCNC: 4.5 MMOL/L (ref 3.5–5.2)
SL AMB EGFR AFRICAN AMERICAN: 84 ML/MIN/1.73
SL AMB EGFR NON AFRICAN AMERICAN: 73 ML/MIN/1.73
SODIUM SERPL-SCNC: 141 MMOL/L (ref 134–144)

## 2021-03-15 ENCOUNTER — TELEPHONE (OUTPATIENT)
Dept: CARDIOLOGY CLINIC | Facility: CLINIC | Age: 51
End: 2021-03-15

## 2021-03-15 ENCOUNTER — OFFICE VISIT (OUTPATIENT)
Dept: CARDIOLOGY CLINIC | Facility: CLINIC | Age: 51
End: 2021-03-15
Payer: COMMERCIAL

## 2021-03-15 VITALS
TEMPERATURE: 98.7 F | HEIGHT: 61 IN | HEART RATE: 65 BPM | BODY MASS INDEX: 39.65 KG/M2 | DIASTOLIC BLOOD PRESSURE: 78 MMHG | WEIGHT: 210 LBS | OXYGEN SATURATION: 97 % | SYSTOLIC BLOOD PRESSURE: 120 MMHG

## 2021-03-15 DIAGNOSIS — I42.8 NON-ISCHEMIC CARDIOMYOPATHY (HCC): Primary | ICD-10-CM

## 2021-03-15 DIAGNOSIS — I50.1 HEART FAILURE, LEFT, WITH LVEF 31-40% (HCC): ICD-10-CM

## 2021-03-15 DIAGNOSIS — I50.41 ACUTE COMBINED SYSTOLIC AND DIASTOLIC CONGESTIVE HEART FAILURE (HCC): ICD-10-CM

## 2021-03-15 DIAGNOSIS — I50.1 HEART FAILURE, LEFT, WITH LVEF <=30% (HCC): ICD-10-CM

## 2021-03-15 PROCEDURE — 99214 OFFICE O/P EST MOD 30 MIN: CPT | Performed by: INTERNAL MEDICINE

## 2021-03-15 PROCEDURE — 3008F BODY MASS INDEX DOCD: CPT | Performed by: INTERNAL MEDICINE

## 2021-03-15 PROCEDURE — 1036F TOBACCO NON-USER: CPT | Performed by: INTERNAL MEDICINE

## 2021-03-15 PROCEDURE — 93000 ELECTROCARDIOGRAM COMPLETE: CPT | Performed by: INTERNAL MEDICINE

## 2021-03-15 RX ORDER — CARVEDILOL 12.5 MG/1
12.5 TABLET ORAL 2 TIMES DAILY WITH MEALS
Qty: 180 TABLET | Refills: 3 | Status: SHIPPED | OUTPATIENT
Start: 2021-03-15 | End: 2021-10-28 | Stop reason: SDUPTHER

## 2021-03-15 RX ORDER — SPIRONOLACTONE 25 MG/1
25 TABLET ORAL DAILY
Qty: 90 TABLET | Refills: 3 | Status: SHIPPED | OUTPATIENT
Start: 2021-03-15 | End: 2021-10-28 | Stop reason: SDUPTHER

## 2021-03-15 NOTE — PROGRESS NOTES
Cardiology Follow Up  Trevor Wells  1970  [id number]  [facility]  [address]  [address] [facility], [facility] [address]  [place] [address] [phone]    1  Non-ischemic cardiomyopathy ([address] )  POCT ECG   2  Heart failure, left, with LVEF <=30% (HCC)  POCT ECG   3  Acute combined systolic and diastolic congestive heart failure (HCC)  POCT ECG   4  Heart failure, left, with LVEF 31-40% (HCC)  POCT ECG      Discussion/Plan:  Nonischemic CM EF34% NYHA class 2-  plan for cardiac MRI to evaluate ejection fraction  Also evaluate for scar  Possible dilated cardiomyopathy- no major family history? Possible viral etiology? Her last heart monitor showed some short burst of NSVT- possibly secondary to sleep apnea  She is now wearing her CPAP  Tolerating Entresto therapy- 49-51mg (   Mildly rising creatinine- we will need to monitor  Carvedilol 12 5mg in morning and 12 5mg at night  She will continue spironolactone 25 mg daily  Watch sodium intake- 2gm  She will need to wear compression socks  Cardiac cath negative cad  She will continue low sodium diet  Unclear trigger for the patient's cardiomyopathy  No significant recovery? Possible dilated familial cardiomyopathy? Cath was negative for CAD  Reports multiple family members with MI/heart failure  TSH  No alcohol  No HIV risk Dry weight is 204ilbs  NSVT- now that she is wearing her CPAP every night  We will recheck her heart monitor  If she has any evidence episodes of SVT  Consideration for a defibrillator  Acute on chronic kidney disease- improved  Electrolytes stable  Diverticulitis- currently high risk for procedure  Would delay until improved EF  Hyperkalemia- she will watch her potassium intake    TAE- trouble with cpap  Positive pressure  Interval History:  She denies having significant lower extremity edema since discharge    She denies feeling dizziness or lightheadedness  She denies having chest discomfort  Denies having syncopal episodes  Denies having any irregular heart rhythm  8/12:  No edema  No syncope  No dizziness or light-headness  Compliant with meds  She has had no device firings  She has been compliant with the up titration of the Entresto  She reports trying to increase her carvedilol but did not feel right  She understands about orthostatic hypotension in the risk of prolonged standing  She has been watching her sodium intake  Her weight has been down trending  11/25:  She is up titrated her carvedilol without significant dizziness lightheadedness  She does feel some orthostatic symptoms  She stands up slowly  She denies having significant lower extremity swelling  She is compliant with her medications  She is wearing compression socks when she is at work  01/20/2020:  Reviewed through her imaging study which shows her EF has improved to near 34%  She is walking a mi without significant shortness of breath  She denies having any dizziness or lightheadedness  She is wearing her compression socks at work  She is compliant with her medications  Her last blood work showed mildly elevated potassium  She is watching her potassium foods  Her kidney function has returned back to normal Holter a shin  9/10/20:  Still with trouble with cpap  Feels gassy  No dizziness or light-headness  03/15/2021:  She denies feeling any chest tightness  She denies feeling dizziness or lightheadedness  I reviewed with her her last studies  She is compliant with her medications      Patient Active Problem List   Diagnosis    Nicotine abuse    GERD (gastroesophageal reflux disease)    Acute combined systolic and diastolic congestive heart failure (HCC)    Diverticulitis    Hypokalemia    Non-ischemic cardiomyopathy ([address] )    Diverticulitis of large intestine without perforation or abscess without bleeding    TAE (obstructive sleep apnea)     Past Medical History:   Diagnosis Date    CHF (congestive heart failure) (HCC)     Diverticulitis of colon     GERD (gastroesophageal reflux disease)     HH (hiatus hernia)      Social History     Socioeconomic History    Marital status: /Civil Union     Spouse name: Not on file    Number of children: Not on file    Years of education: Not on file    Highest education level: Not on file   Occupational History    Not on file   Social Needs    Financial resource strain: Not on file    Food insecurity     Worry: Not on file     Inability: Not on file   [employer] needs     Medical: Not on file     Non-medical: Not on file   Tobacco Use    Smoking status: Former Smoker     Packs/day: 0 50     Years: 33 00     Pack years: 16 50     Types: Cigarettes     Quit date: 2019     Years since quittin 7    Smokeless tobacco: Never Used   Substance and Sexual Activity    Alcohol use: Yes     Frequency: Monthly or less     Comment: 2x year    Drug use: No    Sexual activity: Not on file   Lifestyle    Physical activity     Days per week: Not on file     Minutes per session: Not on file    Stress: Not on file   Relationships    Social connections     Talks on phone: Not on file     Gets together: Not on file     Attends Anabaptism service: Not on file     Active member of club or organization: Not on file     Attends meetings of clubs or organizations: Not on file     Relationship status: Not on file    Intimate partner violence     Fear of current or ex partner: Not on file     Emotionally abused: Not on file     Physically abused: Not on file     Forced sexual activity: Not on file   Other Topics Concern    Not on file   Social History Narrative    Not on file      Family History   Problem Relation Age of Onset    Heart attack Mother     Heart attack Father      Past Surgical History:   Procedure Laterality Date     SECTION      CHOLECYSTECTOMY  HYSTERECTOMY         Current Outpatient Medications:     ascorbic acid (VITAMIN C) 500 mg tablet, Take 500 mg by mouth daily, Disp: , Rfl:     carvedilol (COREG) 12 5 mg tablet, Take 1 tablet (12 5 mg total) by mouth 2 (two) times a day with meals, Disp: 180 tablet, Rfl: 3    cholecalciferol (VITAMIN D3) 1,000 units tablet, Take 1,000 Units by mouth daily, Disp: , Rfl:     Entresto 49-51 MG TABS, TAKE 1 TABLET BY MOUTH TWICE A DAY, Disp: 180 tablet, Rfl: 3    spironolactone (ALDACTONE) 25 mg tablet, TAKE 1 TABLET BY MOUTH EVERY DAY, Disp: 90 tablet, Rfl: 1    Na Sulfate-K Sulfate-Mg Sulf (SUPREP BOWEL PREP KIT) 17 5-3 13-1 6 GM/177ML SOLN, Take 2 Bottles by mouth see administration instructions Suprep bowel prep  Please follow the instructions from the office (Patient not taking: Reported on 3/15/2021), Disp: 2 Bottle, Rfl: 0    SUMAtriptan (IMITREX) 100 mg tablet, Take 100 mg by mouth as needed, Disp: , Rfl:   Allergies   Allergen Reactions    Percocet [Oxycodone-Acetaminophen]        Review of Systems:  Review of Systems   Constitutional: Positive for fatigue  Negative for activity change, appetite change, chills, diaphoresis, fever and unexpected weight change  HENT: Negative  Negative for congestion, dental problem, drooling, ear discharge, ear pain, facial swelling, hearing loss, mouth sores, nosebleeds, postnasal drip, rhinorrhea, sinus pressure, sinus pain, sneezing, sore throat, tinnitus, trouble swallowing and voice change  Eyes: Negative  Negative for photophobia, pain, redness, itching and visual disturbance  Respiratory: Positive for shortness of breath  Negative for apnea, cough, choking, chest tightness, wheezing and stridor  Cardiovascular: Negative for chest pain, palpitations and leg swelling  Gastrointestinal: Negative  Negative for abdominal distention, abdominal pain, anal bleeding, blood in stool, constipation, diarrhea, nausea, rectal pain and vomiting     Endocrine: Negative  Negative for cold intolerance, heat intolerance, polydipsia, polyphagia and polyuria  Genitourinary: Negative  Negative for decreased urine volume, difficulty urinating, dyspareunia, dysuria, enuresis, flank pain, frequency, genital sores, hematuria, menstrual problem, pelvic pain, urgency, vaginal bleeding, vaginal discharge and vaginal pain  Musculoskeletal: Negative  Negative for arthralgias, back pain, gait problem, joint swelling, myalgias, neck pain and neck stiffness  Skin: Negative  Negative for color change, pallor, rash and wound  Allergic/Immunologic: Negative  Negative for environmental allergies, food allergies and immunocompromised state  Neurological: Negative  Negative for dizziness, tremors, seizures, syncope, facial asymmetry, speech difficulty, weakness, light-headedness, numbness and headaches  Hematological: Negative  Negative for adenopathy  Does not bruise/bleed easily  Psychiatric/Behavioral: Negative  Negative for agitation, behavioral problems, confusion, decreased concentration, dysphoric mood, hallucinations, self-injury, sleep disturbance and suicidal ideas  The patient is not nervous/anxious and is not hyperactive  All other systems reviewed and are negative  Vitals:    03/15/21 1045   BP: 120/78   BP Location: Right arm   Patient Position: Sitting   Cuff Size: Standard   Pulse: 65   Temp: 98 7 °F (37 1 °C)   SpO2: 97%   Weight: 95 3 kg (210 lb)   Height: 5' 1" (1 549 m)     Physical Exam:  Physical Exam  Constitutional:       General: She is not in acute distress  Appearance: She is well-developed  She is not diaphoretic  HENT:      Head: Normocephalic and atraumatic  Right Ear: External ear normal       Left Ear: External ear normal    Eyes:      General: No scleral icterus  Right eye: No discharge  Left eye: No discharge        Conjunctiva/sclera: Conjunctivae normal       Pupils: Pupils are equal, round, and reactive to light  Neck:      Musculoskeletal: Normal range of motion and neck supple  Thyroid: No thyromegaly  Vascular: No JVD  Trachea: No tracheal deviation  Cardiovascular:      Rate and Rhythm: Normal rate and regular rhythm  Heart sounds: No murmur  No friction rub  Gallop present  Pulmonary:      Effort: Pulmonary effort is normal  No respiratory distress  Breath sounds: Normal breath sounds  No stridor  No wheezing or rales  Chest:      Chest wall: No tenderness  Abdominal:      General: Bowel sounds are normal  There is no distension  Palpations: Abdomen is soft  There is no mass  Tenderness: There is no abdominal tenderness  There is no guarding or rebound  Musculoskeletal: Normal range of motion  General: No tenderness or deformity  Skin:     General: Skin is warm and dry  Coloration: Skin is not pale  Findings: No erythema or rash  Neurological:      Mental Status: She is alert and oriented to person, place, and time  Cranial Nerves: No cranial nerve deficit  Motor: No abnormal muscle tone  Coordination: Coordination normal       Deep Tendon Reflexes: Reflexes are normal and symmetric  Reflexes normal    Psychiatric:         Behavior: Behavior normal          Thought Content:  Thought content normal          Judgment: Judgment normal          Labs:     Lab Results   Component Value Date    WBC 6 40 06/26/2019    HGB 12 8 06/26/2019    HCT 41 7 06/26/2019    MCV 87 06/26/2019     06/26/2019     Lab Results   Component Value Date    K 4 5 03/08/2021     03/08/2021    CO2 24 03/08/2021    BUN 17 03/08/2021    CREATININE 0 90 03/08/2021    GLUF 145 (H) 03/26/2019    CALCIUM 8 7 06/26/2019    AST 21 06/26/2019    ALT 61 06/26/2019    ALKPHOS 101 06/26/2019    EGFR 70 06/26/2019     No results found for: CHOL  Lab Results   Component Value Date    HDL 44 03/26/2019     Lab Results   Component Value Date    LDLCALC 116 (H) 2019     Lab Results   Component Value Date    TRIG 53 2019     No results found for: HGBA1C    Imaging & Testing   I have personally reviewed pertinent reports  EKG: Personally reviewed  Cardiac testing:   Results for orders placed during the hospital encounter of 19   Echo complete with contrast if indicated    [address]  [address] [facility]  [phone]    Transthoracic Echocardiogram  2D, M-mode, Doppler, and Color Doppler    Study date:  2019    Patient: Stephen Kimball  MR number: [id number]  Account number: [de-identified]  : 1970  Age: 44 years  Gender: Female  Status: Inpatient  Location: Bedside  Height: 61 in  Weight: 219 6 lb  BP: 118/ 80 mmHg    Indications: Heart Failure    Diagnoses: I50 9 - Heart failure, unspecified    Sonographer:  ISABELL Junior  Primary Physician:  Elan Ríos MD  Referring Physician:  Tia Kulkarni DO  Group:  [facility]  Interpreting Physician:  Teresa Meek MD    SUMMARY    LEFT VENTRICLE:  Systolic function was markedly reduced  Ejection fraction was estimated in the range of 25 % to 30 % to be 30 %  There was severe diffuse hypokinesis  Doppler parameters were consistent with abnormal left ventricular relaxation (grade 1 diastolic dysfunction)  RIGHT VENTRICLE:  Systolic function was moderately reduced  LEFT ATRIUM:  The atrium was mildly dilated  RIGHT ATRIUM:  The atrium was mildly dilated  MITRAL VALVE:  There was trace regurgitation  IVC, HEPATIC VEINS:  The inferior vena cava was dilated  HISTORY: PRIOR HISTORY: GERD, Hiatal Hernia    PROCEDURE: The procedure was performed at the bedside  This was a routine study  The transthoracic approach was used  The study included complete 2D imaging, M-mode, complete spectral Doppler, and color Doppler  The heart rate was 92 bpm,  at the start of the study   Image quality was adequate  LEFT VENTRICLE: Size was normal  Systolic function was markedly reduced  Ejection fraction was estimated in the range of 25 % to 30 % to be 30 %  There was severe diffuse hypokinesis  Wall thickness was normal  No evidence of apical  thrombus  DOPPLER: Doppler parameters were consistent with abnormal left ventricular relaxation (grade 1 diastolic dysfunction)  RIGHT VENTRICLE: The size was normal  Systolic function was moderately reduced  Wall thickness was normal     LEFT ATRIUM: The atrium was mildly dilated  RIGHT ATRIUM: The atrium was mildly dilated  MITRAL VALVE: Valve structure was normal  There was normal leaflet separation  DOPPLER: The transmitral velocity was within the normal range  There was no evidence for stenosis  There was trace regurgitation  AORTIC VALVE: The valve was trileaflet  Leaflets exhibited normal thickness and normal cuspal separation  DOPPLER: Transaortic velocity was within the normal range  There was no evidence for stenosis  There was no significant  regurgitation  TRICUSPID VALVE: The valve structure was normal  There was normal leaflet separation  DOPPLER: The transtricuspid velocity was within the normal range  There was no evidence for stenosis  There was no significant regurgitation  PULMONIC VALVE: Leaflets exhibited normal thickness, no calcification, and normal cuspal separation  DOPPLER: The transpulmonic velocity was within the normal range  There was no significant regurgitation  PERICARDIUM: There was no pericardial effusion  The pericardium was normal in appearance  AORTA: The root exhibited normal size  SYSTEMIC VEINS: IVC: The inferior vena cava was dilated      SYSTEM MEASUREMENT TABLES    2D mode  AoR Diam 2D: 3 2 cm  LA Diam (2D): 4 5 cm  LA/Ao (2D): 1 41  FS (2D Teich): 13 7 %  IVSd (2D): 0 82 cm  LVDEV: 193 cmï¾³  LVESV: 138 cmï¾³  LVIDd(2D): 6 19 cm  LVISd (2D): 5 34 cm  LVPWd (2D): 0 81 cm  SV (Teich): 55 cmï¾³    Apical four chamber  LVEF A4C: 28 %    Unspecified Scan Mode  MV Peak E Zach  Mean: 848 mm/s  MVA (PHT): 4 15 cmï¾²  PHT: 53 ms  Max P mm[Hg]  V Max: 2440 mm/s  Vmax: 2500 mm/s  RA Area: 18 1 cmï¾²  RA Volume: 48 8 cmï¾³  TAPSE: 1 1 cm    [facility]    Prepared and electronically signed by    Gaudencio Syed MD  Signed 2019 13:10:35       ekg sinus rhythm with LBBB periodic pvc    Gaudencio Luna  Please call with any questions or suggestions    A description of the counseling:   Goals and Barriers:  Patient's ability to self care:  Medication side effect reviewed with patient in detail and all their questions answered  "This note has been constructed using a voice recognition system  Therefore there may be syntax, spelling, and/or grammatical errors   Please call if you have any questions  "

## 2021-03-18 ENCOUNTER — IMMUNIZATIONS (OUTPATIENT)
Dept: FAMILY MEDICINE CLINIC | Facility: HOSPITAL | Age: 51
End: 2021-03-18

## 2021-03-18 DIAGNOSIS — Z23 ENCOUNTER FOR IMMUNIZATION: Primary | ICD-10-CM

## 2021-03-18 PROCEDURE — 0011A SARS-COV-2 / COVID-19 MRNA VACCINE (MODERNA) 100 MCG: CPT

## 2021-03-18 PROCEDURE — 91301 SARS-COV-2 / COVID-19 MRNA VACCINE (MODERNA) 100 MCG: CPT

## 2021-03-23 ENCOUNTER — HOSPITAL ENCOUNTER (OUTPATIENT)
Dept: MRI IMAGING | Facility: HOSPITAL | Age: 51
Discharge: HOME | End: 2021-03-23
Attending: INTERNAL MEDICINE
Payer: COMMERCIAL

## 2021-03-23 DIAGNOSIS — I50.1 HEART FAILURE, LEFT, WITH LVEF 31-40% (HCC): ICD-10-CM

## 2021-03-23 DIAGNOSIS — I42.8 NON-ISCHEMIC CARDIOMYOPATHY (HCC): ICD-10-CM

## 2021-03-23 DIAGNOSIS — I50.41 ACUTE COMBINED SYSTOLIC AND DIASTOLIC CONGESTIVE HEART FAILURE (HCC): ICD-10-CM

## 2021-03-23 PROCEDURE — G1004 CDSM NDSC: HCPCS

## 2021-03-23 PROCEDURE — A9585 GADOBUTROL INJECTION: HCPCS | Performed by: INTERNAL MEDICINE

## 2021-03-23 PROCEDURE — 75561 CARDIAC MRI FOR MORPH W/DYE: CPT

## 2021-03-23 RX ADMIN — GADOBUTROL 18 ML: 604.72 INJECTION INTRAVENOUS at 16:18

## 2021-03-29 ENCOUNTER — CLINICAL SUPPORT (OUTPATIENT)
Dept: CARDIOLOGY CLINIC | Facility: CLINIC | Age: 51
End: 2021-03-29
Payer: COMMERCIAL

## 2021-03-29 DIAGNOSIS — I50.41 ACUTE COMBINED SYSTOLIC AND DIASTOLIC CONGESTIVE HEART FAILURE (HCC): ICD-10-CM

## 2021-03-29 DIAGNOSIS — I50.1 HEART FAILURE, LEFT, WITH LVEF 31-40% (HCC): ICD-10-CM

## 2021-03-29 PROCEDURE — 93244 EXT ECG>48HR<7D REV&INTERPJ: CPT | Performed by: INTERNAL MEDICINE

## 2021-04-15 ENCOUNTER — IMMUNIZATIONS (OUTPATIENT)
Dept: FAMILY MEDICINE CLINIC | Facility: HOSPITAL | Age: 51
End: 2021-04-15

## 2021-04-15 DIAGNOSIS — Z23 ENCOUNTER FOR IMMUNIZATION: Primary | ICD-10-CM

## 2021-04-15 PROCEDURE — 91301 SARS-COV-2 / COVID-19 MRNA VACCINE (MODERNA) 100 MCG: CPT

## 2021-04-15 PROCEDURE — 0012A SARS-COV-2 / COVID-19 MRNA VACCINE (MODERNA) 100 MCG: CPT

## 2021-04-29 ENCOUNTER — OFFICE VISIT (OUTPATIENT)
Dept: CARDIOLOGY CLINIC | Facility: CLINIC | Age: 51
End: 2021-04-29
Payer: COMMERCIAL

## 2021-04-29 VITALS
HEART RATE: 80 BPM | DIASTOLIC BLOOD PRESSURE: 70 MMHG | HEIGHT: 61 IN | WEIGHT: 209 LBS | BODY MASS INDEX: 39.46 KG/M2 | SYSTOLIC BLOOD PRESSURE: 108 MMHG | TEMPERATURE: 98.5 F | OXYGEN SATURATION: 97 %

## 2021-04-29 DIAGNOSIS — I42.8 NON-ISCHEMIC CARDIOMYOPATHY (HCC): ICD-10-CM

## 2021-04-29 DIAGNOSIS — G47.33 OSA (OBSTRUCTIVE SLEEP APNEA): ICD-10-CM

## 2021-04-29 DIAGNOSIS — I50.1 HEART FAILURE, LEFT, WITH LVEF 31-40% (HCC): Primary | ICD-10-CM

## 2021-04-29 PROCEDURE — 1036F TOBACCO NON-USER: CPT | Performed by: INTERNAL MEDICINE

## 2021-04-29 PROCEDURE — 3008F BODY MASS INDEX DOCD: CPT | Performed by: INTERNAL MEDICINE

## 2021-04-29 PROCEDURE — 99214 OFFICE O/P EST MOD 30 MIN: CPT | Performed by: INTERNAL MEDICINE

## 2021-04-29 NOTE — PROGRESS NOTES
Cardiology Follow Up  Jeferson Baker  1970  [id number]  [address] [facility]  [address] [facility]  [address] [facility], [address]  [place] [id number] [phone]    1  Heart failure, left, with LVEF 31-40% ([address] )     2  Non-ischemic cardiomyopathy ([facility] [address] )     3  TAE (obstructive sleep apnea)        Discussion/Plan:  Nonischemic CM EF34% NYHA class 2- MRI with viral etiology? She is now wearing her CPAP  Tolerating Entresto therapy- 49-51mg (    Carvedilol 12 5mg in morning and 12 5mg at night  She will continue spironolactone 25 mg daily  Watch sodium intake- 2gm  Cath was negative for CAD  Reports multiple family members with MI/heart failure  TSH  No alcohol  No HIV risk Dry weight is 204ilbs  She declines ICD  NSVT- her repeat heart monitor did not show nonsustained ventricular tachycardia  She has been tolerating the increased dose of beta-blocker  She is wearing her CPAP at night  Acute on chronic kidney disease- improved  Electrolytes stable  Diverticulitis- currently high risk for procedure  Would delay until improved EF  Hyperkalemia- she will watch her potassium intake    TAE- trouble with cpap  Positive pressure  Interval History:  She denies having significant lower extremity edema since discharge  She denies feeling dizziness or lightheadedness  She denies having chest discomfort  Denies having syncopal episodes  Denies having any irregular heart rhythm  8/12:  No edema  No syncope  No dizziness or light-headness  Compliant with meds  She has had no device firings  She has been compliant with the up titration of the Entresto  She reports trying to increase her carvedilol but did not feel right  She understands about orthostatic hypotension in the risk of prolonged standing  She has been watching her sodium intake  Her weight has been down trending      11/25:  She is up titrated her carvedilol without significant dizziness lightheadedness  She does feel some orthostatic symptoms  She stands up slowly  She denies having significant lower extremity swelling  She is compliant with her medications  She is wearing compression socks when she is at work  01/20/2020:  Reviewed through her imaging study which shows her EF has improved to near 34%  She is walking a mi without significant shortness of breath  She denies having any dizziness or lightheadedness  She is wearing her compression socks at work  She is compliant with her medications  Her last blood work showed mildly elevated potassium  She is watching her potassium foods  Her kidney function has returned back to normal Holter a shin  9/10/20:  Still with trouble with cpap  Feels gassy  No dizziness or light-headness  03/15/2021:  She denies feeling any chest tightness  She denies feeling dizziness or lightheadedness  I reviewed with her her last studies  She is compliant with her medications  04/29/2021:  She is walking more than a mi without having shortness of breath or chest discomfort  She is compliant with her heart failure regimen  Her weight stays very stable  We reviewed through her recent MRI  We had a mutual discussion about having a primary defibrillator  I discussed with her given she is a nonischemic cardiomyopathy with a very good functional status a primary prevention device would have limited benefits  She is currently wishes not to go ahead with a defibrillator placement  She will notify me if she feels any major shortness of breath, palpitations or chest pain  We will continue optimal medical therapy      Patient Active Problem List   Diagnosis    Nicotine abuse    GERD (gastroesophageal reflux disease)    Acute combined systolic and diastolic congestive heart failure (HCC)    Diverticulitis    Hypokalemia    Non-ischemic cardiomyopathy ([address] )    Diverticulitis of large intestine without perforation or abscess without bleeding    TAE (obstructive sleep apnea)     Past Medical History:   Diagnosis Date    CHF (congestive heart failure) (HCC)     Diverticulitis of colon     GERD (gastroesophageal reflux disease)     HH (hiatus hernia)      Social History     Socioeconomic History    Marital status: /Civil Union     Spouse name: Not on file    Number of children: Not on file    Years of education: Not on file    Highest education level: Not on file   Occupational History    Not on file   Social Needs    Financial resource strain: Not on file    Food insecurity     Worry: Not on file     Inability: Not on file   [employer] needs     Medical: Not on file     Non-medical: Not on file   Tobacco Use    Smoking status: Former Smoker     Packs/day: 0 50     Years: 33 00     Pack years: 16 50     Types: Cigarettes     Quit date: 2019     Years since quittin 8    Smokeless tobacco: Never Used   Substance and Sexual Activity    Alcohol use: Yes     Frequency: Monthly or less     Comment: 2x year    Drug use: No    Sexual activity: Not on file   Lifestyle    Physical activity     Days per week: Not on file     Minutes per session: Not on file    Stress: Not on file   Relationships    Social connections     Talks on phone: Not on file     Gets together: Not on file     Attends Taoism service: Not on file     Active member of club or organization: Not on file     Attends meetings of clubs or organizations: Not on file     Relationship status: Not on file    Intimate partner violence     Fear of current or ex partner: Not on file     Emotionally abused: Not on file     Physically abused: Not on file     Forced sexual activity: Not on file   Other Topics Concern    Not on file   Social History Narrative    Not on file      Family History   Problem Relation Age of Onset    Heart attack Mother     Heart attack Father      Past Surgical History:   Procedure Laterality Date     SECTION      CHOLECYSTECTOMY      HYSTERECTOMY         Current Outpatient Medications:     ascorbic acid (VITAMIN C) 500 mg tablet, Take 500 mg by mouth daily, Disp: , Rfl:     carvedilol (COREG) 12 5 mg tablet, Take 1 tablet (12 5 mg total) by mouth 2 (two) times a day with meals, Disp: 180 tablet, Rfl: 3    cholecalciferol (VITAMIN D3) 1,000 units tablet, Take 1,000 Units by mouth daily, Disp: , Rfl:     Entresto 49-51 MG TABS, TAKE 1 TABLET BY MOUTH TWICE A DAY, Disp: 180 tablet, Rfl: 3    spironolactone (ALDACTONE) 25 mg tablet, Take 1 tablet (25 mg total) by mouth daily, Disp: 90 tablet, Rfl: 3    Na Sulfate-K Sulfate-Mg Sulf (SUPREP BOWEL PREP KIT) 17 5-3 13-1 6 GM/177ML SOLN, Take 2 Bottles by mouth see administration instructions Suprep bowel prep  Please follow the instructions from the office (Patient not taking: Reported on 3/15/2021), Disp: 2 Bottle, Rfl: 0    SUMAtriptan (IMITREX) 100 mg tablet, Take 100 mg by mouth as needed, Disp: , Rfl:   Allergies   Allergen Reactions    Percocet [Oxycodone-Acetaminophen]        Review of Systems:  Review of Systems   Constitutional: Positive for fatigue  Negative for activity change, appetite change, chills, diaphoresis, fever and unexpected weight change  HENT: Negative  Negative for congestion, dental problem, drooling, ear discharge, ear pain, facial swelling, hearing loss, mouth sores, nosebleeds, postnasal drip, rhinorrhea, sinus pressure, sinus pain, sneezing, sore throat, tinnitus, trouble swallowing and voice change  Eyes: Negative  Negative for photophobia, pain, redness, itching and visual disturbance  Respiratory: Negative for apnea, cough, choking, chest tightness, shortness of breath, wheezing and stridor  Cardiovascular: Negative for chest pain, palpitations and leg swelling  Gastrointestinal: Negative    Negative for abdominal distention, abdominal pain, anal bleeding, blood in stool, constipation, diarrhea, nausea, rectal pain and vomiting  Endocrine: Negative  Negative for cold intolerance, heat intolerance, polydipsia, polyphagia and polyuria  Genitourinary: Negative  Negative for decreased urine volume, difficulty urinating, dyspareunia, dysuria, enuresis, flank pain, frequency, genital sores, hematuria, menstrual problem, pelvic pain, urgency, vaginal bleeding, vaginal discharge and vaginal pain  Musculoskeletal: Negative  Negative for arthralgias, back pain, gait problem, joint swelling, myalgias, neck pain and neck stiffness  Skin: Negative  Negative for color change, pallor, rash and wound  Allergic/Immunologic: Negative  Negative for environmental allergies, food allergies and immunocompromised state  Neurological: Negative  Negative for dizziness, tremors, seizures, syncope, facial asymmetry, speech difficulty, weakness, light-headedness, numbness and headaches  Hematological: Negative  Negative for adenopathy  Does not bruise/bleed easily  Psychiatric/Behavioral: Negative  Negative for agitation, behavioral problems, confusion, decreased concentration, dysphoric mood, hallucinations, self-injury, sleep disturbance and suicidal ideas  The patient is not nervous/anxious and is not hyperactive  All other systems reviewed and are negative  Vitals:    04/29/21 0953   BP: 108/70   BP Location: Right arm   Patient Position: Sitting   Cuff Size: Standard   Pulse: 80   Temp: 98 5 °F (36 9 °C)   SpO2: 97%   Weight: 94 8 kg (209 lb)   Height: 5' 1" (1 549 m)     Physical Exam:  Physical Exam  Constitutional:       General: She is not in acute distress  Appearance: She is well-developed  She is not diaphoretic  HENT:      Head: Normocephalic and atraumatic  Right Ear: External ear normal       Left Ear: External ear normal    Eyes:      General: No scleral icterus  Right eye: No discharge  Left eye: No discharge        Conjunctiva/sclera: Conjunctivae normal       Pupils: Pupils are equal, round, and reactive to light  Neck:      Musculoskeletal: Normal range of motion and neck supple  Thyroid: No thyromegaly  Vascular: No JVD  Trachea: No tracheal deviation  Cardiovascular:      Rate and Rhythm: Normal rate and regular rhythm  Heart sounds: No murmur  No friction rub  Gallop present  Pulmonary:      Effort: Pulmonary effort is normal  No respiratory distress  Breath sounds: Normal breath sounds  No stridor  No wheezing or rales  Chest:      Chest wall: No tenderness  Abdominal:      General: Bowel sounds are normal  There is no distension  Palpations: Abdomen is soft  There is no mass  Tenderness: There is no abdominal tenderness  There is no guarding or rebound  Musculoskeletal: Normal range of motion  General: No tenderness or deformity  Skin:     General: Skin is warm and dry  Coloration: Skin is not pale  Findings: No erythema or rash  Neurological:      Mental Status: She is alert and oriented to person, place, and time  Cranial Nerves: No cranial nerve deficit  Motor: No abnormal muscle tone  Coordination: Coordination normal       Deep Tendon Reflexes: Reflexes are normal and symmetric  Reflexes normal    Psychiatric:         Behavior: Behavior normal          Thought Content:  Thought content normal          Judgment: Judgment normal          Labs:     Lab Results   Component Value Date    WBC 6 40 06/26/2019    HGB 12 8 06/26/2019    HCT 41 7 06/26/2019    MCV 87 06/26/2019     06/26/2019     Lab Results   Component Value Date    K 4 5 03/08/2021     03/08/2021    CO2 24 03/08/2021    BUN 17 03/08/2021    CREATININE 0 90 03/08/2021    GLUF 145 (H) 03/26/2019    CALCIUM 8 7 06/26/2019    AST 21 06/26/2019    ALT 61 06/26/2019    ALKPHOS 101 06/26/2019    EGFR 70 06/26/2019     No results found for: CHOL  Lab Results   Component Value Date    HDL 44 03/26/2019 Lab Results   Component Value Date    LDLCALC 116 (H) 2019     Lab Results   Component Value Date    TRIG 53 2019     No results found for: HGBA1C    Imaging & Testing   I have personally reviewed pertinent reports  EKG: Personally reviewed  Cardiac testing:   Results for orders placed during the hospital encounter of 19   Echo complete with contrast if indicated    Narrative [address]  [address] [facility] [address]  [phone]    Transthoracic Echocardiogram  2D, M-mode, Doppler, and Color Doppler    Study date:  2019    Patient: Melvi Graves  MR number: [id number]  Account number: [de-identified]  : 1970  Age: 44 years  Gender: Female  Status: Inpatient  Location: Bedside  Height: 61 in  Weight: 219 6 lb  BP: 118/ 80 mmHg    Indications: Heart Failure    Diagnoses: I50 9 - Heart failure, unspecified    Sonographer:  ISABELL Barlow  Primary Physician:  Hunter Espinoza MD  Referring Physician:  Margaret Sr DO  Group:  [facility]  Interpreting Physician:  Daniel Cerda MD    SUMMARY    LEFT VENTRICLE:  Systolic function was markedly reduced  Ejection fraction was estimated in the range of 25 % to 30 % to be 30 %  There was severe diffuse hypokinesis  Doppler parameters were consistent with abnormal left ventricular relaxation (grade 1 diastolic dysfunction)  RIGHT VENTRICLE:  Systolic function was moderately reduced  LEFT ATRIUM:  The atrium was mildly dilated  RIGHT ATRIUM:  The atrium was mildly dilated  MITRAL VALVE:  There was trace regurgitation  IVC, HEPATIC VEINS:  The inferior vena cava was dilated  HISTORY: PRIOR HISTORY: GERD, Hiatal Hernia    PROCEDURE: The procedure was performed at the bedside  This was a routine study  The transthoracic approach was used  The study included complete 2D imaging, M-mode, complete spectral Doppler, and color Doppler   The heart rate was 92 bpm,  at the start of the study  Image quality was adequate  LEFT VENTRICLE: Size was normal  Systolic function was markedly reduced  Ejection fraction was estimated in the range of 25 % to 30 % to be 30 %  There was severe diffuse hypokinesis  Wall thickness was normal  No evidence of apical  thrombus  DOPPLER: Doppler parameters were consistent with abnormal left ventricular relaxation (grade 1 diastolic dysfunction)  RIGHT VENTRICLE: The size was normal  Systolic function was moderately reduced  Wall thickness was normal     LEFT ATRIUM: The atrium was mildly dilated  RIGHT ATRIUM: The atrium was mildly dilated  MITRAL VALVE: Valve structure was normal  There was normal leaflet separation  DOPPLER: The transmitral velocity was within the normal range  There was no evidence for stenosis  There was trace regurgitation  AORTIC VALVE: The valve was trileaflet  Leaflets exhibited normal thickness and normal cuspal separation  DOPPLER: Transaortic velocity was within the normal range  There was no evidence for stenosis  There was no significant  regurgitation  TRICUSPID VALVE: The valve structure was normal  There was normal leaflet separation  DOPPLER: The transtricuspid velocity was within the normal range  There was no evidence for stenosis  There was no significant regurgitation  PULMONIC VALVE: Leaflets exhibited normal thickness, no calcification, and normal cuspal separation  DOPPLER: The transpulmonic velocity was within the normal range  There was no significant regurgitation  PERICARDIUM: There was no pericardial effusion  The pericardium was normal in appearance  AORTA: The root exhibited normal size  SYSTEMIC VEINS: IVC: The inferior vena cava was dilated      SYSTEM MEASUREMENT TABLES    2D mode  AoR Diam 2D: 3 2 cm  LA Diam (2D): 4 5 cm  LA/Ao (2D): 1 41  FS (2D Teich): 13 7 %  IVSd (2D): 0 82 cm  LVDEV: 193 cmï¾³  LVESV: 138 cmï¾³  LVIDd(2D): 6 19 cm  LVISd (2D): 5 34 cm  LVPWd (2D): 0 81 cm  SV (Teich): 55 cmï¾³    Apical four chamber  LVEF A4C: 28 %    Unspecified Scan Mode  MV Peak E Zach  Mean: 848 mm/s  MVA (PHT): 4 15 cmï¾²  PHT: 53 ms  Max P mm[Hg]  V Max: 2440 mm/s  Vmax: 2500 mm/s  RA Area: 18 1 cmï¾²  RA Volume: 48 8 cmï¾³  TAPSE: 1 1 cm    [facility]    Prepared and electronically signed by    Lee Rivera MD  Signed 2019 13:10:35       ekg sinus rhythm with LBBB periodic pvc    Lee Rivera MD [facility]  Please call with any questions or suggestions    A description of the counseling:   Goals and Barriers:  Patient's ability to self care:  Medication side effect reviewed with patient in detail and all their questions answered  "This note has been constructed using a voice recognition system  Therefore there may be syntax, spelling, and/or grammatical errors   Please call if you have any questions  "

## 2021-07-15 ENCOUNTER — OFFICE VISIT (OUTPATIENT)
Dept: SLEEP CENTER | Facility: CLINIC | Age: 51
End: 2021-07-15
Payer: COMMERCIAL

## 2021-07-15 VITALS
WEIGHT: 206 LBS | DIASTOLIC BLOOD PRESSURE: 82 MMHG | SYSTOLIC BLOOD PRESSURE: 120 MMHG | HEIGHT: 61 IN | BODY MASS INDEX: 38.89 KG/M2

## 2021-07-15 DIAGNOSIS — G47.31 CENTRAL SLEEP APNEA: ICD-10-CM

## 2021-07-15 DIAGNOSIS — G47.33 OSA (OBSTRUCTIVE SLEEP APNEA): Primary | ICD-10-CM

## 2021-07-15 DIAGNOSIS — E66.9 OBESITY (BMI 30-39.9): ICD-10-CM

## 2021-07-15 DIAGNOSIS — G47.09 OTHER INSOMNIA: ICD-10-CM

## 2021-07-15 DIAGNOSIS — I42.8 NON-ISCHEMIC CARDIOMYOPATHY (HCC): ICD-10-CM

## 2021-07-15 DIAGNOSIS — I50.41 ACUTE COMBINED SYSTOLIC AND DIASTOLIC CONGESTIVE HEART FAILURE (HCC): ICD-10-CM

## 2021-07-15 DIAGNOSIS — R09.81 NASAL CONGESTION: ICD-10-CM

## 2021-07-15 PROCEDURE — 1036F TOBACCO NON-USER: CPT | Performed by: INTERNAL MEDICINE

## 2021-07-15 PROCEDURE — 99214 OFFICE O/P EST MOD 30 MIN: CPT | Performed by: INTERNAL MEDICINE

## 2021-07-15 PROCEDURE — 3008F BODY MASS INDEX DOCD: CPT | Performed by: INTERNAL MEDICINE

## 2021-07-15 NOTE — PROGRESS NOTES
Follow-Up Note - Yachats Elroy Landers  46 y o  female  :1970  HQT:[id number]    CC: I saw this patient for follow-up in clinic today for Sleep disordered breathing, Coexisting Sleep and Medical Problems  She is concerned about the recall and has stopped using her machine when she received notification  Results of prior study:  A diagnostic study in April of this year demonstrated severe obstructive sleep apnea: AHI 43 /hour made up of central and obstructive events  Minimum oxygen saturation 80 % and 8 6 minutes of total sleep time was spent with saturations less than 90%  There were mild periodic limb movements of sleep  She had difficulty both initiating and maintaining sleep      PFSH, Problem List, Medications & Allergies were reviewed in EMR  Interval changes: none reported  She  has a past medical history of CHF (congestive heart failure) ([facility] [address] ), Diverticulitis of colon, GERD (gastroesophageal reflux disease), and HH (hiatus hernia)  She has a current medication list which includes the following prescription(s): ascorbic acid, carvedilol, cholecalciferol, entresto, spironolactone, and sumatriptan  PHYSIOLOGICAL DATA REVIEW AND INTERPRETATION:  For 30 days ending 2021 using PAP > 4 hours/night 83%  Estimated NANDO 4 4/hour with pressure of 11 7cm H2O @90th percentile; Compliance: Very good; Sleep disordered breathing:stable & within target range; Patient reports has not been using So Clean to sanitize the machine  SUBJECTIVE: Regarding use of PAP, Jerrell Santana reports:   · She is experiencing some adverse effects: dry mouth/throat, dry nose and nasal congestion  · She is benefiting from use: sleeping better with use of CPAP  Sleep Routine: Jerrell Santana reports getting 6 hrs sleep  ; she has no difficulty initiating or maintaining sleep   She arises spontaneously and is not always refreshed as when she uses CPAP  Jerrell Santana denies Excessive Daytime Sleepiness, or napping and rated herself at Total score: 0 /24 on the [place] Sleepiness Scale  Habits: reports that she quit smoking about 2 years ago  Her smoking use included cigarettes  She has a 16 50 pack-year smoking history  She has never used smokeless tobacco ,  reports current alcohol use ,  reports no history of drug use , Caffeine use: moderate , Exercise routine: none   ROS: as attached  Significant for weight has been stable  He has nasal symptoms due to allergies and since not using CPAP feels symptoms have improved  EXAM: /82   Ht 5' 1" (1 549 m)   Wt 93 4 kg (206 lb)   BMI 38 92 kg/m²     Patient is well groomed; well appearing  H&N: EOMI; NC/AT:no facial pressure marks, no rashes  Skin/Extrem: col & hydration normal; no edema  Psych: cooperativeand in no distress  Mental state:appears normal   Resp: Respiratory effort is normal  CNS: Alert, orientated, clear & coherent speech  Physical findings otherwise essentially unchanged from previous  IMPRESSION: Problem List Items & Comorbidities Addressed this Visit    1  TAE (obstructive sleep apnea)  PAP DME Resupply/Reorder   2  Central sleep apnea     3  Other insomnia     4  Nasal congestion     5  Non-ischemic cardiomyopathy ([address] )     6  Acute combined systolic and diastolic congestive heart failure (HCC)     7  Obesity (BMI 30-39  9)         PLAN:  I reviewed results of prior studies and physiologic data with the patient  Notified regarding recall of [employer] devices and the recommendation to discontinue use pending resolution of degradation of the foam by replacing it  I discussed treatment options with risks and benefits  Patient understands risks of discontinuing PAP vs continuing use while awaiting resolution of the new issue  Patient has registered machine with [employer]  Advised to follow progress on their website, including web sites of DME provider, [facility] and [facility] for notifications     Treatment is medically necessary and patient will weigh the options regarding use  Plans to get an in lined biological filter before continuing Pap and while awaiting remediation  Care of equipment, methods to improve comfort using PAP and importance of compliance with therapy were discussed  Instructed not to use So Clean or other cleaning devices unless approved by  a and FDA  Pressure settin-14 cmH2O  She declined pressure adjustment  Rx provided to replace  supplies and Care coordinated with DME provider  Nasal symptoms may improve with regular nasal saline rinse followed by topical nasal steroid if necessary  Discussed strategies for weight reduction  I also advised allowing sufficient opportunity for sleep  Follow-up is advised in 1 year or sooner if needed to monitor progress, compliance and to adjust therapy  Thank you for allowing me to participate in the care of this patient      Sincerely,    Authenticated electronically by Deisy Vance MD on    Board Certified Specialist

## 2021-07-15 NOTE — PATIENT INSTRUCTIONS
Continuous Positive Airway Pressure (CPAP) therapy was prescribed to you as a medical necessity and there are risks of discontinuing  use of the device, some of which may be long term  Symptoms you experienced before using CPAP may return such as snoring, apneas, excessive daytime sleepiness, hypertension, cardiac arrhythmias, risk of stroke, congestive heart-failure, exacerbation of COPD and potential respiratory failure  Ultimately, it is a personal decision for you to make if you continue use of an affected device or discontinue until a replacement is provided  Unfortunately, [employer] has not yet provided us with information about available devices  You can visit their website at [email or web address]  [email or web address] to register your device or www  [employer]  expertinquiry  com to learn more about how Ramana to replace your device  Another option would be to check with your medical equipment provider to determine if you are eligible for a new machine through your insurance, if not you can pay out of pocket for a new machine  According to Clary Ashraf, an in [employer] filter that can be purchased online for upwards of $4  can also be helpful  If you wish to get a replacement machine, we are able to provide you with a script  P;ease include your mask type  Nursing Support:  When: Monday through Friday 7A-5PM except holidays  Where: Our direct line is [phone]  If you are having a true emergency please call 911  In the event that the line is busy or it is after hours please leave a voice message and we will return your call  Please speak clearly, leaving your full name, birth date, best number to reach you and the reason for your call  Medication refills: We will need the name of the medication, the dosage, the ordering provider, whether you get a 30 or 90 day refill, and the pharmacy name and address  Medications will be ordered by the provider only    Nurses cannot call in prescriptions  Please allow 7 days for medication refills  Physician requested updates: If your provider requested that you call with an update after starting medication, please be ready to provide us the medication and dosage, what time you take your medication, the time you attempt to fall asleep, time you fall asleep, when you wake up, and what time you get out of bed  Sleep Study Results: We will contact you with sleep study results and/or next steps after the physician has reviewed your testing

## 2021-07-16 ENCOUNTER — TELEPHONE (OUTPATIENT)
Dept: SLEEP CENTER | Facility: CLINIC | Age: 51
End: 2021-07-16

## 2021-09-03 ENCOUNTER — TELEPHONE (OUTPATIENT)
Dept: CARDIOLOGY CLINIC | Facility: CLINIC | Age: 51
End: 2021-09-03

## 2021-10-28 ENCOUNTER — OFFICE VISIT (OUTPATIENT)
Dept: CARDIOLOGY CLINIC | Facility: CLINIC | Age: 51
End: 2021-10-28
Payer: COMMERCIAL

## 2021-10-28 VITALS
BODY MASS INDEX: 38.89 KG/M2 | DIASTOLIC BLOOD PRESSURE: 70 MMHG | WEIGHT: 206 LBS | HEIGHT: 61 IN | TEMPERATURE: 98.4 F | SYSTOLIC BLOOD PRESSURE: 118 MMHG | OXYGEN SATURATION: 98 % | HEART RATE: 63 BPM

## 2021-10-28 DIAGNOSIS — I50.41 ACUTE COMBINED SYSTOLIC AND DIASTOLIC CONGESTIVE HEART FAILURE (HCC): ICD-10-CM

## 2021-10-28 DIAGNOSIS — I50.1 HEART FAILURE, LEFT, WITH LVEF <=30% (HCC): ICD-10-CM

## 2021-10-28 DIAGNOSIS — I42.8 NON-ISCHEMIC CARDIOMYOPATHY (HCC): ICD-10-CM

## 2021-10-28 DIAGNOSIS — G47.33 OSA (OBSTRUCTIVE SLEEP APNEA): ICD-10-CM

## 2021-10-28 DIAGNOSIS — I50.1 HEART FAILURE, LEFT, WITH LVEF 31-40% (HCC): Primary | ICD-10-CM

## 2021-10-28 PROCEDURE — 99214 OFFICE O/P EST MOD 30 MIN: CPT | Performed by: INTERNAL MEDICINE

## 2021-10-28 PROCEDURE — 93000 ELECTROCARDIOGRAM COMPLETE: CPT | Performed by: INTERNAL MEDICINE

## 2021-10-28 RX ORDER — SPIRONOLACTONE 25 MG/1
25 TABLET ORAL DAILY
Qty: 90 TABLET | Refills: 3 | Status: SHIPPED | OUTPATIENT
Start: 2021-10-28

## 2021-10-28 RX ORDER — SACUBITRIL AND VALSARTAN 49; 51 MG/1; MG/1
1 TABLET, FILM COATED ORAL 2 TIMES DAILY
Qty: 180 TABLET | Refills: 3 | Status: SHIPPED | OUTPATIENT
Start: 2021-10-28

## 2021-10-28 RX ORDER — ALBUTEROL SULFATE 90 UG/1
AEROSOL, METERED RESPIRATORY (INHALATION)
COMMUNITY
Start: 2021-09-27

## 2021-10-28 RX ORDER — CARVEDILOL 12.5 MG/1
12.5 TABLET ORAL 2 TIMES DAILY WITH MEALS
Qty: 180 TABLET | Refills: 3 | Status: SHIPPED | OUTPATIENT
Start: 2021-10-28

## 2021-10-28 RX ORDER — ERGOCALCIFEROL 1.25 MG/1
CAPSULE ORAL
COMMUNITY
Start: 2021-09-29

## 2022-01-27 ENCOUNTER — APPOINTMENT (OUTPATIENT)
Dept: RADIOLOGY | Facility: CLINIC | Age: 52
End: 2022-01-27
Payer: COMMERCIAL

## 2022-01-27 ENCOUNTER — OFFICE VISIT (OUTPATIENT)
Dept: OBGYN CLINIC | Facility: CLINIC | Age: 52
End: 2022-01-27
Payer: COMMERCIAL

## 2022-01-27 VITALS
BODY MASS INDEX: 36.32 KG/M2 | SYSTOLIC BLOOD PRESSURE: 130 MMHG | HEART RATE: 80 BPM | WEIGHT: 205 LBS | HEIGHT: 63 IN | DIASTOLIC BLOOD PRESSURE: 82 MMHG

## 2022-01-27 DIAGNOSIS — S80.211A ABRASION OF RIGHT KNEE, INITIAL ENCOUNTER: ICD-10-CM

## 2022-01-27 DIAGNOSIS — M25.561 RIGHT KNEE PAIN, UNSPECIFIED CHRONICITY: ICD-10-CM

## 2022-01-27 DIAGNOSIS — M25.461 EFFUSION OF RIGHT KNEE: ICD-10-CM

## 2022-01-27 DIAGNOSIS — Z01.89 ENCOUNTER FOR OTHER SPECIFIED SPECIAL EXAMINATIONS: ICD-10-CM

## 2022-01-27 DIAGNOSIS — M17.11 PRIMARY OSTEOARTHRITIS OF RIGHT KNEE: ICD-10-CM

## 2022-01-27 DIAGNOSIS — M25.561 ACUTE PAIN OF RIGHT KNEE: Primary | ICD-10-CM

## 2022-01-27 PROCEDURE — 73560 X-RAY EXAM OF KNEE 1 OR 2: CPT

## 2022-01-27 PROCEDURE — 99213 OFFICE O/P EST LOW 20 MIN: CPT | Performed by: ORTHOPAEDIC SURGERY

## 2022-01-27 PROCEDURE — 73562 X-RAY EXAM OF KNEE 3: CPT

## 2022-01-27 PROCEDURE — 20610 DRAIN/INJ JOINT/BURSA W/O US: CPT | Performed by: ORTHOPAEDIC SURGERY

## 2022-01-27 RX ORDER — TRIAMCINOLONE ACETONIDE 40 MG/ML
80 INJECTION, SUSPENSION INTRA-ARTICULAR; INTRAMUSCULAR
Status: COMPLETED | OUTPATIENT
Start: 2022-01-27 | End: 2022-01-27

## 2022-01-27 RX ORDER — BUPIVACAINE HYDROCHLORIDE 5 MG/ML
6 INJECTION, SOLUTION EPIDURAL; INTRACAUDAL
Status: COMPLETED | OUTPATIENT
Start: 2022-01-27 | End: 2022-01-27

## 2022-01-27 RX ADMIN — BUPIVACAINE HYDROCHLORIDE 6 ML: 5 INJECTION, SOLUTION EPIDURAL; INTRACAUDAL at 10:50

## 2022-01-27 RX ADMIN — TRIAMCINOLONE ACETONIDE 80 MG: 40 INJECTION, SUSPENSION INTRA-ARTICULAR; INTRAMUSCULAR at 10:50

## 2022-01-27 NOTE — PROGRESS NOTES
Assessment/Plan:  1  Acute pain of right knee  XR knee 3 vw right non injury    Large joint arthrocentesis: R knee   2  Primary osteoarthritis of right knee  Large joint arthrocentesis: R knee   3  Effusion of right knee  Large joint arthrocentesis: R knee   4  Abrasion of right knee, initial encounter       Daquan Melgar is a very pleasant 49-year-old female presenting today for acute on chronic right knee pain after a fall on ice 9 days ago  In reviewing her imaging, history, and physical exam, I do not believe that she has any acute fractures, and there is no sign of occult fracture on images today  I do believe that she aggravated her moderate to severe underlying osteoarthritis  After discussing potential treatment options, she consented to an aspiration for both diagnostic and therapeutic purposes  Only 2 cc of clear benign yellow fluid was obtained  This time, it was determined that it was safe to give her a cortisone injection, which she tolerated well without difficulty or complication  Post injection instructions were provided  I would like to avoid giving her knee brace at this time due to the abrasion on the front of her knee  She has been doing very well in caring for the wound at home, and I encouraged her to continue with Neosporin and Band-Aids for the time being  I would like to see her back in 2 weeks for a clinical re-evaluation to determine if the cortisone was efficacious and to ensure that the wound is healing properly  She expressed understanding all of her questions were addressed today    Large joint arthrocentesis: R knee  Universal Protocol:  Consent: Verbal consent obtained  Risks and benefits: risks, benefits and alternatives were discussed  Consent given by: patient  Time out: Immediately prior to procedure a "time out" was called to verify the correct patient, procedure, equipment, support staff and site/side marked as required    Timeout called at: 1/27/2022 10:30 AM   Site marked: the operative site was marked  Patient identity confirmed: verbally with patient    Supporting Documentation  Indications: pain and joint swelling   Procedure Details  Location: knee - R knee  Preparation: Patient was prepped and draped in the usual sterile fashion  Needle size: 18 G  Ultrasound guidance: no  Approach: lateral  Medications administered: 6 mL bupivacaine (PF) 0 5 %; 80 mg triamcinolone acetonide 40 mg/mL    Aspirate amount: 2 mL  Aspirate: clear, serous and yellow    Patient tolerance: patient tolerated the procedure well with no immediate complications  Dressing:  Sterile dressing applied        Subjective: Fall    Patient ID: Grace Fuentes is a 45 y o  female  Landon Chew is a very pleasant 64-year-old female presenting today for evaluation of acute on chronic right knee pain  She reports that this has been her bad knee since high school and that she has had intermittent pain with that before, especially going up and down stairs  She recalls having the knee aspirated and injected, but believes it was many years ago  Nine days ago, she slipped on ice and re-injured the knee  She believes that she fell forward, as she has an abrasion in the front of her knee  She has been treating the abrasion with Neosporin and a Band-Aid  She complains of pain with ambulation and limitations in motion due to swelling in the knee  She has tried occasional Advil and Tylenol without significant relief  She locates the pain in the anterior and lateral aspect of the knee  She denies mechanical symptoms or paresthesias    Review of Systems   Constitutional: Positive for activity change  HENT: Negative  Eyes: Negative  Respiratory: Negative  Cardiovascular: Positive for leg swelling  Gastrointestinal: Negative  Endocrine: Negative  Genitourinary: Negative  Musculoskeletal: Positive for arthralgias, gait problem, joint swelling and myalgias  Skin: Negative      Allergic/Immunologic: Negative  Hematological: Negative  Psychiatric/Behavioral: Negative        Past Medical History:   Diagnosis Date    CHF (congestive heart failure) (HCC)     Diverticulitis of colon     GERD (gastroesophageal reflux disease)     Heart disease 2019    HH (hiatus hernia)     Stomach disorder     Vascular disorder        Past Surgical History:   Procedure Laterality Date     SECTION      CHOLECYSTECTOMY      HAND SURGERY      HYSTERECTOMY       Family History   Problem Relation Age of Onset    Heart attack Mother     Heart attack Father      Social History     Occupational History    Not on file   Tobacco Use    Smoking status: Former Smoker     Packs/day: 0 50     Years: 33 00     Pack years: 16 50     Types: Cigarettes     Quit date: 2019     Years since quittin 6    Smokeless tobacco: Former User   Vaping Use    Vaping Use: Never used   Substance and Sexual Activity    Alcohol use: Not Currently     Alcohol/week: 0 0 standard drinks     Comment: 2x year    Drug use: No    Sexual activity: Not on file       Current Outpatient Medications:     albuterol (PROVENTIL HFA,VENTOLIN HFA) 90 mcg/act inhaler, , Disp: , Rfl:     ascorbic acid (VITAMIN C) 500 mg tablet, Take 500 mg by mouth daily, Disp: , Rfl:     carvedilol (COREG) 12 5 mg tablet, Take 1 tablet (12 5 mg total) by mouth 2 (two) times a day with meals, Disp: 180 tablet, Rfl: 3    cholecalciferol (VITAMIN D3) 1,000 units tablet, Take 1,000 Units by mouth daily (Patient not taking: Reported on 10/28/2021), Disp: , Rfl:     ergocalciferol (VITAMIN D2) 50,000 units, , Disp: , Rfl:     sacubitril-valsartan (Entresto) 49-51 MG TABS, Take 1 tablet by mouth 2 (two) times a day, Disp: 180 tablet, Rfl: 3    spironolactone (ALDACTONE) 25 mg tablet, Take 1 tablet (25 mg total) by mouth daily, Disp: 90 tablet, Rfl: 3    Allergies   Allergen Reactions    Percocet [Oxycodone-Acetaminophen]        Objective:  Vitals: 01/27/22 0953   BP: 130/82   Pulse: 80       Body mass index is 36 31 kg/m²  Right Knee Exam     Muscle Strength   The patient has normal right knee strength  Tenderness   The patient is experiencing tenderness in the lateral joint line, medial joint line and patella  Range of Motion   Extension:  0 normal   Flexion:  110 (Limited by pain and swelling) abnormal     Tests   Maxine:  Medial - negative Lateral - negative  Varus: negative Valgus: negative  Drawer:  Anterior - negative      Patellar apprehension: negative    Other   Erythema: present  Scars: present  Sensation: normal  Pulse: present  Swelling: none  Effusion: effusion (1+) present    Comments:  Superficial abrasion noted over the anterior knee approximately 3 x 3 cm  There is no sign of drainage or infection  The tissue appears to be healthy and healing  Flexion limited by pain and swelling in the posterior and lateral aspect of the knee  Collateral ligaments stable to stressing at 0, 30, 90  Ambulates with an antalgic gait on the right without assistive device          Observations     Right Knee   Positive for effusion (1+)  Physical Exam  Vitals and nursing note reviewed  Constitutional:       Appearance: She is well-developed  Comments: Body mass index is 36 31 kg/m²  HENT:      Head: Normocephalic and atraumatic  Right Ear: External ear normal       Left Ear: External ear normal    Cardiovascular:      Rate and Rhythm: Normal rate  Pulmonary:      Effort: Pulmonary effort is normal    Abdominal:      Palpations: Abdomen is soft  Musculoskeletal:      Cervical back: Normal range of motion  Right knee: Effusion (1+) present  Instability Tests: Medial Maxine test negative and lateral Maxine test negative  Comments: See ortho exam   Skin:     General: Skin is warm and dry  Neurological:      General: No focal deficit present        Mental Status: She is alert and oriented to person, place, and time  Mental status is at baseline  Psychiatric:         Mood and Affect: Mood normal          Behavior: Behavior normal          Thought Content: Thought content normal          Judgment: Judgment normal        I have personally reviewed pertinent films in PACS of the weight-bearing x-rays taken today of her right knee which demonstrate no acute findings such as fracture or dislocation  She does have slight valgus deformity and moderate tricompartmental degenerative changes with lateral joint space narrowing and tricompartmental osteophytosis

## 2022-02-10 ENCOUNTER — OFFICE VISIT (OUTPATIENT)
Dept: OBGYN CLINIC | Facility: CLINIC | Age: 52
End: 2022-02-10
Payer: COMMERCIAL

## 2022-02-10 VITALS
HEART RATE: 80 BPM | DIASTOLIC BLOOD PRESSURE: 80 MMHG | HEIGHT: 61 IN | BODY MASS INDEX: 37.76 KG/M2 | SYSTOLIC BLOOD PRESSURE: 130 MMHG | WEIGHT: 200 LBS

## 2022-02-10 DIAGNOSIS — G89.29 CHRONIC PAIN OF RIGHT KNEE: ICD-10-CM

## 2022-02-10 DIAGNOSIS — M25.561 CHRONIC PAIN OF RIGHT KNEE: ICD-10-CM

## 2022-02-10 DIAGNOSIS — S80.211D ABRASION OF RIGHT KNEE, SUBSEQUENT ENCOUNTER: ICD-10-CM

## 2022-02-10 DIAGNOSIS — M17.11 PRIMARY OSTEOARTHRITIS OF RIGHT KNEE: Primary | ICD-10-CM

## 2022-02-10 PROCEDURE — 99214 OFFICE O/P EST MOD 30 MIN: CPT | Performed by: ORTHOPAEDIC SURGERY

## 2022-02-10 NOTE — PROGRESS NOTES
Assessment/Plan:  No diagnosis found  Scribe Attestation    I,:   am acting as a scribe while in the presence of the attending physician :       I,:   personally performed the services described in this documentation    as scribed in my presence :           ***    Subjective: ***    Patient ID: Latanya Donis is a 45 y o  female      HPI  ***      Review of Systems      Past Medical History:   Diagnosis Date    CHF (congestive heart failure) (HCC)     Diverticulitis of colon     GERD (gastroesophageal reflux disease)     Heart disease 2019    HH (hiatus hernia)     Stomach disorder     Vascular disorder        Past Surgical History:   Procedure Laterality Date     SECTION      CHOLECYSTECTOMY      HAND SURGERY      HYSTERECTOMY         Family History   Problem Relation Age of Onset    Heart attack Mother     Heart attack Father        Social History     Occupational History    Not on file   Tobacco Use    Smoking status: Former Smoker     Packs/day: 0 50     Years: 33 00     Pack years: 16 50     Types: Cigarettes     Quit date: 2019     Years since quittin 6    Smokeless tobacco: Former User   Vaping Use    Vaping Use: Never used   Substance and Sexual Activity    Alcohol use: Not Currently     Alcohol/week: 0 0 standard drinks     Comment: 2x year    Drug use: No    Sexual activity: Not on file         Current Outpatient Medications:     albuterol (PROVENTIL HFA,VENTOLIN HFA) 90 mcg/act inhaler, , Disp: , Rfl:     ascorbic acid (VITAMIN C) 500 mg tablet, Take 500 mg by mouth daily, Disp: , Rfl:     carvedilol (COREG) 12 5 mg tablet, Take 1 tablet (12 5 mg total) by mouth 2 (two) times a day with meals, Disp: 180 tablet, Rfl: 3    cholecalciferol (VITAMIN D3) 1,000 units tablet, Take 1,000 Units by mouth daily (Patient not taking: Reported on 10/28/2021), Disp: , Rfl:     ergocalciferol (VITAMIN D2) 50,000 units, , Disp: , Rfl:     sacubitril-valsartan (Entresto) 49-51 MG TABS, Take 1 tablet by mouth 2 (two) times a day, Disp: 180 tablet, Rfl: 3    spironolactone (ALDACTONE) 25 mg tablet, Take 1 tablet (25 mg total) by mouth daily, Disp: 90 tablet, Rfl: 3    Allergies   Allergen Reactions    Percocet [Oxycodone-Acetaminophen]        Objective: There were no vitals filed for this visit  There is no height or weight on file to calculate BMI  Ortho Exam    Physical Exam    I have personally reviewed pertinent films in PACS    ***

## 2022-02-10 NOTE — PROGRESS NOTES
Assessment/Plan:  1  Primary osteoarthritis of right knee     2  Abrasion of right knee, subsequent encounter     3  Chronic pain of right knee       Scribe Attestation    I,:  Raymundo Martinez am acting as a scribe while in the presence of the attending physician :       I,:  Imelda Elaine, DO personally performed the services described in this documentation    as scribed in my presence :         Kathi Craft is a pleasant 47-year-old female who returns today for follow-up evaluation of her right knee pain  I am pleased with the relief she has experienced following corticosteroid injection at her last visit  Her region also continues to heal nicely  I encouraged her to continue with at home wound care  I explained that the burning sensation she is experiencing is likely related to her healing abrasion  She will monitor this  She understands that repeat injection can occur for her knee safely at 3 month intervals if her pain dictates  We also discussed the potential for physical therapy in the future for persistent pain  All her questions and concerns were addressed today  We will see her back as needed  Subjective: Follow-up evaluation for right knee pain    Patient ID: Patt Fatima is a 45 y o  female who returns today for follow-up evaluation of her right knee pain  She was seen 2 weeks ago and a corticosteroid injection was administered for symptomatic relief for exacerbation of her underlying osteoarthritis  At today's visit, she reports that her pain is significantly improved  She does complain of a burning sensation about the anterior aspect of the knee as well as a clicking sensation that occurs intermittently  She reports this is nonpainful  She denies any new injury or trauma  Review of Systems   Constitutional: Positive for activity change  Negative for chills, fever and unexpected weight change  HENT: Negative for hearing loss, nosebleeds and sore throat      Eyes: Negative for pain, redness and visual disturbance  Respiratory: Negative for cough, shortness of breath and wheezing  Cardiovascular: Negative for chest pain, palpitations and leg swelling  Gastrointestinal: Negative for abdominal pain, nausea and vomiting  Endocrine: Negative for polydipsia and polyuria  Genitourinary: Negative for dysuria and hematuria  Musculoskeletal: Negative for arthralgias, joint swelling and myalgias  See HPI   Skin: Negative for rash and wound  Neurological: Negative for dizziness, numbness and headaches  Psychiatric/Behavioral: Negative for decreased concentration and suicidal ideas  The patient is not nervous/anxious            Past Medical History:   Diagnosis Date    CHF (congestive heart failure) (HCC)     Diverticulitis of colon     GERD (gastroesophageal reflux disease)     Heart disease 2019    HH (hiatus hernia)     Stomach disorder     Vascular disorder        Past Surgical History:   Procedure Laterality Date     SECTION      CHOLECYSTECTOMY      HAND SURGERY      HYSTERECTOMY         Family History   Problem Relation Age of Onset    Heart attack Mother     Heart attack Father        Social History     Occupational History    Not on file   Tobacco Use    Smoking status: Former Smoker     Packs/day: 0 50     Years: 33 00     Pack years: 16 50     Types: Cigarettes     Quit date: 2019     Years since quittin 6    Smokeless tobacco: Former User   Vaping Use    Vaping Use: Never used   Substance and Sexual Activity    Alcohol use: Not Currently     Alcohol/week: 0 0 standard drinks     Comment: 2x year    Drug use: No    Sexual activity: Not on file         Current Outpatient Medications:     albuterol (PROVENTIL HFA,VENTOLIN HFA) 90 mcg/act inhaler, , Disp: , Rfl:     ascorbic acid (VITAMIN C) 500 mg tablet, Take 500 mg by mouth daily, Disp: , Rfl:     carvedilol (COREG) 12 5 mg tablet, Take 1 tablet (12 5 mg total) by mouth 2 (two) times a day with meals, Disp: 180 tablet, Rfl: 3    cholecalciferol (VITAMIN D3) 1,000 units tablet, Take 1,000 Units by mouth daily (Patient not taking: Reported on 10/28/2021), Disp: , Rfl:     ergocalciferol (VITAMIN D2) 50,000 units, , Disp: , Rfl:     sacubitril-valsartan (Entresto) 49-51 MG TABS, Take 1 tablet by mouth 2 (two) times a day, Disp: 180 tablet, Rfl: 3    spironolactone (ALDACTONE) 25 mg tablet, Take 1 tablet (25 mg total) by mouth daily, Disp: 90 tablet, Rfl: 3    Allergies   Allergen Reactions    Percocet [Oxycodone-Acetaminophen]        Objective:  Vitals:    02/10/22 1332   BP: 130/80   Pulse: 80       Body mass index is 37 79 kg/m²  Right Knee Exam     Muscle Strength   The patient has normal right knee strength  Tenderness   The patient is experiencing no tenderness  Range of Motion   Extension:  0 normal   Flexion:  130 normal     Tests   Maxine:  Medial - negative Lateral - negative  Varus: negative Valgus: negative  Drawer:  Anterior - negative      Patellar apprehension: negative    Other   Erythema: present  Scars: absent  Sensation: normal  Pulse: present  Swelling: mild  Effusion: no effusion present    Comments:  Healing abrasion anterior knee without drainage or evidence of infection  Stable at 0, 30, 90  Neurovascularly in tact distally  No warmth or erythema  Significant crepitance  Patellofemoral grind: positive            Observations     Right Knee   Negative for effusion  Physical Exam  Vitals and nursing note reviewed  Constitutional:       Appearance: She is well-developed  HENT:      Head: Normocephalic and atraumatic  Eyes:      General: No scleral icterus  Conjunctiva/sclera: Conjunctivae normal    Cardiovascular:      Rate and Rhythm: Normal rate  Pulmonary:      Effort: Pulmonary effort is normal  No respiratory distress  Musculoskeletal:      Cervical back: Normal range of motion and neck supple  Right knee: No effusion  Instability Tests: Medial Maxine test negative and lateral Maxine test negative  Comments: As noted in HPI   Skin:     General: Skin is warm and dry  Neurological:      Mental Status: She is alert and oriented to person, place, and time  Psychiatric:         Behavior: Behavior normal          I have personally reviewed pertinent films in PACS  X-ray of the right knee obtained on 01/27/2022 reviewed demonstrating moderate degenerative change in a valgus pattern lateral joint space narrowing  There is no acute fracture, dislocation, lytic or blastic lesion

## 2022-03-10 ENCOUNTER — TELEPHONE (OUTPATIENT)
Dept: OTHER | Facility: OTHER | Age: 52
End: 2022-03-10

## 2022-03-11 NOTE — TELEPHONE ENCOUNTER
Please see and update
S/w with Mary Ellen from [address] [place] [address] and stated that member did update cob ref #  [id number] and stated this is for claim date 2/10/2022
2 seconds or less

## 2022-03-12 NOTE — PROGRESS NOTES
See scanned exercise session detailed report
verbal cues/nonverbal cues (demo/gestures)/1 person assist

## 2022-04-14 ENCOUNTER — OFFICE VISIT (OUTPATIENT)
Dept: CARDIOLOGY CLINIC | Facility: CLINIC | Age: 52
End: 2022-04-14
Payer: COMMERCIAL

## 2022-04-14 VITALS
TEMPERATURE: 97.3 F | DIASTOLIC BLOOD PRESSURE: 82 MMHG | SYSTOLIC BLOOD PRESSURE: 132 MMHG | HEIGHT: 61 IN | OXYGEN SATURATION: 99 % | BODY MASS INDEX: 38.51 KG/M2 | HEART RATE: 71 BPM | WEIGHT: 204 LBS

## 2022-04-14 DIAGNOSIS — I42.8 NON-ISCHEMIC CARDIOMYOPATHY (HCC): ICD-10-CM

## 2022-04-14 DIAGNOSIS — I50.1 HEART FAILURE, LEFT, WITH LVEF 31-40% (HCC): Primary | ICD-10-CM

## 2022-04-14 DIAGNOSIS — R06.00 EXERTIONAL DYSPNEA: ICD-10-CM

## 2022-04-14 DIAGNOSIS — I50.1 HEART FAILURE, LEFT, WITH LVEF <=30% (HCC): ICD-10-CM

## 2022-04-14 PROCEDURE — 99214 OFFICE O/P EST MOD 30 MIN: CPT | Performed by: INTERNAL MEDICINE

## 2022-04-14 PROCEDURE — 93000 ELECTROCARDIOGRAM COMPLETE: CPT | Performed by: INTERNAL MEDICINE

## 2022-04-15 NOTE — PROGRESS NOTES
Cardiology Follow Up  Viridiana Villarreal  1970  [id number]  [address] [facility]  [address] [place] [address]  [address] [address] [phone]    1  Heart failure, left, with LVEF 31-40% (HCC)  POCT ECG    Empagliflozin (Jardiance) 10 MG TABS    Echo complete w/ contrast if indicated   2  Non-ischemic cardiomyopathy (HCC)  POCT ECG    Empagliflozin (Jardiance) 10 MG TABS    Echo complete w/ contrast if indicated   3  Heart failure, left, with LVEF <=30% (HCC)  POCT ECG   4  Exertional dyspnea  Echo complete w/ contrast if indicated      Discussion/Plan:  Nonischemic CM EF34% NYHA class 2- MRI with viral etiology? She is now wearing her CPAP  Tolerating Entresto therapy- 49-51mg  Will add an SGLT 2 inhibitor 10 mg in the morning its cardiac mortality and morbidity benefits patients decreased EF  Carvedilol 12 5mg in morning and 12 5mg at night  She will continue spironolactone 25 mg daily  Watch sodium intake- 2gm  Cath was negative for CAD  Reports multiple family members with MI/heart failure  TSH  No alcohol  No HIV risk Dry weight is 204ilbs  She declines ICD  NSVT- her repeat heart monitor did not show nonsustained ventricular tachycardia  She has been tolerating the increased dose of beta-blocker  She is wearing her CPAP at night  Acute on chronic kidney disease- improved  Electrolytes stable  Recheck for electrolytes    Diverticulitis- currently high risk for procedure  Would delay until improved EF  Hyperkalemia- she will watch her potassium intake    TAE- trouble with cpap  Positive pressure  Interval History:  She denies having significant lower extremity edema since discharge  She denies feeling dizziness or lightheadedness  She denies having chest discomfort  Denies having syncopal episodes  Denies having any irregular heart rhythm  8/12:  No edema  No syncope   No dizziness or light-headness  Compliant with meds  She has had no device firings  She has been compliant with the up titration of the Entresto  She reports trying to increase her carvedilol but did not feel right  She understands about orthostatic hypotension in the risk of prolonged standing  She has been watching her sodium intake  Her weight has been down trending  11/25:  She is up titrated her carvedilol without significant dizziness lightheadedness  She does feel some orthostatic symptoms  She stands up slowly  She denies having significant lower extremity swelling  She is compliant with her medications  She is wearing compression socks when she is at work  01/20/2020:  Reviewed through her imaging study which shows her EF has improved to near 34%  She is walking a mi without significant shortness of breath  She denies having any dizziness or lightheadedness  She is wearing her compression socks at work  She is compliant with her medications  Her last blood work showed mildly elevated potassium  She is watching her potassium foods  Her kidney function has returned back to normal Holter a shin  9/10/20:  Still with trouble with cpap  Feels gassy  No dizziness or light-headness  03/15/2021:  She denies feeling any chest tightness  She denies feeling dizziness or lightheadedness  I reviewed with her her last studies  She is compliant with her medications  04/29/2021:  She is walking more than a mi without having shortness of breath or chest discomfort  She is compliant with her heart failure regimen  Her weight stays very stable  We reviewed through her recent MRI  We had a mutual discussion about having a primary defibrillator  I discussed with her given she is a nonischemic cardiomyopathy with a very good functional status a primary prevention device would have limited benefits  She is currently wishes not to go ahead with a defibrillator placement    She will notify me if she feels any major shortness of breath, palpitations or chest pain  We will continue optimal medical therapy  10/28/2021:  Her weight has been stable  She denies having dizziness or lightheadedness  She denies having chest heaviness  No major changes EKG  Compliant with medications  4 15 2022 she reports having recent travel  She denies having dizziness or lightheadedness  She is compliant with her medications  She denies having lower extremity edema      Patient Active Problem List   Diagnosis    Nicotine abuse    GERD (gastroesophageal reflux disease)    Acute combined systolic and diastolic congestive heart failure (HCC)    Diverticulitis    Hypokalemia    Non-ischemic cardiomyopathy ([address] )    Diverticulitis of large intestine without perforation or abscess without bleeding    TAE (obstructive sleep apnea)     Past Medical History:   Diagnosis Date    CHF (congestive heart failure) (HCC)     Diverticulitis of colon     GERD (gastroesophageal reflux disease)     Heart disease 2019    HH (hiatus hernia)     Stomach disorder     Vascular disorder      Social History     Socioeconomic History    Marital status: /Civil Union     Spouse name: Not on file    Number of children: Not on file    Years of education: Not on file    Highest education level: Not on file   Occupational History    Not on file   Tobacco Use    Smoking status: Former Smoker     Packs/day: 0 50     Years: 33 00     Pack years: 16 50     Types: Cigarettes     Quit date: 2019     Years since quittin 8    Smokeless tobacco: Former User   Vaping Use    Vaping Use: Never used   Substance and Sexual Activity    Alcohol use: Not Currently     Alcohol/week: 0 0 standard drinks     Comment: 2x year    Drug use: No    Sexual activity: Not on file   Other Topics Concern    Not on file   Social History Narrative    Not on file     Social Determinants of Health     Financial Resource Strain: Not on file   Food Insecurity: Not on file   Transportation Needs: Not on file   Physical Activity: Not on file   Stress: Not on file   Social Connections: Not on file   Intimate Partner Violence: Not on file   Housing Stability: Not on file      Family History   Problem Relation Age of Onset    Heart attack Mother     Heart attack Father      Past Surgical History:   Procedure Laterality Date     SECTION      CHOLECYSTECTOMY      HAND SURGERY      HYSTERECTOMY         Current Outpatient Medications:     albuterol (PROVENTIL HFA,VENTOLIN HFA) 90 mcg/act inhaler, , Disp: , Rfl:     ascorbic acid (VITAMIN C) 500 mg tablet, Take 500 mg by mouth daily, Disp: , Rfl:     carvedilol (COREG) 12 5 mg tablet, Take 1 tablet (12 5 mg total) by mouth 2 (two) times a day with meals, Disp: 180 tablet, Rfl: 3    ergocalciferol (VITAMIN D2) 50,000 units, , Disp: , Rfl:     sacubitril-valsartan (Entresto) 49-51 MG TABS, Take 1 tablet by mouth 2 (two) times a day, Disp: 180 tablet, Rfl: 3    spironolactone (ALDACTONE) 25 mg tablet, Take 1 tablet (25 mg total) by mouth daily, Disp: 90 tablet, Rfl: 3    cholecalciferol (VITAMIN D3) 1,000 units tablet, Take 1,000 Units by mouth daily (Patient not taking: Reported on 10/28/2021), Disp: , Rfl:     Empagliflozin (Jardiance) 10 MG TABS, Take 1 tablet (10 mg total) by mouth every morning, Disp: 90 tablet, Rfl: 1  Allergies   Allergen Reactions    Percocet [Oxycodone-Acetaminophen]        Review of Systems:  Review of Systems   Constitutional: Positive for fatigue  Negative for activity change, appetite change, chills, diaphoresis, fever and unexpected weight change  HENT: Negative  Negative for congestion, dental problem, drooling, ear discharge, ear pain, facial swelling, hearing loss, mouth sores, nosebleeds, postnasal drip, rhinorrhea, sinus pressure, sinus pain, sneezing, sore throat, tinnitus, trouble swallowing and voice change  Eyes: Negative    Negative for photophobia, pain, redness, itching and visual disturbance  Respiratory: Negative for apnea, cough, choking, chest tightness, shortness of breath, wheezing and stridor  Cardiovascular: Negative for chest pain, palpitations and leg swelling  Gastrointestinal: Negative  Negative for abdominal distention, abdominal pain, anal bleeding, blood in stool, constipation, diarrhea, nausea, rectal pain and vomiting  Endocrine: Negative  Negative for cold intolerance, heat intolerance, polydipsia, polyphagia and polyuria  Genitourinary: Negative  Negative for decreased urine volume, difficulty urinating, dyspareunia, dysuria, enuresis, flank pain, frequency, genital sores, hematuria, menstrual problem, pelvic pain, urgency, vaginal bleeding, vaginal discharge and vaginal pain  Musculoskeletal: Negative  Negative for arthralgias, back pain, gait problem, joint swelling, myalgias, neck pain and neck stiffness  Skin: Negative  Negative for color change, pallor, rash and wound  Allergic/Immunologic: Negative  Negative for environmental allergies, food allergies and immunocompromised state  Neurological: Negative  Negative for dizziness, tremors, seizures, syncope, facial asymmetry, speech difficulty, weakness, light-headedness, numbness and headaches  Hematological: Negative  Negative for adenopathy  Does not bruise/bleed easily  Psychiatric/Behavioral: Negative  Negative for agitation, behavioral problems, confusion, decreased concentration, dysphoric mood, hallucinations, self-injury, sleep disturbance and suicidal ideas  The patient is not nervous/anxious and is not hyperactive  All other systems reviewed and are negative        Vitals:    04/14/22 1439   BP: 132/82   BP Location: Right arm   Patient Position: Sitting   Cuff Size: Large   Pulse: 71   Temp: (!) 97 3 °F (36 3 °C)   SpO2: 99%   Weight: 92 5 kg (204 lb)   Height: 5' 1" (1 549 m)     Physical Exam:  Physical Exam  Constitutional: General: She is not in acute distress  Appearance: She is well-developed  She is not diaphoretic  HENT:      Head: Normocephalic and atraumatic  Right Ear: External ear normal       Left Ear: External ear normal    Eyes:      General: No scleral icterus  Right eye: No discharge  Left eye: No discharge  Conjunctiva/sclera: Conjunctivae normal       Pupils: Pupils are equal, round, and reactive to light  Neck:      Thyroid: No thyromegaly  Vascular: No JVD  Trachea: No tracheal deviation  Cardiovascular:      Rate and Rhythm: Normal rate and regular rhythm  Heart sounds: Murmur heard  No friction rub  Gallop present  Pulmonary:      Effort: Pulmonary effort is normal  No respiratory distress  Breath sounds: Normal breath sounds  No stridor  No wheezing or rales  Chest:      Chest wall: No tenderness  Abdominal:      General: Bowel sounds are normal  There is distension  Palpations: Abdomen is soft  There is no mass  Tenderness: There is no abdominal tenderness  There is no guarding or rebound  Musculoskeletal:         General: No tenderness or deformity  Normal range of motion  Cervical back: Normal range of motion and neck supple  Skin:     General: Skin is warm and dry  Coloration: Skin is not pale  Findings: No erythema or rash  Comments: Positive varicose veins   Neurological:      Mental Status: She is alert and oriented to person, place, and time  Cranial Nerves: No cranial nerve deficit  Motor: No abnormal muscle tone  Coordination: Coordination normal       Deep Tendon Reflexes: Reflexes are normal and symmetric  Reflexes normal    Psychiatric:         Behavior: Behavior normal          Thought Content:  Thought content normal          Judgment: Judgment normal          Labs:     Lab Results   Component Value Date    WBC 6 40 06/26/2019    HGB 12 8 06/26/2019    HCT 41 7 06/26/2019    MCV 87 2019     2019     Lab Results   Component Value Date    K 4 5 2021     2021    CO2 24 2021    BUN 17 2021    CREATININE 0 90 2021    GLUF 145 (H) 2019    CALCIUM 8 7 2019    AST 21 2019    ALT 61 2019    ALKPHOS 101 2019    EGFR 70 2019     No results found for: CHOL  Lab Results   Component Value Date    HDL 44 2019     Lab Results   Component Value Date    LDLCALC 116 (H) 2019     Lab Results   Component Value Date    TRIG 53 2019     No results found for: HGBA1C    Imaging & Testing   I have personally reviewed pertinent reports  EKG: Personally reviewed  Cardiac testing:   Results for orders placed during the hospital encounter of 19   Echo complete with contrast if indicated    Narrative [address]  [address] [facility]  [phone]    Transthoracic Echocardiogram  2D, M-mode, Doppler, and Color Doppler    Study date:  2019    Patient: Guanaco Galo  MR number: [id number]  Account number: [de-identified]  : 1970  Age: 44 years  Gender: Female  Status: Inpatient  Location: Bedside  Height: 61 in  Weight: 219 6 lb  BP: 118/ 80 mmHg    Indications: Heart Failure    Diagnoses: I50 9 - Heart failure, unspecified    Sonographer:  ISABELL Katz  Primary Physician:  Edwina Kraus MD  Referring Physician:  Luzmaria Heller DO  Group:  [facility]  Interpreting Physician:  Todd Forbse MD    SUMMARY    LEFT VENTRICLE:  Systolic function was markedly reduced  Ejection fraction was estimated in the range of 25 % to 30 % to be 30 %  There was severe diffuse hypokinesis  Doppler parameters were consistent with abnormal left ventricular relaxation (grade 1 diastolic dysfunction)  RIGHT VENTRICLE:  Systolic function was moderately reduced  LEFT ATRIUM:  The atrium was mildly dilated      RIGHT ATRIUM:  The atrium was mildly dilated  MITRAL VALVE:  There was trace regurgitation  IVC, HEPATIC VEINS:  The inferior vena cava was dilated  HISTORY: PRIOR HISTORY: GERD, Hiatal Hernia    PROCEDURE: The procedure was performed at the bedside  This was a routine study  The transthoracic approach was used  The study included complete 2D imaging, M-mode, complete spectral Doppler, and color Doppler  The heart rate was 92 bpm,  at the start of the study  Image quality was adequate  LEFT VENTRICLE: Size was normal  Systolic function was markedly reduced  Ejection fraction was estimated in the range of 25 % to 30 % to be 30 %  There was severe diffuse hypokinesis  Wall thickness was normal  No evidence of apical  thrombus  DOPPLER: Doppler parameters were consistent with abnormal left ventricular relaxation (grade 1 diastolic dysfunction)  RIGHT VENTRICLE: The size was normal  Systolic function was moderately reduced  Wall thickness was normal     LEFT ATRIUM: The atrium was mildly dilated  RIGHT ATRIUM: The atrium was mildly dilated  MITRAL VALVE: Valve structure was normal  There was normal leaflet separation  DOPPLER: The transmitral velocity was within the normal range  There was no evidence for stenosis  There was trace regurgitation  AORTIC VALVE: The valve was trileaflet  Leaflets exhibited normal thickness and normal cuspal separation  DOPPLER: Transaortic velocity was within the normal range  There was no evidence for stenosis  There was no significant  regurgitation  TRICUSPID VALVE: The valve structure was normal  There was normal leaflet separation  DOPPLER: The transtricuspid velocity was within the normal range  There was no evidence for stenosis  There was no significant regurgitation  PULMONIC VALVE: Leaflets exhibited normal thickness, no calcification, and normal cuspal separation  DOPPLER: The transpulmonic velocity was within the normal range   There was no significant regurgitation  PERICARDIUM: There was no pericardial effusion  The pericardium was normal in appearance  AORTA: The root exhibited normal size  SYSTEMIC VEINS: IVC: The inferior vena cava was dilated  SYSTEM MEASUREMENT TABLES    2D mode  AoR Diam 2D: 3 2 cm  LA Diam (2D): 4 5 cm  LA/Ao (2D): 1 41  FS (2D Teich): 13 7 %  IVSd (2D): 0 82 cm  LVDEV: 193 cmï¾³  LVESV: 138 cmï¾³  LVIDd(2D): 6 19 cm  LVISd (2D): 5 34 cm  LVPWd (2D): 0 81 cm  SV (Teich): 55 cmï¾³    Apical four chamber  LVEF A4C: 28 %    Unspecified Scan Mode  MV Peak E Zach  Mean: 848 mm/s  MVA (PHT): 4 15 cmï¾²  PHT: 53 ms  Max P mm[Hg]  V Max: 2440 mm/s  Vmax: 2500 mm/s  RA Area: 18 1 cmï¾²  RA Volume: 48 8 cmï¾³  TAPSE: 1 1 cm    [facility]    Prepared and electronically signed by    Augustin Gutierrez MD  Signed 2019 13:10:35       ekg sinus rhythm with LBBB periodic pvc    Augustin Luna  Please call with any questions or suggestions    A description of the counseling:   Goals and Barriers:  Patient's ability to self care:  Medication side effect reviewed with patient in detail and all their questions answered  "This note has been constructed using a voice recognition system  Therefore there may be syntax, spelling, and/or grammatical errors   Please call if you have any questions  "

## 2022-05-05 ENCOUNTER — HOSPITAL ENCOUNTER (OUTPATIENT)
Dept: NON INVASIVE DIAGNOSTICS | Facility: HOSPITAL | Age: 52
Discharge: HOME | End: 2022-05-05
Attending: INTERNAL MEDICINE
Payer: COMMERCIAL

## 2022-05-05 VITALS
SYSTOLIC BLOOD PRESSURE: 96 MMHG | DIASTOLIC BLOOD PRESSURE: 55 MMHG | HEIGHT: 61 IN | WEIGHT: 204 LBS | BODY MASS INDEX: 38.51 KG/M2 | HEART RATE: 63 BPM

## 2022-05-05 DIAGNOSIS — R06.00 EXERTIONAL DYSPNEA: ICD-10-CM

## 2022-05-05 DIAGNOSIS — I42.8 NON-ISCHEMIC CARDIOMYOPATHY (HCC): ICD-10-CM

## 2022-05-05 DIAGNOSIS — I50.1 HEART FAILURE, LEFT, WITH LVEF 31-40% (HCC): ICD-10-CM

## 2022-05-05 LAB
AORTIC ROOT: 3.5 CM
APICAL FOUR CHAMBER EJECTION FRACTION: 38 %
AV LVOT PEAK GRADIENT: 6 MMHG
AV PEAK GRADIENT: 9 MMHG
DOP CALC LVOT AREA: 2.83 CM2
DOP CALC LVOT DIAMETER: 1.9 CM
E WAVE DECELERATION TIME: 189 MS
FRACTIONAL SHORTENING: 23 % (ref 28–44)
INTERVENTRICULAR SEPTUM IN DIASTOLE (PARASTERNAL SHORT AXIS VIEW): 1 CM
INTERVENTRICULAR SEPTUM: 1 CM (ref 0.54–1.01)
LAAS-AP2: 21.6 CM2
LAAS-AP4: 15.9 CM2
LEFT ATRIUM AREA SYSTOLE SINGLE PLANE A4C: 17.7 CM2
LEFT ATRIUM SIZE: 3.6 CM
LEFT INTERNAL DIMENSION IN SYSTOLE: 4.1 CM (ref 3.03–4.59)
LEFT VENTRICLE DIASTOLIC VOLUME (MOD BIPLANE): 249 ML (ref 96.37–217.02)
LEFT VENTRICLE SYSTOLIC VOLUME (MOD BIPLANE): 155 ML
LEFT VENTRICULAR INTERNAL DIMENSION IN DIASTOLE: 5.3 CM (ref 4.98–7.42)
LEFT VENTRICULAR POSTERIOR WALL IN END DIASTOLE: 1 CM (ref 0.52–0.99)
LEFT VENTRICULAR STROKE VOLUME: 61 ML
LV EF: 38 %
LVSV (TEICH): 61 ML
MV E'TISSUE VEL-SEP: 6 CM/S
MV PEAK A VEL: 0.89 M/S
MV PEAK E VEL: 69 CM/S
MV STENOSIS PRESSURE HALF TIME: 55 MS
MV VALVE AREA P 1/2 METHOD: 4 CM2
PV PEAK GRADIENT: 3 MMHG
RIGHT ATRIUM AREA SYSTOLE A4C: 13.3 CM2
RIGHT VENTRICLE ID DIMENSION: 2.7 CM
SL CV LEFT ATRIUM LENGTH A2C: 5.5 CM
SL CV LV DIAS VOL ENDO Z SCORE: 3.06
SL CV PED ECHO LEFT VENTRICLE DIASTOLIC VOLUME (MOD BIPLANE) 2D: 135 ML
SL CV PED ECHO LEFT VENTRICLE SYSTOLIC VOLUME (MOD BIPLANE) 2D: 74 ML
Z-SCORE OF INTERVENTRICULAR SEPTUM IN END DIASTOLE: 1.91
Z-SCORE OF LEFT VENTRICULAR DIMENSION IN END DIASTOLE: -1.38
Z-SCORE OF LEFT VENTRICULAR DIMENSION IN END SYSTOLE: 0.76
Z-SCORE OF LEFT VENTRICULAR POSTERIOR WALL IN END DIASTOLE: 2.02

## 2022-05-05 PROCEDURE — 93306 TTE W/DOPPLER COMPLETE: CPT

## 2022-05-05 PROCEDURE — 93306 TTE W/DOPPLER COMPLETE: CPT | Performed by: INTERNAL MEDICINE

## 2022-05-16 ENCOUNTER — TELEPHONE (OUTPATIENT)
Dept: CARDIOLOGY CLINIC | Facility: CLINIC | Age: 52
End: 2022-05-16

## 2022-05-16 NOTE — TELEPHONE ENCOUNTER
Marilynn Gonsalves  She is on an overall good regimen  She need to notify if any swelling or symptoms

## 2022-07-21 ENCOUNTER — OFFICE VISIT (OUTPATIENT)
Dept: SLEEP CENTER | Facility: CLINIC | Age: 52
End: 2022-07-21
Payer: COMMERCIAL

## 2022-07-21 VITALS
SYSTOLIC BLOOD PRESSURE: 110 MMHG | WEIGHT: 202.4 LBS | DIASTOLIC BLOOD PRESSURE: 68 MMHG | BODY MASS INDEX: 38.21 KG/M2 | HEIGHT: 61 IN

## 2022-07-21 DIAGNOSIS — R09.81 NASAL CONGESTION: ICD-10-CM

## 2022-07-21 DIAGNOSIS — G47.31 CENTRAL SLEEP APNEA: ICD-10-CM

## 2022-07-21 DIAGNOSIS — G47.09 OTHER INSOMNIA: ICD-10-CM

## 2022-07-21 DIAGNOSIS — I50.41 ACUTE COMBINED SYSTOLIC AND DIASTOLIC CONGESTIVE HEART FAILURE (HCC): ICD-10-CM

## 2022-07-21 DIAGNOSIS — Z71.89 CPAP USE COUNSELING: ICD-10-CM

## 2022-07-21 DIAGNOSIS — G47.33 OSA (OBSTRUCTIVE SLEEP APNEA): Primary | ICD-10-CM

## 2022-07-21 DIAGNOSIS — E66.9 OBESITY (BMI 30-39.9): ICD-10-CM

## 2022-07-21 DIAGNOSIS — I42.8 NON-ISCHEMIC CARDIOMYOPATHY (HCC): ICD-10-CM

## 2022-07-21 PROCEDURE — 99214 OFFICE O/P EST MOD 30 MIN: CPT | Performed by: INTERNAL MEDICINE

## 2022-07-21 NOTE — PROGRESS NOTES
Follow-Up Note - [place] Elroy Landers  45 y o  female  :1970  PQW:[id number]  DOS:2022    CC: I saw this patient for follow-up in clinic today for Sleep disordered breathing, Coexisting Sleep and Medical Problems  She is using a dream Station 1 machine and has registered with Juventino Rowell    Results of prior study:  A diagnostic study in April of this year demonstrated severe obstructive sleep apnea: AHI 43 /hour made up of central and obstructive events  Minimum oxygen saturation 80 % and 8 6 minutes of total sleep time was spent with saturations less than 90%  There were mild periodic limb movements of sleep  She had difficulty both initiating and maintaining sleep       PFSH, Problem List, Medications & Allergies were reviewed in EMR  Interval changes: none reported  She  has a past medical history of CHF (congestive heart failure) ([facility] [address] ), Diverticulitis of colon, GERD (gastroesophageal reflux disease), Heart disease (2019), HH (hiatus hernia), Stomach disorder, and Vascular disorder  She has a current medication list which includes the following prescription(s): albuterol, ascorbic acid, carvedilol, cholecalciferol, empagliflozin, ergocalciferol, entresto, and spironolactone  PHYSIOLOGICAL DATA REVIEW AND INTERPRETATION:    using PAP > 4 hours/night 87%  Estimated NANDO 4 6/hour with pressure of 12cm H2O @90th/95th percentile; Patient has not been using ozone based devices to sanitize the machine  SUBJECTIVE: Regarding use of PAP, Qian Mendez reports:   · no adverse effects:  has not noticed any fibres or foreign material in air line  · She is benefiting from use: sleeping better   Sleep Routine: Qian Mendez reports getting 5-6 hrs sleep  ; she has difficulty initiating sleep, but not maintaining sleep   She has racing thoughts, watches TV in bed and watches the clock worrying about sleep  She arises spontaneously and is not always refreshed  Qian Mendez denies Excessive Daytime Sleepiness,   She rated herself at Total score: 1 /24 on the [place] Sleepiness Scale  Habits: reports that she quit smoking about 3 years ago  Her smoking use included cigarettes  She has a 16 50 pack-year smoking history  She has quit using smokeless tobacco ,  reports previous alcohol use ,  reports no history of drug use , Caffeine use:limited ; Exercise routine: "not enough"   ROS: reviewed & as attached  Significant for weight has been stable  She has postnasal drip and nasal congestion of long-standing  She reported no respiratory or cardiac symptoms  EXAM: /68   Ht 5' 1" (1 549 m)   Wt 91 8 kg (202 lb 6 4 oz)   BMI 38 24 kg/m²     Wt Readings from Last 3 Encounters:   07/21/22 91 8 kg (202 lb 6 4 oz)   05/05/22 92 5 kg (204 lb)   04/14/22 92 5 kg (204 lb)      Patient is well groomed; well appearing  CNS: Alert, orientated, clear & coherent speech  Psych: cooperativeand in no distress  Mental state:appears normal   H&N: EOMI; NC/AT:no facial pressure marks, no rashes  Skin/Extrem: col & hydration normal; no edema  Resp: Respiratory effort is normal  Physical findings otherwise essentially unchanged from previous  Had Repeat echocardiogram in May of this year:  Left Ventricle: Left ventricular cavity size is normal  Wall thickness is mildly increased  Systolic function is normal   Estimated ejection fraction is 25-30%  Although no diagnostic regional wall motion abnormality was identified, this possibility cannot be completely excluded on the basis of this study  Diastolic function is mildly abnormal, consistent with grade I (abnormal) relaxation    IVS: There is abnormal septal motion consistent with bundle branch block/IVCD    Right Ventricle: Systolic function is mildly reduced    Mitral Valve: There is trace regurgitation    Pulmonic Valve: There is trace regurgitation    No significant changes noted compared to the prior study        IMPRESSION: Problem List Items & Comorbidities Addressed this Visit    1  TAE (obstructive sleep apnea)  PAP DME Resupply/Reorder    PAP DME Pressure Change   2  Central sleep apnea  PAP DME Resupply/Reorder    PAP DME Pressure Change   3  Other insomnia     4  Nasal congestion     5  Non-ischemic cardiomyopathy ([address] )     6  Acute combined systolic and diastolic congestive heart failure (HCC)     7  Obesity (BMI 30-39  9)         PLAN:  I reviewed results of prior studies and physiologic data with the patient  Notified of [employer] devices recall and their recommendation to discontinue use  Risks of discontinuing PAP vs continuing while awaiting resolution were explained and patient indicates understanding  I discussed treatment options with risks and benefits  Patient elected to  continue using Pap while awaiting remediation  Instructed  to register machine with [employer] & track progress on Mary Lon  Care of equipment, methods to improve comfort using PAP and importance of compliance with therapy were discussed  Instructed not to use ozone based or other unapproved  cleaning devices  Pressure setting:  Change 8-14 cmH2O  Rx provided to replace  supplies and Care coordinated with DME provider  Multi component Cognitive behavioral therapy for Insomnia undertaken - Sleep Restriction, Stimulus control, Relaxation techniques and Sleep hygiene were discussed  Nasal symptoms may improve with regular nasal saline rinse up to twice a day, followed by topical nasal steroid (e g  OTC Flonase, Nasacort) once a day if necessary  Discussed strategies for weight reduction  Follow-up is advised in 1 year or sooner if needed to monitor progress, compliance and to adjust therapy  Thank you for allowing me to participate in the care of this patient      Sincerely,    Authenticated electronically by Singh Cassidy MD on 52/87/86   Board Certified Specialist     Portions of the record may have been created with voice recognition software  Occasional wrong word or "sound a like" substitutions may have occurred due to the inherent limitations of voice recognition software  There may also be notations and random deletions of words or characters from malfunctioning software  Read the chart carefully and recognize, using context, where substitutions/deletions have occurred

## 2022-07-21 NOTE — PATIENT INSTRUCTIONS

## 2022-07-21 NOTE — PROGRESS NOTES
Review of Systems      Genitourinary none   Cardiology none   Gastrointestinal none   Neurology frequent headaches   Constitutional none   Integumentary none   Psychiatry none   Musculoskeletal joint pain   Pulmonary none   ENT throat clearing   Endocrine none   Hematological none

## 2022-07-22 ENCOUNTER — TELEPHONE (OUTPATIENT)
Dept: SLEEP CENTER | Facility: CLINIC | Age: 52
End: 2022-07-22

## 2022-08-08 ENCOUNTER — OFFICE VISIT (OUTPATIENT)
Dept: CARDIOLOGY CLINIC | Facility: CLINIC | Age: 52
End: 2022-08-08
Payer: COMMERCIAL

## 2022-08-08 VITALS
HEART RATE: 69 BPM | DIASTOLIC BLOOD PRESSURE: 66 MMHG | SYSTOLIC BLOOD PRESSURE: 104 MMHG | TEMPERATURE: 98.2 F | WEIGHT: 200 LBS | BODY MASS INDEX: 37.76 KG/M2 | OXYGEN SATURATION: 98 % | HEIGHT: 61 IN

## 2022-08-08 DIAGNOSIS — R06.09 EXERTIONAL DYSPNEA: ICD-10-CM

## 2022-08-08 DIAGNOSIS — I49.3 PVC (PREMATURE VENTRICULAR CONTRACTION): ICD-10-CM

## 2022-08-08 DIAGNOSIS — I42.8 NON-ISCHEMIC CARDIOMYOPATHY (HCC): Primary | ICD-10-CM

## 2022-08-08 DIAGNOSIS — G47.33 OSA (OBSTRUCTIVE SLEEP APNEA): ICD-10-CM

## 2022-08-08 DIAGNOSIS — I50.1 HEART FAILURE, LEFT, WITH LVEF <=30% (HCC): ICD-10-CM

## 2022-08-08 PROCEDURE — 99214 OFFICE O/P EST MOD 30 MIN: CPT | Performed by: INTERNAL MEDICINE

## 2022-08-08 PROCEDURE — 93000 ELECTROCARDIOGRAM COMPLETE: CPT | Performed by: INTERNAL MEDICINE

## 2022-08-08 NOTE — PROGRESS NOTES
Cardiology Follow Up  Fab Funes  1970  [id number]  [facility]  [address]  [address] [facility] [address]  [place] [address] [phone]    1  Heart failure, left, with LVEF 31-40% (HCC)  POCT ECG   2  Non-ischemic cardiomyopathy ([address] )  POCT ECG   3  Exertional dyspnea  POCT ECG   4  TAE (obstructive sleep apnea)  POCT ECG      Discussion/Plan:  Nonischemic CM EF34% by MRI <30% by echo 2d NYHA class 2- MRI with viral etiology? She is now wearing her CPAP  Tolerating Entresto therapy- 49-51mg  He was unable to tolerate as she will he 2 inhibitor-she suffers from any yeast infection  Carvedilol 12 5mg in morning and 12 5mg at night  She will continue spironolactone 25 mg daily  Watch sodium intake- 2gm  Cath was negative for CAD  Reports multiple family members with MI/heart failure  TSH  No alcohol  No HIV risk Dry weight is 200ilbs  He denies having any major palpitations currently  Will refer to EP for discussion of ICD for primary prevention  She is nonischemic and currently with NYHA class 2- but with periodic nsvt noted on monitor  NSVT- her repeat heart monitor did not show nonsustained ventricular tachycardia  She is wearing her CPAP at night  Acute on chronic kidney disease- improved  Electrolytes stable  Recheck for electrolytes    Diverticulitis- currently high risk for procedure  Would delay until improved EF  Hyperkalemia- she will watch her potassium intake    TAE- trouble with cpap  Positive pressure  Interval History:  She denies having significant lower extremity edema since discharge  She denies feeling dizziness or lightheadedness  She denies having chest discomfort  Denies having syncopal episodes  Denies having any irregular heart rhythm  8/12:  No edema  No syncope  No dizziness or light-headness  Compliant with meds    She has had no device firings  She has been compliant with the up titration of the Entresto  She reports trying to increase her carvedilol but did not feel right  She understands about orthostatic hypotension in the risk of prolonged standing  She has been watching her sodium intake  Her weight has been down trending  11/25:  She is up titrated her carvedilol without significant dizziness lightheadedness  She does feel some orthostatic symptoms  She stands up slowly  She denies having significant lower extremity swelling  She is compliant with her medications  She is wearing compression socks when she is at work  01/20/2020:  Reviewed through her imaging study which shows her EF has improved to near 34%  She is walking a mi without significant shortness of breath  She denies having any dizziness or lightheadedness  She is wearing her compression socks at work  She is compliant with her medications  Her last blood work showed mildly elevated potassium  She is watching her potassium foods  Her kidney function has returned back to normal Holter a shin  9/10/20:  Still with trouble with cpap  Feels gassy  No dizziness or light-headness  03/15/2021:  She denies feeling any chest tightness  She denies feeling dizziness or lightheadedness  I reviewed with her her last studies  She is compliant with her medications  04/29/2021:  She is walking more than a mi without having shortness of breath or chest discomfort  She is compliant with her heart failure regimen  Her weight stays very stable  We reviewed through her recent MRI  We had a mutual discussion about having a primary defibrillator  I discussed with her given she is a nonischemic cardiomyopathy with a very good functional status a primary prevention device would have limited benefits  She is currently wishes not to go ahead with a defibrillator placement  She will notify me if she feels any major shortness of breath, palpitations or chest pain  We will continue optimal medical therapy  10/28/2021:  Her weight has been stable  She denies having dizziness or lightheadedness  She denies having chest heaviness  No major changes EKG  Compliant with medications  4 15 2022 she reports having recent travel  She denies having dizziness or lightheadedness  She is compliant with her medications  She denies having lower extremity edema  08/08/2022:  She was unable to tolerate the SGLT2 inhibitor  She unfortunately had a yeast infection that was difficult to control  She currently denies having major exertional chest pain or shortness of breath  She denies feeling dizziness or lightheadedness  We discussed about her echocardiogram results  She is wearing her CPAP at night  However unfortunately her CPAP has been recalled  She is using soap and water to clean it      Patient Active Problem List   Diagnosis    Nicotine abuse    GERD (gastroesophageal reflux disease)    Acute combined systolic and diastolic congestive heart failure (HCC)    Diverticulitis    Hypokalemia    Non-ischemic cardiomyopathy ([facility] )    Diverticulitis of large intestine without perforation or abscess without bleeding    TAE (obstructive sleep apnea)     Past Medical History:   Diagnosis Date    CHF (congestive heart failure) (HCC)     Diverticulitis of colon     GERD (gastroesophageal reflux disease)     Heart disease 6-    HH (hiatus hernia)     Stomach disorder     Vascular disorder      Social History     Socioeconomic History    Marital status: /Civil Union     Spouse name: Not on file    Number of children: Not on file    Years of education: Not on file    Highest education level: Not on file   Occupational History    Not on file   Tobacco Use    Smoking status: Former Smoker     Packs/day: 0 50     Years: 33 00     Pack years: 16 50     Types: Cigarettes     Quit date: 6/23/2019     Years since quitting: 3 1    Smokeless tobacco: Former User   Vaping Use    Vaping Use: Never used   Substance and Sexual Activity    Alcohol use: Not Currently     Alcohol/week: 0 0 standard drinks     Comment: 2x year    Drug use: No    Sexual activity: Not on file   Other Topics Concern    Not on file   Social History Narrative    Not on file     Social Determinants of Health     Financial Resource Strain: Not on file   Food Insecurity: Not on file   Transportation Needs: Not on file   Physical Activity: Not on file   Stress: Not on file   Social Connections: Not on file   Intimate Partner Violence: Not on file   Housing Stability: Not on file      Family History   Problem Relation Age of Onset    Heart attack Mother     Heart attack Father      Past Surgical History:   Procedure Laterality Date     SECTION      CHOLECYSTECTOMY      HAND SURGERY      HYSTERECTOMY         Current Outpatient Medications:     albuterol (PROVENTIL HFA,VENTOLIN HFA) 90 mcg/act inhaler, , Disp: , Rfl:     ascorbic acid (VITAMIN C) 500 mg tablet, Take 500 mg by mouth daily, Disp: , Rfl:     carvedilol (COREG) 12 5 mg tablet, Take 1 tablet (12 5 mg total) by mouth 2 (two) times a day with meals, Disp: 180 tablet, Rfl: 3    cholecalciferol (VITAMIN D3) 1,000 units tablet, Take 1,000 Units by mouth daily Winter time only, Disp: , Rfl:     sacubitril-valsartan (Entresto) 49-51 MG TABS, Take 1 tablet by mouth 2 (two) times a day, Disp: 180 tablet, Rfl: 3    spironolactone (ALDACTONE) 25 mg tablet, Take 1 tablet (25 mg total) by mouth daily, Disp: 90 tablet, Rfl: 3    ergocalciferol (VITAMIN D2) 50,000 units, , Disp: , Rfl:   Allergies   Allergen Reactions    Percocet [Oxycodone-Acetaminophen]        Review of Systems:  Review of Systems   Constitutional: Positive for fatigue  Negative for activity change, appetite change, chills, diaphoresis, fever and unexpected weight change  HENT: Negative    Negative for congestion, dental problem, drooling, ear discharge, ear pain, facial swelling, hearing loss, mouth sores, nosebleeds, postnasal drip, rhinorrhea, sinus pressure, sinus pain, sneezing, sore throat, tinnitus, trouble swallowing and voice change  Eyes: Negative  Negative for photophobia, pain, redness, itching and visual disturbance  Respiratory: Negative for apnea, cough, choking, chest tightness, shortness of breath, wheezing and stridor  Cardiovascular: Negative for chest pain, palpitations and leg swelling  Gastrointestinal: Negative  Negative for abdominal distention, abdominal pain, anal bleeding, blood in stool, constipation, diarrhea, nausea, rectal pain and vomiting  Endocrine: Negative  Negative for cold intolerance, heat intolerance, polydipsia, polyphagia and polyuria  Genitourinary: Negative  Negative for decreased urine volume, difficulty urinating, dyspareunia, dysuria, enuresis, flank pain, frequency, genital sores, hematuria, menstrual problem, pelvic pain, urgency, vaginal bleeding, vaginal discharge and vaginal pain  Musculoskeletal: Negative  Negative for arthralgias, back pain, gait problem, joint swelling, myalgias, neck pain and neck stiffness  Skin: Negative  Negative for color change, pallor, rash and wound  Allergic/Immunologic: Negative  Negative for environmental allergies, food allergies and immunocompromised state  Neurological: Negative  Negative for dizziness, tremors, seizures, syncope, facial asymmetry, speech difficulty, weakness, light-headedness, numbness and headaches  Hematological: Negative  Negative for adenopathy  Does not bruise/bleed easily  Psychiatric/Behavioral: Negative  Negative for agitation, behavioral problems, confusion, decreased concentration, dysphoric mood, hallucinations, self-injury, sleep disturbance and suicidal ideas  The patient is not nervous/anxious and is not hyperactive  All other systems reviewed and are negative        Vitals:    08/08/22 1509   BP: 104/66   BP Location: Right arm   Patient Position: Sitting   Cuff Size: Large   Pulse: 69   Temp: 98 2 °F (36 8 °C)   SpO2: 98%   Weight: 90 7 kg (200 lb)   Height: 5' 1" (1 549 m)     Physical Exam:  Physical Exam  Constitutional:       General: She is not in acute distress  Appearance: She is well-developed  She is not diaphoretic  HENT:      Head: Normocephalic and atraumatic  Right Ear: External ear normal       Left Ear: External ear normal    Eyes:      General: No scleral icterus  Right eye: No discharge  Left eye: No discharge  Conjunctiva/sclera: Conjunctivae normal       Pupils: Pupils are equal, round, and reactive to light  Neck:      Thyroid: No thyromegaly  Vascular: No JVD  Trachea: No tracheal deviation  Cardiovascular:      Rate and Rhythm: Normal rate and regular rhythm  Heart sounds: Murmur heard  No friction rub  Gallop present  Pulmonary:      Effort: Pulmonary effort is normal  No respiratory distress  Breath sounds: Normal breath sounds  No stridor  No wheezing or rales  Chest:      Chest wall: No tenderness  Abdominal:      General: Bowel sounds are normal  There is distension  Palpations: Abdomen is soft  There is no mass  Tenderness: There is no abdominal tenderness  There is no guarding or rebound  Musculoskeletal:         General: No tenderness or deformity  Normal range of motion  Cervical back: Normal range of motion and neck supple  Skin:     General: Skin is warm and dry  Coloration: Skin is not pale  Findings: No erythema or rash  Comments: Positive varicose veins   Neurological:      Mental Status: She is alert and oriented to person, place, and time  Cranial Nerves: No cranial nerve deficit  Motor: No abnormal muscle tone  Coordination: Coordination normal       Deep Tendon Reflexes: Reflexes are normal and symmetric   Reflexes normal    Psychiatric:         Behavior: Behavior normal          Thought Content: Thought content normal          Judgment: Judgment normal          Labs:     Lab Results   Component Value Date    WBC 6 40 2019    HGB 12 8 2019    HCT 41 7 2019    MCV 87 2019     2019     Lab Results   Component Value Date    K 4 5 2021     2021    CO2 24 2021    BUN 17 2021    CREATININE 0 90 2021    GLUF 145 (H) 2019    CALCIUM 8 7 2019    AST 21 2019    ALT 61 2019    ALKPHOS 101 2019    EGFR 70 2019     No results found for: CHOL  Lab Results   Component Value Date    HDL 44 2019     Lab Results   Component Value Date    LDLCALC 116 (H) 2019     Lab Results   Component Value Date    TRIG 53 2019     No results found for: HGBA1C    Imaging & Testing   I have personally reviewed pertinent reports  EKG: Personally reviewed  Cardiac testing:   Results for orders placed during the hospital encounter of 19   Echo complete with contrast if indicated    Narrative [address]  [address] [address]  [phone]    Transthoracic Echocardiogram  2D, M-mode, Doppler, and Color Doppler    Study date:  2019    Patient: Virginia Jordan  MR number: [id number]  Account number: [de-identified]  : 1970  Age: 44 years  Gender: Female  Status: Inpatient  Location: Bedside  Height: 61 in  Weight: 219 6 lb  BP: 118/ 80 mmHg    Indications: Heart Failure    Diagnoses: I50 9 - Heart failure, unspecified    Sonographer:  ISABELL Simons  Primary Physician:  Jarvis Anderson MD  Referring Physician:  Chelsey Bone DO  Group:  [facility]  Interpreting Physician:  Rigoberto Zamora MD    SUMMARY    LEFT VENTRICLE:  Systolic function was markedly reduced  Ejection fraction was estimated in the range of 25 % to 30 % to be 30 %  There was severe diffuse hypokinesis    Doppler parameters were consistent with abnormal left ventricular relaxation (grade 1 diastolic dysfunction)  RIGHT VENTRICLE:  Systolic function was moderately reduced  LEFT ATRIUM:  The atrium was mildly dilated  RIGHT ATRIUM:  The atrium was mildly dilated  MITRAL VALVE:  There was trace regurgitation  IVC, HEPATIC VEINS:  The inferior vena cava was dilated  HISTORY: PRIOR HISTORY: GERD, Hiatal Hernia    PROCEDURE: The procedure was performed at the bedside  This was a routine study  The transthoracic approach was used  The study included complete 2D imaging, M-mode, complete spectral Doppler, and color Doppler  The heart rate was 92 bpm,  at the start of the study  Image quality was adequate  LEFT VENTRICLE: Size was normal  Systolic function was markedly reduced  Ejection fraction was estimated in the range of 25 % to 30 % to be 30 %  There was severe diffuse hypokinesis  Wall thickness was normal  No evidence of apical  thrombus  DOPPLER: Doppler parameters were consistent with abnormal left ventricular relaxation (grade 1 diastolic dysfunction)  RIGHT VENTRICLE: The size was normal  Systolic function was moderately reduced  Wall thickness was normal     LEFT ATRIUM: The atrium was mildly dilated  RIGHT ATRIUM: The atrium was mildly dilated  MITRAL VALVE: Valve structure was normal  There was normal leaflet separation  DOPPLER: The transmitral velocity was within the normal range  There was no evidence for stenosis  There was trace regurgitation  AORTIC VALVE: The valve was trileaflet  Leaflets exhibited normal thickness and normal cuspal separation  DOPPLER: Transaortic velocity was within the normal range  There was no evidence for stenosis  There was no significant  regurgitation  TRICUSPID VALVE: The valve structure was normal  There was normal leaflet separation  DOPPLER: The transtricuspid velocity was within the normal range  There was no evidence for stenosis   There was no significant regurgitation  PULMONIC VALVE: Leaflets exhibited normal thickness, no calcification, and normal cuspal separation  DOPPLER: The transpulmonic velocity was within the normal range  There was no significant regurgitation  PERICARDIUM: There was no pericardial effusion  The pericardium was normal in appearance  AORTA: The root exhibited normal size  SYSTEMIC VEINS: IVC: The inferior vena cava was dilated  SYSTEM MEASUREMENT TABLES    2D mode  AoR Diam 2D: 3 2 cm  LA Diam (2D): 4 5 cm  LA/Ao (2D): 1 41  FS (2D Teich): 13 7 %  IVSd (2D): 0 82 cm  LVDEV: 193 cmï¾³  LVESV: 138 cmï¾³  LVIDd(2D): 6 19 cm  LVISd (2D): 5 34 cm  LVPWd (2D): 0 81 cm  SV (Teich): 55 cmï¾³    Apical four chamber  LVEF A4C: 28 %    Unspecified Scan Mode  MV Peak E Zach  Mean: 848 mm/s  MVA (PHT): 4 15 cmï¾²  PHT: 53 ms  Max P mm[Hg]  V Max: 2440 mm/s  Vmax: 2500 mm/s  RA Area: 18 1 cmï¾²  RA Volume: 48 8 cmï¾³  TAPSE: 1 1 cm    [facility]    Prepared and electronically signed by    You Lew MD  Signed 2019 13:10:35       ekg sinus rhythm with LBBB periodic pvc    You Luna  Please call with any questions or suggestions    A description of the counseling:   Goals and Barriers:  Patient's ability to self care:  Medication side effect reviewed with patient in detail and all their questions answered  "This note has been constructed using a voice recognition system  Therefore there may be syntax, spelling, and/or grammatical errors   Please call if you have any questions  "

## 2022-08-26 ENCOUNTER — TELEPHONE (OUTPATIENT)
Dept: CARDIOLOGY CLINIC | Facility: CLINIC | Age: 52
End: 2022-08-26

## 2022-08-26 LAB
BUN SERPL-MCNC: 12 MG/DL (ref 6–24)
BUN/CREAT SERPL: 15 (ref 9–23)
CALCIUM SERPL-MCNC: 9.3 MG/DL (ref 8.7–10.2)
CHLORIDE SERPL-SCNC: 103 MMOL/L (ref 96–106)
CO2 SERPL-SCNC: 23 MMOL/L (ref 20–29)
CREAT SERPL-MCNC: 0.82 MG/DL (ref 0.57–1)
EGFR: 85 ML/MIN/1.73
GLUCOSE SERPL-MCNC: 106 MG/DL (ref 65–99)
POTASSIUM SERPL-SCNC: 4.6 MMOL/L (ref 3.5–5.2)
SODIUM SERPL-SCNC: 138 MMOL/L (ref 134–144)

## 2022-08-26 NOTE — TELEPHONE ENCOUNTER
----- Message from Chano Butcher MD sent at 8/26/2022  1:25 PM EDT -----  Labs are stable on look good

## 2022-09-13 NOTE — PROGRESS NOTES
EPS Consultation/New Patient Evaluation - Paulette Todd 45 y o  female MRN: [id number]           ASSESSMENT:  1  PVC (premature ventricular contraction)  Ambulatory referral to Cardiac Electrophysiology    POCT ECG   2  Heart failure, left, with LVEF <=30% [facility])  Ambulatory referral to Cardiac Electrophysiology   3  Non-ischemic cardiomyopathy ([facility] [address] )     4  Acute combined systolic and diastolic congestive heart failure ([facility] [address] )     5  Left bundle branch block (LBBB)       ECG left bundle-branch block normal sinus rhythm QRS duration 142 millisecond      PLAN:    Nonischemic CM EF34% by MRI <30% by echo 2d NYHA class 2- MRI with viral etiology? 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  Patient is agreeable to have the procedure performed       NSVT- clinical significance of an SVT in the setting of nonischemic cardiomyopathy is unknown        TAE- trouble with cpap  Positive pressure  Treated    BIV ICD LEFT SIDED PROBABLY SUBMUSCULAR [employer] 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 INTERFERING WITH HER RANGE OF MOTION    CC/HPI:     Patient presents to the office today referred from Dr Terry Gonzales for Non-ischemic cardiomyopathy  Cardiomyopathy x 3 years  Filled up with fluid/decompsation  Father had heart disease  NYHA CLASS II at this point  She keeps herself busy and continues to work  Cannot do treadmill like she used to  Occasional dyspnea  ROS    Positive for decreased exercise tolerance from prior to her cardiomyopathy fatigue negative for chest discomfort all other 12 point ROS negative    Objective:     Vitals: Blood pressure 94/72, pulse 76, height 5' 1" (1 549 m), weight 92 6 kg (204 lb 1 6 oz)  , Body mass index is 38 56 kg/m²  , ?        Physical Exam:    GEN: Colin Walker appears well, alert and oriented x 3, pleasant and cooperative   HEENT: pupils equal, round, and reactive to light; extraocular muscles intact  NECK: supple, no carotid bruits   HEART: regular rhythm, normal S1 and S2, no murmurs, clicks, gallops or rubs   LUNGS: clear to auscultation bilaterally; no wheezes, rales, or rhonchi   ABDOMEN: normal bowel sounds, soft, no tenderness, no distention  EXTREMITIES: peripheral pulses normal; no clubbing, cyanosis, or edema  NEURO: no focal findings   SKIN: normal without suspicious lesions on exposed skin    Medications:      Current Outpatient Medications:     albuterol (PROVENTIL HFA,VENTOLIN HFA) 90 mcg/act inhaler, , Disp: , Rfl:     ascorbic acid (VITAMIN C) 500 mg tablet, Take 500 mg by mouth daily, Disp: , Rfl:     carvedilol (COREG) 12 5 mg tablet, Take 1 tablet (12 5 mg total) by mouth 2 (two) times a day with meals, Disp: 180 tablet, Rfl: 3    cholecalciferol (VITAMIN D3) 1,000 units tablet, Take 1,000 Units by mouth daily Winter time only, Disp: , Rfl:     sacubitril-valsartan (Entresto) 49-51 MG TABS, Take 1 tablet by mouth 2 (two) times a day, Disp: 180 tablet, Rfl: 3    spironolactone (ALDACTONE) 25 mg tablet, Take 1 tablet (25 mg total) by mouth daily, Disp: 90 tablet, Rfl: 3    Turmeric (QC TUMERIC COMPLEX PO), Take 500 mg by mouth, Disp: , Rfl:     ergocalciferol (VITAMIN D2) 50,000 units, , Disp: , Rfl:      Family History   Problem Relation Age of Onset    Heart attack Mother     Heart attack Father      Social History     Socioeconomic History    Marital status: /Civil Union     Spouse name: Not on file    Number of children: Not on file    Years of education: Not on file    Highest education level: Not on file   Occupational History    Not on file   Tobacco Use    Smoking status: Current Some Day Smoker     Packs/day: 0 50     Years: 33 00     Pack years: 16 50     Types: Cigarettes     Last attempt to quit: 6/23/2019     Years since quitting: 3 2    Smokeless tobacco: Former User   Vaping Use    Vaping Use: Never used   Substance and Sexual Activity    Alcohol use: Not Currently     Alcohol/week: 0 0 standard drinks     Comment: 2x year    Drug use: No    Sexual activity: Not on file   Other Topics Concern    Not on file   Social History Narrative    Not on file     Social Determinants of Health     Financial Resource Strain: Not on file   Food Insecurity: Not on file   Transportation Needs: Not on file   Physical Activity: Not on file   Stress: Not on file   Social Connections: Not on file   Intimate Partner Violence: Not on file   Housing Stability: Not on file     Social History     Tobacco Use   Smoking Status Current Some Day Smoker    Packs/day: 0 50    Years: 33 00    Pack years: 16 50    Types: Cigarettes    Last attempt to quit: 6/23/2019    Years since quitting: 3 2   Smokeless Tobacco Former User     Social History     Substance and Sexual Activity   Alcohol Use Not Currently    Alcohol/week: 0 0 standard drinks    Comment: 2x year       Labs & Results:  Below is the patient's most recent value for Albumin, ALT, AST, BUN, Calcium, Chloride, Cholesterol, CO2, Creatinine, GFR, Glucose, HDL, Hematocrit, Hemoglobin, Hemoglobin A1C, LDL, Magnesium, Phosphorus, Platelets, Potassium, PSA, Sodium, Triglycerides, and WBC     Lab Results   Component Value Date    ALT 61 06/26/2019    AST 21 06/26/2019    BUN 12 08/25/2022    CALCIUM 8 7 06/26/2019     08/25/2022    CO2 23 08/25/2022    CREATININE 0 82 08/25/2022    HDL 44 03/26/2019    HCT 41 7 2019    HGB 12 8 2019    MG 2 2 2019     2019    K 4 6 2022    TRIG 53 2019    WBC 6 40 2019     Note: for a comprehensive list of the patient's lab results, access the Results Review activity  Cardiac testing:   Results for orders placed during the hospital encounter of 19    Echo complete with contrast if indicated    Narrative  [address]  [place] [address]  [phone]    Transthoracic Echocardiogram  2D, M-mode, Doppler, and Color Doppler    Study date:  2019    Patient: Analy Rosa  MR number: [id number]  Account number: [de-identified]  : 1970  Age: 44 years  Gender: Female  Status: Inpatient  Location: Bedside  Height: 61 in  Weight: 219 6 lb  BP: 118/ 80 mmHg    Indications: Heart Failure    Diagnoses: I50 9 - Heart failure, unspecified    Sonographer:  ISABELL Shea  Primary Physician:  Jc Orozco MD  Referring Physician:  Varun Rubio DO  Group:  [facility]  Interpreting Physician:  Delbert Live MD    SUMMARY    LEFT VENTRICLE:  Systolic function was markedly reduced  Ejection fraction was estimated in the range of 25 % to 30 % to be 30 %  There was severe diffuse hypokinesis  Doppler parameters were consistent with abnormal left ventricular relaxation (grade 1 diastolic dysfunction)  RIGHT VENTRICLE:  Systolic function was moderately reduced  LEFT ATRIUM:  The atrium was mildly dilated  RIGHT ATRIUM:  The atrium was mildly dilated  MITRAL VALVE:  There was trace regurgitation  IVC, HEPATIC VEINS:  The inferior vena cava was dilated  HISTORY: PRIOR HISTORY: GERD, Hiatal Hernia    PROCEDURE: The procedure was performed at the bedside  This was a routine study  The transthoracic approach was used  The study included complete 2D imaging, M-mode, complete spectral Doppler, and color Doppler   The heart rate was 92 bpm,  at the start of the study  Image quality was adequate  LEFT VENTRICLE: Size was normal  Systolic function was markedly reduced  Ejection fraction was estimated in the range of 25 % to 30 % to be 30 %  There was severe diffuse hypokinesis  Wall thickness was normal  No evidence of apical  thrombus  DOPPLER: Doppler parameters were consistent with abnormal left ventricular relaxation (grade 1 diastolic dysfunction)  RIGHT VENTRICLE: The size was normal  Systolic function was moderately reduced  Wall thickness was normal     LEFT ATRIUM: The atrium was mildly dilated  RIGHT ATRIUM: The atrium was mildly dilated  MITRAL VALVE: Valve structure was normal  There was normal leaflet separation  DOPPLER: The transmitral velocity was within the normal range  There was no evidence for stenosis  There was trace regurgitation  AORTIC VALVE: The valve was trileaflet  Leaflets exhibited normal thickness and normal cuspal separation  DOPPLER: Transaortic velocity was within the normal range  There was no evidence for stenosis  There was no significant  regurgitation  TRICUSPID VALVE: The valve structure was normal  There was normal leaflet separation  DOPPLER: The transtricuspid velocity was within the normal range  There was no evidence for stenosis  There was no significant regurgitation  PULMONIC VALVE: Leaflets exhibited normal thickness, no calcification, and normal cuspal separation  DOPPLER: The transpulmonic velocity was within the normal range  There was no significant regurgitation  PERICARDIUM: There was no pericardial effusion  The pericardium was normal in appearance  AORTA: The root exhibited normal size  SYSTEMIC VEINS: IVC: The inferior vena cava was dilated      SYSTEM MEASUREMENT TABLES    2D mode  AoR Diam 2D: 3 2 cm  LA Diam (2D): 4 5 cm  LA/Ao (2D): 1 41  FS (2D Teich): 13 7 %  IVSd (2D): 0 82 cm  LVDEV: 193 cmï¾³  LVESV: 138 cmï¾³  LVIDd(2D): 6 19 cm  LVISd (2D): 5 34 cm  LVPWd (2D): 0 81 cm  SV (Teich): 55 cmï¾³    Apical four chamber  LVEF A4C: 28 %    Unspecified Scan Mode  MV Peak E Zach  Mean: 848 mm/s  MVA (PHT): 4 15 cmï¾²  PHT: 53 ms  Max P mm[Hg]  V Max: 2440 mm/s  Vmax: 2500 mm/s  RA Area: 18 1 cmï¾²  RA Volume: 48 8 cmï¾³  TAPSE: 1 1 cm    [employer] Accredited Echocardiography Laboratory    Prepared and electronically signed by    Taz Watters MD  Signed 2019 13:10:35    No results found for this or any previous visit  No results found for this or any previous visit  No results found for this or any previous visit

## 2022-09-15 ENCOUNTER — OFFICE VISIT (OUTPATIENT)
Dept: OBGYN CLINIC | Facility: CLINIC | Age: 52
End: 2022-09-15
Payer: COMMERCIAL

## 2022-09-15 ENCOUNTER — CONSULT (OUTPATIENT)
Dept: CARDIOLOGY CLINIC | Facility: CLINIC | Age: 52
End: 2022-09-15
Payer: COMMERCIAL

## 2022-09-15 VITALS
HEIGHT: 61 IN | SYSTOLIC BLOOD PRESSURE: 120 MMHG | BODY MASS INDEX: 38.51 KG/M2 | WEIGHT: 204 LBS | DIASTOLIC BLOOD PRESSURE: 60 MMHG | HEART RATE: 60 BPM

## 2022-09-15 VITALS
WEIGHT: 204.1 LBS | DIASTOLIC BLOOD PRESSURE: 72 MMHG | HEIGHT: 61 IN | SYSTOLIC BLOOD PRESSURE: 94 MMHG | BODY MASS INDEX: 38.53 KG/M2 | HEART RATE: 76 BPM

## 2022-09-15 DIAGNOSIS — I42.8 NON-ISCHEMIC CARDIOMYOPATHY (HCC): ICD-10-CM

## 2022-09-15 DIAGNOSIS — I49.3 PVC (PREMATURE VENTRICULAR CONTRACTION): Primary | ICD-10-CM

## 2022-09-15 DIAGNOSIS — G89.29 CHRONIC PAIN OF RIGHT KNEE: ICD-10-CM

## 2022-09-15 DIAGNOSIS — I44.7 LEFT BUNDLE BRANCH BLOCK (LBBB): ICD-10-CM

## 2022-09-15 DIAGNOSIS — M25.561 CHRONIC PAIN OF RIGHT KNEE: ICD-10-CM

## 2022-09-15 DIAGNOSIS — M17.11 PRIMARY OSTEOARTHRITIS OF RIGHT KNEE: Primary | ICD-10-CM

## 2022-09-15 DIAGNOSIS — M25.461 EFFUSION OF RIGHT KNEE: ICD-10-CM

## 2022-09-15 DIAGNOSIS — I50.1 HEART FAILURE, LEFT, WITH LVEF <=30% (HCC): ICD-10-CM

## 2022-09-15 DIAGNOSIS — I50.41 ACUTE COMBINED SYSTOLIC AND DIASTOLIC CONGESTIVE HEART FAILURE (HCC): ICD-10-CM

## 2022-09-15 PROCEDURE — 20610 DRAIN/INJ JOINT/BURSA W/O US: CPT | Performed by: ORTHOPAEDIC SURGERY

## 2022-09-15 PROCEDURE — 93000 ELECTROCARDIOGRAM COMPLETE: CPT | Performed by: INTERNAL MEDICINE

## 2022-09-15 PROCEDURE — 99214 OFFICE O/P EST MOD 30 MIN: CPT | Performed by: ORTHOPAEDIC SURGERY

## 2022-09-15 PROCEDURE — 99204 OFFICE O/P NEW MOD 45 MIN: CPT | Performed by: INTERNAL MEDICINE

## 2022-09-15 RX ORDER — TRIAMCINOLONE ACETONIDE 40 MG/ML
80 INJECTION, SUSPENSION INTRA-ARTICULAR; INTRAMUSCULAR
Status: COMPLETED | OUTPATIENT
Start: 2022-09-15 | End: 2022-09-15

## 2022-09-15 RX ORDER — BUPIVACAINE HYDROCHLORIDE 2.5 MG/ML
4 INJECTION, SOLUTION INFILTRATION; PERINEURAL
Status: COMPLETED | OUTPATIENT
Start: 2022-09-15 | End: 2022-09-15

## 2022-09-15 RX ADMIN — TRIAMCINOLONE ACETONIDE 80 MG: 40 INJECTION, SUSPENSION INTRA-ARTICULAR; INTRAMUSCULAR at 14:05

## 2022-09-15 RX ADMIN — BUPIVACAINE HYDROCHLORIDE 4 ML: 2.5 INJECTION, SOLUTION INFILTRATION; PERINEURAL at 14:05

## 2022-09-15 NOTE — PROGRESS NOTES
Assessment/Plan:  1  Primary osteoarthritis of right knee  Large joint arthrocentesis: R knee   2  Chronic pain of right knee  Large joint arthrocentesis: R knee   3  Effusion of right knee  Large joint arthrocentesis: R knee     Scribe Attestation    I,:  Cameron Joya PA-C am acting as a scribe while in the presence of the attending physician :       I,:  Celia Barrios, DO personally performed the services described in this documentation    as scribed in my presence :         Maya Clemente is a pleasant 25-year-old female presenting today for follow-up of her activity related pain to her underlying osteoarthritis  She previously did very well an aspiration and cortisone injection 8 months ago  She does have a slight effusion today and her activity related discomfort has returned  After discussing risks benefits, she consented to and underwent a right knee aspiration and cortisone injection as detailed below, which she tolerated well without difficulty or complication  Post injection instructions were provided  We will plan to see her back in 3-4 months pending the efficacy of today's procedure  All questions addressed    Large joint arthrocentesis: R knee  Universal Protocol:  Consent: Verbal consent obtained  Risks and benefits: risks, benefits and alternatives were discussed  Consent given by: patient  Time out: Immediately prior to procedure a "time out" was called to verify the correct patient, procedure, equipment, support staff and site/side marked as required    Timeout called at: 9/15/2022 1:33 PM   Site marked: the operative site was marked  Patient identity confirmed: verbally with patient    Supporting Documentation  Indications: pain and joint swelling   Procedure Details  Location: knee - R knee  Preparation: Patient was prepped and draped in the usual sterile fashion  Needle size: 18 G  Ultrasound guidance: no  Approach: lateral  Medications administered: 80 mg triamcinolone acetonide 40 mg/mL; 4 mL bupivacaine 0 25 %    Aspirate amount: 5 mL  Aspirate: clear, serous and yellow    Patient tolerance: patient tolerated the procedure well with no immediate complications  Dressing:  Sterile dressing applied        Subjective:  Right knee follow-up    Patient ID: Fab Funes is a 45 y o  female  Lorelei is a pleasant 20-year-old female presenting today for follow-up of her right knee  She underwent an aspiration and injection in January after a fall  She reports this did provide significant relief, but it has begun to wear off  Over the past few months, she has noted an increase in activity related discomfort with kneeling, stooping, squatting, and stairs  She feels crunching in the knee and feels that it has been swollen  She denies any new specific injuries  Review of Systems   Constitutional: Negative  HENT: Negative  Eyes: Negative  Respiratory: Negative  Cardiovascular: Negative  Gastrointestinal: Negative  Endocrine: Negative  Genitourinary: Negative  Musculoskeletal: Positive for arthralgias, joint swelling and myalgias  Skin: Negative  Allergic/Immunologic: Negative  Neurological: Negative  Hematological: Negative  Psychiatric/Behavioral: Negative        Past Medical History:   Diagnosis Date    CHF (congestive heart failure) (HCC)     Diverticulitis of colon     GERD (gastroesophageal reflux disease)     Heart disease 2019    HH (hiatus hernia)     Stomach disorder     Vascular disorder        Past Surgical History:   Procedure Laterality Date     SECTION      CHOLECYSTECTOMY      HAND SURGERY      HYSTERECTOMY         Family History   Problem Relation Age of Onset    Heart attack Mother     Heart attack Father        Social History     Occupational History    Not on file   Tobacco Use    Smoking status: Current Some Day Smoker     Packs/day: 0 50     Years: 33 00     Pack years: 16 50     Types: Cigarettes     Last attempt to quit: 6/23/2019     Years since quitting: 3 2    Smokeless tobacco: Former User   Vaping Use    Vaping Use: Never used   Substance and Sexual Activity    Alcohol use: Not Currently     Alcohol/week: 0 0 standard drinks     Comment: 2x year    Drug use: No    Sexual activity: Not on file         Current Outpatient Medications:     albuterol (PROVENTIL HFA,VENTOLIN HFA) 90 mcg/act inhaler, , Disp: , Rfl:     ascorbic acid (VITAMIN C) 500 mg tablet, Take 500 mg by mouth daily, Disp: , Rfl:     carvedilol (COREG) 12 5 mg tablet, Take 1 tablet (12 5 mg total) by mouth 2 (two) times a day with meals, Disp: 180 tablet, Rfl: 3    cholecalciferol (VITAMIN D3) 1,000 units tablet, Take 1,000 Units by mouth daily Winter time only, Disp: , Rfl:     ergocalciferol (VITAMIN D2) 50,000 units, , Disp: , Rfl:     sacubitril-valsartan (Entresto) 49-51 MG TABS, Take 1 tablet by mouth 2 (two) times a day, Disp: 180 tablet, Rfl: 3    spironolactone (ALDACTONE) 25 mg tablet, Take 1 tablet (25 mg total) by mouth daily, Disp: 90 tablet, Rfl: 3    Turmeric (QC TUMERIC COMPLEX PO), Take 500 mg by mouth, Disp: , Rfl:     Allergies   Allergen Reactions    Percocet [Oxycodone-Acetaminophen]        Objective:  Vitals:    09/15/22 1312   BP: 120/60   Pulse: 60       Body mass index is 38 55 kg/m²  Right Knee Exam     Muscle Strength   The patient has normal right knee strength  Tenderness   The patient is experiencing tenderness in the lateral joint line, medial joint line and patella      Range of Motion   Extension:  0 normal   Flexion:  120 normal     Tests   Maxine:  Medial - negative Lateral - negative  Varus: negative Valgus: negative  Drawer:  Anterior - negative    Posterior - negative  Patellar apprehension: negative    Other   Erythema: absent  Scars: absent  Sensation: normal  Pulse: present  Swelling: none  Effusion: effusion (1+, Baker's cyst) present    Comments:  Collateral ligaments stable to stressing at 0, 30, 80  Ambulates with an antalgic gait on the right without assistive device  Positive patellofemoral crepitance and patellofemoral grind          Observations     Right Knee   Positive for effusion (1+, Baker's cyst)  Physical Exam  Vitals and nursing note reviewed  Constitutional:       Appearance: Normal appearance  She is well-developed  Comments: Body mass index is 38 55 kg/m²  HENT:      Head: Normocephalic and atraumatic  Right Ear: External ear normal       Left Ear: External ear normal    Eyes:      Extraocular Movements: Extraocular movements intact  Conjunctiva/sclera: Conjunctivae normal    Cardiovascular:      Rate and Rhythm: Normal rate  Pulses: Normal pulses  Pulmonary:      Effort: Pulmonary effort is normal    Abdominal:      Palpations: Abdomen is soft  Musculoskeletal:      Cervical back: Normal range of motion  Right knee: Effusion (1+, Baker's cyst) present  Instability Tests: Medial Maxine test negative and lateral Maxine test negative  Comments: See ortho exam   Skin:     General: Skin is warm and dry  Neurological:      General: No focal deficit present  Mental Status: She is alert and oriented to person, place, and time  Mental status is at baseline  Psychiatric:         Mood and Affect: Mood normal          Behavior: Behavior normal          Thought Content:  Thought content normal          Judgment: Judgment normal

## 2022-09-19 ENCOUNTER — TELEPHONE (OUTPATIENT)
Dept: CARDIOLOGY CLINIC | Facility: CLINIC | Age: 52
End: 2022-09-19

## 2022-09-19 DIAGNOSIS — I50.1 HEART FAILURE, LEFT, WITH LVEF <=30% (HCC): Primary | ICD-10-CM

## 2022-09-19 NOTE — TELEPHONE ENCOUNTER
----- Message from Jimi Gracia DO sent at 9/15/2022 11:11 AM EDT -----  PLEASE ARRANGE BIV ICD WITH [employer] SUB MUSCULAR

## 2022-09-20 NOTE — TELEPHONE ENCOUNTER
Patient scheduled for BIV ICD submuscular implant at [facility] on 10/2622 with Dr Santillan    Patient aware of general instructions, labs  test required    Spironolactone hold the Am of  Can we please check insurance for approval    Can we please have the case

## 2022-10-14 LAB
ALBUMIN SERPL-MCNC: 4.8 G/DL (ref 3.8–4.9)
ALBUMIN/GLOB SERPL: 2.3 {RATIO} (ref 1.2–2.2)
ALP SERPL-CCNC: 110 IU/L (ref 44–121)
ALT SERPL-CCNC: 21 IU/L (ref 0–32)
AST SERPL-CCNC: 15 IU/L (ref 0–40)
BASOPHILS # BLD AUTO: 0.1 X10E3/UL (ref 0–0.2)
BASOPHILS NFR BLD AUTO: 1 %
BILIRUB SERPL-MCNC: 0.4 MG/DL (ref 0–1.2)
BUN SERPL-MCNC: 24 MG/DL (ref 6–24)
BUN/CREAT SERPL: 26 (ref 9–23)
CALCIUM SERPL-MCNC: 9.8 MG/DL (ref 8.7–10.2)
CHLORIDE SERPL-SCNC: 102 MMOL/L (ref 96–106)
CO2 SERPL-SCNC: 24 MMOL/L (ref 20–29)
CREAT SERPL-MCNC: 0.93 MG/DL (ref 0.57–1)
EGFR: 73 ML/MIN/1.73
EOSINOPHIL # BLD AUTO: 0.1 X10E3/UL (ref 0–0.4)
EOSINOPHIL NFR BLD AUTO: 1 %
ERYTHROCYTE [DISTWIDTH] IN BLOOD BY AUTOMATED COUNT: 13.7 % (ref 11.7–15.4)
GLOBULIN SER-MCNC: 2.1 G/DL (ref 1.5–4.5)
GLUCOSE SERPL-MCNC: 100 MG/DL (ref 70–99)
HCT VFR BLD AUTO: 42 % (ref 34–46.6)
HGB BLD-MCNC: 14.1 G/DL (ref 11.1–15.9)
IMM GRANULOCYTES # BLD: 0.1 X10E3/UL (ref 0–0.1)
IMM GRANULOCYTES NFR BLD: 1 %
LYMPHOCYTES # BLD AUTO: 2 X10E3/UL (ref 0.7–3.1)
LYMPHOCYTES NFR BLD AUTO: 26 %
MCH RBC QN AUTO: 28.9 PG (ref 26.6–33)
MCHC RBC AUTO-ENTMCNC: 33.6 G/DL (ref 31.5–35.7)
MCV RBC AUTO: 86 FL (ref 79–97)
MONOCYTES # BLD AUTO: 0.5 X10E3/UL (ref 0.1–0.9)
MONOCYTES NFR BLD AUTO: 7 %
NEUTROPHILS # BLD AUTO: 5.1 X10E3/UL (ref 1.4–7)
NEUTROPHILS NFR BLD AUTO: 64 %
PLATELET # BLD AUTO: 246 X10E3/UL (ref 150–450)
POTASSIUM SERPL-SCNC: 5 MMOL/L (ref 3.5–5.2)
PROT SERPL-MCNC: 6.9 G/DL (ref 6–8.5)
RBC # BLD AUTO: 4.88 X10E6/UL (ref 3.77–5.28)
SODIUM SERPL-SCNC: 139 MMOL/L (ref 134–144)
WBC # BLD AUTO: 7.8 X10E3/UL (ref 3.4–10.8)

## 2022-10-21 DIAGNOSIS — I50.1 HEART FAILURE, LEFT, WITH LVEF 31-40% (HCC): ICD-10-CM

## 2022-10-21 DIAGNOSIS — I50.41 ACUTE COMBINED SYSTOLIC AND DIASTOLIC CONGESTIVE HEART FAILURE (HCC): ICD-10-CM

## 2022-10-21 DIAGNOSIS — I42.8 NON-ISCHEMIC CARDIOMYOPATHY (HCC): ICD-10-CM

## 2022-10-21 DIAGNOSIS — I50.1 HEART FAILURE, LEFT, WITH LVEF <=30% (HCC): ICD-10-CM

## 2022-10-25 RX ORDER — SACUBITRIL AND VALSARTAN 49; 51 MG/1; MG/1
TABLET, FILM COATED ORAL
Qty: 180 TABLET | Refills: 3 | Status: SHIPPED | OUTPATIENT
Start: 2022-10-25

## 2022-10-26 ENCOUNTER — ANESTHESIA (OUTPATIENT)
Dept: NON INVASIVE DIAGNOSTICS | Facility: HOSPITAL | Age: 52
End: 2022-10-26
Payer: COMMERCIAL

## 2022-10-26 ENCOUNTER — ANESTHESIA EVENT (OUTPATIENT)
Dept: NON INVASIVE DIAGNOSTICS | Facility: HOSPITAL | Age: 52
End: 2022-10-26
Payer: COMMERCIAL

## 2022-10-26 ENCOUNTER — APPOINTMENT (OUTPATIENT)
Dept: RADIOLOGY | Facility: HOSPITAL | Age: 52
End: 2022-10-26
Payer: COMMERCIAL

## 2022-10-26 ENCOUNTER — HOSPITAL ENCOUNTER (OUTPATIENT)
Facility: HOSPITAL | Age: 52
Setting detail: OUTPATIENT SURGERY
Discharge: HOME | End: 2022-10-26
Attending: INTERNAL MEDICINE | Admitting: INTERNAL MEDICINE
Payer: COMMERCIAL

## 2022-10-26 VITALS
HEART RATE: 72 BPM | HEIGHT: 61 IN | DIASTOLIC BLOOD PRESSURE: 64 MMHG | WEIGHT: 204 LBS | BODY MASS INDEX: 38.51 KG/M2 | TEMPERATURE: 97.8 F | RESPIRATION RATE: 17 BRPM | OXYGEN SATURATION: 93 % | SYSTOLIC BLOOD PRESSURE: 97 MMHG

## 2022-10-26 DIAGNOSIS — I42.8 NON-ISCHEMIC CARDIOMYOPATHY (HCC): ICD-10-CM

## 2022-10-26 DIAGNOSIS — G89.18 POST-OP PAIN: Primary | ICD-10-CM

## 2022-10-26 DIAGNOSIS — I50.1 HEART FAILURE, LEFT, WITH LVEF <=30% (HCC): ICD-10-CM

## 2022-10-26 LAB
ANION GAP SERPL CALCULATED.3IONS-SCNC: 6 MMOL/L (ref 4–13)
ATRIAL RATE: 68 BPM
ATRIAL RATE: 70 BPM
BUN SERPL-MCNC: 24 MG/DL (ref 5–25)
CALCIUM SERPL-MCNC: 9.4 MG/DL (ref 8.3–10.1)
CHLORIDE SERPL-SCNC: 109 MMOL/L (ref 96–108)
CO2 SERPL-SCNC: 22 MMOL/L (ref 21–32)
CREAT SERPL-MCNC: 0.94 MG/DL (ref 0.6–1.3)
ERYTHROCYTE [DISTWIDTH] IN BLOOD BY AUTOMATED COUNT: 14.7 % (ref 11.6–15.1)
GFR SERPL CREATININE-BSD FRML MDRD: 68 ML/MIN/1.73SQ M
GLUCOSE P FAST SERPL-MCNC: 131 MG/DL (ref 65–99)
GLUCOSE SERPL-MCNC: 131 MG/DL (ref 65–140)
HCT VFR BLD AUTO: 42.3 % (ref 34.8–46.1)
HGB BLD-MCNC: 13.7 G/DL (ref 11.5–15.4)
INR PPP: 0.89 (ref 0.84–1.19)
MCH RBC QN AUTO: 29.5 PG (ref 26.8–34.3)
MCHC RBC AUTO-ENTMCNC: 32.4 G/DL (ref 31.4–37.4)
MCV RBC AUTO: 91 FL (ref 82–98)
P AXIS: 0 DEGREES
P AXIS: 0 DEGREES
PLATELET # BLD AUTO: 195 THOUSANDS/UL (ref 149–390)
PMV BLD AUTO: 10.6 FL (ref 8.9–12.7)
POTASSIUM SERPL-SCNC: 4.6 MMOL/L (ref 3.5–5.3)
PR INTERVAL: 166 MS
PR INTERVAL: 166 MS
PROTHROMBIN TIME: 12.2 SECONDS (ref 11.6–14.5)
QRS AXIS: 0 DEGREES
QRS AXIS: 149 DEGREES
QRSD INTERVAL: 114 MS
QRSD INTERVAL: 144 MS
QT INTERVAL: 426 MS
QT INTERVAL: 438 MS
QTC INTERVAL: 460 MS
QTC INTERVAL: 465 MS
RBC # BLD AUTO: 4.65 MILLION/UL (ref 3.81–5.12)
SODIUM SERPL-SCNC: 137 MMOL/L (ref 135–147)
T WAVE AXIS: -76 DEGREES
T WAVE AXIS: 7 DEGREES
VENTRICULAR RATE: 68 BPM
VENTRICULAR RATE: 70 BPM
WBC # BLD AUTO: 7.69 THOUSAND/UL (ref 4.31–10.16)

## 2022-10-26 PROCEDURE — C1892 INTRO/SHEATH,FIXED,PEEL-AWAY: HCPCS | Performed by: INTERNAL MEDICINE

## 2022-10-26 PROCEDURE — 85027 COMPLETE CBC AUTOMATED: CPT | Performed by: PHYSICIAN ASSISTANT

## 2022-10-26 PROCEDURE — 93010 ELECTROCARDIOGRAM REPORT: CPT | Performed by: INTERNAL MEDICINE

## 2022-10-26 PROCEDURE — 85610 PROTHROMBIN TIME: CPT | Performed by: PHYSICIAN ASSISTANT

## 2022-10-26 PROCEDURE — 71045 X-RAY EXAM CHEST 1 VIEW: CPT

## 2022-10-26 PROCEDURE — C1887 CATHETER, GUIDING: HCPCS | Performed by: INTERNAL MEDICINE

## 2022-10-26 PROCEDURE — C1769 GUIDE WIRE: HCPCS | Performed by: INTERNAL MEDICINE

## 2022-10-26 PROCEDURE — 93005 ELECTROCARDIOGRAM TRACING: CPT

## 2022-10-26 PROCEDURE — 33225 L VENTRIC PACING LEAD ADD-ON: CPT | Performed by: INTERNAL MEDICINE

## 2022-10-26 PROCEDURE — C1777 LEAD, AICD, ENDO SINGLE COIL: HCPCS | Performed by: INTERNAL MEDICINE

## 2022-10-26 PROCEDURE — C1730 CATH, EP, 19 OR FEW ELECT: HCPCS | Performed by: INTERNAL MEDICINE

## 2022-10-26 PROCEDURE — C1900 LEAD, CORONARY VENOUS: HCPCS | Performed by: INTERNAL MEDICINE

## 2022-10-26 PROCEDURE — NC001 PR NO CHARGE: Performed by: PHYSICIAN ASSISTANT

## 2022-10-26 PROCEDURE — 33249 INSJ/RPLCMT DEFIB W/LEAD(S): CPT | Performed by: INTERNAL MEDICINE

## 2022-10-26 PROCEDURE — C1882 AICD, OTHER THAN SING/DUAL: HCPCS | Performed by: INTERNAL MEDICINE

## 2022-10-26 PROCEDURE — 80048 BASIC METABOLIC PNL TOTAL CA: CPT | Performed by: PHYSICIAN ASSISTANT

## 2022-10-26 PROCEDURE — C1898 LEAD, PMKR, OTHER THAN TRANS: HCPCS | Performed by: INTERNAL MEDICINE

## 2022-10-26 DEVICE — ENVELOPE CMRM6133 ABSORB LRG MR
Type: IMPLANTABLE DEVICE | Site: CHEST  WALL | Status: FUNCTIONAL
Brand: TYRX™

## 2022-10-26 DEVICE — CRTD DTPA2QQ COBALT XT HF QUAD MRI DF4
Type: IMPLANTABLE DEVICE | Site: CHEST  WALL | Status: FUNCTIONAL
Brand: COBALT™ XT HF QUAD CRT-D MRI SURESCAN™

## 2022-10-26 DEVICE — LEAD 6935M62 QUATTRO SECURE S MRI US
Type: IMPLANTABLE DEVICE | Site: HEART | Status: FUNCTIONAL
Brand: SPRINT QUATTRO SECURE S MRI™ SURESCAN™

## 2022-10-26 DEVICE — LEAD 459888 MRI S-TIP US
Type: IMPLANTABLE DEVICE | Site: HEART | Status: FUNCTIONAL
Brand: ATTAIN PERFORMA™ S MRI SURESCAN™

## 2022-10-26 DEVICE — LEAD 457453 MRI US BI RCMCRD MVC
Type: IMPLANTABLE DEVICE | Site: HEART | Status: FUNCTIONAL
Brand: CAPSURE SENSE MRI™ SURESCAN™

## 2022-10-26 RX ORDER — TRAMADOL HYDROCHLORIDE 50 MG/1
50 TABLET ORAL EVERY 6 HOURS PRN
Qty: 15 TABLET | Refills: 0 | Status: SHIPPED | OUTPATIENT
Start: 2022-10-26 | End: 2022-11-05

## 2022-10-26 RX ORDER — MIDAZOLAM HYDROCHLORIDE 2 MG/2ML
INJECTION, SOLUTION INTRAMUSCULAR; INTRAVENOUS AS NEEDED
Status: DISCONTINUED | OUTPATIENT
Start: 2022-10-26 | End: 2022-10-26

## 2022-10-26 RX ORDER — GLYCOPYRROLATE 0.2 MG/ML
INJECTION INTRAMUSCULAR; INTRAVENOUS AS NEEDED
Status: DISCONTINUED | OUTPATIENT
Start: 2022-10-26 | End: 2022-10-26

## 2022-10-26 RX ORDER — PROPOFOL 10 MG/ML
INJECTION, EMULSION INTRAVENOUS AS NEEDED
Status: DISCONTINUED | OUTPATIENT
Start: 2022-10-26 | End: 2022-10-26

## 2022-10-26 RX ORDER — KETOROLAC TROMETHAMINE 30 MG/ML
15 INJECTION, SOLUTION INTRAMUSCULAR; INTRAVENOUS ONCE
Status: COMPLETED | OUTPATIENT
Start: 2022-10-26 | End: 2022-10-26

## 2022-10-26 RX ORDER — PROPOFOL 10 MG/ML
INJECTION, EMULSION INTRAVENOUS CONTINUOUS PRN
Status: DISCONTINUED | OUTPATIENT
Start: 2022-10-26 | End: 2022-10-26

## 2022-10-26 RX ORDER — GENTAMICIN SULFATE 40 MG/ML
INJECTION, SOLUTION INTRAMUSCULAR; INTRAVENOUS AS NEEDED
Status: DISCONTINUED | OUTPATIENT
Start: 2022-10-26 | End: 2022-10-26 | Stop reason: HOSPADM

## 2022-10-26 RX ORDER — LIDOCAINE HYDROCHLORIDE 10 MG/ML
INJECTION, SOLUTION EPIDURAL; INFILTRATION; INTRACAUDAL; PERINEURAL AS NEEDED
Status: DISCONTINUED | OUTPATIENT
Start: 2022-10-26 | End: 2022-10-26

## 2022-10-26 RX ORDER — FENTANYL CITRATE 50 UG/ML
50 INJECTION, SOLUTION INTRAMUSCULAR; INTRAVENOUS EVERY 2 HOUR PRN
OUTPATIENT
Start: 2022-10-26

## 2022-10-26 RX ORDER — CEFAZOLIN SODIUM 2 G/50ML
2000 SOLUTION INTRAVENOUS ONCE
Status: COMPLETED | OUTPATIENT
Start: 2022-10-26 | End: 2022-10-26

## 2022-10-26 RX ORDER — LIDOCAINE HYDROCHLORIDE 10 MG/ML
INJECTION, SOLUTION EPIDURAL; INFILTRATION; INTRACAUDAL; PERINEURAL AS NEEDED
Status: DISCONTINUED | OUTPATIENT
Start: 2022-10-26 | End: 2022-10-26 | Stop reason: HOSPADM

## 2022-10-26 RX ORDER — SODIUM CHLORIDE 9 MG/ML
INJECTION, SOLUTION INTRAVENOUS CONTINUOUS PRN
Status: DISCONTINUED | OUTPATIENT
Start: 2022-10-26 | End: 2022-10-26

## 2022-10-26 RX ORDER — FENTANYL CITRATE 50 UG/ML
INJECTION, SOLUTION INTRAMUSCULAR; INTRAVENOUS AS NEEDED
Status: DISCONTINUED | OUTPATIENT
Start: 2022-10-26 | End: 2022-10-26

## 2022-10-26 RX ORDER — ACETAMINOPHEN 325 MG/1
650 TABLET ORAL EVERY 4 HOURS PRN
Status: DISCONTINUED | OUTPATIENT
Start: 2022-10-26 | End: 2022-10-26 | Stop reason: HOSPADM

## 2022-10-26 RX ADMIN — Medication 30 MG: at 11:56

## 2022-10-26 RX ADMIN — Medication 10 MG: at 12:16

## 2022-10-26 RX ADMIN — NOREPINEPHRINE BITARTRATE 4 MCG/MIN: 1 SOLUTION INTRAVENOUS at 11:34

## 2022-10-26 RX ADMIN — PROPOFOL 30 MG: 10 INJECTION, EMULSION INTRAVENOUS at 13:10

## 2022-10-26 RX ADMIN — ACETAMINOPHEN 650 MG: 325 TABLET ORAL at 13:49

## 2022-10-26 RX ADMIN — FENTANYL CITRATE 50 MCG: 50 INJECTION INTRAMUSCULAR; INTRAVENOUS at 11:56

## 2022-10-26 RX ADMIN — KETOROLAC TROMETHAMINE 15 MG: 30 INJECTION, SOLUTION INTRAMUSCULAR at 14:09

## 2022-10-26 RX ADMIN — PROPOFOL 50 MCG/KG/MIN: 10 INJECTION, EMULSION INTRAVENOUS at 11:34

## 2022-10-26 RX ADMIN — Medication 10 MG: at 12:59

## 2022-10-26 RX ADMIN — LIDOCAINE HYDROCHLORIDE 50 MG: 10 INJECTION, SOLUTION EPIDURAL; INFILTRATION; INTRACAUDAL; PERINEURAL at 11:34

## 2022-10-26 RX ADMIN — MIDAZOLAM 2 MG: 1 INJECTION INTRAMUSCULAR; INTRAVENOUS at 11:22

## 2022-10-26 RX ADMIN — SODIUM CHLORIDE: 0.9 INJECTION, SOLUTION INTRAVENOUS at 11:10

## 2022-10-26 RX ADMIN — CEFAZOLIN SODIUM 2000 MG: 2 SOLUTION INTRAVENOUS at 11:26

## 2022-10-26 RX ADMIN — GLYCOPYRROLATE 0.1 MCG: 0.2 INJECTION, SOLUTION INTRAMUSCULAR; INTRAVENOUS at 11:22

## 2022-10-26 RX ADMIN — FENTANYL CITRATE 50 MCG: 50 INJECTION INTRAMUSCULAR; INTRAVENOUS at 12:38

## 2022-10-26 NOTE — H&P
H&P Exam - Cardiology   Rodolfo Ragland 45 y o  female MRN: [id number]  Unit/Bed#: BE CATH LAB ROOM Encounter: [id number]    Assessment/Plan     Assessment:  1  Nonischemic cardiomyopathy with LVEF 25-30% per echo 05/2022 suspect viral   A ) previously 34% per cardiac MRI 03/2021   B ) no obstructive CAD per cardiac catheterization 06/2019   C ) maintained on carvedilol and Entresto  2  Chronic HFrEF  3  Nonsustained VT noted on prior event monitor  4  CKD  5  Obstructive sleep apnea  6  Obesity with BMI 38  7  Baseline left bundle-branch block with QRS greater than 140 male      Plan:  Bi V ICD implantation, likely will be placed sub muscularly      History of Present Illness   HPI:  Rodolfo Ragland is a 45y o  year old female with nonischemic cardiomyopathy with persistently reduced LVEF, chronic HFrEF, nonsustained VT, CKD, obstructive sleep apnea, and obesity  She typically follows with Dr Marc Sanchez as an outpatient  She was initially diagnosed with a new cardiomyopathy 03/2019, when she presented to the hospital with chest pain  Nuclear stress test showed no evidence of ischemia, but was newly reduced LVEF 35%  It does not appear that she was started on optimal medical therapy at that time, and close outpatient follow-up was recommended  She then returned to the hospital 06/2019 with acute on chronic CHF in the setting of diverticulitis  She had ongoing reduced EF, and underwent cardiac catheterization which showed no obstructive CAD  She was started on optimal medical therapy with carvedilol and Entresto, and was discharged with a life vest   Monitoring over the past several years showed occasional nonsustained VT, with other monitor showing no significant arrhythmias  Cardiac MRI 03/2021 showed LVEF 34%, with findings suggestive of a viral cardiomyopathy    He eventual repeat echocardiogram 05/2022 still showed LVEF 25-30% despite optimal medical therapy, thus she was referred to EP for further management and possible primary prevention ICD implantation  She was seen by Dr Davila Mouse 2022, and a Bi V ICD implantation was recommended given her baseline left bundle-branch block with QRS greater than 140 milliseconds  A submuscular device will be placed  She presents today to undergo that procedure  No significant changes since she was last seen  Review of Systems  ROS as noted above, otherwise 12 point review of systems was performed and is negative         Historical Information   Past Medical History:   Diagnosis Date   • CHF (congestive heart failure) ([facility] [address] )    • Diverticulitis of colon    • GERD (gastroesophageal reflux disease)    • Heart disease 2019   • HH (hiatus hernia)    • Stomach disorder    • Vascular disorder      Past Surgical History:   Procedure Laterality Date   •  SECTION     • CHOLECYSTECTOMY     • HAND SURGERY     • HYSTERECTOMY       Family History:   Family History   Problem Relation Age of Onset   • Heart attack Mother    • Heart attack Father        Social History   Social History     Substance and Sexual Activity   Alcohol Use Not Currently   • Alcohol/week: 0 0 standard drinks    Comment: 2x year     Social History     Substance and Sexual Activity   Drug Use No     Social History     Tobacco Use   Smoking Status Current Some Day Smoker   • Packs/day: 0 50   • Years: 33 00   • Pack years: 16 50   • Types: Cigarettes   • Last attempt to quit: 2019   • Years since quitting: 3 3   Smokeless Tobacco Former User         Meds/Allergies   all medications and allergies reviewed  Home Medications:   Medications Prior to Admission   Medication   • carvedilol (COREG) 12 5 mg tablet   • Entresto 49-51 MG TABS   • spironolactone (ALDACTONE) 25 mg tablet   • Turmeric (QC TUMERIC COMPLEX PO)   • albuterol (PROVENTIL HFA,VENTOLIN HFA) 90 mcg/act inhaler   • ascorbic acid (VITAMIN C) 500 mg tablet   • cholecalciferol (VITAMIN D3) 1,000 units tablet   • ergocalciferol (VITAMIN D2) 50,000 units       Allergies   Allergen Reactions   • Percocet [Oxycodone-Acetaminophen]        Objective   Vitals: There were no vitals taken for this visit  No intake or output data in the 24 hours ending 10/26/22 0930    Invasive Devices  Report    Peripheral Intravenous Line  Duration           Peripheral IV 19 Left Antecubital 1051 days                Physical Exam  GEN: NAD, alert and oriented x 3, well appearing  SKIN: dry without significant lesions or rashes  HEENT: NCAT, PERRL, EOMs intact  NECK: No JVD appreciated  CARDIOVASCULAR: RRR, normal S1, S2 without murmurs, rubs, or gallops appreciated  LUNGS: Clear to auscultation bilaterally without wheezes, rhonchi, or rales  ABDOMEN: Soft, nontender, nondistended  EXTREMITIES/VASCULAR: perfused without clubbing, cyanosis, or LE edema b/l  PSYCH: Normal mood and affect  NEURO: CN ll-Xll grossly intact      Lab Results: I have personally reviewed pertinent lab results  Invalid input(s): LABGLOM              Imaging: I have personally reviewed pertinent reports  Results for orders placed during the hospital encounter of 19    Echo complete with contrast if indicated    Narrative  [address]  [address] [address]  [phone]    Transthoracic Echocardiogram  2D, M-mode, Doppler, and Color Doppler    Study date:  2019    Patient: Neva Alvarez  MR number: [id number]  Account number: [de-identified]  : 1970  Age: 44 years  Gender: Female  Status: Inpatient  Location: Bedside  Height: 61 in  Weight: 219 6 lb  BP: 118/ 80 mmHg    Indications: Heart Failure    Diagnoses: I50 9 - Heart failure, unspecified    Sonographer:  ISABELL Wilhelm  Primary Physician:  Irma Pacheco MD  Referring Physician:  Carson Maki DO  Group:  [facility]  Interpreting Physician:  Destiny Abernathy MD    SUMMARY    LEFT VENTRICLE:  Systolic function was markedly reduced   Ejection fraction was estimated in the range of 25 % to 30 % to be 30 %  There was severe diffuse hypokinesis  Doppler parameters were consistent with abnormal left ventricular relaxation (grade 1 diastolic dysfunction)  RIGHT VENTRICLE:  Systolic function was moderately reduced  LEFT ATRIUM:  The atrium was mildly dilated  RIGHT ATRIUM:  The atrium was mildly dilated  MITRAL VALVE:  There was trace regurgitation  IVC, HEPATIC VEINS:  The inferior vena cava was dilated  HISTORY: PRIOR HISTORY: GERD, Hiatal Hernia    PROCEDURE: The procedure was performed at the bedside  This was a routine study  The transthoracic approach was used  The study included complete 2D imaging, M-mode, complete spectral Doppler, and color Doppler  The heart rate was 92 bpm,  at the start of the study  Image quality was adequate  LEFT VENTRICLE: Size was normal  Systolic function was markedly reduced  Ejection fraction was estimated in the range of 25 % to 30 % to be 30 %  There was severe diffuse hypokinesis  Wall thickness was normal  No evidence of apical  thrombus  DOPPLER: Doppler parameters were consistent with abnormal left ventricular relaxation (grade 1 diastolic dysfunction)  RIGHT VENTRICLE: The size was normal  Systolic function was moderately reduced  Wall thickness was normal     LEFT ATRIUM: The atrium was mildly dilated  RIGHT ATRIUM: The atrium was mildly dilated  MITRAL VALVE: Valve structure was normal  There was normal leaflet separation  DOPPLER: The transmitral velocity was within the normal range  There was no evidence for stenosis  There was trace regurgitation  AORTIC VALVE: The valve was trileaflet  Leaflets exhibited normal thickness and normal cuspal separation  DOPPLER: Transaortic velocity was within the normal range  There was no evidence for stenosis  There was no significant  regurgitation  TRICUSPID VALVE: The valve structure was normal  There was normal leaflet separation  DOPPLER: The transtricuspid velocity was within the normal range  There was no evidence for stenosis  There was no significant regurgitation  PULMONIC VALVE: Leaflets exhibited normal thickness, no calcification, and normal cuspal separation  DOPPLER: The transpulmonic velocity was within the normal range  There was no significant regurgitation  PERICARDIUM: There was no pericardial effusion  The pericardium was normal in appearance  AORTA: The root exhibited normal size  SYSTEMIC VEINS: IVC: The inferior vena cava was dilated  SYSTEM MEASUREMENT TABLES    2D mode  AoR Diam 2D: 3 2 cm  LA Diam (2D): 4 5 cm  LA/Ao (2D): 1 41  FS (2D Teich): 13 7 %  IVSd (2D): 0 82 cm  LVDEV: 193 cmï¾³  LVESV: 138 cmï¾³  LVIDd(2D): 6 19 cm  LVISd (2D): 5 34 cm  LVPWd (2D): 0 81 cm  SV (Teich): 55 cmï¾³    Apical four chamber  LVEF A4C: 28 %    Unspecified Scan Mode  MV Peak E Zach   Mean: 848 mm/s  MVA (PHT): 4 15 cmï¾²  PHT: 53 ms  Max P mm[Hg]  V Max: 2440 mm/s  Vmax: 2500 mm/s  RA Area: 18 1 cmï¾²  RA Volume: 48 8 cmï¾³  TAPSE: 1 1 cm    [employer] Accredited Echocardiography Laboratory    Prepared and electronically signed by    Juan Sullivan MD  Signed 2019 13:10:35      EKG: pending        Code Status: Level 1 - Full Code

## 2022-10-26 NOTE — DISCHARGE INSTRUCTIONS
Please refer to post device implantation discharge instructions and restrictions below and your device booklet/temporary card  Keep incision dry for one week  Do not use lotions/powders/creams on incision  Leave outer bandage in place for 1 week - it is water proof, and as long as it is fully adhered to your skin you may shower with it  If it appears as though the bandage is coming off and/or there is any communication to the area of device incision, please then keep the whole area dry for the remaining week  After 1 week, please remove by pulling all edges away from the center of the bandage  No overhead reaching/pushing/pulling/lifting greater than 5-10lbs with left arm for six weeks  Please call the office if you notice redness, swelling, bleeding, or drainage from incision or if you develop fevers    Cardiac Resynchronization Therapy (CRT)    If you have any questions, please call [phone] to speak with a nurse (8:30am-4pm, or [phone] after hours)  For appointments, please call [phone]  WHAT YOU SHOULD KNOW:    Your device is a biventricular ICD, which delivers resynchronization therapy and is also a defibrillator (see below)  Cardiac resynchronization therapy (CRT) is a procedure used to treat problems with how your heart contracts  CRT is also called biventricular pacing  Your heart has 2 upper chambers, called atria, and 2 lower chambers, called ventricles  Your heartbeat is synchronized when all areas of your heart contract together properly  When the areas of your heart do not contract as they should, your heart cannot pump enough blood and oxygen to your body  You may have trouble breathing, tire easily, and have swelling in your legs and feet  With a biventricular device, there is one wire in the right atria (in most cases), one wire in the right ventricle, and a third wire that goes through a vein and wraps around to your left ventricle   The two ventricular wires work together to help your heart contract more synchronously and efficiently  AFTER YOU LEAVE:      Medicines:   Heart medicine may be given to help your heart beat strongly and regularly  Ask your healthcare provider for more information about heart medicines  Take your medicine as directed  Contact your healthcare provider if you think your medicine is not helping or if you have side effects  Tell him if you are allergic to any medicine  Keep a list of the medicines, vitamins, and herbs you take  Include the amounts, and when and why you take them  Bring the list or the pill bottles to follow-up visits  Carry your medicine list with you in case of an emergency  Driving: you are ok to drive 48 hours after device is implanted     Follow up with your healthcare provider as directed: You will need to return to have your pacemaker checked  You may also need an EKG, echocardiogram, or chest x-ray to check the leads in your heart, and your doctor will order these tests if necessary  Write down your questions so you remember to ask them during your visits  Device safety: Some electrical devices may interfere with how your pacemaker works  Some examples are cell phones, security systems, power cables, and electric monitors  Ask your healthcare provider how long you can be near these devices, and which devices to avoid  Tell caregivers you have a pacemaker before you have a procedure or surgery  Wound care: Care for your wound as directed  Keep incision dry for one week  Do not use lotions/powders/creams on incision  Leave outer bandage in place for 1 week - it is water proof, and as long as it is fully adhered to your skin you may shower with it  If it appears as though the bandage is coming off and/or there is any communication to the area of device incision, please then keep the whole area dry for the remaining week    After 1 week, please remove by pulling all edges away from the center of the bandage  No overhead reaching/pushing/pulling/lifting greater than 5-10lbs with left arm for one month  Please call the office if you notice redness, swelling, bleeding, or drainage from incision or if you develop fevers      Contact your healthcare provider if:   You have new or increased swelling in your legs or feet  You feel more tired than usual    You have trouble breathing during activity  Your wound is red, warm, swollen, or draining pus  You have a fever  You have questions or concerns about your condition or care  Seek care immediately or call 911 if: You feel like your heart is fluttering or jumping  You have any of the following signs of a heart attack:    Squeezing, pressure, fullness, or pain in your chest that lasts longer than a few minutes or returns   Discomfort or pain in your back, neck, jaw, stomach, or arm   Shortness of breath or breathing problems   A sudden cold sweat, lightheadedness, dizziness, or nausea, especially with chest pain or trouble breathing      Implantable Cardioverter Defibrillator (ICD)    If you have any questions, please call [phone] to speak with a nurse (8:30am-4pm, or [phone] after hours)  For appointments, please call [phone]  WHAT YOU SHOULD KNOW:    Your device is a biventricular ICD, which delivers resynchronization therapy (see above) and is also a defibrillator  An implantable cardioverter defibrillator (ICD) is a small device that monitors your heart rate and rhythm  It is commonly placed inside your upper chest region  It may be used if you have a ventricular arrhythmia, which is an irregular, dangerous rhythm from the bottom chamber of your heart  Some arrhythmias may cause your heart to suddenly stop beating  An ICD can give a shock to your heart to make it start beating again, or it can give pacing therapy (also known as pain-free therapy) to return your heart to normal rhythm   It is also a pacemaker, so it will pace your heart if needed to prevent it from beating too slowly  AFTER YOU LEAVE:      Follow up with your primary healthcare provider or cardiologist as directed: You will need to follow-up to have your ICD checked and make sure you are not having problems  Write down your questions so you remember to ask them during your visits  Self-care:   Ask about activity: Ask if you need to avoid moving your shoulder or arm, and for how long  Ask if you should avoid lifting heavy objects  Do not play any contact sports, such as football or wrestling, until your primary healthcare provider [facility] or cardiologist tells you it is okay  You may only be able to drive for a certain amount of time per day, or during certain hours  Ask when you can return to work  Care for your skin over the ICD: see answer in instructions above     When you get a shock from your ICD: A shock may feel like someone has hit you, or you may feel a thump in the chest  If someone is touching you when you get a shock, they may feel a tingling feeling  The first time you feel a shock, it may scare you  Sit or lie down and stay calm  Ask someone to stay with you if possible  Please either call your cardiologist or report to an emergency room  Safety instructions when you have an ICD:   Carry an ID card for the ICD: This card has important information about your ICD  Stay away from magnets or machines with electric fields: This includes MRI machines  Avoid leaning into a car engine or doing welding  These things can interfere with how your ICD works  Tell airport security you have an ICD: You may need to be searched by hand when you go through a security gate  The security gate or handheld wand could harm your ICD  Keep an ICD diary: Record when you get a shock and what you were doing before you got the shock  Keep track of how you felt before and after the shock, as well as how many shocks you received   Write down the day and time of each shock  Bring the diary with you when you see your PHP or cardiologist    Malik Munoz alert identification: Wear medical alert jewelry or carry a card that says you have an ICD  Ask your PHP or cardiologist where to get these items  Contact your primary healthcare provider or cardiologist if:   You have a fever  You feel 1 or more shocks from your ICD and feel fine afterwards  Your feet or ankles swell  The area around your ICD is painful or tender after surgery  The skin around your stitches or staples is red, swollen, or draining pus or fluid  You have chills, a cough, and feel weak or achy  You are sad or anxious and find it hard to do your usual activities  You have questions or concerns about your condition or care  Seek care immediately or call 911 if:   Your stitches or staples come apart  Blood soaks through your bandage  You feel more than 3 shocks in a row from your ICD  You become weak, dizzy, or faint  You feel your heart skip beats or beat very fast or slow, but you do not feel a shock from your ICD  You have chest pain that does not go away with rest or medicine  © 2014 [employer] is for End User's use only and may not be sold, redistributed or otherwise used for commercial purposes  All illustrations and images included in CareNotes® are the copyrighted property of [employer]  or Dequan Carranza  The above information is an  only  It is not intended as medical advice for individual conditions or treatments  Talk to your doctor, nurse or pharmacist before following any medical regimen to see if it is safe and effective for you

## 2022-10-26 NOTE — ANESTHESIA POSTPROCEDURE EVALUATION
Post-Op Assessment Note    CV Status:  Stable  Pain Score: 0    Pain management: adequate     Mental Status:  Awake and sleepy   Hydration Status:  Euvolemic   PONV Controlled:  Controlled   Airway Patency:  Patent      Post Op Vitals Reviewed: Yes      Staff: Anesthesiologist, CRNA         No complications documented      BP  110/82   Temp 98   Pulse 74   Resp 12   SpO2 99

## 2022-10-26 NOTE — ANESTHESIA PREPROCEDURE EVALUATION
Procedure:  Cardiac biv icd implant/ BIV ICD SUBMUSCULAR IMPLANT (N/A Chest)    Relevant Problems   CARDIO   (+) Acute combined systolic and diastolic congestive heart failure (HCC)   (+) Left bundle branch block (LBBB)   (+) PVC (premature ventricular contraction)      GI/HEPATIC   (+) GERD (gastroesophageal reflux disease)      PULMONARY   (+) TAE (obstructive sleep apnea)      Cardiovascular and Mediastinum   (+) Non-ischemic cardiomyopathy ([address] )      Cardiomyopathy with reduced EF 34% by MRI  TAE on CPAP    Physical Exam    Airway    Mallampati score: II  TM Distance: >3 FB  Neck ROM: full     Dental   No notable dental hx     Cardiovascular      Pulmonary      Other Findings        Anesthesia Plan  ASA Score- 4     Anesthesia Type- IV sedation with anesthesia with ASA Monitors  Additional Monitors:   Airway Plan:           Plan Factors-    Chart reviewed  Patient summary reviewed  Induction- intravenous  Postoperative Plan-     Informed Consent- Anesthetic plan and risks discussed with patient  I personally reviewed this patient with the CRNA  Discussed and agreed on the Anesthesia Plan with the CRNA  Driss Luna

## 2022-10-29 DIAGNOSIS — I50.41 ACUTE COMBINED SYSTOLIC AND DIASTOLIC CONGESTIVE HEART FAILURE (HCC): ICD-10-CM

## 2022-10-29 DIAGNOSIS — I50.1 HEART FAILURE, LEFT, WITH LVEF 31-40% (HCC): ICD-10-CM

## 2022-10-29 DIAGNOSIS — I42.8 NON-ISCHEMIC CARDIOMYOPATHY (HCC): ICD-10-CM

## 2022-11-07 ENCOUNTER — IN-CLINIC DEVICE VISIT (OUTPATIENT)
Dept: CARDIOLOGY CLINIC | Facility: CLINIC | Age: 52
End: 2022-11-07

## 2022-11-07 DIAGNOSIS — Z95.810 PRESENCE OF IMPLANTABLE CARDIOVERTER-DEFIBRILLATOR (ICD): Primary | ICD-10-CM

## 2022-11-07 NOTE — PROGRESS NOTES
MDT BI-V ICD / ACTIVE SYSTEM IS MRI CONDITIONAL   DEVICE INTERROGATED IN THE [place] OFFICE:  BATTERY VOLTAGE ADEQUATE (11 8 YR )   AP 7 1% BP 98 0% VSRP 1 6%    ALL LEAD PARAMETERS WITHIN NORMAL LIMITS   NO SIGNIFICANT HIGH RATE EPISODES   NO PROGRAMMING CHANGES MADE TO DEVICE PARAMETERS   WOUND CHECK: INCISION CLEAN AND DRY WITH EDGES APPROXIMATED; WOUND CARE AND RESTRICTIONS REVIEWED WITH PATIENT   NORMAL DEVICE FUNCTION  Conor Redman

## 2022-11-15 DIAGNOSIS — I50.1 HEART FAILURE, LEFT, WITH LVEF 31-40% (HCC): ICD-10-CM

## 2022-11-15 DIAGNOSIS — I50.41 ACUTE COMBINED SYSTOLIC AND DIASTOLIC CONGESTIVE HEART FAILURE (HCC): ICD-10-CM

## 2022-11-15 DIAGNOSIS — I42.8 NON-ISCHEMIC CARDIOMYOPATHY (HCC): ICD-10-CM

## 2022-11-16 RX ORDER — SPIRONOLACTONE 25 MG/1
TABLET ORAL
Qty: 90 TABLET | Refills: 3 | Status: SHIPPED | OUTPATIENT
Start: 2022-11-16

## 2022-12-21 RX ORDER — CARVEDILOL 12.5 MG/1
TABLET ORAL
Qty: 180 TABLET | Refills: 3 | Status: SHIPPED | OUTPATIENT
Start: 2022-12-21

## 2023-01-18 ENCOUNTER — TELEPHONE (OUTPATIENT)
Dept: CARDIOLOGY CLINIC | Facility: CLINIC | Age: 53
End: 2023-01-18

## 2023-01-18 NOTE — TELEPHONE ENCOUNTER
----- Message from Kimberly Gil MD sent at 1/18/2023  2:28 PM EST -----  Reviewed all notes/transmission  Normal device function  Please let patient know

## 2023-02-09 ENCOUNTER — TELEPHONE (OUTPATIENT)
Dept: CARDIOLOGY CLINIC | Facility: CLINIC | Age: 53
End: 2023-02-09

## 2023-03-06 ENCOUNTER — IN-CLINIC DEVICE VISIT (OUTPATIENT)
Dept: CARDIOLOGY CLINIC | Facility: CLINIC | Age: 53
End: 2023-03-06

## 2023-03-06 ENCOUNTER — TELEPHONE (OUTPATIENT)
Dept: CARDIOLOGY CLINIC | Facility: CLINIC | Age: 53
End: 2023-03-06

## 2023-03-06 DIAGNOSIS — Z95.810 AICD (AUTOMATIC CARDIOVERTER/DEFIBRILLATOR) PRESENT: Primary | ICD-10-CM

## 2023-03-06 NOTE — TELEPHONE ENCOUNTER
----- Message from Jennifer Chapa MD sent at 3/6/2023 12:39 PM EST -----  1 brief ventricular run   Continue cpap at night + meds

## 2023-03-06 NOTE — PROGRESS NOTES
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

## 2023-03-27 ENCOUNTER — TELEPHONE (OUTPATIENT)
Dept: CARDIOLOGY CLINIC | Facility: CLINIC | Age: 53
End: 2023-03-27

## 2023-03-27 NOTE — TELEPHONE ENCOUNTER
Received a call from Tawnya Garcia from Dr Maxwell Velasquez office asking for a return call regarding BP's    C/b# [phone]

## 2023-06-12 ENCOUNTER — OFFICE VISIT (OUTPATIENT)
Dept: CARDIOLOGY CLINIC | Facility: CLINIC | Age: 53
End: 2023-06-12
Payer: COMMERCIAL

## 2023-06-12 VITALS
WEIGHT: 208 LBS | DIASTOLIC BLOOD PRESSURE: 60 MMHG | HEART RATE: 77 BPM | OXYGEN SATURATION: 98 % | BODY MASS INDEX: 39.27 KG/M2 | SYSTOLIC BLOOD PRESSURE: 104 MMHG | HEIGHT: 61 IN

## 2023-06-12 DIAGNOSIS — Z95.810 AICD (AUTOMATIC CARDIOVERTER/DEFIBRILLATOR) PRESENT: ICD-10-CM

## 2023-06-12 DIAGNOSIS — I50.1 HEART FAILURE, LEFT, WITH LVEF 31-40% (HCC): Primary | ICD-10-CM

## 2023-06-12 DIAGNOSIS — I50.41 ACUTE COMBINED SYSTOLIC AND DIASTOLIC CONGESTIVE HEART FAILURE (HCC): ICD-10-CM

## 2023-06-12 DIAGNOSIS — I47.20 VENTRICULAR TACHYCARDIA, UNSPECIFIED (HCC): ICD-10-CM

## 2023-06-12 DIAGNOSIS — I44.7 LEFT BUNDLE BRANCH BLOCK (LBBB): ICD-10-CM

## 2023-06-12 DIAGNOSIS — I42.8 NON-ISCHEMIC CARDIOMYOPATHY (HCC): ICD-10-CM

## 2023-06-12 DIAGNOSIS — I49.3 PVC (PREMATURE VENTRICULAR CONTRACTION): ICD-10-CM

## 2023-06-12 LAB — HBA1C MFR BLD HPLC: 5.8 %

## 2023-06-12 PROCEDURE — 99214 OFFICE O/P EST MOD 30 MIN: CPT | Performed by: INTERNAL MEDICINE

## 2023-06-12 PROCEDURE — 93000 ELECTROCARDIOGRAM COMPLETE: CPT | Performed by: INTERNAL MEDICINE

## 2023-06-12 RX ORDER — SPIRONOLACTONE 25 MG/1
12.5 TABLET ORAL EVERY OTHER DAY
Qty: 90 TABLET | Refills: 3
Start: 2023-06-12

## 2023-06-12 NOTE — PROGRESS NOTES
Cardiology Follow Up  Johan Cox  1970  [id number]  [address] [facility]  [address]  [address] [facility], [facility] [address]  [place] [id number] [phone]    1  Heart failure, left, with LVEF 31-40% (HCC)  POCT ECG    spironolactone (ALDACTONE) 25 mg tablet      2  AICD (automatic cardioverter/defibrillator) present  POCT ECG      3  PVC (premature ventricular contraction)  POCT ECG      4  Left bundle branch block (LBBB)  POCT ECG      5  Non-ischemic cardiomyopathy (HCC)  spironolactone (ALDACTONE) 25 mg tablet    Comprehensive metabolic panel      6  Acute combined systolic and diastolic congestive heart failure (HCC)  spironolactone (ALDACTONE) 25 mg tablet      7  Ventricular tachycardia, unspecified (HCC)  Comprehensive metabolic panel         Discussion/Plan:  Nonischemic CM EF34% by MRI <30% by echo 2d NYHA class 2- MRI with viral etiology? She is now wearing her CPAP  Tolerating Entresto therapy- 49-51mg  He was unable to tolerate as she will he 2 inhibitor-she suffers from any yeast infection  Carvedilol 12 5mg in morning and 12 5mg at night  We will cut her spironolactone to 12 5 mg every other day  Discussed about need for compression socks  NSVT- her repeat heart monitor did not show nonsustained ventricular tachycardia  She is wearing her CPAP at night  Her ICD is working normally  Her last blood work with normal electrolytes    Acute on chronic kidney disease- improved  Electrolytes stable  Recheck for electrolytes    Diverticulitis- currently high risk for procedure  Would delay until improved EF  Hyperkalemia- she will watch her potassium intake    TAE- trouble with cpap  Positive pressure  Interval History:  She denies having significant lower extremity edema since discharge  She denies feeling dizziness or lightheadedness  She denies having chest discomfort    Denies having syncopal episodes  Denies having any irregular heart rhythm  8/12:  No edema  No syncope  No dizziness or light-headness  Compliant with meds  She has had no device firings  She has been compliant with the up titration of the Entresto  She reports trying to increase her carvedilol but did not feel right  She understands about orthostatic hypotension in the risk of prolonged standing  She has been watching her sodium intake  Her weight has been down trending  11/25:  She is up titrated her carvedilol without significant dizziness lightheadedness  She does feel some orthostatic symptoms  She stands up slowly  She denies having significant lower extremity swelling  She is compliant with her medications  She is wearing compression socks when she is at work  01/20/2020:  Reviewed through her imaging study which shows her EF has improved to near 34%  She is walking a mi without significant shortness of breath  She denies having any dizziness or lightheadedness  She is wearing her compression socks at work  She is compliant with her medications  Her last blood work showed mildly elevated potassium  She is watching her potassium foods  Her kidney function has returned back to normal Holter a shin  9/10/20:  Still with trouble with cpap  Feels gassy  No dizziness or light-headness  03/15/2021:  She denies feeling any chest tightness  She denies feeling dizziness or lightheadedness  I reviewed with her her last studies  She is compliant with her medications  04/29/2021:  She is walking more than a mi without having shortness of breath or chest discomfort  She is compliant with her heart failure regimen  Her weight stays very stable  We reviewed through her recent MRI  We had a mutual discussion about having a primary defibrillator    I discussed with her given she is a nonischemic cardiomyopathy with a very good functional status a primary prevention device would have limited benefits  She is currently wishes not to go ahead with a defibrillator placement  She will notify me if she feels any major shortness of breath, palpitations or chest pain  We will continue optimal medical therapy  10/28/2021:  Her weight has been stable  She denies having dizziness or lightheadedness  She denies having chest heaviness  No major changes EKG  Compliant with medications  4 15 2022 she reports having recent travel  She denies having dizziness or lightheadedness  She is compliant with her medications  She denies having lower extremity edema  08/08/2022:  She was unable to tolerate the SGLT2 inhibitor  She unfortunately had a yeast infection that was difficult to control  She currently denies having major exertional chest pain or shortness of breath  She denies feeling dizziness or lightheadedness  We discussed about her echocardiogram results  She is wearing her CPAP at night  However unfortunately her CPAP has been recalled  She is using soap and water to clean it  6/12/2023: She has periodic low blood pressures  She may have low fluid intake  She is taking her medication regularly  She is taking her CPAP at night  She denies having major palpitations      Patient Active Problem List   Diagnosis   • Nicotine abuse   • GERD (gastroesophageal reflux disease)   • Acute combined systolic and diastolic congestive heart failure (HCC)   • Diverticulitis   • Hypokalemia   • Non-ischemic cardiomyopathy (HCC)   • Diverticulitis of large intestine without perforation or abscess without bleeding   • TAE (obstructive sleep apnea)   • Left bundle branch block (LBBB)   • Heart failure, left, with LVEF <=30% ([facility])   • PVC (premature ventricular contraction)   • Post-op pain   • [facility], [facility] ([facility] )     Past Medical History:   Diagnosis Date   • CHF (congestive heart failure) ([facility])    • Diverticulitis of colon    • GERD (gastroesophageal reflux disease)    • Heart disease 2019   • HH (hiatus hernia)    • Stomach disorder    • Vascular disorder      Social History     Socioeconomic History   • Marital status: /Civil Union     Spouse name: Not on file   • Number of children: Not on file   • Years of education: Not on file   • Highest education level: Not on file   Occupational History   • Not on file   Tobacco Use   • Smoking status: Some Days     Packs/day: 0 50     Years: 33 00     Total pack years: 16 50     Types: Cigarettes     Last attempt to quit: 2019     Years since quitting: 3 9   • Smokeless tobacco: Former   Vaping Use   • Vaping Use: Never used   Substance and Sexual Activity   • Alcohol use: Not Currently     Alcohol/week: 0 0 standard drinks of alcohol     Comment: 2x year   • Drug use: No   • Sexual activity: Not on file   Other Topics Concern   • Not on file   Social History Narrative   • Not on file     Social Determinants of Health     Financial Resource Strain: Not on file   Food Insecurity: Not on file   Transportation Needs: Not on file   Physical Activity: Not on file   Stress: Not on file   Social Connections: Not on file   Intimate Partner Violence: Not on file   Housing Stability: Not on file      Family History   Problem Relation Age of Onset   • Heart attack Mother    • Heart attack Father      Past Surgical History:   Procedure Laterality Date   • CARDIAC ELECTROPHYSIOLOGY PROCEDURE N/A 10/26/2022    Procedure: Cardiac biv icd implant/ BIV ICD SUBMUSCULAR IMPLANT;  Surgeon: Griselda Levan, DO;  Location: [facility];   Service: Cardiology   •  SECTION     • CHOLECYSTECTOMY     • HAND SURGERY     • HYSTERECTOMY         Current Outpatient Medications:   •  albuterol (PROVENTIL HFA,VENTOLIN HFA) 90 mcg/act inhaler, , Disp: , Rfl:   •  ascorbic acid (VITAMIN C) 500 mg tablet, Take 500 mg by mouth daily, Disp: , Rfl:   •  carvedilol (COREG) 12 5 mg tablet, TAKE 1 TABLET BY MOUTH  TWICE DAILY WITH MEALS, Disp: 180 tablet, Rfl: 3  •  cholecalciferol (VITAMIN D3) 1,000 units tablet, Take 1,000 Units by mouth daily Winter time only, Disp: , Rfl:   •  Entresto 49-51 MG TABS, TAKE 1 TABLET BY MOUTH  TWICE DAILY, Disp: 180 tablet, Rfl: 3  •  spironolactone (ALDACTONE) 25 mg tablet, Take 0 5 tablets (12 5 mg total) by mouth every other day, Disp: 90 tablet, Rfl: 3  •  Turmeric (QC TUMERIC COMPLEX PO), Take 500 mg by mouth, Disp: , Rfl:   •  ergocalciferol (VITAMIN D2) 50,000 units, , Disp: , Rfl:   Allergies   Allergen Reactions   • Percocet [Oxycodone-Acetaminophen] Hives       Review of Systems:  Review of Systems   Constitutional: Positive for fatigue  Negative for activity change, appetite change, chills, diaphoresis, fever and unexpected weight change  HENT: Negative  Negative for congestion, dental problem, drooling, ear discharge, ear pain, facial swelling, hearing loss, mouth sores, nosebleeds, postnasal drip, rhinorrhea, sinus pressure, sinus pain, sneezing, sore throat, tinnitus, trouble swallowing and voice change  Eyes: Negative  Negative for photophobia, pain, redness, itching and visual disturbance  Respiratory: Negative for apnea, cough, choking, chest tightness, shortness of breath, wheezing and stridor  Cardiovascular: Negative for chest pain, palpitations and leg swelling  Gastrointestinal: Negative  Negative for abdominal distention, abdominal pain, anal bleeding, blood in stool, constipation, diarrhea, nausea, rectal pain and vomiting  Endocrine: Negative  Negative for cold intolerance, heat intolerance, polydipsia, polyphagia and polyuria  Genitourinary: Negative  Negative for decreased urine volume, difficulty urinating, dyspareunia, dysuria, enuresis, flank pain, frequency, genital sores, hematuria, menstrual problem, pelvic pain, urgency, vaginal bleeding, vaginal discharge and vaginal pain  Musculoskeletal: Negative    Negative for arthralgias, back pain, gait problem, joint swelling, myalgias, "neck pain and neck stiffness  Skin: Negative  Negative for color change, pallor, rash and wound  Allergic/Immunologic: Negative  Negative for environmental allergies, food allergies and immunocompromised state  Neurological: Negative  Negative for dizziness, tremors, seizures, syncope, facial asymmetry, speech difficulty, weakness, light-headedness, numbness and headaches  Hematological: Negative  Negative for adenopathy  Does not bruise/bleed easily  Psychiatric/Behavioral: Negative  Negative for agitation, behavioral problems, confusion, decreased concentration, dysphoric mood, hallucinations, self-injury, sleep disturbance and suicidal ideas  The patient is not nervous/anxious and is not hyperactive  All other systems reviewed and are negative  Vitals:    06/12/23 1612   BP: 104/60   BP Location: Right arm   Patient Position: Sitting   Cuff Size: Standard   Pulse: 77   SpO2: 98%   Weight: 94 3 kg (208 lb)   Height: 5' 1\" (1 549 m)     Physical Exam:  Physical Exam  Constitutional:       General: She is not in acute distress  Appearance: She is well-developed  She is not diaphoretic  HENT:      Head: Normocephalic and atraumatic  Right Ear: External ear normal       Left Ear: External ear normal    Eyes:      General: No scleral icterus  Right eye: No discharge  Left eye: No discharge  Conjunctiva/sclera: Conjunctivae normal       Pupils: Pupils are equal, round, and reactive to light  Neck:      Thyroid: No thyromegaly  Vascular: No JVD  Trachea: No tracheal deviation  Cardiovascular:      Rate and Rhythm: Normal rate and regular rhythm  Heart sounds: Murmur heard  No friction rub  Gallop present  Pulmonary:      Effort: Pulmonary effort is normal  No respiratory distress  Breath sounds: Normal breath sounds  No stridor  No wheezing or rales  Chest:      Chest wall: No tenderness     Abdominal:      General: Bowel sounds are " "normal  There is distension  Palpations: Abdomen is soft  There is no mass  Tenderness: There is no abdominal tenderness  There is no guarding or rebound  Musculoskeletal:         General: No tenderness or deformity  Normal range of motion  Cervical back: Normal range of motion and neck supple  Skin:     General: Skin is warm and dry  Coloration: Skin is not pale  Findings: No erythema or rash  Comments: Positive varicose veins   Neurological:      Mental Status: She is alert and oriented to person, place, and time  Cranial Nerves: No cranial nerve deficit  Motor: No abnormal muscle tone  Coordination: Coordination normal       Deep Tendon Reflexes: Reflexes are normal and symmetric  Reflexes normal    Psychiatric:         Behavior: Behavior normal          Thought Content: Thought content normal          Judgment: Judgment normal          Labs:     Lab Results   Component Value Date    HCT 42 3 10/26/2022    HGB 13 7 10/26/2022    MCV 91 10/26/2022     10/26/2022    WBC 7 69 10/26/2022     Lab Results   Component Value Date    ALKPHOS 101 06/26/2019    ALT 21 10/13/2022    AST 15 10/13/2022    BUN 24 10/26/2022    CALCIUM 9 4 10/26/2022     (H) 10/26/2022    CO2 22 10/26/2022    CREATININE 0 94 10/26/2022    EGFR 68 10/26/2022    GLUF 131 (H) 10/26/2022    K 4 6 10/26/2022     No results found for: \"CHOL\"  Lab Results   Component Value Date    HDL 44 03/26/2019     Lab Results   Component Value Date    LDLCALC 116 (H) 03/26/2019     Lab Results   Component Value Date    TRIG 53 03/26/2019     No results found for: \"HGBA1C\"    Imaging & Testing   I have personally reviewed pertinent reports  EKG: Personally reviewed        Cardiac testing:   Results for orders placed during the hospital encounter of 06/23/19   Echo complete with contrast if indicated    Narrative [address]  [address] [place], [place] " [id number]  [phone]    Transthoracic Echocardiogram  2D, M-mode, Doppler, and Color Doppler    Study date:  2019    Patient: Deborah Nguyen  MR number: [id number]  Account number: [de-identified]  : 1970  Age: 44 years  Gender: Female  Status: Inpatient  Location: Bedside  Height: 61 in  Weight: 219 6 lb  BP: 118/ 80 mmHg    Indications: Heart Failure    Diagnoses: I50 9 - Heart failure, unspecified    Sonographer:  ISABELL Blair  Primary Physician:  Von Barton MD  Referring Physician:  Eloisa Jamil DO  Group:  [id number]  Interpreting Physician:  Alda Anaya MD    SUMMARY    LEFT VENTRICLE:  Systolic function was markedly reduced  Ejection fraction was estimated in the range of 25 % to 30 % to be 30 %  There was severe diffuse hypokinesis  Doppler parameters were consistent with abnormal left ventricular relaxation (grade 1 diastolic dysfunction)  RIGHT VENTRICLE:  Systolic function was moderately reduced  LEFT ATRIUM:  The atrium was mildly dilated  RIGHT ATRIUM:  The atrium was mildly dilated  MITRAL VALVE:  There was trace regurgitation  IVC, HEPATIC VEINS:  The inferior vena cava was dilated  HISTORY: PRIOR HISTORY: GERD, Hiatal Hernia    PROCEDURE: The procedure was performed at the bedside  This was a routine study  The transthoracic approach was used  The study included complete 2D imaging, M-mode, complete spectral Doppler, and color Doppler  The heart rate was 92 bpm,  at the start of the study  Image quality was adequate  LEFT VENTRICLE: Size was normal  Systolic function was markedly reduced  Ejection fraction was estimated in the range of 25 % to 30 % to be 30 %  There was severe diffuse hypokinesis  Wall thickness was normal  No evidence of apical  thrombus  DOPPLER: Doppler parameters were consistent with abnormal left ventricular relaxation (grade 1 diastolic dysfunction)      RIGHT VENTRICLE: The size was normal  Systolic function was moderately reduced  Wall thickness was normal     LEFT ATRIUM: The atrium was mildly dilated  RIGHT ATRIUM: The atrium was mildly dilated  MITRAL VALVE: Valve structure was normal  There was normal leaflet separation  DOPPLER: The transmitral velocity was within the normal range  There was no evidence for stenosis  There was trace regurgitation  AORTIC VALVE: The valve was trileaflet  Leaflets exhibited normal thickness and normal cuspal separation  DOPPLER: Transaortic velocity was within the normal range  There was no evidence for stenosis  There was no significant  regurgitation  TRICUSPID VALVE: The valve structure was normal  There was normal leaflet separation  DOPPLER: The transtricuspid velocity was within the normal range  There was no evidence for stenosis  There was no significant regurgitation  PULMONIC VALVE: Leaflets exhibited normal thickness, no calcification, and normal cuspal separation  DOPPLER: The transpulmonic velocity was within the normal range  There was no significant regurgitation  PERICARDIUM: There was no pericardial effusion  The pericardium was normal in appearance  AORTA: The root exhibited normal size  SYSTEMIC VEINS: IVC: The inferior vena cava was dilated  SYSTEM MEASUREMENT TABLES    2D mode  AoR Diam 2D: 3 2 cm  LA Diam (2D): 4 5 cm  LA/Ao (2D): 1 41  FS (2D Teich): 13 7 %  IVSd (2D): 0 82 cm  LVDEV: 193 cmï¾³  LVESV: 138 cmï¾³  LVIDd(2D): 6 19 cm  LVISd (2D): 5 34 cm  LVPWd (2D): 0 81 cm  SV (Teich): 55 cmï¾³    Apical four chamber  LVEF A4C: 28 %    Unspecified Scan Mode  MV Peak E Zach   Mean: 848 mm/s  MVA (PHT): 4 15 cmï¾²  PHT: 53 ms  Max P mm[Hg]  V Max: 2440 mm/s  Vmax: 2500 mm/s  RA Area: 18 1 cmï¾²  RA Volume: 48 8 cmï¾³  TAPSE: 1 1 cm    [employer] Accredited Echocardiography Laboratory    Prepared and electronically signed by    Estephania Thompson MD  Signed 2019 13:10:35       ekg sinus rhythm with LBBB periodic "bean Luna  Please call with any questions or suggestions    A description of the counseling:   Goals and Barriers:  Patient's ability to self care:  Medication side effect reviewed with patient in detail and all their questions answered  \"This note has been constructed using a voice recognition system  Therefore there may be syntax, spelling, and/or grammatical errors  Please call if you have any questions   \"    "

## 2023-06-15 ENCOUNTER — REMOTE DEVICE CLINIC VISIT (OUTPATIENT)
Dept: CARDIOLOGY CLINIC | Facility: CLINIC | Age: 53
End: 2023-06-15
Payer: COMMERCIAL

## 2023-06-15 DIAGNOSIS — Z95.810 PRESENCE OF AUTOMATIC CARDIOVERTER/DEFIBRILLATOR (AICD): Primary | ICD-10-CM

## 2023-06-15 PROCEDURE — 93296 REM INTERROG EVL PM/IDS: CPT | Performed by: INTERNAL MEDICINE

## 2023-06-15 PROCEDURE — 93295 DEV INTERROG REMOTE 1/2/MLT: CPT | Performed by: INTERNAL MEDICINE

## 2023-06-15 NOTE — PROGRESS NOTES
Results for orders placed or performed in visit on 06/15/23   Cardiac EP device report    Narrative    MDT BI-V ICD / ACTIVE SYSTEM IS MRI CONDITIONAL  CARELINK TRANSMISSION: BATTERY VOLTAGE ADEQUATE  (11 1 YRS) AP 62%  80% ( <90% DUE TO PVCS)  ALL AVAILABLE LEAD PARAMETERS WITHIN NORMAL LIMITS  2 NSVT EPISODES DETECTED ADDRESSED IN 5/22 ALERT  VENTRICULAR SENSE EPISODES DETECTED  OPTI-VOL WITHIN NORMAL LIMITS  NORMAL DEVICE FUNCTION  ----ORTIZ

## 2023-06-26 ENCOUNTER — TELEPHONE (OUTPATIENT)
Dept: CARDIOLOGY CLINIC | Facility: CLINIC | Age: 53
End: 2023-06-26

## 2023-06-26 NOTE — TELEPHONE ENCOUNTER
Hi, this is [facility]  My phone number is [phone]  I just have some questions  My device started beeping so I was calling to find out what is going on  If somebody could help me with that  So please just have somebody get back to me  Thank you

## 2023-07-03 ENCOUNTER — IN-CLINIC DEVICE VISIT (OUTPATIENT)
Dept: CARDIOLOGY CLINIC | Facility: CLINIC | Age: 53
End: 2023-07-03

## 2023-07-03 ENCOUNTER — HOSPITAL ENCOUNTER (OUTPATIENT)
Facility: HOSPITAL | Age: 53
Setting detail: OBSERVATION
Discharge: HOME | End: 2023-07-05
Attending: EMERGENCY MEDICINE | Admitting: STUDENT IN AN ORGANIZED HEALTH CARE EDUCATION/TRAINING PROGRAM
Payer: COMMERCIAL

## 2023-07-03 DIAGNOSIS — I49.3 PVC (PREMATURE VENTRICULAR CONTRACTION): ICD-10-CM

## 2023-07-03 DIAGNOSIS — I47.20 VENTRICULAR TACHYCARDIA, UNSPECIFIED (HCC): Primary | ICD-10-CM

## 2023-07-03 DIAGNOSIS — Z95.810 PRESENCE OF AUTOMATIC CARDIOVERTER/DEFIBRILLATOR (AICD): Primary | ICD-10-CM

## 2023-07-03 DIAGNOSIS — I50.1 HEART FAILURE, LEFT, WITH LVEF <=30% (HCC): Primary | ICD-10-CM

## 2023-07-03 DIAGNOSIS — I47.20 VENTRICULAR TACHYCARDIA, UNSPECIFIED (HCC): ICD-10-CM

## 2023-07-03 DIAGNOSIS — I50.1 HEART FAILURE, LEFT, WITH LVEF <=30% (HCC): ICD-10-CM

## 2023-07-03 DIAGNOSIS — I42.8 NON-ISCHEMIC CARDIOMYOPATHY (HCC): ICD-10-CM

## 2023-07-03 PROBLEM — Z45.02 ICD (IMPLANTABLE CARDIOVERTER-DEFIBRILLATOR) DISCHARGE: Status: ACTIVE | Noted: 2023-07-03

## 2023-07-03 LAB
ANION GAP SERPL CALCULATED.3IONS-SCNC: 5 MMOL/L
ATRIAL RATE: 78 BPM
BASOPHILS # BLD AUTO: 0.05 THOUSANDS/ÂΜL (ref 0–0.1)
BASOPHILS NFR BLD AUTO: 1 % (ref 0–1)
BUN SERPL-MCNC: 16 MG/DL (ref 5–25)
CALCIUM SERPL-MCNC: 9.6 MG/DL (ref 8.3–10.1)
CHLORIDE SERPL-SCNC: 111 MMOL/L (ref 96–108)
CO2 SERPL-SCNC: 23 MMOL/L (ref 21–32)
CREAT SERPL-MCNC: 0.83 MG/DL (ref 0.6–1.3)
EOSINOPHIL # BLD AUTO: 0.11 THOUSAND/ÂΜL (ref 0–0.61)
EOSINOPHIL NFR BLD AUTO: 1 % (ref 0–6)
ERYTHROCYTE [DISTWIDTH] IN BLOOD BY AUTOMATED COUNT: 13.9 % (ref 11.6–15.1)
GFR SERPL CREATININE-BSD FRML MDRD: 79 ML/MIN/1.73SQ M
GLUCOSE SERPL-MCNC: 91 MG/DL (ref 65–140)
HCT VFR BLD AUTO: 44.9 % (ref 34.8–46.1)
HGB BLD-MCNC: 14.2 G/DL (ref 11.5–15.4)
IMM GRANULOCYTES # BLD AUTO: 0.03 THOUSAND/UL (ref 0–0.2)
IMM GRANULOCYTES NFR BLD AUTO: 0 % (ref 0–2)
LYMPHOCYTES # BLD AUTO: 2.36 THOUSANDS/ÂΜL (ref 0.6–4.47)
LYMPHOCYTES NFR BLD AUTO: 27 % (ref 14–44)
MAGNESIUM SERPL-MCNC: 2.3 MG/DL (ref 1.6–2.6)
MCH RBC QN AUTO: 28.6 PG (ref 26.8–34.3)
MCHC RBC AUTO-ENTMCNC: 31.6 G/DL (ref 31.4–37.4)
MCV RBC AUTO: 90 FL (ref 82–98)
MONOCYTES # BLD AUTO: 0.57 THOUSAND/ÂΜL (ref 0.17–1.22)
MONOCYTES NFR BLD AUTO: 6 % (ref 4–12)
NEUTROPHILS # BLD AUTO: 5.79 THOUSANDS/ÂΜL (ref 1.85–7.62)
NEUTS SEG NFR BLD AUTO: 65 % (ref 43–75)
NRBC BLD AUTO-RTO: 0 /100 WBCS
P AXIS: 31 DEGREES
PHOSPHATE SERPL-MCNC: 4.9 MG/DL (ref 2.7–4.5)
PLATELET # BLD AUTO: 221 THOUSANDS/UL (ref 149–390)
PMV BLD AUTO: 11.1 FL (ref 8.9–12.7)
POTASSIUM SERPL-SCNC: 4.3 MMOL/L (ref 3.5–5.3)
PR INTERVAL: 160 MS
QRS AXIS: -72 DEGREES
QRSD INTERVAL: 108 MS
QT INTERVAL: 430 MS
QTC INTERVAL: 490 MS
RBC # BLD AUTO: 4.97 MILLION/UL (ref 3.81–5.12)
SODIUM SERPL-SCNC: 139 MMOL/L (ref 135–147)
T WAVE AXIS: 38 DEGREES
VENTRICULAR RATE: 78 BPM
WBC # BLD AUTO: 8.91 THOUSAND/UL (ref 4.31–10.16)

## 2023-07-03 PROCEDURE — 93010 ELECTROCARDIOGRAM REPORT: CPT | Performed by: INTERNAL MEDICINE

## 2023-07-03 PROCEDURE — 99285 EMERGENCY DEPT VISIT HI MDM: CPT | Performed by: EMERGENCY MEDICINE

## 2023-07-03 PROCEDURE — 93005 ELECTROCARDIOGRAM TRACING: CPT

## 2023-07-03 PROCEDURE — 99215 OFFICE O/P EST HI 40 MIN: CPT | Performed by: INTERNAL MEDICINE

## 2023-07-03 PROCEDURE — 80048 BASIC METABOLIC PNL TOTAL CA: CPT

## 2023-07-03 PROCEDURE — 99223 1ST HOSP IP/OBS HIGH 75: CPT | Performed by: FAMILY MEDICINE

## 2023-07-03 PROCEDURE — RECHECK

## 2023-07-03 PROCEDURE — 84100 ASSAY OF PHOSPHORUS: CPT

## 2023-07-03 PROCEDURE — 99285 EMERGENCY DEPT VISIT HI MDM: CPT

## 2023-07-03 PROCEDURE — 83735 ASSAY OF MAGNESIUM: CPT

## 2023-07-03 PROCEDURE — 36415 COLL VENOUS BLD VENIPUNCTURE: CPT

## 2023-07-03 PROCEDURE — 85025 COMPLETE CBC W/AUTO DIFF WBC: CPT

## 2023-07-03 RX ORDER — DOCUSATE SODIUM 100 MG/1
100 CAPSULE, LIQUID FILLED ORAL 2 TIMES DAILY
Status: DISCONTINUED | OUTPATIENT
Start: 2023-07-04 | End: 2023-07-05 | Stop reason: HOSPADM

## 2023-07-03 RX ORDER — ONDANSETRON 2 MG/ML
4 INJECTION INTRAMUSCULAR; INTRAVENOUS EVERY 6 HOURS PRN
Status: DISCONTINUED | OUTPATIENT
Start: 2023-07-03 | End: 2023-07-05 | Stop reason: HOSPADM

## 2023-07-03 RX ORDER — METOPROLOL SUCCINATE 100 MG/1
100 TABLET, EXTENDED RELEASE ORAL EVERY 12 HOURS
Status: DISCONTINUED | OUTPATIENT
Start: 2023-07-03 | End: 2023-07-05 | Stop reason: HOSPADM

## 2023-07-03 RX ORDER — CALCIUM CARBONATE 500 MG/1
1000 TABLET, CHEWABLE ORAL DAILY PRN
Status: DISCONTINUED | OUTPATIENT
Start: 2023-07-03 | End: 2023-07-05 | Stop reason: HOSPADM

## 2023-07-03 RX ORDER — ACETAMINOPHEN 325 MG/1
650 TABLET ORAL EVERY 6 HOURS PRN
Status: DISCONTINUED | OUTPATIENT
Start: 2023-07-03 | End: 2023-07-05 | Stop reason: HOSPADM

## 2023-07-03 RX ORDER — ALBUTEROL SULFATE 90 UG/1
2 AEROSOL, METERED RESPIRATORY (INHALATION)
Status: DISCONTINUED | OUTPATIENT
Start: 2023-07-03 | End: 2023-07-04

## 2023-07-03 RX ORDER — SPIRONOLACTONE 25 MG/1
12.5 TABLET ORAL EVERY OTHER DAY
Status: DISCONTINUED | OUTPATIENT
Start: 2023-07-03 | End: 2023-07-05 | Stop reason: HOSPADM

## 2023-07-03 RX ORDER — ASCORBIC ACID 500 MG
500 TABLET ORAL DAILY
Status: DISCONTINUED | OUTPATIENT
Start: 2023-07-04 | End: 2023-07-05 | Stop reason: HOSPADM

## 2023-07-03 RX ADMIN — SACUBITRIL AND VALSARTAN 1 TABLET: 49; 51 TABLET, FILM COATED ORAL at 22:04

## 2023-07-03 RX ADMIN — METOPROLOL SUCCINATE 100 MG: 100 TABLET, EXTENDED RELEASE ORAL at 17:52

## 2023-07-03 NOTE — ASSESSMENT & PLAN NOTE
· Patient with ICD shock on 6/26  · Patient reported that she did not feel the shocking and she was so symptomatic. · Sent by cardiology to the hospital for monitoring due to nonsustained/nontreated VF episodes noted on ICD interrogation today  · Electrophysiology evaluation appreciated. Started the patient on metoprolol succinate. · Patient was on carvedilol as outpatient  · Monitor on telemetry  · Follow-up with the electrophysiology follow-up and recommendation  · Patient reported that she was on vacation and had a more alcohol consumption during that.   Of ICD firing

## 2023-07-03 NOTE — H&P
[address] [facility]  H&P  Name: Shima Hinojosa 52 y.o. female I MRN: [id number]  Unit/Bed#: CW2 216-02 I Date of Admission: 7/3/2023   Date of Service: 7/3/2023 I Hospital Day: 0      Assessment/Plan   * ICD (implantable cardioverter-defibrillator) discharge  Assessment & Plan  · Patient with ICD shock on 6/26  · Patient reported that she did not feel the shocking and she was so symptomatic. · Sent by cardiology to the hospital for monitoring due to nonsustained/nontreated VF episodes noted on ICD interrogation today  · Electrophysiology evaluation appreciated. Started the patient on metoprolol succinate. · Patient was on carvedilol as outpatient  · Monitor on telemetry  · Follow-up with the electrophysiology follow-up and recommendation  · Patient reported that she was on vacation and had a more alcohol consumption during that. Of ICD firing      Ventricular tachycardia, unspecified (HCC)  Assessment & Plan  · VF with successful ICD shock-patient with recent alcohol and quinolone use  · EKG shows frequent PVCs  · Echocardiogram  · Electro physiology evaluation appreciated  · Monitor electrolytes  ·     PVC (premature ventricular contraction)  Assessment & Plan  · Noted to have frequent PVCs on telemetry. Cardiology on board. Changing to Toprol  · Monitor on telemetry    Left bundle branch block (LBBB)  Assessment & Plan  · Monitor on telemetry  Known LBBB    TAE (obstructive sleep apnea)  Assessment & Plan  · cpap qhs    Non-ischemic cardiomyopathy ([address])  Assessment & Plan  · Patient with history of  Non  ischemic cardiomyopathy with ejection fraction 25- 30% on echocardiogram done on 5/22.   Presented to the hospital with chest pain and 3/2019 and noted to have ejection fraction 35%  · Viral cardiomyopathy per records  · Early on Entresto ,carvedilol and spironolactone  · Monitor on telemetry  · Appears euvolemic on examination  · Changed to metoprolol succinate  · Continue with Entresto and spironolactone        VTE Pharmacologic Prophylaxis: VTE Score: 2 Low Risk (Score 0-2) - Encourage Ambulation. Code Status: full code  Discussion with family: Updated patient. Anticipated Length of Stay: Patient will be admitted under observation basis to monitor on telemetry due to ICD firing  Total Time Spent on Date of Encounter in care of patient: 65 minutes This time was spent on one or more of the following: performing physical exam; counseling and coordination of care; obtaining or reviewing history; documenting in the medical record; reviewing/ordering tests, medications or procedures; communicating with other healthcare professionals and discussing with patient's family/caregivers. Chief Complaint: ICD firing     History of Present Illness:  Ellis Beebe is a 52 y.o. female with a PMH of non ischemic cardiomyopathy status post biventricular ICD placement, obstructive sleep apnea, CKD, left bundle branch block obesity who presents as requested by cardiology office. It appears that patient had an ICD firing on 6/26. Patient felt lightheaded and dizzy and her ICD was beeping. Patient reported that she was on vacation in the [place] and she had more alcoholic beverages than usual.  Patient had a device interrogation today and showed ICD firing and also.'s of NSVT. Patient recently had fluoroquinolones for a sinus infection. Today patient had device interrogation done in [facility] office and noted to have nonsustained episodes of V-fib which did not require ICD firing. Patient was sent to the emergency room for evaluation and admission. Patient was seen by electrophysiology and carvedilol was changed to metoprolol succinate. Electrophysiology recommending echocardiogram admitting overnight for monitoring on telemetry. Patient noted to have frequent PVCs on telemetry. Currently she denies any chest pain or shortness of breath.   She denies any specific complaints  Review of Systems:  Review of Systems   Constitutional: Negative. HENT: Negative. Eyes: Negative. Respiratory: Negative. Cardiovascular: Negative. Gastrointestinal: Negative. Endocrine: Negative. Genitourinary: Negative. Musculoskeletal: Negative. Skin: Negative. Neurological: Positive for dizziness and light-headedness. Hematological: Negative. Psychiatric/Behavioral: Negative. Past Medical and Surgical History:   Past Medical History:   Diagnosis Date   • CHF (congestive heart failure) ([address])    • Diverticulitis of colon    • GERD (gastroesophageal reflux disease)    • Heart disease 2019   • HH (hiatus hernia)    • Stomach disorder    • Vascular disorder        Past Surgical History:   Procedure Laterality Date   • CARDIAC ELECTROPHYSIOLOGY PROCEDURE N/A 10/26/2022    Procedure: Cardiac biv icd implant/ BIV ICD SUBMUSCULAR IMPLANT;  Surgeon: César Wilburn DO;  Location: [facility]; Service: Cardiology   •  SECTION     • CHOLECYSTECTOMY     • HAND SURGERY     • HYSTERECTOMY         Meds/Allergies:  Prior to Admission medications    Medication Sig Start Date End Date Taking?  Authorizing Provider   albuterol (PROVENTIL HFA,VENTOLIN HFA) 90 mcg/act inhaler  21   Historical Provider, MD   ascorbic acid (VITAMIN C) 500 mg tablet Take 500 mg by mouth daily    Historical Provider, MD   carvedilol (COREG) 12.5 mg tablet TAKE 1 TABLET BY MOUTH  TWICE DAILY WITH MEALS 22   Adrienne Sánchez MD   cholecalciferol (VITAMIN D3) 1,000 units tablet Take 1,000 Units by mouth daily Winter time only    Historical Provider, MD   Entresto 49-51 MG TABS TAKE 1 TABLET BY MOUTH  TWICE DAILY 10/25/22   Adrienne Sánchez MD   ergocalciferol (VITAMIN D2) 50,000 units  21   Historical Provider, MD   spironolactone (ALDACTONE) 25 mg tablet Take 0.5 tablets (12.5 mg total) by mouth every other day 23   Adrienne Sánchez MD   Turmeric (QC TUMERIC COMPLEX PO) Take 500 mg by mouth Historical Provider, MD FOX have reviewed home medications with patient personally. Allergies: Allergies   Allergen Reactions   • Percocet [Oxycodone-Acetaminophen] Hives       Social History:  Marital Status: /Civil Union   Occupation:   Patient Pre-hospital Living Situation: Home  Patient Pre-hospital Level of Mobility: walks  Patient Pre-hospital Diet Restrictions:   Substance Use History:   Social History     Substance and Sexual Activity   Alcohol Use Not Currently   • Alcohol/week: 0.0 standard drinks of alcohol    Comment: 2x year     Social History     Tobacco Use   Smoking Status Some Days   • Packs/day: 0.50   • Years: 33.00   • Total pack years: 16.50   • Types: Cigarettes   • Last attempt to quit: 2019   • Years since quittin.0   Smokeless Tobacco Former     Social History     Substance and Sexual Activity   Drug Use No       Family History:  Family History   Problem Relation Age of Onset   • Heart attack Mother    • Heart attack Father        Physical Exam:     Vitals:   Blood Pressure: 129/63 (23)  Pulse: 75 (23)  Temperature: 98.2 °F (36.8 °C) (23 142)  Temp Source: Oral (23 142)  Respirations: 17 (23)  SpO2: 98 % (23)    Physical Exam  Constitutional:       Appearance: She is obese. HENT:      Head: Normocephalic and atraumatic. Nose: Nose normal.   Eyes:      General: No scleral icterus. Cardiovascular:      Rate and Rhythm: Normal rate and regular rhythm. Pulses: Normal pulses. Pulmonary:      Effort: Pulmonary effort is normal.      Breath sounds: Normal breath sounds. Abdominal:      General: There is no distension. Musculoskeletal:         General: Normal range of motion. Cervical back: Normal range of motion. Skin:     General: Skin is warm. Neurological:      General: No focal deficit present. Mental Status: She is alert and oriented to person, place, and time.  Mental status is at baseline. Cranial Nerves: No cranial nerve deficit. Additional Data:     Lab Results:  Results from last 7 days   Lab Units 07/03/23  1520   WBC Thousand/uL 8.91   HEMOGLOBIN g/dL 14.2   HEMATOCRIT % 44.9   PLATELETS Thousands/uL 221   NEUTROS PCT % 65   LYMPHS PCT % 27   MONOS PCT % 6   EOS PCT % 1     Results from last 7 days   Lab Units 07/03/23  1520   SODIUM mmol/L 139   POTASSIUM mmol/L 4.3   CHLORIDE mmol/L 111*   CO2 mmol/L 23   BUN mg/dL 16   CREATININE mg/dL 0.83   ANION GAP mmol/L 5   CALCIUM mg/dL 9.6   GLUCOSE RANDOM mg/dL 91                       Lines/Drains:  Invasive Devices     Peripheral Intravenous Line  Duration           Peripheral IV 07/03/23 Left Antecubital <1 day                    Imaging: No pertinent imaging reviewed. No orders to display       EKG and Other Studies Reviewed on Admission:   · EKG: Sinus rhythm with PVCs. ** Please Note: This note has been constructed using a voice recognition system.  **

## 2023-07-03 NOTE — ASSESSMENT & PLAN NOTE
· VF with successful ICD shock-patient with recent alcohol and quinolone use  · EKG shows frequent PVCs  · Echocardiogram  · Electro physiology evaluation appreciated  · Monitor electrolytes  ·

## 2023-07-03 NOTE — CONSULTS
Consultation - Electrophysiology - Cardiology  Jorge Camara 52 y.o. female MRN: [id number]  Unit/Bed#: ED 06 Encounter: [id number]      Inpatient consult to Electrophysiology  Consult performed by: Ledy Elise PA-C  Consult ordered by: Jenna Khan,         History of Present Illness   Physician Requesting Consult: Jenna Khna,*  Reason for Consult / Principal Problem: AICD discharge     Assessment/Plan   Assessment:  1. Syncope, PVC induced VF s/p ICD shock  -presents for ICD shock for PVC induced VT which accelerated to VF  - had ICD discharged for VF 6/26 but never presented to ED   - device interrogation shows VF rate 350 bpm   - had non sustained/ non treated VF episode noted on interrogation today and sent to ED   -tele here showing frequent PVCs  -not on AAD   -last cath 2019 without disease   -ICD shock occurred Monday after a weekend of heavy alcohol use and also recent quinolone use     2. PVCs  -- frequent PVCs, RVOT morphology  -- consider PVC ablation    3. Non ishchemic cardiomyopathy   -History of nonischemic cardiomyopathy with EF 34%  -Thought to be viral etiology  -MRI consistent with viral etiology  -Currently on Entresto 49-51 mg unable to tolerate SGLT2 inhibitor secondary to yeast infections  -Beta-blocker with Coreg 12.5 mg twice daily  -On Aldactone 12.5 mg q. OD    4. LBBB   -Previously with QRS morphology 144 ms  -EKG with improvement in QRS duration 114 ms     5.  [employer] BiV ICD  -Status post ICD implantation by Dr. Katharine Gilmore on 10/26/2022  -Planted secondary to cardiomyopathy with reduced LVEF of 25 to 30% with existing LBBB with QRS duration 144 MS  -Status post AICD shock for VT which degenerated to VF as above    Plan:  - VF s/p successful ICD shock in setting of alcohol use, recent quinolone use, and also frequent PVCs  -check echocardiogram  - change carvedilol to Toprol  mg bid   - consider AAD - avoid amiodarone use given patient's young age and long term side effects  - consider sotalol initiation   -Check electrolytes and replete to keep potassium greater than 4, magnesium greater than 2  - Monitor on telemetry and reassess tomorrow  - given arrhythmia appears PVC induced (RVOT origin), patient may be candidate for PVC ablation    HPI: Agnes Cleaning is a 52 y.o. female with history of nonischemic cardiomyopathy with EF 25 to 30%, GERD, left bundle branch block, BiV ICD placement, obstructive sleep apnea, CKD, obesity who presents to the emergency department for discharge of her BiV ICD secondary to VT which degenerated into VF. Patient has past medical history significant for nonischemic cardiomyopathy which was thought to be secondary to viral etiology. Patient initially presented to the emergency department in March 2019 with chest pain. Nuclear stress test at that time showed no evidence of ischemia but did show newly reduced ED after 35%. Does not appear the patient was started on GDMT adequately at that time was discharged but represented to the hospital on 6/19 with acute on chronic CHF in the setting of diverticulitis. Had cardiac catheterization done during that admission which did not show any obstructive coronary artery disease and was started on optimal medical therapy with carvedilol, and Entresto as well as Aldactone. Patient was discharged with LifeVest.  She underwent cardiac MRI on 3/2021 which showed LVEF of 34% with findings suggestive of viral cardiomyopathy. She had continued suppressed LVEF to 25 to 30% in May 2022 despite being on optical medical therapy and was thus referred to electrophysiology for ICD implantation. Patient underwent BiV ICD placement secondary to pre-existing LBBB for cardiac resynchronization therapy by Dr. Quyen Linda on 10/26/2022. Since that time she has followed up with Dr. Lidia Solares of cardiology. Patient has been followed in the device clinic and is shown periods of NSVT on prior device checks. She reports that she was at the beach week prior and drank "more than normal". She also states that she was recovering from recent sinus infection which she had taken a medication that "could result in tendon rupture". This was likely christen quinolone. Patient states that she was out on the  and had an episode of syncope after prodromal symptoms of lightheadedness and dizziness. She did call the office on 2023 as she was feeling her ICD "beeping". She had a device check done at that time which did show ICD shocks secondary to VT which degenerated into VF. She was again seen at the [address] [place] [address] where device was interrogated today 7/3 showing nonsustained episode of V-fib which did not require therapies. She was referred to the emergency department for further evaluation given new onset ventricular arrhythmias. Primary Cardiologist: [place]: NSR with frequent runs of PVC's      Historical Information   Past Medical History:   Diagnosis Date   • CHF (congestive heart failure) ([address])    • Diverticulitis of colon    • GERD (gastroesophageal reflux disease)    • Heart disease 2019   • HH (hiatus hernia)    • Stomach disorder    • Vascular disorder      Past Surgical History:   Procedure Laterality Date   • CARDIAC ELECTROPHYSIOLOGY PROCEDURE N/A 10/26/2022    Procedure: Cardiac biv icd implant/ BIV ICD SUBMUSCULAR IMPLANT;  Surgeon: Zeyad Arredondo DO;  Location: [facility];   Service: Cardiology   •  SECTION     • CHOLECYSTECTOMY     • HAND SURGERY     • HYSTERECTOMY       Social History     Substance and Sexual Activity   Alcohol Use Not Currently   • Alcohol/week: 0.0 standard drinks of alcohol    Comment: 2x year     Social History     Substance and Sexual Activity   Drug Use No     Social History     Tobacco Use   Smoking Status Some Days   • Packs/day: 0.50   • Years: 33.00   • Total pack years: 16.50   • Types: Cigarettes   • Last attempt to quit: 2019 • Years since quittin.0   Smokeless Tobacco Former     Family History:   Family History   Problem Relation Age of Onset   • Heart attack Mother    • Heart attack Father        Meds/Allergies   Hospital Medications:   No current facility-administered medications for this encounter. Home Medications: (Not in a hospital admission)      Allergies   Allergen Reactions   • Percocet [Oxycodone-Acetaminophen] Hives       Objective   Vitals: Blood pressure 118/87, pulse 74, temperature 98.2 °F (36.8 °C), temperature source Oral, resp. rate 18, SpO2 97 %. Orthostatic Blood Pressures    Flowsheet Row Most Recent Value   Blood Pressure 118/87 filed at 2023 1600   Patient Position - Orthostatic VS Sitting filed at 2023 1421          No intake or output data in the 24 hours ending 23 1622    Invasive Devices     Peripheral Intravenous Line  Duration           Peripheral IV 23 Left Antecubital <1 day                Review of Systems:  Review of Systems   Constitutional: Negative for malaise/fatigue. HENT: Negative. Eyes: Negative. Cardiovascular: Positive for syncope. Negative for chest pain, near-syncope and palpitations. Respiratory: Negative for cough, shortness of breath and wheezing. Endocrine: Negative. Hematologic/Lymphatic: Negative. Skin: Negative. Negative for rash. Musculoskeletal: Negative. Gastrointestinal: Negative for abdominal pain, nausea and vomiting. Genitourinary: Negative. Neurological: Negative for dizziness. Psychiatric/Behavioral: Negative. ROS as noted above, otherwise 12 point review of systems was performed and is negative. Physical Exam:   GEN: NAD, alert and oriented x 3, well appearing  SKIN: warm, dry without significant lesions or rashes. Left chest implant site C/D/I no erythema or hematoma. No signs of infection.    HEENT: NCAT, PERRL, EOMs intact  NECK: supple, no JVD appreciated  CARDIOVASCULAR: RRR, normal S1, S2 without murmurs, rubs, or gallops   LUNGS: CTA bilaterally without wheezes, rhonchi, or rales  ABDOMEN: Soft, nontender, nondistended. EXTREMITIES/VASCULAR: perfused without clubbing, cyanosis, or LE edema b/l  PSYCH: Normal mood and affect  NEURO: CN ll-Xll grossly intact    Lab Results: I have personally reviewed pertinent lab results. Results from last 7 days   Lab Units 23  1520   WBC Thousand/uL 8.91   HEMOGLOBIN g/dL 14.2   HEMATOCRIT % 44.9   PLATELETS Thousands/uL 221     Results from last 7 days   Lab Units 23  1520   POTASSIUM mmol/L 4.3   CHLORIDE mmol/L 111*   CO2 mmol/L 23   BUN mg/dL 16   CREATININE mg/dL 0.83   CALCIUM mg/dL 9.6         Results from last 7 days   Lab Units 23  1520   MAGNESIUM mg/dL 2.3       Imaging: I have personally reviewed pertinent reports. ECHO: Results for orders placed during the hospital encounter of 19    Echo complete with contrast if indicated    Narrative  [address] [place] [address]  [phone]    Transthoracic Echocardiogram  2D, M-mode, Doppler, and Color Doppler    Study date:  2019    Patient: Jayesh Perez  MR number: [id number]  Account number: [de-identified]  : 1970  Age: 44 years  Gender: Female  Status: Inpatient  Location: Bedside  Height: 61 in  Weight: 219.6 lb  BP: 118/ 80 mmHg    Indications: Heart Failure    Diagnoses: I50.9 - Heart failure, unspecified    Sonographer:  ISABELL Castaneda  Primary Physician:  Hugo Gonzales MD  Referring Physician:  Stacy Saini DO  Group:  [facility]  Interpreting Physician:  Panchito Keller MD    SUMMARY    LEFT VENTRICLE:  Systolic function was markedly reduced. Ejection fraction was estimated in the range of 25 % to 30 % to be 30 %. There was severe diffuse hypokinesis. Doppler parameters were consistent with abnormal left ventricular relaxation (grade 1 diastolic dysfunction).     RIGHT VENTRICLE:  Systolic function was moderately reduced. LEFT ATRIUM:  The atrium was mildly dilated. RIGHT ATRIUM:  The atrium was mildly dilated. MITRAL VALVE:  There was trace regurgitation. IVC, HEPATIC VEINS:  The inferior vena cava was dilated. HISTORY: PRIOR HISTORY: GERD, Hiatal Hernia    PROCEDURE: The procedure was performed at the bedside. This was a routine study. The transthoracic approach was used. The study included complete 2D imaging, M-mode, complete spectral Doppler, and color Doppler. The heart rate was 92 bpm,  at the start of the study. Image quality was adequate. LEFT VENTRICLE: Size was normal. Systolic function was markedly reduced. Ejection fraction was estimated in the range of 25 % to 30 % to be 30 %. There was severe diffuse hypokinesis. Wall thickness was normal. No evidence of apical  thrombus. DOPPLER: Doppler parameters were consistent with abnormal left ventricular relaxation (grade 1 diastolic dysfunction). RIGHT VENTRICLE: The size was normal. Systolic function was moderately reduced. Wall thickness was normal.    LEFT ATRIUM: The atrium was mildly dilated. RIGHT ATRIUM: The atrium was mildly dilated. MITRAL VALVE: Valve structure was normal. There was normal leaflet separation. DOPPLER: The transmitral velocity was within the normal range. There was no evidence for stenosis. There was trace regurgitation. AORTIC VALVE: The valve was trileaflet. Leaflets exhibited normal thickness and normal cuspal separation. DOPPLER: Transaortic velocity was within the normal range. There was no evidence for stenosis. There was no significant  regurgitation. TRICUSPID VALVE: The valve structure was normal. There was normal leaflet separation. DOPPLER: The transtricuspid velocity was within the normal range. There was no evidence for stenosis. There was no significant regurgitation. PULMONIC VALVE: Leaflets exhibited normal thickness, no calcification, and normal cuspal separation. DOPPLER: The transpulmonic velocity was within the normal range. There was no significant regurgitation. PERICARDIUM: There was no pericardial effusion. The pericardium was normal in appearance. AORTA: The root exhibited normal size. SYSTEMIC VEINS: IVC: The inferior vena cava was dilated. SYSTEM MEASUREMENT TABLES    2D mode  AoR Diam 2D: 3.2 cm  LA Diam (2D): 4.5 cm  LA/Ao (2D): 1.41  FS (2D Teich): 13.7 %  IVSd (2D): 0.82 cm  LVDEV: 193 cmï¾³  LVESV: 138 cmï¾³  LVIDd(2D): 6.19 cm  LVISd (2D): 5.34 cm  LVPWd (2D): 0.81 cm  SV (Teich): 55 cmï¾³    Apical four chamber  LVEF A4C: 28 %    Unspecified Scan Mode  MV Peak E Zach. Mean: 848 mm/s  MVA (PHT): 4.15 cmï¾²  PHT: 53 ms  Max P mm[Hg]  V Max: 2440 mm/s  Vmax: 2500 mm/s  RA Area: 18.1 cmï¾²  RA Volume: 48.8 cmï¾³  TAPSE: 1.1 cm    [facility]    Prepared and electronically signed by    Dennise Rivers MD  Signed 2019 13:10:35       Cardiac testing:   ECHO:   Results for orders placed during the hospital encounter of 19    Echo complete with contrast if indicated    [address]  [phone]    Transthoracic Echocardiogram  2D, M-mode, Doppler, and Color Doppler    Study date:  2019    Patient: Citlaly Griffin  MR number: [id number]  Account number: [de-identified]  : 1970  Age: 44 years  Gender: Female  Status: Inpatient  Location: Bedside  Height: 61 in  Weight: 219.6 lb  BP: 118/ 80 mmHg    Indications: Heart Failure    Diagnoses: I50.9 - Heart failure, unspecified    Sonographer:  ISABELL James  Primary Physician:  Tima Pedersen MD  Referring Physician:  Carlita Donnelly DO  Group:  [facility]  Interpreting Physician:  Dennise Rivers MD    SUMMARY    LEFT VENTRICLE:  Systolic function was markedly reduced.  Ejection fraction was estimated in the range of 25 % to 30 % to be 30 %.  There was severe diffuse hypokinesis. Doppler parameters were consistent with abnormal left ventricular relaxation (grade 1 diastolic dysfunction). RIGHT VENTRICLE:  Systolic function was moderately reduced. LEFT ATRIUM:  The atrium was mildly dilated. RIGHT ATRIUM:  The atrium was mildly dilated. MITRAL VALVE:  There was trace regurgitation. IVC, HEPATIC VEINS:  The inferior vena cava was dilated. HISTORY: PRIOR HISTORY: GERD, Hiatal Hernia    PROCEDURE: The procedure was performed at the bedside. This was a routine study. The transthoracic approach was used. The study included complete 2D imaging, M-mode, complete spectral Doppler, and color Doppler. The heart rate was 92 bpm,  at the start of the study. Image quality was adequate. LEFT VENTRICLE: Size was normal. Systolic function was markedly reduced. Ejection fraction was estimated in the range of 25 % to 30 % to be 30 %. There was severe diffuse hypokinesis. Wall thickness was normal. No evidence of apical  thrombus. DOPPLER: Doppler parameters were consistent with abnormal left ventricular relaxation (grade 1 diastolic dysfunction). RIGHT VENTRICLE: The size was normal. Systolic function was moderately reduced. Wall thickness was normal.    LEFT ATRIUM: The atrium was mildly dilated. RIGHT ATRIUM: The atrium was mildly dilated. MITRAL VALVE: Valve structure was normal. There was normal leaflet separation. DOPPLER: The transmitral velocity was within the normal range. There was no evidence for stenosis. There was trace regurgitation. AORTIC VALVE: The valve was trileaflet. Leaflets exhibited normal thickness and normal cuspal separation. DOPPLER: Transaortic velocity was within the normal range. There was no evidence for stenosis. There was no significant  regurgitation. TRICUSPID VALVE: The valve structure was normal. There was normal leaflet separation. DOPPLER: The transtricuspid velocity was within the normal range. There was no evidence for stenosis. There was no significant regurgitation. PULMONIC VALVE: Leaflets exhibited normal thickness, no calcification, and normal cuspal separation. DOPPLER: The transpulmonic velocity was within the normal range. There was no significant regurgitation. PERICARDIUM: There was no pericardial effusion. The pericardium was normal in appearance. AORTA: The root exhibited normal size. SYSTEMIC VEINS: IVC: The inferior vena cava was dilated. SYSTEM MEASUREMENT TABLES    2D mode  AoR Diam 2D: 3.2 cm  LA Diam (2D): 4.5 cm  LA/Ao (2D): 1.41  FS (2D Teich): 13.7 %  IVSd (2D): 0.82 cm  LVDEV: 193 cmï¾³  LVESV: 138 cmï¾³  LVIDd(2D): 6.19 cm  LVISd (2D): 5.34 cm  LVPWd (2D): 0.81 cm  SV (Teich): 55 cmï¾³    Apical four chamber  LVEF A4C: 28 %    Unspecified Scan Mode  MV Peak E Zach. Mean: 848 mm/s  MVA (PHT): 4.15 cmï¾²  PHT: 53 ms  Max P mm[Hg]  V Max: 2440 mm/s  Vmax: 2500 mm/s  RA Area: 18.1 cmï¾²  RA Volume: 48.8 cmï¾³  TAPSE: 1.1 cm    [employer] Accredited Echocardiography Laboratory    Prepared and electronically signed by    Mitchel Cerrato MD  Signed 2019 13:10:35    No results found for this or any previous visit. CATH:  No results found for this or any previous visit. STRESS TEST:  No results found for this or any previous visit. VTE Prophylaxis: none ordered currently defer to primary service.

## 2023-07-03 NOTE — ED PROVIDER NOTES
History  Chief Complaint   Patient presents with   • Heart Problem     Pt referred to ED by cardiologist d/t ICD shocking pt last Monday. Interrogated at [facility], showed pt was in Vfib asymptomatically yesterday. See MDM          Prior to Admission Medications   Prescriptions Last Dose Informant Patient Reported? Taking? Entresto 49-51 MG TABS  Self No No   Sig: TAKE 1 TABLET BY MOUTH  TWICE DAILY   Turmeric (QC TUMERIC COMPLEX PO)  Self Yes No   Sig: Take 500 mg by mouth   albuterol (PROVENTIL HFA,VENTOLIN HFA) 90 mcg/act inhaler  Self Yes No   ascorbic acid (VITAMIN C) 500 mg tablet  Self Yes No   Sig: Take 500 mg by mouth daily   carvedilol (COREG) 12.5 mg tablet  Self No No   Sig: TAKE 1 TABLET BY MOUTH  TWICE DAILY WITH MEALS   cholecalciferol (VITAMIN D3) 1,000 units tablet  Self Yes No   Sig: Take 1,000 Units by mouth daily Winter time only   ergocalciferol (VITAMIN D2) 50,000 units  Self Yes No   Patient not taking: Reported on 2022   spironolactone (ALDACTONE) 25 mg tablet   No No   Sig: Take 0.5 tablets (12.5 mg total) by mouth every other day      Facility-Administered Medications: None       Past Medical History:   Diagnosis Date   • CHF (congestive heart failure) (HCC)    • Diverticulitis of colon    • GERD (gastroesophageal reflux disease)    • Heart disease 2019   • HH (hiatus hernia)    • Stomach disorder    • Vascular disorder        Past Surgical History:   Procedure Laterality Date   • CARDIAC ELECTROPHYSIOLOGY PROCEDURE N/A 10/26/2022    Procedure: Cardiac biv icd implant/ BIV ICD SUBMUSCULAR IMPLANT;  Surgeon: Davian Kelly DO;  Location: [facility]; Service: Cardiology   •  SECTION     • CHOLECYSTECTOMY     • HAND SURGERY     • HYSTERECTOMY         Family History   Problem Relation Age of Onset   • Heart attack Mother    • Heart attack Father      I have reviewed and agree with the history as documented.     E-Cigarette/Vaping   • E-Cigarette Use Never User E-Cigarette/Vaping Substances   • Nicotine No    • THC No    • CBD No      Social History     Tobacco Use   • Smoking status: Some Days     Packs/day: 0.50     Years: 33.00     Total pack years: 16.50     Types: Cigarettes     Last attempt to quit: 2019     Years since quittin.0   • Smokeless tobacco: Former   Vaping Use   • Vaping Use: Never used   Substance Use Topics   • Alcohol use: Not Currently     Alcohol/week: 0.0 standard drinks of alcohol     Comment: 2x year   • Drug use: No        Review of Systems    Physical Exam  ED Triage Vitals [23 1421]   Temperature Pulse Respirations Blood Pressure SpO2   98.2 °F (36.8 °C) 87 16 154/75 98 %      Temp Source Heart Rate Source Patient Position - Orthostatic VS BP Location FiO2 (%)   Oral Monitor Sitting Right arm --      Pain Score       No Pain             Orthostatic Vital Signs  Vitals:    23 0809 23 0915 23 1100 23 1539   BP: 108/58 108/58 123/75 114/71   Pulse: (!) 51 75 58 59   Patient Position - Orthostatic VS:           Physical Exam    ED Medications  Medications   metoprolol succinate (TOPROL-XL) 24 hr tablet 100 mg (100 mg Oral Given 23)   albuterol (PROVENTIL HFA,VENTOLIN HFA) inhaler 2 puff (2 puffs Inhalation Not Given 23 1422)   ascorbic acid (VITAMIN C) tablet 500 mg (500 mg Oral Given 23)   sacubitril-valsartan (ENTRESTO) 49-51 MG per tablet 1 tablet (1 tablet Oral Given 23)   spironolactone (ALDACTONE) tablet 12.5 mg (12.5 mg Oral Not Given 7/3/23 1915)   acetaminophen (TYLENOL) tablet 650 mg (650 mg Oral Given 23)   docusate sodium (COLACE) capsule 100 mg (100 mg Oral Given 23)   ondansetron (ZOFRAN) injection 4 mg (has no administration in time range)   calcium carbonate (TUMS) chewable tablet 1,000 mg (has no administration in time range)   nicotine (NICODERM CQ) 7 mg/24hr TD 24 hr patch 1 patch (1 patch Transdermal Not Given 23 9920) Diagnostic Studies  Results Reviewed     Procedure Component Value Units Date/Time    CBC (With Platelets) [[id number]]  (Abnormal) Collected: 07/04/23 1149    Lab Status: Final result Specimen: Blood from Arm, Left Updated: 07/04/23 1231     WBC 5.47 Thousand/uL      RBC 3.31 Million/uL      Hemoglobin 9.8 g/dL      Hematocrit 31.3 %      MCV 95 fL      MCH 29.6 pg      MCHC 31.3 g/dL      RDW 13.8 %      Platelets 531 Thousands/uL      MPV 11.2 fL     Comprehensive metabolic panel [[id number]]  (Abnormal) Collected: 07/04/23 0527    Lab Status: Final result Specimen: Blood from Arm, Left Updated: 07/04/23 0369     Sodium 141 mmol/L      Potassium 4.1 mmol/L      Chloride 112 mmol/L      CO2 24 mmol/L      ANION GAP 5 mmol/L      BUN 11 mg/dL      Creatinine 0.81 mg/dL      Glucose 111 mg/dL      Glucose, Fasting 111 mg/dL      Calcium 9.2 mg/dL      AST 14 U/L      ALT 23 U/L      Alkaline Phosphatase 88 U/L      Total Protein 7.0 g/dL      Albumin 3.5 g/dL      Total Bilirubin 0.35 mg/dL      eGFR 82 ml/min/1.73sq m     Narrative:      [place] guidelines for Chronic Kidney Disease (CKD):   •  Stage 1 with normal or high GFR (GFR > 90 mL/min/1.73 square meters)  •  Stage 2 Mild CKD (GFR = 60-89 mL/min/1.73 square meters)  •  Stage 3A Moderate CKD (GFR = 45-59 mL/min/1.73 square meters)  •  Stage 3B Moderate CKD (GFR = 30-44 mL/min/1.73 square meters)  •  Stage 4 Severe CKD (GFR = 15-29 mL/min/1.73 square meters)  •  Stage 5 End Stage CKD (GFR <15 mL/min/1.73 square meters)  Note: GFR calculation is accurate only with a steady state creatinine    Magnesium [[id number]]  (Normal) Collected: 07/04/23 0527    Lab Status: Final result Specimen: Blood from Arm, Left Updated: 07/04/23 0638     Magnesium 2.3 mg/dL     Basic metabolic panel [[id number]]  (Abnormal) Collected: 07/03/23 1520    Lab Status: Final result Specimen: Blood from Arm, Left Updated: 07/03/23 1543     Sodium 139 mmol/L Potassium 4.3 mmol/L      Chloride 111 mmol/L      CO2 23 mmol/L      ANION GAP 5 mmol/L      BUN 16 mg/dL      Creatinine 0.83 mg/dL      Glucose 91 mg/dL      Calcium 9.6 mg/dL      eGFR 79 ml/min/1.73sq m     Narrative:      [facility] guidelines for Chronic Kidney Disease (CKD):   •  Stage 1 with normal or high GFR (GFR > 90 mL/min/1.73 square meters)  •  Stage 2 Mild CKD (GFR = 60-89 mL/min/1.73 square meters)  •  Stage 3A Moderate CKD (GFR = 45-59 mL/min/1.73 square meters)  •  Stage 3B Moderate CKD (GFR = 30-44 mL/min/1.73 square meters)  •  Stage 4 Severe CKD (GFR = 15-29 mL/min/1.73 square meters)  •  Stage 5 End Stage CKD (GFR <15 mL/min/1.73 square meters)  Note: GFR calculation is accurate only with a steady state creatinine    Magnesium [[id number]]  (Normal) Collected: 07/03/23 1520    Lab Status: Final result Specimen: Blood from Arm, Left Updated: 07/03/23 1543     Magnesium 2.3 mg/dL     Phosphorus [[id number]]  (Abnormal) Collected: 07/03/23 1520    Lab Status: Final result Specimen: Blood from Arm, Left Updated: 07/03/23 1543     Phosphorus 4.9 mg/dL     CBC and differential [[id number]] Collected: 07/03/23 1520    Lab Status: Final result Specimen: Blood from Arm, Left Updated: 07/03/23 1535     WBC 8.91 Thousand/uL      RBC 4.97 Million/uL      Hemoglobin 14.2 g/dL      Hematocrit 44.9 %      MCV 90 fL      MCH 28.6 pg      MCHC 31.6 g/dL      RDW 13.9 %      MPV 11.1 fL      Platelets 106 Thousands/uL      nRBC 0 /100 WBCs      Neutrophils Relative 65 %      Immat GRANS % 0 %      Lymphocytes Relative 27 %      Monocytes Relative 6 %      Eosinophils Relative 1 %      Basophils Relative 1 %      Neutrophils Absolute 5.79 Thousands/µL      Immature Grans Absolute 0.03 Thousand/uL      Lymphocytes Absolute 2.36 Thousands/µL      Monocytes Absolute 0.57 Thousand/µL      Eosinophils Absolute 0.11 Thousand/µL      Basophils Absolute 0.05 Thousands/µL                  No orders to display         Procedures  Procedures      ED Course  ED Course as of 07/04/23 1618   Mon Jul 03, 2023   1733 Admit to [facility] for EP follow up. SBIRT 20yo+    Flowsheet Row Most Recent Value   Initial Alcohol Screen: US AUDIT-C     1. How often do you have a drink containing alcohol? 0 Filed at: 07/03/2023 1423   2. How many drinks containing alcohol do you have on a typical day you are drinking? 0 Filed at: 07/03/2023 1423   3a. Male UNDER 65: How often do you have five or more drinks on one occasion? 0 Filed at: 07/03/2023 1423   3b. FEMALE Any Age, or MALE 65+: How often do you have 4 or more drinks on one occassion? 0 Filed at: 07/03/2023 1423   Audit-C Score 0 Filed at: 07/03/2023 1423   JOSE ALBERTO: How many times in the past year have you. .. Used an illegal drug or used a prescription medication for non-medical reasons? Never Filed at: 07/03/2023 1423                Medical Decision Making  Pt is a 53 y/o F presenting for evaluation of icd shock. Hx of HF, ICD , states that she was on vacation. States she passed out on June 26 for 3 seconds ( was drinking) she called her cardiology office who interrogated her icd and stated that she had an episode of Vfib, she did not feel the shock. She was at her outpatient cardiology office today and was sent here for further work up. Initial presentation pt is asymptomatic, denies palpitations, chest pain, sob, lightheadedness, nausea, emesis, abdominal pain. On exam   General: VSS, NAD, awake, alert. Talking normally. Head: Normocephalic, atraumatic, nontender. Eyes: EOM-No subconjunctival hemorrhages. ENT: Nose atraumatic. MMM  No malocclusion. No stridor. Normal phonation. No drooling. Normal swallowing. Neck: Trachea midline. No JVD. CV: RRR. Lungs: CTAB No tachypnea. No paradoxical motion. Abd: +BS, soft, NT/ND. No guarding/rigidity. MSK: Full ROM throughout. No lower extremity edema. Skin: Dry, intact. Neuro: AAOx3, GCS 15, CN II-XII grossly intact. Motor/sensory grossly intact. Psychiatric/Behavioral: mood/affect normal; behavior normal; thought content normal; judgement normal   Exam: deferred    Ddx:     Plan: pt was evaluated with basic labs, ekg. Cardiology/EP was consulted who recommended admission for observation. Labs as interpreted by me normal   EKG as interpreted by me  Procedure Note: EKG  Date/Time: 07/04/23 4:16 PM   Interpreted by: Jose Martinez   Indications / Diagnosis: hx of icd shock   ECG reviewed by me, the ED Provider: yes   The EKG demonstrates: frequent PVCs  Rhythm: normal sinus  Intervals: normal intervals  Axis: normal axis  QRS/Blocks: normal QRS  ST Changes: No acute ST Changes, no STD/FABRIZIO. Imaging as interpreted by me N/A    ED Course: Pt was give metoprolol by EP and was admitted to medicine for EP follow up. Stable in the ED. Did not require any further intervention. Final Dispo: Admitted to medicine with EP follow up for hx of icd shock for VFib      Amount and/or Complexity of Data Reviewed  Labs: ordered. Risk  Decision regarding hospitalization.             Disposition  Final diagnoses:   Ventricular tachycardia, unspecified ([address])   PVC (premature ventricular contraction)   Heart failure, left, with LVEF <=30% ([address])     Time reflects when diagnosis was documented in both MDM as applicable and the Disposition within this note     Time User Action Codes Description Comment    7/3/2023  4:02 PM Jose Martinez Add [I47.20] Ventricular tachycardia, unspecified ([address])     7/3/2023  4:02 PM Gricelda Pollock Add [I49.3] PVC (premature ventricular contraction)     7/3/2023  4:02 PM Gricelda Pollock Add [I50.1] Heart failure, left, with LVEF <=30% [facility])       ED Disposition     ED Disposition   Admit    Condition   Stable    Date/Time   Mon Jul 3, 2023  6:04 PM    Comment   Case was discussed with Roxy Jacome and the patient's admission status was agreed to be Admission Status: observation status to the service of Dr. Sarkis Blackman. Follow-up Information    None         Current Discharge Medication List      START taking these medications    Details   dofetilide (TIKOSYN) 500 mcg capsule Take 1 capsule (500 mcg total) by mouth 2 (two) times a day  Qty: 60 capsule, Refills: 0    Comments: DO NOT FILL - please check copay  Associated Diagnoses: Ventricular tachycardia, unspecified ([address]); PVC (premature ventricular contraction)         CONTINUE these medications which have NOT CHANGED    Details   albuterol (PROVENTIL HFA,VENTOLIN HFA) 90 mcg/act inhaler       ascorbic acid (VITAMIN C) 500 mg tablet Take 500 mg by mouth daily      carvedilol (COREG) 12.5 mg tablet TAKE 1 TABLET BY MOUTH  TWICE DAILY WITH MEALS  Qty: 180 tablet, Refills: 3    Comments: Requesting 1 year supply  Associated Diagnoses: Heart failure, left, with LVEF 31-40% ([address]); Non-ischemic cardiomyopathy ([address]); Acute combined systolic and diastolic congestive heart failure (HCC)      cholecalciferol (VITAMIN D3) 1,000 units tablet Take 1,000 Units by mouth daily Winter time only      Entresto 49-51 MG TABS TAKE 1 TABLET BY MOUTH  TWICE DAILY  Qty: 180 tablet, Refills: 3    Comments: Requesting 1 year supply  Associated Diagnoses: Heart failure, left, with LVEF 31-40% ([address]); Non-ischemic cardiomyopathy ([address]); Acute combined systolic and diastolic congestive heart failure ([address]); Heart failure, left, with LVEF <=30% (HCC)      ergocalciferol (VITAMIN D2) 50,000 units       spironolactone (ALDACTONE) 25 mg tablet Take 0.5 tablets (12.5 mg total) by mouth every other day  Qty: 90 tablet, Refills: 3    Comments: Requesting 1 year supply  Associated Diagnoses: Heart failure, left, with LVEF 31-40% ([address]); Non-ischemic cardiomyopathy ([address]);  Acute combined systolic and diastolic congestive heart failure (HCC)      Turmeric (QC TUMERIC COMPLEX PO) Take 500 mg by mouth           No discharge procedures on file.    PDMP Review     None           ED Provider  Attending physically available and evaluated [facility]. I managed the patient along with the ED Attending.     Electronically Signed by         Caroline Tinajero DO  07/04/23 2033

## 2023-07-03 NOTE — ASSESSMENT & PLAN NOTE
· Patient with history of  Non  ischemic cardiomyopathy with ejection fraction 25- 30% on echocardiogram done on 5/22.   Presented to the hospital with chest pain and 3/2019 and noted to have ejection fraction 35%  · Viral cardiomyopathy per records  · Early on Entresto ,carvedilol and spironolactone  · Monitor on telemetry  · Appears euvolemic on examination  · Changed to metoprolol succinate  · Continue with Entresto and spironolactone

## 2023-07-03 NOTE — ED ATTENDING ATTESTATION
7/3/2023  IJonn DO, saw and evaluated the patient. I have discussed the patient with the resident/non-physician practitioner and agree with the resident's/non-physician practitioner's findings, Plan of Care, and MDM as documented in the resident's/non-physician practitioner's note, except where noted. All available labs and Radiology studies were reviewed. I was present for key portions of any procedure(s) performed by the resident/non-physician practitioner and I was immediately available to provide assistance. At this point I agree with the current assessment done in the Emergency Department. I have conducted an independent evaluation of this patient a history and physical is as follows:    51 yo female w/hx  presents for evaluation NICCM HFrEF ~30% s/p BiV PPM and ICD. Was on vacation last week when she lost consciousness. It turns out she was in VF and received a single 40J shock with successful defibrillation after cardiology interrogated her device. She takes entresto, carvedilol, spironolactone. She was called, referred to ED by cardiology. She is asymptomatic at this time. Imp: VF, shock terminated plan: lytes, ECG, EP consult.       ED Course         Critical Care Time  Procedures

## 2023-07-03 NOTE — ASSESSMENT & PLAN NOTE
· Noted to have frequent PVCs on telemetry. Cardiology on board.   Changing to Toprol  · Monitor on telemetry

## 2023-07-03 NOTE — PROGRESS NOTES
Results for orders placed or performed in visit on 07/03/23   Cardiac EP device report    Narrative    MDT BI-V ICD / ACTIVE SYSTEM IS MRI CONDITIONAL  NON-BILLABLE: DEVICE INTERROGATED IN THE [place] OFFICE - S/P ICD THERAPY DELIVERED 6/26/23; BATTERY VOLTAGE ADEQUATE (10.9 YRS); AP 60.4% BVP 84.9% ( 81.5% & VSR PACE 3.4%); ALL LEAD PARAMETERS WITHIN NORMAL LIMITS/STABLE; 1 NEW HIGH RATE-NS EPISODES SINCE 6/26/23 WITH NSVF, ~ 18 BEATS @  BPM; PATIENT TAKES CARVEDILOL, ENTRESTO, SPIRONOLACTONE; OPTI-VOL FLUID THRESHOLD CROSSED 7/1/23 & ONGOING; TTEXT TO DT/LS ; PATIENT DIRECTED TO B ER FOR FURTHER EVALUATION. NORMAL DEVICE FUNCTION.   ES .

## 2023-07-04 ENCOUNTER — APPOINTMENT (OUTPATIENT)
Dept: NON INVASIVE DIAGNOSTICS | Facility: HOSPITAL | Age: 53
End: 2023-07-04
Payer: COMMERCIAL

## 2023-07-04 PROBLEM — E66.9 OBESE: Status: ACTIVE | Noted: 2023-07-04

## 2023-07-04 LAB
ALBUMIN SERPL BCP-MCNC: 3.5 G/DL (ref 3.5–5)
ALP SERPL-CCNC: 88 U/L (ref 46–116)
ALT SERPL W P-5'-P-CCNC: 23 U/L (ref 12–78)
ANION GAP SERPL CALCULATED.3IONS-SCNC: 5 MMOL/L
AORTIC VALVE MEAN VELOCITY: 10.8 M/S
APICAL FOUR CHAMBER EJECTION FRACTION: 37 %
AST SERPL W P-5'-P-CCNC: 14 U/L (ref 5–45)
AV AREA BY CONTINUOUS VTI: 1.5 CM2
AV AREA PEAK VELOCITY: 1.3 CM2
AV LVOT MEAN GRADIENT: 1 MMHG
AV LVOT PEAK GRADIENT: 2 MMHG
AV MEAN GRADIENT: 5 MMHG
AV PEAK GRADIENT: 10 MMHG
AV VALVE AREA: 1.47 CM2
AV VELOCITY RATIO: 0.47
BILIRUB SERPL-MCNC: 0.35 MG/DL (ref 0.2–1)
BUN SERPL-MCNC: 11 MG/DL (ref 5–25)
CALCIUM SERPL-MCNC: 9.2 MG/DL (ref 8.3–10.1)
CHLORIDE SERPL-SCNC: 112 MMOL/L (ref 96–108)
CO2 SERPL-SCNC: 24 MMOL/L (ref 21–32)
CREAT SERPL-MCNC: 0.81 MG/DL (ref 0.6–1.3)
DOP CALC AO PEAK VEL: 1.59 M/S
DOP CALC AO VTI: 29.7 CM
DOP CALC LVOT AREA: 2.83 CM2
DOP CALC LVOT CARDIAC INDEX: 4.99 L/MIN/M2
DOP CALC LVOT CARDIAC OUTPUT: 9.57 L/MIN
DOP CALC LVOT DIAMETER: 1.9 CM
DOP CALC LVOT PEAK VEL VTI: 15.43 CM
DOP CALC LVOT PEAK VEL: 0.74 M/S
DOP CALC LVOT STROKE INDEX: 22.4 ML/M2
DOP CALC LVOT STROKE VOLUME: 43.73 CM3
E WAVE DECELERATION TIME: 169 MS
ERYTHROCYTE [DISTWIDTH] IN BLOOD BY AUTOMATED COUNT: 13.8 % (ref 11.6–15.1)
FRACTIONAL SHORTENING: 16 % (ref 28–44)
GFR SERPL CREATININE-BSD FRML MDRD: 82 ML/MIN/1.73SQ M
GLUCOSE P FAST SERPL-MCNC: 111 MG/DL (ref 65–99)
GLUCOSE SERPL-MCNC: 111 MG/DL (ref 65–140)
HCT VFR BLD AUTO: 31.3 % (ref 34.8–46.1)
HGB BLD-MCNC: 9.8 G/DL (ref 11.5–15.4)
INTERVENTRICULAR SEPTUM IN DIASTOLE (PARASTERNAL SHORT AXIS VIEW): 1.3 CM
INTERVENTRICULAR SEPTUM: 1.3 CM (ref 0.6–1.1)
LAAS-AP2: 18.7 CM2
LAAS-AP4: 15.2 CM2
LEFT ATRIUM AREA SYSTOLE SINGLE PLANE A4C: 16.4 CM2
LEFT ATRIUM SIZE: 3 CM
LEFT ATRIUM VOLUME (MOD BIPLANE): 48 ML
LEFT INTERNAL DIMENSION IN SYSTOLE: 4.2 CM (ref 2.1–4)
LEFT VENTRICLE DIASTOLIC VOLUME (MOD BIPLANE): 175 ML
LEFT VENTRICLE SYSTOLIC VOLUME (MOD BIPLANE): 102 ML
LEFT VENTRICULAR INTERNAL DIMENSION IN DIASTOLE: 5 CM (ref 3.5–6)
LEFT VENTRICULAR POSTERIOR WALL IN END DIASTOLE: 1.3 CM
LEFT VENTRICULAR STROKE VOLUME: 39 ML
LV EF: 41 %
LVSV (TEICH): 39 ML
MAGNESIUM SERPL-MCNC: 2.3 MG/DL (ref 1.6–2.6)
MCH RBC QN AUTO: 29.6 PG (ref 26.8–34.3)
MCHC RBC AUTO-ENTMCNC: 31.3 G/DL (ref 31.4–37.4)
MCV RBC AUTO: 95 FL (ref 82–98)
MV PEAK A VEL: 0.8 M/S
MV PEAK E VEL: 62 CM/S
MV STENOSIS PRESSURE HALF TIME: 49 MS
MV VALVE AREA P 1/2 METHOD: 4.49 CM2
PLATELET # BLD AUTO: 139 THOUSANDS/UL (ref 149–390)
PMV BLD AUTO: 11.2 FL (ref 8.9–12.7)
POTASSIUM SERPL-SCNC: 4.1 MMOL/L (ref 3.5–5.3)
PROT SERPL-MCNC: 7 G/DL (ref 6.4–8.4)
RBC # BLD AUTO: 3.31 MILLION/UL (ref 3.81–5.12)
RIGHT ATRIUM AREA SYSTOLE A4C: 12.9 CM2
RIGHT VENTRICLE ID DIMENSION: 3.5 CM
SL CV LEFT ATRIUM LENGTH A2C: 4.7 CM
SL CV LV EF: 40
SL CV PED ECHO LEFT VENTRICLE DIASTOLIC VOLUME (MOD BIPLANE) 2D: 120 ML
SL CV PED ECHO LEFT VENTRICLE SYSTOLIC VOLUME (MOD BIPLANE) 2D: 80 ML
SODIUM SERPL-SCNC: 141 MMOL/L (ref 135–147)
TR MAX PG: 11 MMHG
TR PEAK VELOCITY: 1.7 M/S
TRICUSPID ANNULAR PLANE SYSTOLIC EXCURSION: 1.7 CM
TRICUSPID VALVE PEAK REGURGITATION VELOCITY: 1.65 M/S
WBC # BLD AUTO: 5.47 THOUSAND/UL (ref 4.31–10.16)

## 2023-07-04 PROCEDURE — 93306 TTE W/DOPPLER COMPLETE: CPT

## 2023-07-04 PROCEDURE — 80053 COMPREHEN METABOLIC PANEL: CPT | Performed by: FAMILY MEDICINE

## 2023-07-04 PROCEDURE — 85027 COMPLETE CBC AUTOMATED: CPT | Performed by: FAMILY MEDICINE

## 2023-07-04 PROCEDURE — 99214 OFFICE O/P EST MOD 30 MIN: CPT | Performed by: INTERNAL MEDICINE

## 2023-07-04 PROCEDURE — 83735 ASSAY OF MAGNESIUM: CPT | Performed by: FAMILY MEDICINE

## 2023-07-04 PROCEDURE — 93306 TTE W/DOPPLER COMPLETE: CPT | Performed by: INTERNAL MEDICINE

## 2023-07-04 PROCEDURE — 99232 SBSQ HOSP IP/OBS MODERATE 35: CPT | Performed by: INTERNAL MEDICINE

## 2023-07-04 RX ORDER — DOFETILIDE 0.5 MG/1
500 CAPSULE ORAL 2 TIMES DAILY
Qty: 60 CAPSULE | Refills: 0 | Status: SHIPPED | OUTPATIENT
Start: 2023-07-04 | End: 2023-07-05

## 2023-07-04 RX ORDER — ALBUTEROL SULFATE 90 UG/1
2 AEROSOL, METERED RESPIRATORY (INHALATION) EVERY 4 HOURS PRN
Status: DISCONTINUED | OUTPATIENT
Start: 2023-07-04 | End: 2023-07-05 | Stop reason: HOSPADM

## 2023-07-04 RX ADMIN — METOPROLOL SUCCINATE 100 MG: 100 TABLET, EXTENDED RELEASE ORAL at 17:32

## 2023-07-04 RX ADMIN — METOPROLOL SUCCINATE 100 MG: 100 TABLET, EXTENDED RELEASE ORAL at 06:21

## 2023-07-04 RX ADMIN — ACETAMINOPHEN 650 MG: 325 TABLET ORAL at 09:29

## 2023-07-04 RX ADMIN — OXYCODONE HYDROCHLORIDE AND ACETAMINOPHEN 500 MG: 500 TABLET ORAL at 09:29

## 2023-07-04 RX ADMIN — SACUBITRIL AND VALSARTAN 1 TABLET: 49; 51 TABLET, FILM COATED ORAL at 09:29

## 2023-07-04 RX ADMIN — SACUBITRIL AND VALSARTAN 1 TABLET: 49; 51 TABLET, FILM COATED ORAL at 19:49

## 2023-07-04 RX ADMIN — DOCUSATE SODIUM 100 MG: 100 CAPSULE, LIQUID FILLED ORAL at 09:29

## 2023-07-04 NOTE — ASSESSMENT & PLAN NOTE
· Patient with ICD shock on 6/26, evaluated in cardiology office - device interrogation showed nonsustained episode of V-fib referred to ED for further management  · Patient has not been placed on metoprolol succinate 100 mg twice daily  · Cardiology plans to monitor and consider initiating on dofetilide noted  · Maintain magnesium more than 2, potassium more than 4  · Cardiology inputs noted  · Monitor on telemetry

## 2023-07-04 NOTE — PROGRESS NOTES
[address] [facility]  Progress Note  Name: Raeann Ortega  MRN: [id number]  Unit/Bed#: BX4 216-02 I Date of Admission: 7/3/2023   Date of Service: 7/4/2023 I Hospital Day: 0    Assessment/Plan   * ICD (implantable cardioverter-defibrillator) discharge  Assessment & Plan  · Patient with ICD shock on 6/26, evaluated in cardiology office - device interrogation showed nonsustained episode of V-fib referred to ED for further management  · Patient has not been placed on metoprolol succinate 100 mg twice daily  · Cardiology plans to monitor and consider initiating on dofetilide noted  · Maintain magnesium more than 2, potassium more than 4  · Cardiology inputs noted  · Monitor on telemetry      Non-ischemic cardiomyopathy [facility])  Assessment & Plan  Patient with history of nonischemic cardiomyopathy of possible viral etiology per review of records  2D echo 35 to 40%  Continue metoprolol, Entresto, Aldactone  Cardiology inputs noted      Obese  Assessment & Plan  Therapeutic lifestyle modification encouraged    Ventricular tachycardia, unspecified ([address])  Assessment & Plan  · Patient with successful ICD shock  · She has now been placed on metoprolol  · Cardiology plans to initiate dofetilide noted  · Monitor on telemetry  · Cardiology following      PVC (premature ventricular contraction)  Assessment & Plan  · Noted on telemetry  · Now on metoprolol  · Cardiology following    Left bundle branch block (LBBB)  Assessment & Plan  Monitor    TAE (obstructive sleep apnea)  Assessment & Plan  · CPAP compliance discussed and encouraged                 VTE Pharmacologic Prophylaxis: VTE Score: 2 Low Risk (Score 0-2) - Encourage Ambulation. Patient Centered Rounds: I performed bedside rounds with nursing staff today. Discussions with Specialists or Other Care Team Provider:     Education and Discussions with Family / Patient: Discussed with the patient, reports she is keeping her family updated. Total Time Spent on Date of Encounter in care of patient: 35 minutes This time was spent on one or more of the following: performing physical exam; counseling and coordination of care; obtaining or reviewing history; documenting in the medical record; reviewing/ordering tests, medications or procedures; communicating with other healthcare professionals and discussing with patient's family/caregivers. Current Length of Stay: 0 day(s)  Current Patient Status: Observation   Certification Statement: The patient will continue to require additional inpatient hospital stay due to As outlined  Discharge Plan: Cardiology plans to monitor inpatient and possibly initiate dofetilide noted    Code Status: Level 1 - Full Code    Subjective:     Comfortably sitting up in bed  Denies chest pain shortness of breath palpitations  History chart labs medications reviewed  Encourage out of bed and ambulation as able      Objective:     Vitals:   Temp (24hrs), Av.2 °F (36.8 °C), Min:98 °F (36.7 °C), Max:98.3 °F (36.8 °C)    Temp:  [98 °F (36.7 °C)-98.3 °F (36.8 °C)] 98.3 °F (36.8 °C)  HR:  [51-87] 58  Resp:  [16-20] 17  BP: (107-154)/(55-87) 123/75  SpO2:  [94 %-98 %] 98 %  Body mass index is 39.11 kg/m². Input and Output Summary (last 24 hours):      Intake/Output Summary (Last 24 hours) at 2023 1321  Last data filed at 2023 1100  Gross per 24 hour   Intake 900 ml   Output 2400 ml   Net -1500 ml       Physical Exam:   Physical Exam       Comfortably in bed  Obese  Short thick neck  Lungs diminished breath sounds bilateral  Heart sounds S1-S2 noted  Heart sounds are distant  Abdomen soft nontender  Awake and alert obey simple commands  No pedal edema  No rash          Additional Data:     Labs:  Results from last 7 days   Lab Units 23  1149 23  1520   WBC Thousand/uL 5.47 8.91   HEMOGLOBIN g/dL 9.8* 14.2   HEMATOCRIT % 31.3* 44.9   PLATELETS Thousands/uL 139* 221   NEUTROS PCT %  --  65   LYMPHS PCT % --  27   MONOS PCT %  --  6   EOS PCT %  --  1     Results from last 7 days   Lab Units 07/04/23  0527   SODIUM mmol/L 141   POTASSIUM mmol/L 4.1   CHLORIDE mmol/L 112*   CO2 mmol/L 24   BUN mg/dL 11   CREATININE mg/dL 0.81   ANION GAP mmol/L 5   CALCIUM mg/dL 9.2   ALBUMIN g/dL 3.5   TOTAL BILIRUBIN mg/dL 0.35   ALK PHOS U/L 88   ALT U/L 23   AST U/L 14   GLUCOSE RANDOM mg/dL 111                       Lines/Drains:  Invasive Devices     Peripheral Intravenous Line  Duration           Peripheral IV 07/03/23 Left Antecubital <1 day                  Telemetry:  Telemetry Orders (From admission, onward)             24 Hour Telemetry Monitoring  Continuous x 24 Hours (Telem)        Expiring   Question:  Reason for 24 Hour Telemetry  Answer:  Decompensated CHF- and any one of the following: continuous diuretic infusion or total diuretic dose >200 mg daily, associated electrolyte derangement (I.e. K < 3.0), ionotropic drip (continuous infusion), hx of ventricular arrhythmia, or new EF < 35%                 Telemetry Reviewed: PVCs noted  Indication for Continued Telemetry Use: Arrthymias requiring medical therapy             Imaging: Reviewed radiology reports from this admission including: ECHO    Recent Cultures (last 7 days):         Last 24 Hours Medication List:   Current Facility-Administered Medications   Medication Dose Route Frequency Provider Last Rate   • acetaminophen  650 mg Oral Q6H PRN Hermes Bruce MD     • albuterol  2 puff Inhalation 4x Daily Hermes Bruce MD     • ascorbic acid  500 mg Oral Daily Hermes Bruce MD     • calcium carbonate  1,000 mg Oral Daily PRN Hermes Bruce MD     • docusate sodium  100 mg Oral BID Hermes Bruce MD     • metoprolol succinate  100 mg Oral Q12H Hermes Bruce MD     • nicotine  1 patch Transdermal Daily Hermes Bruce MD     • ondansetron  4 mg Intravenous Q6H PRN Hermes Bruce MD     • sacubitril-valsartan  1 tablet Oral BID Hermes Bruce MD     • spironolactone 12.5 mg Oral Every Other Day Lennox Choi MD          Today, Patient Was Seen By: Remi Perez MD    **Please Note: This note may have been constructed using a voice recognition system. ** no weight-bearing restrictions

## 2023-07-04 NOTE — ASSESSMENT & PLAN NOTE
Patient with history of nonischemic cardiomyopathy of possible viral etiology per review of records  2D echo 35 to 40%  Continue metoprolol, Entresto, Aldactone  Cardiology inputs noted

## 2023-07-04 NOTE — ASSESSMENT & PLAN NOTE
· Patient with successful ICD shock  · She has now been placed on metoprolol  · Cardiology plans to initiate dofetilide noted  · Monitor on telemetry  · Cardiology following

## 2023-07-04 NOTE — PROGRESS NOTES
Progress Note - Electrophysiology - Cardiology  Braden Nelson 52 y.o. female MRN: [id number]  Unit/Bed#: CW2 216-02 Encounter: [id number]      Assessment:  1. Syncope secondary to VF 6/26/23  2. 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 Frequent PVCs   -- morphology suggests RVOT  4. NICM EF 25-30%, HFrEF, LBBB s/p Medtronic BiV ICD 10/26/2022  -- cath 2019 no obstructive CAD  -- possible viral etiology  -- Entresto, spironolactone  -- Coreg changed to Toprol XL  -- unable to tolerate SGLT2 2/2 yeast infections  --  ms --> improved to 114 ms after BiV pacing    Plan:  -- carvedilol 12.5 mg bid changed to Toprol  bid for PVC suppression  -- tele shows sinus rhythm with frequent PVCs  -- echo pending to reevaluate LVEF  -- keep Mg >2.0, K+ >4.0  -- continue to monitor on tele with Toprol dosing  -- discussed AAD initiation --> avoid amiodarone given patient's young age and potential for side effects. Check copay for dofetilide, consider initiation tomorrow  -- consider downstream PVC ablation  -- consult heart failure team  -- recommend continued monitoring in hospital given frequent PVCs and risk of recurrent VF with ICD shocks - discussed with patient and she is aware of risk if she elects to go home      Subjective/Objective   Subjective: Braden Nelson is a 52 y.o. female with history of NICM EF 25 to 30%, GERD, LBBB, Medtronic BiV ICD 10/26/2022, obstructive sleep apnea, CKD, obesity who presents to the emergency department after ICD shock on 6/26/2023 d/t VF. She had syncope at the time of the event. She was admitted to the hospital for further monitoring. Adline Pennant was changed to Toprol XL (and dose increased). Today, she is feeling well. She denies palpitations, lightheadedness, dizziness, orthopnea, PND, chest pain, or shortness of breath. She is tolerating Toprol-XL. She would like to go home.     TELE: sinus rhythm with V pacing, frequent PVCs, couplets and triplets    EKG: sinus rhythm with BiV pacing, frequent PVCs (LBBB morphology, transition in V3)      Objective:  Vitals: /58   Pulse 75   Temp 98.3 °F (36.8 °C)   Resp 17   Ht 5' 1" (1.549 m)   Wt 93.9 kg (207 lb)   SpO2 95%   BMI 39.11 kg/m²     Vitals:    07/04/23 0530 07/04/23 0915   Weight: 94.1 kg (207 lb 6.4 oz) 93.9 kg (207 lb)     Orthostatic Blood Pressures    Flowsheet Row Most Recent Value   Blood Pressure 108/58 filed at 07/04/2023 0915   Patient Position - Orthostatic VS Lying filed at 07/03/2023 1912            Intake/Output Summary (Last 24 hours) at 7/4/2023 1143  Last data filed at 7/4/2023 0530  Gross per 24 hour   Intake 480 ml   Output 1000 ml   Net -520 ml       Invasive Devices     Peripheral Intravenous Line  Duration           Peripheral IV 07/03/23 Left Antecubital <1 day                Scheduled Meds:  Current Facility-Administered Medications   Medication Dose Route Frequency Provider Last Rate   • acetaminophen  650 mg Oral Q6H PRN Sonia Canas MD     • albuterol  2 puff Inhalation 4x Daily Sonia Canas MD     • ascorbic acid  500 mg Oral Daily Sonia Canas MD     • calcium carbonate  1,000 mg Oral Daily PRN Sonia Canas MD     • docusate sodium  100 mg Oral BID Sonia Canas MD     • metoprolol succinate  100 mg Oral Q12H Sonia Canas MD     • nicotine  1 patch Transdermal Daily Sonia Canas MD     • ondansetron  4 mg Intravenous Q6H PRN Sonia Canas MD     • sacubitril-valsartan  1 tablet Oral BID Sonia Canas MD     • spironolactone  12.5 mg Oral Every Other Day Sonia Canas MD       Continuous Infusions:   PRN Meds:.•  acetaminophen  •  calcium carbonate  •  ondansetron    Review of Systems:  Review of Systems   Constitutional: Negative for malaise/fatigue. HENT: Negative. Eyes: Negative. Cardiovascular: Negative for chest pain, near-syncope, orthopnea, palpitations and syncope. Respiratory: Negative for cough and wheezing. Endocrine: Negative. Hematologic/Lymphatic: Negative. Skin: Negative for rash. Musculoskeletal: Negative for back pain. Gastrointestinal: Negative for abdominal pain, nausea and vomiting. Genitourinary: Negative. Neurological: Negative for dizziness and seizures. Psychiatric/Behavioral: Negative. Allergic/Immunologic: Negative. ROS as noted above, otherwise 12 point review of systems was performed and is negative. Physical Exam:   GEN: NAD, alert and oriented x 3, well appearing  SKIN: warm, dry without significant lesions or rashes. Left chest implant site well healed  HEENT: NCAT, PERRL, EOMs intact  NECK: supple, no JVD appreciated  CARDIOVASCULAR: RRR, normal S1, S2 without murmurs, rubs, or gallops   LUNGS: CTA bilaterally without wheezes, rhonchi, or rales  ABDOMEN: Soft, nontender, nondistended. EXTREMITIES/VASCULAR: perfused without clubbing, cyanosis, or LE edema b/l  PSYCH: Normal mood and affect  NEURO: CN ll-Xll grossly intact    Lab Results: I have personally reviewed pertinent lab results. Results from last 7 days   Lab Units 07/03/23  1520   WBC Thousand/uL 8.91   HEMOGLOBIN g/dL 14.2   HEMATOCRIT % 44.9   PLATELETS Thousands/uL 221     Results from last 7 days   Lab Units 07/04/23  0527 07/03/23  1520   POTASSIUM mmol/L 4.1 4.3   CHLORIDE mmol/L 112* 111*   CO2 mmol/L 24 23   BUN mg/dL 11 16   CREATININE mg/dL 0.81 0.83   CALCIUM mg/dL 9.2 9.6         Results from last 7 days   Lab Units 07/04/23  0527 07/03/23  1520   MAGNESIUM mg/dL 2.3 2.3       Imaging: I have personally reviewed pertinent reports.     Results for orders placed during the hospital encounter of 06/23/19    Echo complete with contrast if indicated    Narrative  [address], [facility] [address] [phone]    Transthoracic Echocardiogram  2D, M-mode, Doppler, and Color Doppler    Study date: 2019    Patient: John Vela  MR number: [id number]  Account number: [de-identified]  : 1970  Age: 44 years  Gender: Female  Status: Inpatient  Location: Bedside  Height: 61 in  Weight: 219.6 lb  BP: 118/ 80 mmHg    Indications: Heart Failure    Diagnoses: I50.9 - Heart failure, unspecified    Sonographer:  ISABELL Frost  Primary Physician:  Loco Torres MD  Referring Physician:  Mercedes Ventura DO  Group:  [facility]  Interpreting Physician:  Jeffy Ames MD    SUMMARY    LEFT VENTRICLE:  Systolic function was markedly reduced. Ejection fraction was estimated in the range of 25 % to 30 % to be 30 %. There was severe diffuse hypokinesis. Doppler parameters were consistent with abnormal left ventricular relaxation (grade 1 diastolic dysfunction). RIGHT VENTRICLE:  Systolic function was moderately reduced. LEFT ATRIUM:  The atrium was mildly dilated. RIGHT ATRIUM:  The atrium was mildly dilated. MITRAL VALVE:  There was trace regurgitation. IVC, HEPATIC VEINS:  The inferior vena cava was dilated. HISTORY: PRIOR HISTORY: GERD, Hiatal Hernia    PROCEDURE: The procedure was performed at the bedside. This was a routine study. The transthoracic approach was used. The study included complete 2D imaging, M-mode, complete spectral Doppler, and color Doppler. The heart rate was 92 bpm,  at the start of the study. Image quality was adequate. LEFT VENTRICLE: Size was normal. Systolic function was markedly reduced. Ejection fraction was estimated in the range of 25 % to 30 % to be 30 %. There was severe diffuse hypokinesis. Wall thickness was normal. No evidence of apical  thrombus. DOPPLER: Doppler parameters were consistent with abnormal left ventricular relaxation (grade 1 diastolic dysfunction). RIGHT VENTRICLE: The size was normal. Systolic function was moderately reduced. Wall thickness was normal.    LEFT ATRIUM: The atrium was mildly dilated.     RIGHT ATRIUM: The atrium was mildly dilated. MITRAL VALVE: Valve structure was normal. There was normal leaflet separation. DOPPLER: The transmitral velocity was within the normal range. There was no evidence for stenosis. There was trace regurgitation. AORTIC VALVE: The valve was trileaflet. Leaflets exhibited normal thickness and normal cuspal separation. DOPPLER: Transaortic velocity was within the normal range. There was no evidence for stenosis. There was no significant  regurgitation. TRICUSPID VALVE: The valve structure was normal. There was normal leaflet separation. DOPPLER: The transtricuspid velocity was within the normal range. There was no evidence for stenosis. There was no significant regurgitation. PULMONIC VALVE: Leaflets exhibited normal thickness, no calcification, and normal cuspal separation. DOPPLER: The transpulmonic velocity was within the normal range. There was no significant regurgitation. PERICARDIUM: There was no pericardial effusion. The pericardium was normal in appearance. AORTA: The root exhibited normal size. SYSTEMIC VEINS: IVC: The inferior vena cava was dilated. SYSTEM MEASUREMENT TABLES    2D mode  AoR Diam 2D: 3.2 cm  LA Diam (2D): 4.5 cm  LA/Ao (2D): 1.41  FS (2D Teich): 13.7 %  IVSd (2D): 0.82 cm  LVDEV: 193 cmï¾³  LVESV: 138 cmï¾³  LVIDd(2D): 6.19 cm  LVISd (2D): 5.34 cm  LVPWd (2D): 0.81 cm  SV (Teich): 55 cmï¾³    Apical four chamber  LVEF A4C: 28 %    Unspecified Scan Mode  MV Peak E Zach.  Mean: 848 mm/s  MVA (PHT): 4.15 cmï¾²  PHT: 53 ms  Max P mm[Hg]  V Max: 2440 mm/s  Vmax: 2500 mm/s  RA Area: 18.1 cmï¾²  RA Volume: 48.8 cmï¾³  TAPSE: 1.1 cm    [employer] Accredited Echocardiography Laboratory    Prepared and electronically signed by    Zuri Gomes MD  Signed 2019 13:10:35

## 2023-07-04 NOTE — UTILIZATION REVIEW
Initial Clinical Review    Admission: Date/Time/Statement:   Admission Orders (From admission, onward)     Ordered        07/03/23 1804  Place in Observation  Once                      Orders Placed This Encounter   Procedures   • Place in Observation     Standing Status:   Standing     Number of Occurrences:   1     Order Specific Question:   Level of Care     Answer:   Med Surg [16]     ED Arrival Information     Expected   7/3/2023 14:05    Arrival   7/3/2023 14:18    Acuity   Emergent            Means of arrival   Walk-In    Escorted by   Spouse    Service   Hospitalist    Admission type   Emergency            Arrival complaint   heart failure           Chief Complaint   Patient presents with   • Heart Problem     Pt referred to ED by cardiologist d/t ICD shocking pt last Monday. Interrogated at [facility], showed pt was in Vfib asymptomatically yesterday. Initial Presentation: 52 y.o. female with a PMH of non ischemic cardiomyopathy status post biventricular ICD placement, obstructive sleep apnea, CKD, left bundle branch block obesity who presents as requested by cardiology office. It appears that patient had an ICD firing on 6/26. Patient felt lightheaded and dizzy and her ICD was beeping. Patient reported that she was on vacation in [place] and she had more alcoholic beverages than usual.  Patient had a device interrogation today and showed ICD firing and also.'s of NSVT. Patient recently had fluoroquinolones for a sinus infection. Today patient had device interrogation done in [facility] office and noted to have nonsustained episodes of V-fib which did not require ICD firing. Patient was sent to the emergency room for evaluation and admission. Patient was seen by electrophysiology and carvedilol was changed to metoprolol succinate. Electrophysiology recommending echocardiogram admitting overnight for monitoring on telemetry. Patient noted to have frequent PVCs on telemetry.   Currently she denies any chest pain or shortness of breath.   She denies any specific complaints    ADMIT OBSERVATION STATUS    Date:     Day 2:      ED Triage Vitals [07/03/23 1421]   Temperature Pulse Respirations Blood Pressure SpO2   98.2 °F (36.8 °C) 87 16 154/75 98 %      Temp Source Heart Rate Source Patient Position - Orthostatic VS BP Location FiO2 (%)   Oral Monitor Sitting Right arm --      Pain Score       No Pain          Wt Readings from Last 1 Encounters:   07/04/23 94.1 kg (207 lb 6.4 oz)     Additional Vital Signs:   Date/Time Temp Pulse Resp BP MAP (mmHg) SpO2 O2 Device Patient Position - Orthostatic VS   07/04/23 08:09:42 98.3 °F (36.8 °C) 51 Abnormal  -- 108/58 75 95 % -- --   07/04/23 06:14:34 -- 59 -- 107/55 72 96 % -- --   07/04/23 05:30:31 98 °F (36.7 °C) 63 -- 109/55 73 94 % -- --   07/03/23 22:37:55 -- 65 -- 107/57 74 95 % -- --   07/03/23 22:07:23 -- 53 Abnormal  -- 121/72 88 97 % -- --   07/03/23 2017 -- -- -- -- -- 95 % None (Room air) --   07/03/23 19:19:15 -- 61 -- 125/72 90 95 % -- --   07/03/23 1912 -- 75 17 129/63 -- 98 % None (Room air) Lying   07/03/23 1752 -- 63 -- 126/86 -- -- -- --   07/03/23 1600 -- 74 18 118/87 97 97 % None (Room air) --   07/03/23 1530 -- 82 20 119/72 91 95 % None (Room air) --   07/03/23 1515 -- 81 19 126/69 87 97 % None (Room air) --   07/03/23 1421 98.2 °F (36.8 °C) 87 16 154/75 108 98 % None (Room air) Sitting     Pertinent Labs/Diagnostic Test Results:   7/3 ECHO:    7/3 EKG: Sinus rhythm with occasional atrial-paced complexes and with frequent Premature ventricular complexes  Left axis deviation  Low voltage QRS  Inferior infarct , age undetermined  Anterolateral infarct , age undetermined  Abnormal ECG        Results from last 7 days   Lab Units 07/03/23  1520   WBC Thousand/uL 8.91   HEMOGLOBIN g/dL 14.2   HEMATOCRIT % 44.9   PLATELETS Thousands/uL 221   NEUTROS ABS Thousands/µL 5.79         Results from last 7 days   Lab Units 07/04/23  0527 07/03/23  1520 SODIUM mmol/L 141 139   POTASSIUM mmol/L 4.1 4.3   CHLORIDE mmol/L 112* 111*   CO2 mmol/L 24 23   ANION GAP mmol/L 5 5   BUN mg/dL 11 16   CREATININE mg/dL 0.81 0.83   EGFR ml/min/1.73sq m 82 79   CALCIUM mg/dL 9.2 9.6   MAGNESIUM mg/dL 2.3 2.3   PHOSPHORUS mg/dL  --  4.9*     Results from last 7 days   Lab Units 07/04/23  0527   AST U/L 14   ALT U/L 23   ALK PHOS U/L 88   TOTAL PROTEIN g/dL 7.0   ALBUMIN g/dL 3.5   TOTAL BILIRUBIN mg/dL 0.35         Results from last 7 days   Lab Units 07/04/23  0527 07/03/23  1520   GLUCOSE RANDOM mg/dL 111 91             No results found for: "BETA-HYDROXYBUTYRATE"                                                                                                                                         ED Treatment:   Medication Administration from 07/03/2023 1404 to 07/03/2023 1912       Date/Time Order Dose Route Action Action by Comments     07/03/2023 1756 EDT metoprolol succinate (TOPROL-XL) 24 hr tablet 100 mg 100 mg Oral Given Alireza Gómez RN --        Past Medical History:   Diagnosis Date   • CHF (congestive heart failure) ([address])    • Diverticulitis of colon    • GERD (gastroesophageal reflux disease)    • Heart disease 6-   • HH (hiatus hernia)    • Stomach disorder    • Vascular disorder      Present on Admission:  • Non-ischemic cardiomyopathy ([address])  • Left bundle branch block (LBBB)  • Ventricular tachycardia, unspecified (HCC)  • TAE (obstructive sleep apnea)  • PVC (premature ventricular contraction)      Admitting Diagnosis: PVC (premature ventricular contraction) [I49.3]  Heart failure, left, with LVEF <=30% (HCC) [I50.1]  Ventricular tachycardia, unspecified ([address]) [I47.20]  Age/Sex: 52 y.o. female  Admission Orders:  Scheduled Medications:  albuterol, 2 puff, Inhalation, 4x Daily  ascorbic acid, 500 mg, Oral, Daily  docusate sodium, 100 mg, Oral, BID  metoprolol succinate, 100 mg, Oral, Q12H  nicotine, 1 patch, Transdermal, Daily  sacubitril-valsartan, 1 tablet, Oral, BID  spironolactone, 12.5 mg, Oral, Every Other Day      Continuous IV Infusions:     PRN Meds:  acetaminophen, 650 mg, Oral, Q6H PRN  calcium carbonate, 1,000 mg, Oral, Daily PRN  ondansetron, 4 mg, Intravenous, Q6H PRN        IP CONSULT TO ELECTROPHYSIOLOGY    Network Utilization Review Department  ATTENTION: Please call with any questions or concerns to [phone] and carefully listen to the prompts so that you are directed to the right person. All voicemails are confidential.  Prince Esters all requests for admission clinical reviews, approved or denied determinations and any other requests to dedicated fax number below belonging to the campus where the patient is receiving treatment.  List of dedicated fax numbers for the Facilities:  [place] DENIALS (Administrative/Medical Necessity) [phone] [address] [facility] (Maternity/NICU/Pediatrics) [phone]   [address] [place] [address] Drive [phone]   [facility] [address] [facility] [phone]   [address] [place] [address] [address] [place] [address] [id number] [place] [address] [phone]   [address] [facility] [address] [facility] [address]  [phone]

## 2023-07-05 ENCOUNTER — TELEPHONE (OUTPATIENT)
Dept: CARDIOLOGY CLINIC | Facility: CLINIC | Age: 53
End: 2023-07-05

## 2023-07-05 VITALS
DIASTOLIC BLOOD PRESSURE: 71 MMHG | TEMPERATURE: 98.7 F | HEART RATE: 77 BPM | WEIGHT: 207.2 LBS | HEIGHT: 61 IN | SYSTOLIC BLOOD PRESSURE: 115 MMHG | RESPIRATION RATE: 17 BRPM | OXYGEN SATURATION: 95 % | BODY MASS INDEX: 39.12 KG/M2

## 2023-07-05 PROBLEM — G89.18 POST-OP PAIN: Status: RESOLVED | Noted: 2022-10-26 | Resolved: 2023-07-05

## 2023-07-05 PROBLEM — E87.6 HYPOKALEMIA: Status: RESOLVED | Noted: 2019-06-23 | Resolved: 2023-07-05

## 2023-07-05 PROBLEM — I50.22 CHRONIC HFREF (HEART FAILURE WITH REDUCED EJECTION FRACTION) (HCC): Chronic | Status: ACTIVE | Noted: 2022-09-15

## 2023-07-05 LAB
ANION GAP SERPL CALCULATED.3IONS-SCNC: 1 MMOL/L
BASOPHILS # BLD AUTO: 0.03 THOUSANDS/ÂΜL (ref 0–0.1)
BASOPHILS NFR BLD AUTO: 0 % (ref 0–1)
BNP SERPL-MCNC: 167 PG/ML (ref 0–100)
BUN SERPL-MCNC: 11 MG/DL (ref 5–25)
CALCIUM SERPL-MCNC: 9 MG/DL (ref 8.3–10.1)
CHLORIDE SERPL-SCNC: 113 MMOL/L (ref 96–108)
CO2 SERPL-SCNC: 26 MMOL/L (ref 21–32)
CREAT SERPL-MCNC: 0.8 MG/DL (ref 0.6–1.3)
EOSINOPHIL # BLD AUTO: 0.06 THOUSAND/ÂΜL (ref 0–0.61)
EOSINOPHIL NFR BLD AUTO: 1 % (ref 0–6)
ERYTHROCYTE [DISTWIDTH] IN BLOOD BY AUTOMATED COUNT: 13.6 % (ref 11.6–15.1)
GFR SERPL CREATININE-BSD FRML MDRD: 83 ML/MIN/1.73SQ M
GLUCOSE SERPL-MCNC: 109 MG/DL (ref 65–140)
HCT VFR BLD AUTO: 47 % (ref 34.8–46.1)
HGB BLD-MCNC: 15 G/DL (ref 11.5–15.4)
IMM GRANULOCYTES # BLD AUTO: 0.04 THOUSAND/UL (ref 0–0.2)
IMM GRANULOCYTES NFR BLD AUTO: 1 % (ref 0–2)
LYMPHOCYTES # BLD AUTO: 1.99 THOUSANDS/ÂΜL (ref 0.6–4.47)
LYMPHOCYTES NFR BLD AUTO: 24 % (ref 14–44)
MAGNESIUM SERPL-MCNC: 2.5 MG/DL (ref 1.6–2.6)
MCH RBC QN AUTO: 28.5 PG (ref 26.8–34.3)
MCHC RBC AUTO-ENTMCNC: 31.9 G/DL (ref 31.4–37.4)
MCV RBC AUTO: 89 FL (ref 82–98)
MONOCYTES # BLD AUTO: 0.52 THOUSAND/ÂΜL (ref 0.17–1.22)
MONOCYTES NFR BLD AUTO: 6 % (ref 4–12)
NEUTROPHILS # BLD AUTO: 5.75 THOUSANDS/ÂΜL (ref 1.85–7.62)
NEUTS SEG NFR BLD AUTO: 68 % (ref 43–75)
NRBC BLD AUTO-RTO: 0 /100 WBCS
PLATELET # BLD AUTO: 223 THOUSANDS/UL (ref 149–390)
PMV BLD AUTO: 10.9 FL (ref 8.9–12.7)
POTASSIUM SERPL-SCNC: 4 MMOL/L (ref 3.5–5.3)
RBC # BLD AUTO: 5.26 MILLION/UL (ref 3.81–5.12)
SODIUM SERPL-SCNC: 140 MMOL/L (ref 135–147)
WBC # BLD AUTO: 8.39 THOUSAND/UL (ref 4.31–10.16)

## 2023-07-05 PROCEDURE — 85025 COMPLETE CBC W/AUTO DIFF WBC: CPT | Performed by: PHYSICIAN ASSISTANT

## 2023-07-05 PROCEDURE — 99215 OFFICE O/P EST HI 40 MIN: CPT | Performed by: INTERNAL MEDICINE

## 2023-07-05 PROCEDURE — 83735 ASSAY OF MAGNESIUM: CPT | Performed by: INTERNAL MEDICINE

## 2023-07-05 PROCEDURE — 99214 OFFICE O/P EST MOD 30 MIN: CPT | Performed by: INTERNAL MEDICINE

## 2023-07-05 PROCEDURE — 80048 BASIC METABOLIC PNL TOTAL CA: CPT | Performed by: INTERNAL MEDICINE

## 2023-07-05 PROCEDURE — 83880 ASSAY OF NATRIURETIC PEPTIDE: CPT | Performed by: PHYSICIAN ASSISTANT

## 2023-07-05 PROCEDURE — 93005 ELECTROCARDIOGRAM TRACING: CPT

## 2023-07-05 PROCEDURE — 99239 HOSP IP/OBS DSCHRG MGMT >30: CPT | Performed by: INTERNAL MEDICINE

## 2023-07-05 RX ORDER — METOPROLOL SUCCINATE 100 MG/1
100 TABLET, EXTENDED RELEASE ORAL EVERY 12 HOURS
Qty: 60 TABLET | Refills: 6 | Status: SHIPPED | OUTPATIENT
Start: 2023-07-05

## 2023-07-05 RX ORDER — MEXILETINE HYDROCHLORIDE 200 MG/1
200 CAPSULE ORAL 2 TIMES DAILY
Qty: 60 CAPSULE | Refills: 6 | Status: SHIPPED | OUTPATIENT
Start: 2023-07-05

## 2023-07-05 RX ORDER — MEXILETINE HYDROCHLORIDE 150 MG/1
300 CAPSULE ORAL ONCE
Status: COMPLETED | OUTPATIENT
Start: 2023-07-05 | End: 2023-07-05

## 2023-07-05 RX ADMIN — SPIRONOLACTONE 12.5 MG: 25 TABLET ORAL at 08:30

## 2023-07-05 RX ADMIN — METOPROLOL SUCCINATE 100 MG: 100 TABLET, EXTENDED RELEASE ORAL at 05:48

## 2023-07-05 RX ADMIN — SACUBITRIL AND VALSARTAN 1 TABLET: 49; 51 TABLET, FILM COATED ORAL at 08:30

## 2023-07-05 RX ADMIN — MEXILETINE HYDROCHLORIDE 300 MG: 150 CAPSULE ORAL at 11:19

## 2023-07-05 NOTE — CONSULTS
Advanced Heart Failure / Pulmonary Hypertension Service Consultation    Ellis Beebe 52 y.o. female  MRN: [id number]  Unit/Bed#: CW2 216-02; Encounter: [id number]    Assessment:  Principal Problem:    ICD (implantable cardioverter-defibrillator) discharge  Active Problems:    Non-ischemic cardiomyopathy (HCC)    TAE (obstructive sleep apnea)    Left bundle branch block (LBBB)    PVC (premature ventricular contraction)    Ventricular tachycardia, unspecified ([address])    Obese      HPI:   Ellis Beebe is a 52-year-old woman with NICM (diagnosed 2019), LBBB, s/p BiV ICD in 10/2022, TAE, and tobacco abuse who presented to [facility] on 07/03/2023 at the direction of the EP office due to ICD discharge for VF on 06/26/2023 ("Pt states she did "black out" for a few seconds. In office for device check with more episodes of non sustained VF noted, & Optivol fluid level crossed. BiV pacing % decreased, PVC counters increased"). Patient seen and examined. Reviewed above events. Patient feeling well today. No cardiac complaints. Denies SOB, MORE, PND, and orthopnea. Heart failure service was consulted for "heart failure with LVEF <30%." Patient follows with Dr. Nan Braxton for outpatient cardiology. Objective: Intake/ Output: Not accurate. Weight: 207 lbs (same as on 07/04). Telemetry: NSR, rates in 70s. Frequent PVCs. Today's Plan:  • Management of frequent PVCs per EP. Not currently on AAD. • Per patient, lower rate limit on ICD was reprogrammed to 75 bpm.   • Euvolemic on exam. Continue current medications. • Will sign off. Patient to continue to follow with Dr. Nan Braxton for outpatient cardiology. Plan:  ICD discharge with syncope / ventricular fibrillation    ICD discharge on 06/26/2023 for VF "after a weekend of heavy alcohol use and also recent quinolone use." Per EP, likely PVC-induced. Rate control: BB as below. Rhythm control: None.     Chronic HFrEF; LVEF 35-40%; LVIDd 5 cm; NYHA II; ACC/AHA Stage B   Etiology: nonischemic. TTE 06/24/2019: LVEF 25-30%. LVIDd 6.2 cm. C 06/25/2019: mild nonobstructive CAD. TTE 05/05/2022: LVEF 25-30%. LVIDd 5.3 cm. Grade 1 DD. Normal RV size with mildly reduced RVSF. Trace MR. TTE 07/04/2023: LVEF 35-40%. LVIDd 5 cm. Normal RV. Mild MR. Pharmacotherapies / Neurohormonal Blockade:  --Beta Blocker: metoprolol succinate 100 mg q12 hours (on carvedilol 12.5 mg q12 hours as outpatient). --ARNi / ACEi / ARB: Entresto 49-51 mg BID. --Aldosterone Antagonist: spironolactone 12.5 mg QOD. --SGLT2 Inhibitor: No; history of yeast infections with Jardiance. --Home Diuretic: None. --Inpatient Diuretic: None. Sudden Cardiac Death Risk Reduction:  --[employer] BiV ICD in situ since 10/26/2022. Lower rate limit increased this admission. --Interrogation from 07/03/2023: AP 60%. BVP 84.9%. Lead parameters WNL. 1 high rate episode since 06/26 with NSVF. OptiVol crossed since 07/01. Normal device function. Electrical Resynchronization:  --Response to BiV device: LVEF increased from 25-30% to 35-40%. BVP percentage of 84.9% (reduced due to increased PVCs?). Advanced Therapies (if appropriate): Will continue to monitor. Obstructive sleep apnea  Diverticulitis   Tobacco abuse    Past Medical History:   Diagnosis Date   • CHF (congestive heart failure) (HCC)    • Diverticulitis of colon    • GERD (gastroesophageal reflux disease)    • HH (hiatus hernia)    • Nonischemic cardiomyopathy ([address]) 06/2019   • Post-op pain 10/26/2022   • Ventricular fibrillation (HCC)    • Ventricular tachycardia (HCC)        Review of Systems   Constitutional: Negative for activity change, appetite change, diaphoresis, fatigue, fever and unexpected weight change. Eyes: Negative. Respiratory: Negative for cough, chest tightness and shortness of breath. Cardiovascular: Negative for chest pain, palpitations and leg swelling.    Gastrointestinal: Negative for abdominal distention, abdominal pain, diarrhea and nausea. Endocrine: Negative. Genitourinary: Negative for decreased urine volume, difficulty urinating, dysuria and urgency. Musculoskeletal: Negative. Skin: Negative. Allergic/Immunologic: Negative. Neurological: Negative for dizziness, syncope, weakness and light-headedness. Psychiatric/Behavioral: Negative for confusion and sleep disturbance. The patient is not nervous/anxious.         Current Facility-Administered Medications:   •  acetaminophen (TYLENOL) tablet 650 mg, 650 mg, Oral, Q6H PRN, Lennox Choi MD, 650 mg at 07/04/23 9939  •  albuterol (PROVENTIL HFA,VENTOLIN HFA) inhaler 2 puff, 2 puff, Inhalation, Q4H PRN, Zac Miranda PA-C  •  ascorbic acid (VITAMIN C) tablet 500 mg, 500 mg, Oral, Daily, Lennox Choi MD, 500 mg at 07/04/23 2780  •  calcium carbonate (TUMS) chewable tablet 1,000 mg, 1,000 mg, Oral, Daily PRN, Lennox Choi MD  •  docusate sodium (COLACE) capsule 100 mg, 100 mg, Oral, BID, Lennox Choi MD, 100 mg at 07/04/23 6198  •  metoprolol succinate (TOPROL-XL) 24 hr tablet 100 mg, 100 mg, Oral, Q12H, Lennox Choi MD, 100 mg at 07/05/23 0548  •  nicotine (NICODERM CQ) 7 mg/24hr TD 24 hr patch 1 patch, 1 patch, Transdermal, Daily, Lennox Choi MD  •  ondansetron [facility]) injection 4 mg, 4 mg, Intravenous, Q6H PRN, Lennox Choi MD  •  sacubitril-valsartan (ENTRESTO) 49-51 MG per tablet 1 tablet, 1 tablet, Oral, BID, Lennox Choi MD, 1 tablet at 07/05/23 0830  •  spironolactone (ALDACTONE) tablet 12.5 mg, 12.5 mg, Oral, Every Other Day, Lennox Choi MD, 12.5 mg at 07/05/23 0830    Allergies   Allergen Reactions   • Percocet [Oxycodone-Acetaminophen] Hives     Social History     Socioeconomic History   • Marital status: /Civil Union     Spouse name: Not on file   • Number of children: Not on file   • Years of education: Not on file   • Highest education level: Not on file   Occupational History   • Not on file   Tobacco Use   • Smoking status: Some Days     Packs/day: 0.50     Years: 33.00     Total pack years: 16.50     Types: Cigarettes     Last attempt to quit: 2019     Years since quittin.0   • Smokeless tobacco: Former   Vaping Use   • Vaping Use: Never used   Substance and Sexual Activity   • Alcohol use: Not Currently     Alcohol/week: 0.0 standard drinks of alcohol     Comment: 2x year   • Drug use: No   • Sexual activity: Not on file   Other Topics Concern   • Not on file   Social History Narrative   • Not on file     Social Determinants of Health     Financial Resource Strain: Not on file   Food Insecurity: Not on file   Transportation Needs: Not on file   Physical Activity: Not on file   Stress: Not on file   Social Connections: Not on file   Intimate Partner Violence: Not on file   Housing Stability: Not on file     Family History   Problem Relation Age of Onset   • Heart attack Mother    • Heart attack Father        Vitals:  Blood pressure 110/67, pulse 59, temperature 98.3 °F (36.8 °C), resp. rate 17, height 5' 1" (1.549 m), weight 94 kg (207 lb 3.2 oz), SpO2 93 %. I/O last 3 completed shifts:   In: 1060 [P.O.:1060]  Out: 2900 [Urine:2900]    Weight (last 2 days)     Date/Time Weight    23 0551 94 (207.2)    23 0915 93.9 (207)    23 0530 94.1 (207.4)        Wt Readings from Last 10 Encounters:   23 94 kg (207 lb 3.2 oz)   23 94.3 kg (208 lb)   10/26/22 92.5 kg (204 lb)   09/15/22 92.5 kg (204 lb)   09/15/22 92.6 kg (204 lb 1.6 oz)   22 90.7 kg (200 lb)   22 91.8 kg (202 lb 6.4 oz)   22 92.5 kg (204 lb)   22 92.5 kg (204 lb)   02/10/22 90.7 kg (200 lb)       Vitals:    23 0319 23 0544 23 0551 23 0722   BP: 106/65 105/67  110/67   Pulse: 64 67  59   Resp:       Temp: 98.7 °F (37.1 °C)   98.3 °F (36.8 °C)   TempSrc:       SpO2: 91% 95%  93%   Weight:   94 kg (207 lb 3.2 oz)    Height:           Physical Exam  Vitals reviewed. Constitutional:       General: She is awake. She is not in acute distress. Appearance: Normal appearance. She is well-developed and overweight. She is not toxic-appearing or diaphoretic. HENT:      Head: Normocephalic. Nose: Nose normal.      Mouth/Throat:      Mouth: Mucous membranes are moist.   Eyes:      General: No scleral icterus. Conjunctiva/sclera: Conjunctivae normal.   Neck:      Vascular: No JVD. Trachea: No tracheal deviation. Cardiovascular:      Rate and Rhythm: Normal rate and regular rhythm. Extrasystoles are present. Heart sounds: No murmur heard. Pulmonary:      Effort: Pulmonary effort is normal. No tachypnea, bradypnea or respiratory distress. Breath sounds: Normal air entry. No decreased air movement. No decreased breath sounds, wheezing or rhonchi. Abdominal:      General: Bowel sounds are normal. There is no distension. Palpations: Abdomen is soft. Tenderness: There is no abdominal tenderness. Musculoskeletal:      Cervical back: Neck supple. Right lower leg: No edema. Left lower leg: No edema. Skin:     General: Skin is warm and dry. Coloration: Skin is not jaundiced or pale. Neurological:      General: No focal deficit present. Mental Status: She is alert and oriented to person, place, and time. Psychiatric:         Attention and Perception: Attention normal.         Mood and Affect: Mood and affect normal.         Speech: Speech normal.         Behavior: Behavior normal. Behavior is cooperative. Thought Content:  Thought content normal.       Lines/Drains/Airways     Active Status     None              Labs & Results:      Results from last 7 days   Lab Units 07/04/23  1149 07/03/23  1520   WBC Thousand/uL 5.47 8.91   HEMOGLOBIN g/dL 9.8* 14.2   HEMATOCRIT % 31.3* 44.9   PLATELETS Thousands/uL 139* 221         Results from last 7 days   Lab Units 07/05/23  0550 07/04/23  0527 07/03/23  1520 POTASSIUM mmol/L 4.0 4.1 4.3   CHLORIDE mmol/L 113* 112* 111*   CO2 mmol/L 26 24 23   BUN mg/dL 11 11 16   CREATININE mg/dL 0.80 0.81 0.83   CALCIUM mg/dL 9.0 9.2 9.6   ALK PHOS U/L  --  88  --    ALT U/L  --  23  --    AST U/L  --  14  --          Scarlett Waters PA-C

## 2023-07-05 NOTE — DISCHARGE SUMMARY
[address] [facility]  Discharge- Diann Parsons 1970, 52 y.o. female MRN: [id number]  Unit/Bed#: Jennifer Peralta 298-85 Encounter: [id number]  Primary Care Provider: Cathy Berger MD   Date and time admitted to hospital: 7/3/2023  2:57 PM    * ICD (implantable cardioverter-defibrillator) discharge  Assessment & Plan  · Patient with ICD shock on 6/26, evaluated in cardiology office - device interrogation showed nonsustained episode of V-fib referred to ED for further management  · Carvedilol discontinued  · She is now placed on metoprolol succinate 100 mg twice daily twice   · Patient is now placed on mexiletine, she will be discharged with mexiletine 200 mg twice daily  · Outpatient cardiology follow-up    Non-ischemic cardiomyopathy [facility])  Assessment & Plan  Patient with history of nonischemic cardiomyopathy of possible viral etiology per review of records  2D echo 35 to 40%  Continue Entresto, Aldactone, metoprolol  Cardiology inputs noted      Obese  Assessment & Plan  Therapeutic lifestyle modification encouraged    Ventricular tachycardia, unspecified ([address])  Assessment & Plan  Patient with successful ICD shock  Continue metoprolol, mexiletine  Outpatient cardiology follow-up      PVC (premature ventricular contraction)  Assessment & Plan  · Now placed on metoprolol  · Mexiletine  · Outpatient cardiology follow-up    Left bundle branch block (LBBB)  Assessment & Plan  Noted    TAE (obstructive sleep apnea)  Assessment & Plan  · CPAP compliance encouraged            Discharge Summary - [facility]    Patient Information: Diann Parsons 52 y.o. female MRN: [id number]  Unit/Bed#: Jennifer Peralta 261-39 Encounter: [id number]    Discharging Physician / Practitioner: Addison Gilford, MD  PCP: Cathy Berger MD  Admission Date: 7/3/2023  Discharge Date: 07/05/23    Disposition:     Home    Reason for Admission: ICD firing, referred from cardiology office to the ED for further management    Discharge Diagnoses:     Principal Problem:    ICD (implantable cardioverter-defibrillator) discharge  Active Problems:    Non-ischemic cardiomyopathy (HCC)    TAE (obstructive sleep apnea)    Left bundle branch block (LBBB)    PVC (premature ventricular contraction)    Ventricular tachycardia, unspecified ([address])    Obese  Resolved Problems:    * No resolved hospital problems. *      Consultations During Hospital Stay:  · Cardiology    Procedures Performed:     · 2D echo EF 35 to 50%, LV systolic function moderately reduced, moderate global hypokinesis      Hospital Course:     Osorio Lui is a 52 y.o. female patient who originally presented to the hospital on 7/3/2023 due to ICD firing. Patientreferred from cardiology office to the ED for further management. Patient with history of nonischemic cardiomyopathy ICD in place was noted to have ICD firing also nonsustained ventricular tachycardia episodes of V-fib. She was admitted seen in consultation with cardiology. Her carvedilol was discontinued. She has been placed on metoprolol succinate 100 mg twice daily. She is initiated on mexiletine 200 g twice daily. She symptomatically improved hemodynamically stable, cardiology plans on close outpatient follow-up. Kindly review the chart for details.     Condition at Discharge: fair     Discharge Day Visit / Exam:     Subjective:      Comfortably in chair  Reports feeling better  Denies chest pain palpitations diaphoresis  Agreeable to discharge plan      Vitals: Blood Pressure: 115/71 (07/05/23 1511)  Pulse: 77 (07/05/23 1511)  Temperature: 98.7 °F (37.1 °C) (07/05/23 1511)  Temp Source: Oral (07/03/23 1421)  Respirations: 17 (07/03/23 1912)  Height: 5' 1" (154.9 cm) (07/04/23 0915)  Weight - Scale: 94 kg (207 lb 3.2 oz) (07/05/23 0551)  SpO2: 95 % (07/05/23 1511)  Exam:   Physical Exam    Comfortably sitting up in chair  Obese  Short thick neck  Lungs diminished breath sounds bilateral  Heart sounds S1-S2 noted  Heart sounds are distant  Abdomen soft nontender  Abdominal obesity noted  Awake and alert obey simple commands  No pedal Ouma  No rash    Discharge instructions/Information to patient and family:   See after visit summary for information provided to patient and family. Discharge plan discussed with cardiology  Discharge plan discussed with the patient, offered to call family reports he is keeping them updated  Outpatient follow-up with cardiology, primary care physician    Provisions for Follow-Up Care:  See after visit summary for information related to follow-up care and any pertinent home health orders. Planned Readmission: no     Discharge Statement:  I spent 43 minutes discharging the patient. This time was spent on the day of discharge. I had direct contact with the patient on the day of discharge. Greater than 50% of the total time was spent examining patient, answering all patient questions, arranging and discussing plan of care with patient as well as directly providing post-discharge instructions. Additional time then spent on discharge activities. Discharge Medications:  See after visit summary for reconciled discharge medications provided to patient and family.       ** Please Note: This note has been constructed using a voice recognition system **

## 2023-07-05 NOTE — PROGRESS NOTES
Progress Note - Electrophysiology - Cardiology  Agnes Cleaning 52 y.o. female MRN: [id number]  Unit/Bed#: CW2 216-02 Encounter: [id number]      Assessment:  1. Syncope secondary to VF 6/26/23  2. 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 Frequent PVCs   -- morphology suggests RVOT  -- burden significant increased now compared to time of device implant in October 4. NICM EF 25-30%, HFrEF, LBBB s/p Medtronic BiV ICD 10/26/2022  -- cath 2019 no obstructive CAD  -- possible viral etiology  -- Entresto, spironolactone  -- Coreg changed to Toprol XL  -- unable to tolerate SGLT2 2/2 yeast infections  --  ms --> improved to 114 ms after BiV pacing    Plan:  -- carvedilol 12.5 mg bid changed to Toprol  bid for PVC suppression  -- tele shows sinus rhythm with frequent PVCs, long short long with brief polymorphic VT noted  -- lower rate increased to 75 bpm to reduce chance of R on T phenomenon   -- add mexiletine 300 mg x 1 now, monitor response, then 200 mg po bid on discharge  -- keep Mg >2.0, K+ >4.0  -- echo shows EF 35-40%  -- consider downstream PVC ablation  -- heart failure consult pending  -- hopeful D/C home this afternoon after monitoring of first dose of mexiletine and HF consult      Subjective/Objective   Subjective: Agnes Cleaning is a 52 y.o. female with history of NICM EF 25 to 30%, GERD, LBBB, Medtronic BiV ICD 10/26/2022, obstructive sleep apnea, CKD, obesity who presents to the emergency department after ICD shock on 6/26/2023 d/t VF. She had syncope at the time of the event. She was admitted to the hospital for further monitoring. Segun Bibles was changed to Toprol XL (and dose increased). She continues to have PVCs on tele and brief polymorphic VT. Today, she is feeling well. She denies palpitations, lightheadedness, dizziness, orthopnea, PND, chest pain, or shortness of breath.   She is tolerating Toprol-XL. She would like to go home.     TELE: sinus rhythm with V pacing, frequent PVCs, couplets and triplets, long short long noted    EKG: sinus rhythm with BiV pacing, frequent PVCs (LBBB morphology, transition in V3)      Objective:  Vitals: /67   Pulse 59   Temp 98.3 °F (36.8 °C)   Resp 17   Ht 5' 1" (1.549 m)   Wt 94 kg (207 lb 3.2 oz)   SpO2 93%   BMI 39.15 kg/m²     Vitals:    07/04/23 0915 07/05/23 0551   Weight: 93.9 kg (207 lb) 94 kg (207 lb 3.2 oz)     Orthostatic Blood Pressures    Flowsheet Row Most Recent Value   Blood Pressure 110/67 filed at 07/05/2023 8393   Patient Position - Orthostatic VS Lying filed at 07/03/2023 1912            Intake/Output Summary (Last 24 hours) at 7/5/2023 1111  Last data filed at 7/5/2023 7596  Gross per 24 hour   Intake 380 ml   Output 500 ml   Net -120 ml       Invasive Devices     Peripheral Intravenous Line  Duration           Peripheral IV 07/03/23 Left Antecubital 1 day                Scheduled Meds:  Current Facility-Administered Medications   Medication Dose Route Frequency Provider Last Rate   • acetaminophen  650 mg Oral Q6H PRN Esmer Rahman MD     • albuterol  2 puff Inhalation Q4H PRN Daisy Adames PA-C     • ascorbic acid  500 mg Oral Daily Esmer Rahman MD     • calcium carbonate  1,000 mg Oral Daily PRN Esmer Rahman MD     • docusate sodium  100 mg Oral BID Esmer Rahman MD     • metoprolol succinate  100 mg Oral Q12H Esmer Rahman MD     • mexiletine  300 mg Oral Once Noris Fall PA-C     • nicotine  1 patch Transdermal Daily Esmer Rahman MD     • ondansetron  4 mg Intravenous Q6H PRN Esmer Rahman MD     • sacubitril-valsartan  1 tablet Oral BID Esmer Rahman MD     • spironolactone  12.5 mg Oral Every Other Day Esmer Rahman MD       Continuous Infusions:   PRN Meds:.•  acetaminophen  •  albuterol  •  calcium carbonate  •  ondansetron    Review of Systems:  Review of Systems   Constitutional: Negative for malaise/fatigue. HENT: Negative. Eyes: Negative. Cardiovascular: Negative for chest pain, near-syncope, orthopnea, palpitations and syncope. Respiratory: Negative for cough and wheezing. Endocrine: Negative. Hematologic/Lymphatic: Negative. Skin: Negative for rash. Musculoskeletal: Negative for back pain. Gastrointestinal: Negative for abdominal pain, nausea and vomiting. Genitourinary: Negative. Neurological: Negative for dizziness and seizures. Psychiatric/Behavioral: Negative. Allergic/Immunologic: Negative. ROS as noted above, otherwise 12 point review of systems was performed and is negative. Physical Exam:   GEN: NAD, alert and oriented x 3, well appearing  SKIN: warm, dry without significant lesions or rashes. Left chest implant site well healed  HEENT: NCAT, PERRL, EOMs intact  NECK: supple, no JVD appreciated  CARDIOVASCULAR: RRR, normal S1, S2 without murmurs, rubs, or gallops   LUNGS: CTA bilaterally without wheezes, rhonchi, or rales  ABDOMEN: Soft, nontender, nondistended. EXTREMITIES/VASCULAR: perfused without clubbing, cyanosis, or LE edema b/l  PSYCH: Normal mood and affect  NEURO: CN ll-Xll grossly intact    Lab Results: I have personally reviewed pertinent lab results. Results from last 7 days   Lab Units 07/04/23  1149 07/03/23  1520   WBC Thousand/uL 5.47 8.91   HEMOGLOBIN g/dL 9.8* 14.2   HEMATOCRIT % 31.3* 44.9   PLATELETS Thousands/uL 139* 221     Results from last 7 days   Lab Units 07/05/23  0550 07/04/23  0527 07/03/23  1520   POTASSIUM mmol/L 4.0 4.1 4.3   CHLORIDE mmol/L 113* 112* 111*   CO2 mmol/L 26 24 23   BUN mg/dL 11 11 16   CREATININE mg/dL 0.80 0.81 0.83   CALCIUM mg/dL 9.0 9.2 9.6         Results from last 7 days   Lab Units 07/05/23  0550 07/04/23  0527 07/03/23  1520   MAGNESIUM mg/dL 2.5 2.3 2.3       Imaging: I have personally reviewed pertinent reports.     Results for orders placed during the hospital encounter of 19    Echo complete with contrast if indicated    Narrative  [address] [place] [address].  [place] [address] [address] [place] [address]  [phone]    Transthoracic Echocardiogram  2D, M-mode, Doppler, and Color Doppler    Study date:  2019    Patient: Jayesh Perez  MR number: [id number]  Account number: [de-identified]  : 1970  Age: 44 years  Gender: Female  Status: Inpatient  Location: Bedside  Height: 61 in  Weight: 219.6 lb  BP: 118/ 80 mmHg    Indications: Heart Failure    Diagnoses: I50.9 - Heart failure, unspecified    Sonographer:  ISABELL Castaneda  Primary Physician:  Hugo Gonzales MD  Referring Physician:  Stacy Saini DO  Group:  [facility]  Interpreting Physician:  Panchito Keller MD    SUMMARY    LEFT VENTRICLE:  Systolic function was markedly reduced. Ejection fraction was estimated in the range of 25 % to 30 % to be 30 %. There was severe diffuse hypokinesis. Doppler parameters were consistent with abnormal left ventricular relaxation (grade 1 diastolic dysfunction). RIGHT VENTRICLE:  Systolic function was moderately reduced. LEFT ATRIUM:  The atrium was mildly dilated. RIGHT ATRIUM:  The atrium was mildly dilated. MITRAL VALVE:  There was trace regurgitation. IVC, HEPATIC VEINS:  The inferior vena cava was dilated. HISTORY: PRIOR HISTORY: GERD, Hiatal Hernia    PROCEDURE: The procedure was performed at the bedside. This was a routine study. The transthoracic approach was used. The study included complete 2D imaging, M-mode, complete spectral Doppler, and color Doppler. The heart rate was 92 bpm,  at the start of the study. Image quality was adequate. LEFT VENTRICLE: Size was normal. Systolic function was markedly reduced. Ejection fraction was estimated in the range of 25 % to 30 % to be 30 %. There was severe diffuse hypokinesis. Wall thickness was normal. No evidence of apical  thrombus.  DOPPLER: Doppler parameters were consistent with abnormal left ventricular relaxation (grade 1 diastolic dysfunction). RIGHT VENTRICLE: The size was normal. Systolic function was moderately reduced. Wall thickness was normal.    LEFT ATRIUM: The atrium was mildly dilated. RIGHT ATRIUM: The atrium was mildly dilated. MITRAL VALVE: Valve structure was normal. There was normal leaflet separation. DOPPLER: The transmitral velocity was within the normal range. There was no evidence for stenosis. There was trace regurgitation. AORTIC VALVE: The valve was trileaflet. Leaflets exhibited normal thickness and normal cuspal separation. DOPPLER: Transaortic velocity was within the normal range. There was no evidence for stenosis. There was no significant  regurgitation. TRICUSPID VALVE: The valve structure was normal. There was normal leaflet separation. DOPPLER: The transtricuspid velocity was within the normal range. There was no evidence for stenosis. There was no significant regurgitation. PULMONIC VALVE: Leaflets exhibited normal thickness, no calcification, and normal cuspal separation. DOPPLER: The transpulmonic velocity was within the normal range. There was no significant regurgitation. PERICARDIUM: There was no pericardial effusion. The pericardium was normal in appearance. AORTA: The root exhibited normal size. SYSTEMIC VEINS: IVC: The inferior vena cava was dilated. SYSTEM MEASUREMENT TABLES    2D mode  AoR Diam 2D: 3.2 cm  LA Diam (2D): 4.5 cm  LA/Ao (2D): 1.41  FS (2D Teich): 13.7 %  IVSd (2D): 0.82 cm  LVDEV: 193 cmï¾³  LVESV: 138 cmï¾³  LVIDd(2D): 6.19 cm  LVISd (2D): 5.34 cm  LVPWd (2D): 0.81 cm  SV (Teich): 55 cmï¾³    Apical four chamber  LVEF A4C: 28 %    Unspecified Scan Mode  MV Peak E Zach.  Mean: 848 mm/s  MVA (PHT): 4.15 cmï¾²  PHT: 53 ms  Max P mm[Hg]  V Max: 2440 mm/s  Vmax: 2500 mm/s  RA Area: 18.1 cmï¾²  RA Volume: 48.8 cmï¾³  TAPSE: 1.1 cm    [employer] Accredited Echocardiography Laboratory    Prepared and electronically signed by    Adrienne Sánchez MD  Signed 24-Jun-2019 13:10:35

## 2023-07-05 NOTE — TELEPHONE ENCOUNTER
Patient called with concerns regarding medication changes occurring during her hospital visit. Did not want advice from another physician. She is aware that you are on vacation, and I said I would send the message anyway so that you're aware.

## 2023-07-05 NOTE — ASSESSMENT & PLAN NOTE
Patient with history of nonischemic cardiomyopathy of possible viral etiology per review of records  2D echo 35 to 40%  Continue Entresto, Aldactone, metoprolol  Cardiology inputs noted

## 2023-07-05 NOTE — ASSESSMENT & PLAN NOTE
· Patient with ICD shock on 6/26, evaluated in cardiology office - device interrogation showed nonsustained episode of V-fib referred to ED for further management  · Carvedilol discontinued  · She is now placed on metoprolol succinate 100 mg twice daily twice   · Patient is now placed on mexiletine, she will be discharged with mexiletine 200 mg twice daily  · Outpatient cardiology follow-up

## 2023-07-06 LAB
ATRIAL RATE: 76 BPM
P AXIS: 54 DEGREES
PR INTERVAL: 170 MS
QRS AXIS: 241 DEGREES
QRSD INTERVAL: 132 MS
QT INTERVAL: 454 MS
QTC INTERVAL: 510 MS
T WAVE AXIS: 44 DEGREES
VENTRICULAR RATE: 76 BPM

## 2023-07-06 PROCEDURE — 93010 ELECTROCARDIOGRAM REPORT: CPT | Performed by: INTERNAL MEDICINE

## 2023-08-08 NOTE — PROGRESS NOTES
Call to mom in response to portal message. Per mom, Socorro uses her vest 2-3 times per day. Letter updated and sent to Faheem Stiles at [facility] as a revision. Fax confirmation received.    See scanned exercise session detailed report

## 2023-08-17 ENCOUNTER — OFFICE VISIT (OUTPATIENT)
Dept: SLEEP CENTER | Facility: CLINIC | Age: 53
End: 2023-08-17
Payer: COMMERCIAL

## 2023-08-17 VITALS
OXYGEN SATURATION: 96 % | DIASTOLIC BLOOD PRESSURE: 60 MMHG | HEART RATE: 78 BPM | SYSTOLIC BLOOD PRESSURE: 118 MMHG | WEIGHT: 209 LBS | BODY MASS INDEX: 39.46 KG/M2 | HEIGHT: 61 IN

## 2023-08-17 DIAGNOSIS — G47.33 OSA (OBSTRUCTIVE SLEEP APNEA): Primary | ICD-10-CM

## 2023-08-17 DIAGNOSIS — I50.41 ACUTE COMBINED SYSTOLIC AND DIASTOLIC CONGESTIVE HEART FAILURE (HCC): ICD-10-CM

## 2023-08-17 DIAGNOSIS — I42.8 NON-ISCHEMIC CARDIOMYOPATHY (HCC): ICD-10-CM

## 2023-08-17 DIAGNOSIS — G47.31 CENTRAL SLEEP APNEA: ICD-10-CM

## 2023-08-17 DIAGNOSIS — G47.09 OTHER INSOMNIA: ICD-10-CM

## 2023-08-17 DIAGNOSIS — Z72.0 CURRENT OCCASIONAL SMOKER: ICD-10-CM

## 2023-08-17 DIAGNOSIS — E66.9 OBESITY (BMI 30-39.9): ICD-10-CM

## 2023-08-17 DIAGNOSIS — R09.81 NASAL CONGESTION: ICD-10-CM

## 2023-08-17 PROCEDURE — 99214 OFFICE O/P EST MOD 30 MIN: CPT | Performed by: INTERNAL MEDICINE

## 2023-08-17 NOTE — PATIENT INSTRUCTIONS
Nasal symptoms may [improve] with regular nasal saline rinse up to twice a day, followed by topical nasal steroid (e..g. OTC Flonase, Nasacort) once a day if necessary. What you can do to improve your sleep: (Sleep Hygiene) Basic rules for a good night's sleep  Create a regular sleep schedule. This will help you form a sleep routine. Keep a record of your sleep patterns, and any sleeping problems you have. Bring the record to follow-up visits with healthcare providers. Avoid prolonged use of light-emitting screens before bedtime or watching TV in bed. Avoid forcing sleep. Do not take naps. Naps could make it hard for you to fall asleep at bedtime. Deal with your worries before bedtime. Keep your bedroom cool, quiet, and dark. Turn on white noise, such as a fan, to help you relax. Do not use your bed for any activity that will keep you awake. Do not read, exercise, eat, or watch TV in your bedroom. Get up if you do not fall asleep within 20 minutes. Move to another room and do something relaxing until you become sleepy. Limit caffeine, alcohol, nicotine and food to earlier in the day. Only drink caffeine in the morning. Do not drink alcohol within 6 hours of bedtime. Do not eat a heavy meal right before you go to bed. Avoid smoking, especially in the evening. Exercise regularly. Daily exercise will help you sleep better. Do not exercise within 4 hours of bedtime. Stimulus control therapy rules  1. Go to bed only when sleepy. 2. Do not watch television, read, eat, or worry while in bed. Use bed only for sleep and sex. 3. Get out of bed if unable to fall asleep within 20 minutes and go to another room. Return to bed only when sleepy. Repeat this step as many times as necessary throughout the night. 4. Set an alarm clock to wake up at a fixed time each morning, including weekends. 5. Do not take a nap during the day. Data from: [address] [place], [facility] Nonpharmacologic treatments of insomnia.  J Clin Psychiatry 1992; 53:37. Go to AASM website for more information: Sleepeducation. org  Recommended Reading: Book by john Vasquez   No More sleepless nights  Nursing Support:  When: Monday through Friday 7A-5PM except holidays  Where: Our direct line is [phone]. If you are having a true emergency please call 911. In the event that the line is busy or it is after hours please leave a voice message and we will return your call. Please speak clearly, leaving your full name, birth date, best number to reach you and the reason for your call. Medication refills: We will need the name of the medication, the dosage, the ordering provider, whether you get a 30 or 90 day refill, and the pharmacy name and address. Medications will be ordered by the provider only. Nurses cannot call in prescriptions. Please allow 7 days for medication refills. Physician requested updates: If your provider requested that you call with an update after starting medication, please be ready to provide us the medication and dosage, what time you take your medication, the time you attempt to fall asleep, time you fall asleep, when you wake up, and what time you get out of bed. Sleep Study Results: We will contact you with sleep study results and/or next steps after the physician has reviewed your testing.

## 2023-08-17 NOTE — PROGRESS NOTES
Follow-Up Note - Sleep Center   Little Echeverria  52 y.o. female  :1970  DPZ:[id number]  DOS:2023    CC: I saw this patient for follow-up in clinic today for Sleep disordered breathing, Coexisting Sleep and Medical Problems. Interval changes: None reported. Results of prior study:  A diagnostic study in April of this year demonstrated severe obstructive sleep apnea: AHI 43 /hour made up of central and obstructive events. Minimum oxygen saturation 80 % and 8.6 minutes of total sleep time was spent with saturations less than 90%. There were mild periodic limb movements of sleep. She had difficulty both initiating and maintaining sleep       PFSH, Problem List, Medications & Allergies were reviewed in EMR. She  has a past medical history of CHF (congestive heart failure) ([address]), Diverticulitis of colon, GERD (gastroesophageal reflux disease), HH (hiatus hernia), Nonischemic cardiomyopathy ([address]) (2019), Post-op pain (10/26/2022), Ventricular fibrillation ([address]), and Ventricular tachycardia ([address]). She has a current medication list which includes the following prescription(s): albuterol, ascorbic acid, cholecalciferol, entresto, metoprolol succinate, mexiletine, spironolactone, turmeric, and ergocalciferol. PHYSIOLOGICAL DATA REVIEW : Using PAP > 4 hours/night 100%. Estimated NANDO 4.5/hour with pressure of 10.4cm [email or web address] percentile; patient has not been using non FDA approved devices to sanitize the machine. INTERPRETATION: Compliance is excellent; Pressure setting is:in acceptable range; ;   SUBJECTIVE: With respect to use of PAP, Robert Bell  is experiencing no adverse effects:. She derives benefit. Is satisfied with sleep and daytime function. Sleep Routine: Robert Bell reports getting 6-8 hrs sleep out of approximately 8 hours in bed; she has difficulty initiating sleep, but not maintaining sleep . She watches TV in bed because of mind racing.  She arises spontaneously and feels more refreshed since on Rx. Braden Gonzalez denies excessive daytime sleepiness,. She rated [herself] at Total score: 1 /24 on the [place] Sleepiness Scale. Other issues: None reported. Habits:[ reports that she has been smoking cigarettes. She has a 16.50 pack-year smoking history. She has quit using smokeless tobacco.], [ reports that she does not currently use alcohol.], [ reports no history of drug use.], Caffeine use:excessive ~around 6 PM; Exercise routine: none. ROS: Significant for weight has been stable. She has nasal congestion. She is reporting no respiratory or cardiac symptoms. Had a recent ear infection and reports lingering ear pain. Bohannon Corina EXAM: /60   Pulse 78   Ht 5' 1" (1.549 m)   Wt 94.8 kg (209 lb)   SpO2 96%   BMI 39.49 kg/m²     Wt Readings from Last 3 Encounters:   08/17/23 94.8 kg (209 lb)   07/05/23 94 kg (207 lb 3.2 oz)   06/12/23 94.3 kg (208 lb)      Patient is well groomed; well appearing. CNS: Alert, orientated, speech clear & coherent  Psych: cooperative and in no distress. Mental state: Appears normal.  H&N: EOMI; NC/AT: No facial pressure marks, no rashes. Skin/Extrem: col & hydration normal; no edema  Resp: Respiratory effort is normal  Physical findings otherwise essentially unchanged from previous. IMPRESSION: Problem List Items & Comorbidities Addressed this Visit    1. TAE (obstructive sleep apnea)  PAP DME Resupply/Reorder      2. Central sleep apnea        3. Other insomnia        4. Nasal congestion        5. Current occasional smoker        6. Non-ischemic cardiomyopathy ([address])        7. Acute combined systolic and diastolic congestive heart failure ([address])        8. Obesity (BMI 30-39. 9)            PLAN:  1. I reviewed results of prior studies and physiologic data with the patient. 2. I discussed treatment options with risks and benefits. 3. Treatment with  PAP is medically necessary and Braden Gonzalez is agreable to continue use.    4. Care of equipment, methods to improve comfort using PAP and importance of compliance with therapy were discussed. 5. Pressure setting: adjust 8-14 cmH2O.    6. Rx provided to replace supplies and Care coordinated with DME provider. 7. Multi component Cognitive behavioral therapy for Insomnia undertaken - Sleep Restriction, Stimulus control, Relaxation techniques and Sleep hygiene were discussed. 8. Nasal symptoms may improve with regular nasal saline rinse up to twice a day, followed by topical nasal steroid (e..g. OTC Flonase, Nasacort) once a day if necessary. I also advised smoking cessation. If her symptoms persist, she will need to see ENT. 9. Discussed strategies for weight reduction. 10. Follow-up is advised in 1 year or sooner if needed to monitor progress, compliance and to adjust therapy. Thank you for allowing me to participate in the care of this patient. Sincerely,     Authenticated electronically on 60/41/91   Board Certified Specialist     Portions of the record may have been created with voice recognition software. Occasional wrong word or "sound a like" substitutions may have occurred due to the inherent limitations of voice recognition software. There may also be notations and random deletions of words or characters from malfunctioning software. Read the chart carefully and recognize, using context, where substitutions/deletions have occurred.

## 2023-08-18 ENCOUNTER — TELEPHONE (OUTPATIENT)
Dept: SLEEP CENTER | Facility: CLINIC | Age: 53
End: 2023-08-18

## 2023-08-18 LAB
DME PARACHUTE DELIVERY DATE REQUESTED: NORMAL
DME PARACHUTE ITEM DESCRIPTION: NORMAL
DME PARACHUTE ORDER STATUS: NORMAL
DME PARACHUTE SUPPLIER NAME: NORMAL
DME PARACHUTE SUPPLIER PHONE: NORMAL

## 2023-08-21 LAB

## 2023-09-07 ENCOUNTER — OFFICE VISIT (OUTPATIENT)
Dept: CARDIOLOGY CLINIC | Facility: CLINIC | Age: 53
End: 2023-09-07
Payer: COMMERCIAL

## 2023-09-07 VITALS
SYSTOLIC BLOOD PRESSURE: 98 MMHG | WEIGHT: 209.2 LBS | HEART RATE: 77 BPM | HEIGHT: 61 IN | DIASTOLIC BLOOD PRESSURE: 72 MMHG | BODY MASS INDEX: 39.5 KG/M2

## 2023-09-07 DIAGNOSIS — I47.20 VENTRICULAR TACHYCARDIA, UNSPECIFIED (HCC): Primary | ICD-10-CM

## 2023-09-07 DIAGNOSIS — I49.3 PVC (PREMATURE VENTRICULAR CONTRACTION): ICD-10-CM

## 2023-09-07 DIAGNOSIS — Z45.02 ICD (IMPLANTABLE CARDIOVERTER-DEFIBRILLATOR) DISCHARGE: ICD-10-CM

## 2023-09-07 DIAGNOSIS — I50.22 CHRONIC HFREF (HEART FAILURE WITH REDUCED EJECTION FRACTION) (HCC): Chronic | ICD-10-CM

## 2023-09-07 DIAGNOSIS — I44.7 LEFT BUNDLE BRANCH BLOCK (LBBB): ICD-10-CM

## 2023-09-07 DIAGNOSIS — I50.41 ACUTE COMBINED SYSTOLIC AND DIASTOLIC CONGESTIVE HEART FAILURE (HCC): ICD-10-CM

## 2023-09-07 DIAGNOSIS — I42.8 NON-ISCHEMIC CARDIOMYOPATHY (HCC): ICD-10-CM

## 2023-09-07 PROCEDURE — 93000 ELECTROCARDIOGRAM COMPLETE: CPT | Performed by: INTERNAL MEDICINE

## 2023-09-07 PROCEDURE — 99214 OFFICE O/P EST MOD 30 MIN: CPT | Performed by: INTERNAL MEDICINE

## 2023-09-07 NOTE — PROGRESS NOTES
- Electrophysiology - Cardiology outpatient follow-up  Neisha Byrd 52 y.o. female MRN: [id number]                  History of Present Illness            EPS Progress Note - Neisha Byrd 52 y.o. female MRN: [id number]           ASSESSMENT:  1. Ventricular tachycardia, unspecified (HCC)  POCT ECG      2. Left bundle branch block (LBBB)        3. PVC (premature ventricular contraction)        4. ICD (implantable cardioverter-defibrillator) discharge        5. Chronic HFrEF (heart failure with reduced ejection fraction) (HCC)        6. Acute combined systolic and diastolic congestive heart failure ([address])        7. Non-ischemic cardiomyopathy (HCC)                PLAN:  1.  VT/VF none since metoprolol up-titrated and started on mexilitine  2. biv icd in situ working appropriately  3. NICD - on appropriate meds  4. AAD therapy - continue mexilitine bid  5. PVCs relatively asymptomatic    F/u 1 prn with EPS. Continue with Dr. Diana Tran    HPI:   Interim history she follows up for ventricular tachycardia. Since she has been placed on mexiletine and her metoprolol is been uptitrated she has had no events. ROS   Occasional shortness of breath no chest pain all other 12 point ROS negative for complaints    Objective:     Vitals: Blood pressure 98/72, pulse 77, height 5' 1" (1.549 m), weight 94.9 kg (209 lb 3.2 oz). , Body mass index is 39.53 kg/m². ,        Physical Exam:    GEN: Neisha Byrd appears well, alert and oriented x 3, pleasant and cooperative   HEENT: pupils equal, round, and reactive to light; extraocular muscles intact  NECK: supple, no carotid bruits   HEART: regular rhythm, normal S1 and S2, no murmurs, clicks, gallops or rubs   LUNGS: clear to auscultation bilaterally; no wheezes, rales, or rhonchi   ABDOMEN: normal bowel sounds, soft, no tenderness, no distention  EXTREMITIES: peripheral pulses normal; no clubbing, cyanosis, or edema  NEURO: no focal findings   SKIN: normal without suspicious lesions on exposed skin    Medications:      Current Outpatient Medications:   •  albuterol (PROVENTIL HFA,VENTOLIN HFA) 90 mcg/act inhaler, , Disp: , Rfl:   •  ascorbic acid (VITAMIN C) 500 mg tablet, Take 500 mg by mouth daily, Disp: , Rfl:   •  cholecalciferol (VITAMIN D3) 1,000 units tablet, Take 1,000 Units by mouth daily Winter time only, Disp: , Rfl:   •  Entresto 49-51 MG TABS, TAKE 1 TABLET BY MOUTH  TWICE DAILY, Disp: 180 tablet, Rfl: 3  •  metoprolol succinate (TOPROL-XL) 100 mg 24 hr tablet, Take 1 tablet (100 mg total) by mouth every 12 (twelve) hours, Disp: 60 tablet, Rfl: 6  •  mexiletine (MEXITIL) 200 MG capsule, Take 1 capsule (200 mg total) by mouth 2 (two) times a day, Disp: 60 capsule, Rfl: 6  •  spironolactone (ALDACTONE) 25 mg tablet, Take 0.5 tablets (12.5 mg total) by mouth every other day, Disp: 90 tablet, Rfl: 3  •  Turmeric (QC TUMERIC COMPLEX PO), Take 500 mg by mouth, Disp: , Rfl:   •  ergocalciferol (VITAMIN D2) 50,000 units, , Disp: , Rfl:      Family History   Problem Relation Age of Onset   • Heart attack Mother    • Heart attack Father      Social History     Socioeconomic History   • Marital status: /Civil Union     Spouse name: Not on file   • Number of children: Not on file   • Years of education: Not on file   • Highest education level: Not on file   Occupational History   • Not on file   Tobacco Use   • Smoking status: Some Days     Packs/day: 0.25     Years: 33.00     Total pack years: 8.25     Types: Cigarettes     Last attempt to quit: 2019     Years since quittin.2   • Smokeless tobacco: Never   Vaping Use   • Vaping Use: Never used   Substance and Sexual Activity   • Alcohol use: Not Currently     Alcohol/week: 0.0 standard drinks of alcohol     Comment: 2x year   • Drug use: No   • Sexual activity: Not on file   Other Topics Concern   • Not on file   Social History Narrative   • Not on file     Social Determinants of Health     Financial Resource Strain: Not on file   Food Insecurity: Not on file   Transportation Needs: Not on file   Physical Activity: Not on file   Stress: Not on file   Social Connections: Not on file   Intimate Partner Violence: Not on file   Housing Stability: Not on file     Social History     Tobacco Use   Smoking Status Some Days   • Packs/day: 0.25   • Years: 33.00   • Total pack years: 8.25   • Types: Cigarettes   • Last attempt to quit: 2019   • Years since quittin.2   Smokeless Tobacco Never     Social History     Substance and Sexual Activity   Alcohol Use Not Currently   • Alcohol/week: 0.0 standard drinks of alcohol    Comment: 2x year       Labs & Results:  Below is the patient's most recent value for Albumin, ALT, AST, BUN, Calcium, Chloride, Cholesterol, CO2, Creatinine, GFR, Glucose, HDL, Hematocrit, Hemoglobin, Hemoglobin A1C, LDL, Magnesium, Phosphorus, Platelets, Potassium, PSA, Sodium, Triglycerides, and WBC. Lab Results   Component Value Date    ALT 23 2023    AST 14 2023    BUN 11 2023    CALCIUM 9.0 2023     (H) 2023    CO2 26 2023    CREATININE 0.80 2023    HDL 44 2019    HCT 47.0 (H) 2023    HGB 15.0 2023    HGBA1C 5.8 2023    MG 2.5 2023    PHOS 4.9 (H) 2023     2023    K 4.0 2023    TRIG 53 2019    WBC 8.39 2023     Note: for a comprehensive list of the patient's lab results, access the Results Review activity.             Cardiac testing:   Results for orders placed during the hospital encounter of 19    Echo complete with contrast if indicated    Narrative  [address] [place] [address].  [address]  [phone]    Transthoracic Echocardiogram  2D, M-mode, Doppler, and Color Doppler    Study date:  2019    Patient: Nannette Silva  MR number: [id number]  Account number: [de-identified]  : 1970  Age: 44 years  Gender: Female  Status: Inpatient  Location: Bedside  Height: 61 in  Weight: 219.6 lb  BP: 118/ 80 mmHg    Indications: Heart Failure    Diagnoses: I50.9 - Heart failure, unspecified    Sonographer:  ISABELL Walden  Primary Physician:  Krystian Up MD  Referring Physician:  Dannie Miller DO  Group:  [facility]  Interpreting Physician:  Keyla Emanuel MD    SUMMARY    LEFT VENTRICLE:  Systolic function was markedly reduced. Ejection fraction was estimated in the range of 25 % to 30 % to be 30 %. There was severe diffuse hypokinesis. Doppler parameters were consistent with abnormal left ventricular relaxation (grade 1 diastolic dysfunction). RIGHT VENTRICLE:  Systolic function was moderately reduced. LEFT ATRIUM:  The atrium was mildly dilated. RIGHT ATRIUM:  The atrium was mildly dilated. MITRAL VALVE:  There was trace regurgitation. IVC, HEPATIC VEINS:  The inferior vena cava was dilated. HISTORY: PRIOR HISTORY: GERD, Hiatal Hernia    PROCEDURE: The procedure was performed at the bedside. This was a routine study. The transthoracic approach was used. The study included complete 2D imaging, M-mode, complete spectral Doppler, and color Doppler. The heart rate was 92 bpm,  at the start of the study. Image quality was adequate. LEFT VENTRICLE: Size was normal. Systolic function was markedly reduced. Ejection fraction was estimated in the range of 25 % to 30 % to be 30 %. There was severe diffuse hypokinesis. Wall thickness was normal. No evidence of apical  thrombus. DOPPLER: Doppler parameters were consistent with abnormal left ventricular relaxation (grade 1 diastolic dysfunction). RIGHT VENTRICLE: The size was normal. Systolic function was moderately reduced. Wall thickness was normal.    LEFT ATRIUM: The atrium was mildly dilated. RIGHT ATRIUM: The atrium was mildly dilated. MITRAL VALVE: Valve structure was normal. There was normal leaflet separation.  DOPPLER: The transmitral velocity was within the normal range. There was no evidence for stenosis. There was trace regurgitation. AORTIC VALVE: The valve was trileaflet. Leaflets exhibited normal thickness and normal cuspal separation. DOPPLER: Transaortic velocity was within the normal range. There was no evidence for stenosis. There was no significant  regurgitation. TRICUSPID VALVE: The valve structure was normal. There was normal leaflet separation. DOPPLER: The transtricuspid velocity was within the normal range. There was no evidence for stenosis. There was no significant regurgitation. PULMONIC VALVE: Leaflets exhibited normal thickness, no calcification, and normal cuspal separation. DOPPLER: The transpulmonic velocity was within the normal range. There was no significant regurgitation. PERICARDIUM: There was no pericardial effusion. The pericardium was normal in appearance. AORTA: The root exhibited normal size. SYSTEMIC VEINS: IVC: The inferior vena cava was dilated. SYSTEM MEASUREMENT TABLES    2D mode  AoR Diam 2D: 3.2 cm  LA Diam (2D): 4.5 cm  LA/Ao (2D): 1.41  FS (2D Teich): 13.7 %  IVSd (2D): 0.82 cm  LVDEV: 193 cmï¾³  LVESV: 138 cmï¾³  LVIDd(2D): 6.19 cm  LVISd (2D): 5.34 cm  LVPWd (2D): 0.81 cm  SV (Teich): 55 cmï¾³    Apical four chamber  LVEF A4C: 28 %    Unspecified Scan Mode  MV Peak E Zach. Mean: 848 mm/s  MVA (PHT): 4.15 cmï¾²  PHT: 53 ms  Max P mm[Hg]  V Max: 2440 mm/s  Vmax: 2500 mm/s  RA Area: 18.1 cmï¾²  RA Volume: 48.8 cmï¾³  TAPSE: 1.1 cm    [employer] Accredited Echocardiography Laboratory    Prepared and electronically signed by    Ramona Leong MD  Signed 2019 13:10:35    No results found for this or any previous visit. No results found for this or any previous visit. No results found for this or any previous visit. PRIOR HOSPITAL CONSULT     Assessment/Plan   Assessment:  1.  Syncope, PVC induced VF s/p ICD shock  -presents for ICD shock for PVC induced VT which accelerated to VF  - had ICD discharged for VF 6/26 but never presented to ED   - device interrogation shows VF rate 350 bpm   - had non sustained/ non treated VF episode noted on interrogation today and sent to ED   -tele here showing frequent PVCs  -not on AAD   -last cath 2019 without disease   -ICD shock occurred Monday after a weekend of heavy alcohol use and also recent quinolone use      2. PVCs  -- frequent PVCs, RVOT morphology  -- consider PVC ablation     3. Non ishchemic cardiomyopathy   -History of nonischemic cardiomyopathy with EF 34%  -Thought to be viral etiology  -MRI consistent with viral etiology  -Currently on Entresto 49-51 mg unable to tolerate SGLT2 inhibitor secondary to yeast infections  -Beta-blocker with Coreg 12.5 mg twice daily  -On Aldactone 12.5 mg q. OD     4. LBBB   -Previously with QRS morphology 144 ms  -EKG with improvement in QRS duration 114 ms      5.  Med[employer]onic BiV ICD  -Status post ICD implantation by Dr. Maria Eugenia Holloway on 10/26/2022  -Planted secondary to cardiomyopathy with reduced LVEF of 25 to 30% with existing LBBB with QRS duration 144 MS  -Status post AICD shock for VT which degenerated to VF as above     Plan:  - VF s/p successful ICD shock in setting of alcohol use, recent quinolone use, and also frequent PVCs  -check echocardiogram  - change carvedilol to Toprol  mg bid   - consider AAD - avoid amiodarone use given patient's young age and long term side effects  - consider sotalol initiation   -Check electrolytes and replete to keep potassium greater than 4, magnesium greater than 2  - Monitor on telemetry and reassess tomorrow  - given arrhythmia appears PVC induced (RVOT origin), patient may be candidate for PVC ablation     HPI: Angela Caraballo is a 52 y.o. female with history of nonischemic cardiomyopathy with EF 25 to 30%, GERD, left bundle branch block, BiV ICD placement, obstructive sleep apnea, CKD, obesity who presents to the emergency department for discharge of her BiV ICD secondary to VT which degenerated into VF.     Patient has past medical history significant for nonischemic cardiomyopathy which was thought to be secondary to viral etiology. Patient initially presented to the emergency department in March 2019 with chest pain. Nuclear stress test at that time showed no evidence of ischemia but did show newly reduced ED after 35%. Does not appear the patient was started on GDMT adequately at that time was discharged but represented to the hospital on 6/19 with acute on chronic CHF in the setting of diverticulitis. Had cardiac catheterization done during that admission which did not show any obstructive coronary artery disease and was started on optimal medical therapy with carvedilol, and Entresto as well as Aldactone. Patient was discharged with LifeVest.  She underwent cardiac MRI on 3/2021 which showed LVEF of 34% with findings suggestive of viral cardiomyopathy. She had continued suppressed LVEF to 25 to 30% in May 2022 despite being on optical medical therapy and was thus referred to electrophysiology for ICD implantation. Patient underwent BiV ICD placement secondary to pre-existing LBBB for cardiac resynchronization therapy by Dr. Shadi May on 10/26/2022. Since that time she has followed up with Dr. Dunia Osuna of cardiology.     Patient has been followed in the device clinic and is shown periods of NSVT on prior device checks. She reports that she was at the beach week prior and drank "more than normal". She also states that she was recovering from recent sinus infection which she had taken a medication that "could result in tendon rupture". This was likely christen quinolone. Patient states that she was out on the 26th and had an episode of syncope after prodromal symptoms of lightheadedness and dizziness. She did call the office on 6/26/2023 as she was feeling her ICD "beeping".   She had a device check done at that time which did show ICD shocks secondary to VT which degenerated into VF. She was again seen at the [address] [place] [address] where device was interrogated today 7/3 showing nonsustained episode of V-fib which did not require therapies. She was referred to the emergency department for further evaluation given new onset ventricular arrhythmias.     Primary Cardiologist: Ameena Villasenor     TELE: NSR with frequent runs of PVC's        Historical Information         Medical History        Past Medical History:   Diagnosis Date   • CHF (congestive heart failure) ([address] )     • Diverticulitis of colon     • GERD (gastroesophageal reflux disease)     • Heart disease 2019   • HH (hiatus hernia)     • Stomach disorder     • Vascular disorder           Surgical History         Past Surgical History:   Procedure Laterality Date   • CARDIAC ELECTROPHYSIOLOGY PROCEDURE N/A 10/26/2022     Procedure: Cardiac biv icd implant/ BIV ICD SUBMUSCULAR IMPLANT;  Surgeon: Cristal Hale DO;  Location: [facility];   Service: Cardiology   •  SECTION       • CHOLECYSTECTOMY       • HAND SURGERY       • HYSTERECTOMY             Social History           Substance and Sexual Activity   Alcohol Use Not Currently   • Alcohol/week: 0.0 standard drinks of alcohol     Comment: 2x year      Social History          Substance and Sexual Activity   Drug Use No      Social History           Tobacco Use   Smoking Status Some Days   • Packs/day: 0.50   • Years: 33.00   • Total pack years: 16.50   • Types: Cigarettes   • Last attempt to quit: 2019   • Years since quittin.0   Smokeless Tobacco Former      Family History:   Family History         Family History   Problem Relation Age of Onset   • Heart attack Mother     • Heart attack Father              Meds/Allergies   Hospital Medications:   Current Medications   No current facility-administered medications for this encounter.         Home Medications:   Prescriptions Prior to Admission   (Not in a hospital admission)              Allergies   Allergen Reactions   • Percocet [Oxycodone-Acetaminophen] Hives         Objective   Vitals: Blood pressure 118/87, pulse 74, temperature 98.2 °F (36.8 °C), temperature source Oral, resp. rate 18, SpO2 97 %. Orthostatic Blood Pressures    Flowsheet Row Most Recent Value   Blood Pressure 118/87 filed at 07/03/2023 1600   Patient Position - Orthostatic VS Sitting filed at 07/03/2023 1421             No intake or output data in the 24 hours ending 07/03/23 1622         Invasive Devices            Peripheral Intravenous Line  Duration             Peripheral IV 07/03/23 Left Antecubital <1 day                    Review of Systems:  Review of Systems   Constitutional: Negative for malaise/fatigue. HENT: Negative. Eyes: Negative. Cardiovascular: Positive for syncope. Negative for chest pain, near-syncope and palpitations. Respiratory: Negative for cough, shortness of breath and wheezing. Endocrine: Negative. Hematologic/Lymphatic: Negative. Skin: Negative. Negative for rash. Musculoskeletal: Negative. Gastrointestinal: Negative for abdominal pain, nausea and vomiting. Genitourinary: Negative. Neurological: Negative for dizziness. Psychiatric/Behavioral: Negative.       ROS as noted above, otherwise 12 point review of systems was performed and is negative.      Physical Exam:   GEN: NAD, alert and oriented x 3, well appearing  SKIN: warm, dry without significant lesions or rashes. Left chest implant site C/D/I no erythema or hematoma. No signs of infection. HEENT: NCAT, PERRL, EOMs intact  NECK: supple, no JVD appreciated  CARDIOVASCULAR: RRR, normal S1, S2 without murmurs, rubs, or gallops   LUNGS: CTA bilaterally without wheezes, rhonchi, or rales  ABDOMEN: Soft, nontender, nondistended.   EXTREMITIES/VASCULAR: perfused without clubbing, cyanosis, or LE edema b/l  PSYCH: Normal mood and affect  NEURO: CN ll-Xll grossly intact     Lab Results: I have personally reviewed pertinent lab results. Results from last 7 days   Lab Units 23  1520   WBC Thousand/uL 8.91   HEMOGLOBIN g/dL 14.2   HEMATOCRIT % 44.9   PLATELETS Thousands/uL 221           Results from last 7 days   Lab Units 23  1520   POTASSIUM mmol/L 4.3   CHLORIDE mmol/L 111*   CO2 mmol/L 23   BUN mg/dL 16   CREATININE mg/dL 0.83   CALCIUM mg/dL 9.6               Results from last 7 days   Lab Units 23  1520   MAGNESIUM mg/dL 2.3         Imaging: I have personally reviewed pertinent reports. ECHO: Results for orders placed during the hospital encounter of 19     Echo complete with contrast if indicated     Narrative  [address] [place] [address].  [place] [address] [address]  [phone]     Transthoracic Echocardiogram  2D, M-mode, Doppler, and Color Doppler     Study date:  2019     Patient: Chris Mejia  MR number: [id number]  Account number: [de-identified]  : 1970  Age: 44 years  Gender: Female  Status: Inpatient  Location: Bedside  Height: 61 in  Weight: 219.6 lb  BP: 118/ 80 mmHg     Indications: Heart Failure     Diagnoses: I50.9 - Heart failure, unspecified     Sonographer:  ISABELL Tsang  Primary Physician:  Ignatius Litten, MD  Referring Physician:  Eileen Higuera DO  Group[facility]  [facility]  Interpreting Physician:  Andrea Hart MD     SUMMARY     LEFT VENTRICLE:  Systolic function was markedly reduced. Ejection fraction was estimated in the range of 25 % to 30 % to be 30 %. There was severe diffuse hypokinesis.   Doppler parameters were consistent with abnormal left ventricular relaxation (grade 1 diastolic dysfunction).     RIGHT VENTRICLE:  Systolic function was moderately reduced.     LEFT ATRIUM:  The atrium was mildly dilated.     RIGHT ATRIUM:  The atrium was mildly dilated.     MITRAL VALVE:  There was trace regurgitation.     IVC, HEPATIC VEINS:  The inferior vena cava was dilated.     HISTORY: PRIOR HISTORY: GERD, Hiatal Hernia     PROCEDURE: The procedure was performed at the bedside. This was a routine study. The transthoracic approach was used. The study included complete 2D imaging, M-mode, complete spectral Doppler, and color Doppler. The heart rate was 92 bpm,  at the start of the study. Image quality was adequate.     LEFT VENTRICLE: Size was normal. Systolic function was markedly reduced. Ejection fraction was estimated in the range of 25 % to 30 % to be 30 %. There was severe diffuse hypokinesis. Wall thickness was normal. No evidence of apical  thrombus. DOPPLER: Doppler parameters were consistent with abnormal left ventricular relaxation (grade 1 diastolic dysfunction).     RIGHT VENTRICLE: The size was normal. Systolic function was moderately reduced. Wall thickness was normal.     LEFT ATRIUM: The atrium was mildly dilated.     RIGHT ATRIUM: The atrium was mildly dilated.     MITRAL VALVE: Valve structure was normal. There was normal leaflet separation. DOPPLER: The transmitral velocity was within the normal range. There was no evidence for stenosis. There was trace regurgitation.     AORTIC VALVE: The valve was trileaflet. Leaflets exhibited normal thickness and normal cuspal separation. DOPPLER: Transaortic velocity was within the normal range. There was no evidence for stenosis. There was no significant  regurgitation.     TRICUSPID VALVE: The valve structure was normal. There was normal leaflet separation. DOPPLER: The transtricuspid velocity was within the normal range. There was no evidence for stenosis. There was no significant regurgitation.     PULMONIC VALVE: Leaflets exhibited normal thickness, no calcification, and normal cuspal separation. DOPPLER: The transpulmonic velocity was within the normal range. There was no significant regurgitation.     PERICARDIUM: There was no pericardial effusion.  The pericardium was normal in appearance.     AORTA: The root exhibited normal size.     SYSTEMIC VEINS: IVC: The inferior vena cava was dilated.     SYSTEM MEASUREMENT TABLES     2D mode  AoR Diam 2D: 3.2 cm  LA Diam (2D): 4.5 cm  LA/Ao (2D): 1.41  FS (2D Teich): 13.7 %  IVSd (2D): 0.82 cm  LVDEV: 193 cmï¾³  LVESV: 138 cmï¾³  LVIDd(2D): 6.19 cm  LVISd (2D): 5.34 cm  LVPWd (2D): 0.81 cm  SV (Teich): 55 cmï¾³     Apical four chamber  LVEF A4C: 28 %     Unspecified Scan Mode  MV Peak E Zach. Mean: 848 mm/s  MVA (PHT): 4.15 cmï¾²  PHT: 53 ms  Max P mm[Hg]  V Max: 2440 mm/s  Vmax: 2500 mm/s  RA Area: 18.1 cmï¾²  RA Volume: 48.8 cmï¾³  TAPSE: 1.1 cm     [facility]     Prepared and electronically signed by  Juan Luis Dexter MD  Signed 2019 13:10:35         Cardiac testing:   ECHO:   Results for orders placed during the hospital encounter of 19     Echo complete with contrast if indicated     Narrative  [address] [place] [address]  [phone]     Transthoracic Echocardiogram  2D, M-mode, Doppler, and Color Doppler     Study date:  2019     Patient: Melanie Walker  MR number: [id number]  Account number: [de-identified]  : 1970  Age: 44 years  Gender: Female  Status: Inpatient  Location: Bedside  Height: 61 in  Weight: 219.6 lb  BP: 118/ 80 mmHg     Indications: Heart Failure     Diagnoses: I50.9 - Heart failure, unspecified     Sonographer:  ISABELL Torres  Primary Physician:  Carlitos Noel MD  Referring Physician:  Nori Cowden, DO  Group:  [facility]  Interpreting Physician:  Patricia Keating MD     SUMMARY     LEFT VENTRICLE:  Systolic function was markedly reduced. Ejection fraction was estimated in the range of 25 % to 30 % to be 30 %. There was severe diffuse hypokinesis.   Doppler parameters were consistent with abnormal left ventricular relaxation (grade 1 diastolic dysfunction).     RIGHT VENTRICLE:  Systolic function was moderately reduced.     LEFT ATRIUM:  The atrium was mildly dilated.     RIGHT ATRIUM:  The atrium was mildly dilated.     MITRAL VALVE:  There was trace regurgitation.     IVC, HEPATIC VEINS:  The inferior vena cava was dilated.     HISTORY: PRIOR HISTORY: GERD, Hiatal Hernia     PROCEDURE: The procedure was performed at the bedside. This was a routine study. The transthoracic approach was used. The study included complete 2D imaging, M-mode, complete spectral Doppler, and color Doppler. The heart rate was 92 bpm,  at the start of the study. Image quality was adequate.     LEFT VENTRICLE: Size was normal. Systolic function was markedly reduced. Ejection fraction was estimated in the range of 25 % to 30 % to be 30 %. There was severe diffuse hypokinesis. Wall thickness was normal. No evidence of apical  thrombus. DOPPLER: Doppler parameters were consistent with abnormal left ventricular relaxation (grade 1 diastolic dysfunction).     RIGHT VENTRICLE: The size was normal. Systolic function was moderately reduced. Wall thickness was normal.     LEFT ATRIUM: The atrium was mildly dilated.     RIGHT ATRIUM: The atrium was mildly dilated.     MITRAL VALVE: Valve structure was normal. There was normal leaflet separation. DOPPLER: The transmitral velocity was within the normal range. There was no evidence for stenosis. There was trace regurgitation.     AORTIC VALVE: The valve was trileaflet. Leaflets exhibited normal thickness and normal cuspal separation. DOPPLER: Transaortic velocity was within the normal range. There was no evidence for stenosis. There was no significant  regurgitation.     TRICUSPID VALVE: The valve structure was normal. There was normal leaflet separation. DOPPLER: The transtricuspid velocity was within the normal range. There was no evidence for stenosis. There was no significant regurgitation.     PULMONIC VALVE: Leaflets exhibited normal thickness, no calcification, and normal cuspal separation. DOPPLER: The transpulmonic velocity was within the normal range. There was no significant regurgitation.     PERICARDIUM: There was no pericardial effusion. The pericardium was normal in appearance.     AORTA: The root exhibited normal size.     SYSTEMIC VEINS: IVC: The inferior vena cava was dilated.     SYSTEM MEASUREMENT TABLES     2D mode  AoR Diam 2D: 3.2 cm  LA Diam (2D): 4.5 cm  LA/Ao (2D): 1.41  FS (2D Teich): 13.7 %  IVSd (2D): 0.82 cm  LVDEV: 193 cmï¾³  LVESV: 138 cmï¾³  LVIDd(2D): 6.19 cm  LVISd (2D): 5.34 cm  LVPWd (2D): 0.81 cm  SV (Teich): 55 cmï¾³     Apical four chamber  LVEF A4C: 28 %     Unspecified Scan Mode  MV Peak E Zach.  Mean: 848 mm/s  MVA (PHT): 4.15 cmï¾²  PHT: 53 ms  Max P mm[Hg]  V Max: 2440 mm/s  Vmax: 2500 mm/s  RA Area: 18.1 cmï¾²  RA Volume: 48.8 cmï¾³  TAPSE: 1.1 cm     [employer] Accredited Echocardiography Laboratory     Prepared and electronically signed by  Zach Bee MD  Signed 2019 13:10:35     No results found for this or any previous visit.     CATH:  No results found for this or any previous visit.        STRESS TEST:  No results found for this or any previous visit.        VTE Prophylaxis: none ordered currently defer to primary service.

## 2023-09-14 ENCOUNTER — REMOTE DEVICE CLINIC VISIT (OUTPATIENT)
Dept: CARDIOLOGY CLINIC | Facility: CLINIC | Age: 53
End: 2023-09-14
Payer: COMMERCIAL

## 2023-09-14 DIAGNOSIS — Z95.810 PRESENCE OF AUTOMATIC CARDIOVERTER/DEFIBRILLATOR (AICD): Primary | ICD-10-CM

## 2023-09-14 PROCEDURE — 93295 DEV INTERROG REMOTE 1/2/MLT: CPT | Performed by: INTERNAL MEDICINE

## 2023-09-14 PROCEDURE — 93296 REM INTERROG EVL PM/IDS: CPT | Performed by: INTERNAL MEDICINE

## 2023-09-14 NOTE — PROGRESS NOTES
Results for orders placed or performed in visit on 09/14/23   Cardiac EP device report    Narrative    MDT BI-V ICD / ACTIVE SYSTEM IS MRI CONDITIONAL  CARELINK TRANSMISSION: BATTERY VOLTAGE ADEQUATE. (10.2 YRS) AP 96%  94%. ALL AVAILABLE LEAD PARAMETERS WITHIN NORMAL LIMITS. NO SIGNIFICANT HIGH RATE EPISODES. OPTI-VOL WITHIN NORMAL LIMITS. NORMAL DEVICE FUNCTION. ----ORTIZ

## 2023-09-25 ENCOUNTER — TELEPHONE (OUTPATIENT)
Dept: CARDIOLOGY CLINIC | Facility: CLINIC | Age: 53
End: 2023-09-25

## 2023-09-25 NOTE — TELEPHONE ENCOUNTER
Pharmacy [facility] called for supervising physician info. I faxed order for recent order and Lattie Other as authorizing. I faxed via rightfax.

## 2023-10-01 ENCOUNTER — OFFICE VISIT (OUTPATIENT)
Dept: URGENT CARE | Facility: CLINIC | Age: 53
End: 2023-10-01
Payer: COMMERCIAL

## 2023-10-01 ENCOUNTER — APPOINTMENT (OUTPATIENT)
Dept: RADIOLOGY | Facility: CLINIC | Age: 53
End: 2023-10-01
Payer: COMMERCIAL

## 2023-10-01 VITALS
OXYGEN SATURATION: 99 % | WEIGHT: 212 LBS | DIASTOLIC BLOOD PRESSURE: 60 MMHG | SYSTOLIC BLOOD PRESSURE: 98 MMHG | RESPIRATION RATE: 16 BRPM | TEMPERATURE: 98 F | BODY MASS INDEX: 40.02 KG/M2 | HEART RATE: 77 BPM | HEIGHT: 61 IN

## 2023-10-01 DIAGNOSIS — M79.672 LEFT FOOT PAIN: Primary | ICD-10-CM

## 2023-10-01 DIAGNOSIS — M79.672 LEFT FOOT PAIN: ICD-10-CM

## 2023-10-01 DIAGNOSIS — H92.03 EAR PAIN, BILATERAL: ICD-10-CM

## 2023-10-01 PROCEDURE — 99213 OFFICE O/P EST LOW 20 MIN: CPT | Performed by: PREVENTIVE MEDICINE

## 2023-10-01 PROCEDURE — 73630 X-RAY EXAM OF FOOT: CPT

## 2023-10-01 NOTE — PATIENT INSTRUCTIONS
I could detect nothing wrong with your tympanic membranes. Follow-up with ENT if ear pain continued x-rays negative on the foot believe it is just a contusion. Ibuprofen elevate and ice. Check the official reading in my chart. Patient was dropping off paperwork in regards to this medication dulaglutide (Trulicity) 0.75 MG/0.5ML pen-injector. Forms dropped off is \"[facility] Patient Assistance Program Application. Patient asked once these forms are filled out if someone can give her a call to be picked up.     Thanks,   Brian

## 2023-10-01 NOTE — PROGRESS NOTES
[facility]        NAME: Immanuel Queen is a 52 y.o. female  : 1970    MRN: [id number]  DATE: 2023  TIME: 12:38 PM    Assessment and Plan   Left foot pain [M79.672]  1. Left foot pain  XR foot 3+ vw left      2. Ear pain, bilateral              Patient Instructions       Follow up with PCP in 3-5 days. Proceed to  ER if symptoms worsen. Chief Complaint     Chief Complaint   Patient presents with   • Earache     Earache x 2 weeks. • Foot Injury     Foot injury occurred last night. History of Present Illness       Bilateral ear pain times several days. No history of previous head cold. Also trauma to the left foot trouble walking on it. Review of Systems   Review of Systems   HENT: Positive for ear pain. Musculoskeletal: Positive for arthralgias and gait problem.          Current Medications       Current Outpatient Medications:   •  albuterol (PROVENTIL HFA,VENTOLIN HFA) 90 mcg/act inhaler, , Disp: , Rfl:   •  ascorbic acid (VITAMIN C) 500 mg tablet, Take 500 mg by mouth daily, Disp: , Rfl:   •  cholecalciferol (VITAMIN D3) 1,000 units tablet, Take 1,000 Units by mouth daily Winter time only, Disp: , Rfl:   •  Entresto 49-51 MG TABS, TAKE 1 TABLET BY MOUTH TWICE  DAILY, Disp: 180 tablet, Rfl: 3  •  ergocalciferol (VITAMIN D2) 50,000 units, , Disp: , Rfl:   •  metoprolol succinate (TOPROL-XL) 100 mg 24 hr tablet, Take 1 tablet (100 mg total) by mouth every 12 (twelve) hours, Disp: 60 tablet, Rfl: 6  •  mexiletine (MEXITIL) 200 MG capsule, Take 1 capsule (200 mg total) by mouth 2 (two) times a day, Disp: 60 capsule, Rfl: 6  •  spironolactone (ALDACTONE) 25 mg tablet, Take 0.5 tablets (12.5 mg total) by mouth every other day, Disp: 90 tablet, Rfl: 3  •  Turmeric (QC TUMERIC COMPLEX PO), Take 500 mg by mouth, Disp: , Rfl:     Current Allergies     Allergies as of 10/01/2023 - Reviewed 10/01/2023   Allergen Reaction Noted   • Percocet [oxycodone-acetaminophen] Hives 2016            The following portions of the patient's history were reviewed and updated as appropriate: allergies, current medications, past family history, past medical history, past social history, past surgical history and problem list.     Past Medical History:   Diagnosis Date   • CHF (congestive heart failure) ([address])    • Diverticulitis of colon    • GERD (gastroesophageal reflux disease)    • HH (hiatus hernia)    • Nonischemic cardiomyopathy ([address]) 2019   • Post-op pain 10/26/2022   • Ventricular fibrillation ([address])    • Ventricular tachycardia ([address])        Past Surgical History:   Procedure Laterality Date   • CARDIAC ELECTROPHYSIOLOGY PROCEDURE N/A 10/26/2022    Procedure: Cardiac biv icd implant/ BIV ICD SUBMUSCULAR IMPLANT;  Surgeon: Risa Villaseñor DO;  Location: [facility]; Service: Cardiology   •  SECTION     • CHOLECYSTECTOMY     • HAND SURGERY     • HYSTERECTOMY         Family History   Problem Relation Age of Onset   • Heart attack Mother    • Heart attack Father          Medications have been verified. Objective   BP 98/60   Pulse 77   Temp 98 °F (36.7 °C)   Resp 16   Ht 5' 1" (1.549 m)   Wt 96.2 kg (212 lb)   SpO2 99%   BMI 40.06 kg/m²   No LMP recorded. Patient has had a hysterectomy. Physical Exam     Physical Exam  HENT:      Right Ear: Tympanic membrane and ear canal normal.      Left Ear: Tympanic membrane and ear canal normal.   Musculoskeletal:      Comments:  The left foot is not warm red or swollen     X-ray reveals no acute changes

## 2023-10-05 DIAGNOSIS — I42.8 NON-ISCHEMIC CARDIOMYOPATHY (HCC): ICD-10-CM

## 2023-10-05 DIAGNOSIS — I50.1 HEART FAILURE, LEFT, WITH LVEF 31-40% (HCC): ICD-10-CM

## 2023-10-05 DIAGNOSIS — I50.41 ACUTE COMBINED SYSTOLIC AND DIASTOLIC CONGESTIVE HEART FAILURE (HCC): ICD-10-CM

## 2023-10-05 RX ORDER — SPIRONOLACTONE 25 MG/1
25 TABLET ORAL EVERY OTHER DAY
Qty: 90 TABLET | Refills: 2
Start: 2023-10-05

## 2023-10-05 RX ORDER — SPIRONOLACTONE 25 MG/1
25 TABLET ORAL DAILY
Qty: 90 TABLET | Refills: 2 | Status: SHIPPED | OUTPATIENT
Start: 2023-10-05 | End: 2023-10-05

## 2023-10-05 NOTE — TELEPHONE ENCOUNTER
Pt.requested Spironolactone refill request in error. Spoke with patient. She is currently taking the Spironolactone 25 mg one tablet every other day. Pt.stated she has plenty of this medication and is not requesting refills at this time. In the interim previous to the request an e-scribe was placed for Spironolactone 25 mg once daily. I called [facility] to cancel that order.

## 2023-12-14 ENCOUNTER — REMOTE DEVICE CLINIC VISIT (OUTPATIENT)
Dept: CARDIOLOGY CLINIC | Facility: CLINIC | Age: 53
End: 2023-12-14
Payer: COMMERCIAL

## 2023-12-14 DIAGNOSIS — Z95.810 PRESENCE OF AUTOMATIC CARDIOVERTER/DEFIBRILLATOR (AICD): Primary | ICD-10-CM

## 2023-12-14 PROCEDURE — 93296 REM INTERROG EVL PM/IDS: CPT | Performed by: INTERNAL MEDICINE

## 2023-12-14 PROCEDURE — 93295 DEV INTERROG REMOTE 1/2/MLT: CPT | Performed by: INTERNAL MEDICINE

## 2023-12-14 NOTE — PROGRESS NOTES
Results for orders placed or performed in visit on 12/14/23   Cardiac EP device report    Narrative    MDT BI-V ICD / ACTIVE SYSTEM IS MRI CONDITIONAL  CARELINK TRANSMISSION: BATTERY VOLTAGE ADEQUATE. (9.6YRS) AP 96%  96%. ALL AVAILABLE LEAD PARAMETERS WITHIN NORMAL LIMITS. 1 NSVT EPISODE DETECTED 6 BEATS @ 300ms. VENTRICULAR SENSE EPISODE DETECTED. OPTI-VOL WITHIN NORMAL LIMITS. NORMAL DEVICE FUNCTION. ----ORTIZ

## 2024-01-19 LAB
ALBUMIN SERPL-MCNC: 4.7 G/DL (ref 3.8–4.9)
ALBUMIN/GLOB SERPL: 2.1 {RATIO} (ref 1.2–2.2)
ALP SERPL-CCNC: 105 IU/L (ref 44–121)
ALT SERPL-CCNC: 26 IU/L (ref 0–32)
AST SERPL-CCNC: 19 IU/L (ref 0–40)
BILIRUB SERPL-MCNC: 0.2 MG/DL (ref 0–1.2)
BUN SERPL-MCNC: 18 MG/DL (ref 6–24)
BUN/CREAT SERPL: 18 (ref 9–23)
CALCIUM SERPL-MCNC: 9.7 MG/DL (ref 8.7–10.2)
CHLORIDE SERPL-SCNC: 105 MMOL/L (ref 96–106)
CO2 SERPL-SCNC: 23 MMOL/L (ref 20–29)
CREAT SERPL-MCNC: 0.98 MG/DL (ref 0.57–1)
EGFR: 68 ML/MIN/1.73
GLOBULIN SER-MCNC: 2.2 G/DL (ref 1.5–4.5)
GLUCOSE SERPL-MCNC: 106 MG/DL (ref 70–99)
POTASSIUM SERPL-SCNC: 4.8 MMOL/L (ref 3.5–5.2)
PROT SERPL-MCNC: 6.9 G/DL (ref 6–8.5)
SODIUM SERPL-SCNC: 141 MMOL/L (ref 134–144)

## 2024-01-25 ENCOUNTER — OFFICE VISIT (OUTPATIENT)
Dept: CARDIOLOGY CLINIC | Facility: CLINIC | Age: 54
End: 2024-01-25
Payer: COMMERCIAL

## 2024-01-25 VITALS
HEIGHT: 61 IN | DIASTOLIC BLOOD PRESSURE: 60 MMHG | BODY MASS INDEX: 39.46 KG/M2 | SYSTOLIC BLOOD PRESSURE: 90 MMHG | HEART RATE: 81 BPM | WEIGHT: 209 LBS | OXYGEN SATURATION: 99 %

## 2024-01-25 DIAGNOSIS — G47.33 OSA (OBSTRUCTIVE SLEEP APNEA): ICD-10-CM

## 2024-01-25 DIAGNOSIS — I47.20 VENTRICULAR TACHYCARDIA, UNSPECIFIED (HCC): ICD-10-CM

## 2024-01-25 DIAGNOSIS — I44.7 LEFT BUNDLE BRANCH BLOCK (LBBB): ICD-10-CM

## 2024-01-25 DIAGNOSIS — I49.01 VENTRICULAR FIBRILLATION (HCC): ICD-10-CM

## 2024-01-25 DIAGNOSIS — I50.22 CHRONIC HFREF (HEART FAILURE WITH REDUCED EJECTION FRACTION) (HCC): Primary | Chronic | ICD-10-CM

## 2024-01-25 PROCEDURE — 99214 OFFICE O/P EST MOD 30 MIN: CPT | Performed by: INTERNAL MEDICINE

## 2024-01-25 RX ORDER — MEXILETINE HYDROCHLORIDE 200 MG/1
200 CAPSULE ORAL 2 TIMES DAILY
Qty: 180 CAPSULE | Refills: 1 | Status: SHIPPED | OUTPATIENT
Start: 2024-01-25

## 2024-01-25 RX ORDER — METOPROLOL SUCCINATE 100 MG/1
100 TABLET, EXTENDED RELEASE ORAL EVERY 12 HOURS
Qty: 180 TABLET | Refills: 1 | Status: SHIPPED | OUTPATIENT
Start: 2024-01-25

## 2024-01-25 NOTE — PATIENT INSTRUCTIONS
Potassium Content of Foods List   AMBULATORY CARE:   Potassium  is a mineral that is found in most foods. Potassium helps to balance fluids and minerals in your body. It also helps your body maintain a normal blood pressure. Potassium helps your muscles contract and your nerves function normally.  Why you may need to change the amount of potassium you eat:   You may need more potassium  if you have hypokalemia (low potassium levels) or high blood pressure. You may also need more potassium if you are taking diuretics. Diuretics and certain medicines cause your body to lose potassium.    You may need less potassium  in your diet if you have hyperkalemia (high potassium levels) or kidney disease.    Potassium content of fruit:  The amount of potassium in milligrams (mg) contained in each fruit or serving of fruit is listed beside the item.  High-potassium foods (more than 200 mg per serving):      1 medium banana (425)    ½ of a papaya (390)    ½ cup of prune juice (370)    ¼ cup of raisins (270)    1 medium deysi (325) or kiwi (240)    1 small orange (240) or ½ cup of orange juice (235)    ½ cup of cubed cantaloupe (215) or diced honeydew melon (200)    1 medium pear (200)    Medium-potassium foods (50 to 200 mg per serving):      1 medium peach (185)    1 small apple or ½ cup of apple juice (150)    ½ cup of peaches canned in juice (120)    ½ cup of canned pineapple (100)    ½ cup of fresh, sliced strawberries (125)    ½ cup of watermelon (85)    Low-potassium foods (less than 50 mg per serving):      ½ cup of cranberries (45) or cranberry juice cocktail (20)    ½ cup of nectar of papaya, deysi, or pear (35)    Potassium content of vegetables:   High-potassium foods (more than 200 mg per serving):      1 medium baked potato, with skin (925)    1 baked medium sweet potato, with skin (450)    ½ cup of tomato or vegetable juice (275), or 1 medium raw tomato (290)    ½ cup of mushrooms (280)    ½ cup of fresh brussels  sprouts (250)    ½ cup of cooked zucchini (220) or winter squash (250)    ¼ of a medium avocado (245)    ½ cup of broccoli (230)    Medium-potassium foods (50 to 200 mg per serving):      ½ cup of corn (195)    ½ cup of fresh or cooked carrots (180)    ½ cup of fresh cauliflower (150)    ½ cup of asparagus (155)    ½ cup of canned peas (90)     1 cup of lettuce, all types (100)    ½ cup of fresh green beans (90)    ½ cup of frozen green beans (85)    ½ cup of cucumber (80)    Potassium content of protein foods:   High-potassium foods (more than 200 mg per serving):      ½ cup of cooked byrd beans (400) or lentils (365)    1 cup of soy milk (300)    3 ounces of baked or broiled salmon (319)    3 ounces of roasted turkey, dark meat (250)    ¼ cup of sunflower seeds (241)    3 ounces of cooked lean beef (224)    2 tablespoons of smooth peanut butter (210)    Medium-potassium foods (50 to 200 mg per serving):      1 ounce of salted peanuts, almonds, or cashews (200)    1 large egg (60 mg)    Potassium content of dairy foods:   High-potassium foods (more than 200 mg per serving):      6 ounces of yogurt (260 to 435)    1 cup of nonfat, low-fat, or whole milk (350 to 380)    Medium-potassium foods (50 to 200 mg per serving):      ½ cup of ricotta cheese (154)    ½ cup of vanilla ice cream (131)    ½ cup of low-fat (2%) cottage cheese (110)    Low-potassium foods (less than 50 mg per serving):      1 ounce of cheese (20 to 30)      Potassium content of grains:   1 slice of white bread (30)    ½ cup of white or brown rice (50)    ½ cup of spaghetti or macaroni (30)    1 flour or corn tortilla (50)    1 four-inch waffle (50)    Potassium content of other foods:   1 tablespoon of molasses (295)    1½ ounces of chocolate (165)    Some salt substitutes may contain a high amount of potassium. Check the food label to find the amount of potassium it contains.    © Copyright [employer] 2023 Information is for End User's use only and  may not be sold, redistributed or otherwise used for commercial purposes.  The above information is an  only. It is not intended as medical advice for individual conditions or treatments. Talk to your doctor, nurse or pharmacist before following any medical regimen to see if it is safe and effective for you.

## 2024-01-25 NOTE — PROGRESS NOTES
Cardiology Follow Up  Oneida Landers  1970  [id number]  [facility]  [address]  [address], [address]  [facility] [address] [phone]    1. Chronic HFrEF (heart failure with reduced ejection fraction) (HCC)        2. Left bundle branch block (LBBB)        3. Ventricular tachycardia, unspecified (HCC)             Discussion/Plan:  Hx of Vfib/Nonischemic CM EF34% by MRI <30% by echo 2d NYHA class 2 s/p ICD firing. Last Device recording with 6 beat NSVT. Metoprolol + Mexiletine.  We will cut her spironolactone to 12.5 mg every other day.  Discussed about need for compression socks.    Acute on chronic kidney disease- improved. Electrolytes stable.  Recheck for electrolytes    Diverticulitis- currently high risk for procedure. Would delay until improved EF.    Hyperkalemia- she will watch her potassium intake    TAE- trouble with cpap. Positive pressure.     BMI-39. She will discuss about medical weight loss.      Interval History:  She denies having significant lower extremity edema since discharge.  She denies feeling dizziness or lightheadedness.  She denies having chest discomfort.  Denies having syncopal episodes.  Denies having any irregular heart rhythm.    8/12:  No edema. No syncope. No dizziness or light-headness.  Compliant with meds.  She has had no device firings.  She has been compliant with the up titration of the Entresto.  She reports trying to increase her carvedilol but did not feel right.  She understands about orthostatic hypotension in the risk of prolonged standing.  She has been watching her sodium intake.  Her weight has been down trending.    11/25:  She is up titrated her carvedilol without significant dizziness lightheadedness.  She does feel some orthostatic symptoms.  She stands up slowly.  She denies having significant lower extremity swelling.  She is compliant with her  medications.  She is wearing compression socks when she is at work.    01/20/2020:  Reviewed through her imaging study which shows her EF has improved to near 34%.  She is walking a mi without significant shortness of breath.  She denies having any dizziness or lightheadedness.  She is wearing her compression socks at work.  She is compliant with her medications.  Her last blood work showed mildly elevated potassium.  She is watching her potassium foods.  Her kidney function has returned back to normal Holter a victoria.    9/10/20:  Still with trouble with cpap. Feels gassy. No dizziness or light-headness.      03/15/2021:  She denies feeling any chest tightness.  She denies feeling dizziness or lightheadedness.  I reviewed with her her last studies.  She is compliant with her medications.    04/29/2021:  She is walking more than a mi without having shortness of breath or chest discomfort.  She is compliant with her heart failure regimen.  Her weight stays very stable.  We reviewed through her recent MRI.  We had a mutual discussion about having a primary defibrillator.  I discussed with her given she is a nonischemic cardiomyopathy with a very good functional status a primary prevention device would have limited benefits.  She is currently wishes not to go ahead with a defibrillator placement.  She will notify me if she feels any major shortness of breath, palpitations or chest pain.  We will continue optimal medical therapy.    10/28/2021:  Her weight has been stable.  She denies having dizziness or lightheadedness.  She denies having chest heaviness.  No major changes EKG.    Compliant with medications.    4 15 2022 she reports having recent travel.  She denies having dizziness or lightheadedness.  She is compliant with her medications.  She denies having lower extremity edema.    08/08/2022:  She was unable to tolerate the SGLT2 inhibitor.  She unfortunately had a yeast infection that was difficult to control.  She  currently denies having major exertional chest pain or shortness of breath.  She denies feeling dizziness or lightheadedness.  We discussed about her echocardiogram results.  She is wearing her CPAP at night.  However unfortunately her CPAP has been recalled.  She is using soap and water to clean it.    6/12/2023: She has periodic low blood pressures.  She may have low fluid intake.  She is taking her medication regularly.  She is taking her CPAP at night.  She denies having major palpitations.    Recent Visit: Previous episode of device firing while on vacation. Compliant with CPAP at night. Denies device firing.  Denies significant palpitations. Compliant with therapy. Reviewed labwork together.       Patient Active Problem List   Diagnosis    Nicotine abuse    GERD (gastroesophageal reflux disease)    Acute combined systolic and diastolic congestive heart failure (HCC)    Diverticulitis    Non-ischemic cardiomyopathy (HCC)    Diverticulitis of large intestine without perforation or abscess without bleeding    TAE (obstructive sleep apnea)    Left bundle branch block (LBBB)    Chronic HFrEF (heart failure with reduced ejection fraction) (HCC)    PVC (premature ventricular contraction)    Ventricular tachycardia, unspecified (HCC)    ICD (implantable cardioverter-defibrillator) discharge    Obese     Past Medical History:   Diagnosis Date    CHF (congestive heart failure) (HCC)     Diverticulitis of colon     GERD (gastroesophageal reflux disease)     HH (hiatus hernia)     Nonischemic cardiomyopathy (HCC) 06/2019    Post-op pain 10/26/2022    Ventricular fibrillation (HCC)     Ventricular tachycardia (HCC)      Social History     Socioeconomic History    Marital status: /Civil Union     Spouse name: Not on file    Number of children: Not on file    Years of education: Not on file    Highest education level: Not on file   Occupational History    Not on file   Tobacco Use    Smoking status: Some Days      Current packs/day: 0.00     Average packs/day: 0.3 packs/day for 33.0 years (8.3 ttl pk-yrs)     Types: Cigarettes     Start date: 1986     Last attempt to quit: 2019     Years since quittin.5    Smokeless tobacco: Never   Vaping Use    Vaping status: Never Used   Substance and Sexual Activity    Alcohol use: Not Currently     Alcohol/week: 0.0 standard drinks of alcohol     Comment: 2x year    Drug use: No    Sexual activity: Not on file   Other Topics Concern    Not on file   Social History Narrative    Not on file     Social Determinants of Health     Financial Resource Strain: Not on file   Food Insecurity: Not on file   Transportation Needs: Not on file   Physical Activity: Not on file   Stress: Not on file   Social Connections: Not on file   Intimate Partner Violence: Not on file   Housing Stability: Not on file      Family History   Problem Relation Age of Onset    Heart attack Mother     Heart attack Father      Past Surgical History:   Procedure Laterality Date    CARDIAC ELECTROPHYSIOLOGY PROCEDURE N/A 10/26/2022    Procedure: Cardiac biv icd implant/ BIV ICD SUBMUSCULAR IMPLANT;  Surgeon: Boston Santillan DO;  Location: [facility];  Service: Cardiology     SECTION      CHOLECYSTECTOMY      HAND SURGERY      HYSTERECTOMY         Current Outpatient Medications:     albuterol (PROVENTIL HFA,VENTOLIN HFA) 90 mcg/act inhaler, , Disp: , Rfl:     ascorbic acid (VITAMIN C) 500 mg tablet, Take 500 mg by mouth daily, Disp: , Rfl:     cholecalciferol (VITAMIN D3) 1,000 units tablet, Take 1,000 Units by mouth daily Winter time only, Disp: , Rfl:     Entresto 49-51 MG TABS, TAKE 1 TABLET BY MOUTH TWICE  DAILY, Disp: 180 tablet, Rfl: 3    metoprolol succinate (TOPROL-XL) 100 mg 24 hr tablet, Take 1 tablet (100 mg total) by mouth every 12 (twelve) hours, Disp: 60 tablet, Rfl: 6    mexiletine (MEXITIL) 200 MG capsule, Take 1 capsule (200 mg total) by mouth 2 (two) times a day, Disp: 60  capsule, Rfl: 6    spironolactone (ALDACTONE) 25 mg tablet, Take 1 tablet (25 mg total) by mouth every other day, Disp: 90 tablet, Rfl: 2    Turmeric (QC TUMERIC COMPLEX PO), Take 500 mg by mouth, Disp: , Rfl:     ergocalciferol (VITAMIN D2) 50,000 units, , Disp: , Rfl:   Allergies   Allergen Reactions    Percocet [Oxycodone-Acetaminophen] Hives       Review of Systems:  Review of Systems   Constitutional: Positive for fatigue. Negative for activity change, appetite change, chills, diaphoresis, fever and unexpected weight change.   HENT: Negative.  Negative for congestion, dental problem, drooling, ear discharge, ear pain, facial swelling, hearing loss, mouth sores, nosebleeds, postnasal drip, rhinorrhea, sinus pressure, sinus pain, sneezing, sore throat, tinnitus, trouble swallowing and voice change.    Eyes: Negative.  Negative for photophobia, pain, redness, itching and visual disturbance.   Respiratory: Negative for apnea, cough, choking, chest tightness, shortness of breath, wheezing and stridor.    Cardiovascular: Negative for chest pain, palpitations and leg swelling.   Gastrointestinal: Negative.  Negative for abdominal distention, abdominal pain, anal bleeding, blood in stool, constipation, diarrhea, nausea, rectal pain and vomiting.   Endocrine: Negative.  Negative for cold intolerance, heat intolerance, polydipsia, polyphagia and polyuria.   Genitourinary: Negative.  Negative for decreased urine volume, difficulty urinating, dyspareunia, dysuria, enuresis, flank pain, frequency, genital sores, hematuria, menstrual problem, pelvic pain, urgency, vaginal bleeding, vaginal discharge and vaginal pain.   Musculoskeletal: Negative.  Negative for arthralgias, back pain, gait problem, joint swelling, myalgias, neck pain and neck stiffness.   Skin: Negative.  Negative for color change, pallor, rash and wound.   Allergic/Immunologic: Negative.  Negative for environmental allergies, food allergies and  "immunocompromised state.   Neurological: Negative.  Negative for dizziness, tremors, seizures, syncope, facial asymmetry, speech difficulty, weakness, light-headedness, numbness and headaches.   Hematological: Negative.  Negative for adenopathy. Does not bruise/bleed easily.   Psychiatric/Behavioral: Negative.  Negative for agitation, behavioral problems, confusion, decreased concentration, dysphoric mood, hallucinations, self-injury, sleep disturbance and suicidal ideas. The patient is not nervous/anxious and is not hyperactive.    All other systems reviewed and are negative.      Vitals:    01/25/24 1305   BP: 90/60   BP Location: Right arm   Patient Position: Sitting   Cuff Size: Standard   Pulse: 81   SpO2: 99%   Weight: 94.8 kg (209 lb)   Height: 5' 1\" (1.549 m)     Physical Exam:  Physical Exam  Constitutional:       General: She is not in acute distress.     Appearance: She is well-developed. She is not diaphoretic.   HENT:      Head: Normocephalic and atraumatic.      Right Ear: External ear normal.      Left Ear: External ear normal.   Eyes:      General: No scleral icterus.        Right eye: No discharge.         Left eye: No discharge.      Conjunctiva/sclera: Conjunctivae normal.      Pupils: Pupils are equal, round, and reactive to light.   Neck:      Thyroid: No thyromegaly.      Vascular: No JVD.      Trachea: No tracheal deviation.   Cardiovascular:      Rate and Rhythm: Normal rate and regular rhythm.      Heart sounds: Murmur heard.     No friction rub. Gallop present.   Pulmonary:      Effort: Pulmonary effort is normal. No respiratory distress.      Breath sounds: Normal breath sounds. No stridor. No wheezing or rales.   Chest:      Chest wall: No tenderness.   Abdominal:      General: Bowel sounds are normal. There is distension.      Palpations: Abdomen is soft. There is no mass.      Tenderness: There is no abdominal tenderness. There is no guarding or rebound.   Musculoskeletal:         " "General: No tenderness or deformity. Normal range of motion.      Cervical back: Normal range of motion and neck supple.   Skin:     General: Skin is warm and dry.      Coloration: Skin is not pale.      Findings: No erythema or rash.      Comments: Positive varicose veins   Neurological:      Mental Status: She is alert and oriented to person, place, and time.      Cranial Nerves: No cranial nerve deficit.      Motor: No abnormal muscle tone.      Coordination: Coordination normal.      Deep Tendon Reflexes: Reflexes are normal and symmetric. Reflexes normal.   Psychiatric:         Behavior: Behavior normal.         Thought Content: Thought content normal.         Judgment: Judgment normal.         Labs:     Lab Results   Component Value Date    WBC 8.39 07/05/2023    HGB 15.0 07/05/2023    HCT 47.0 (H) 07/05/2023    MCV 89 07/05/2023     07/05/2023     Lab Results   Component Value Date    K 4.8 01/18/2024     01/18/2024    CO2 23 01/18/2024    BUN 18 01/18/2024    CREATININE 0.98 01/18/2024    GLUF 111 (H) 07/04/2023    CALCIUM 9.0 07/05/2023    AST 19 01/18/2024    ALT 26 01/18/2024    ALKPHOS 88 07/04/2023    EGFR 68 01/18/2024     No results found for: \"CHOL\"  Lab Results   Component Value Date    HDL 44 03/26/2019     Lab Results   Component Value Date    LDLCALC 116 (H) 03/26/2019     Lab Results   Component Value Date    TRIG 53 03/26/2019     Lab Results   Component Value Date    HGBA1C 5.8 06/12/2023       Imaging & Testing   I have personally reviewed pertinent reports.      EKG: Personally reviewed.      Cardiac testing:   Results for orders placed during the hospital encounter of 06/23/19   Echo complete with contrast if indicated    Narrative [place] [id number] [phone]    Transthoracic Echocardiogram  2D, M-mode, Doppler, and Color Doppler    Study date:  24-Jun-2019    Patient: MAUREEN CHANCE  MR number: [id number]  Account " number: [id number]  : 1970  Age: 49 years  Gender: Female  Status: Inpatient  Location: Bedside  Height: 61 in  Weight: 219.6 lb  BP: 118/ 80 mmHg    Indications: Heart Failure    Diagnoses: I50.9 - Heart failure, unspecified    Sonographer:  ISABELL Flores  Primary Physician:  Trina Garza MD  Referring Physician:  Chrissy Mcdaniel DO  Group:  [facility]  Interpreting Physician:  Rodolfo Jon MD    SUMMARY    LEFT VENTRICLE:  Systolic function was markedly reduced. Ejection fraction was estimated in the range of 25 % to 30 % to be 30 %.  There was severe diffuse hypokinesis.  Doppler parameters were consistent with abnormal left ventricular relaxation (grade 1 diastolic dysfunction).    RIGHT VENTRICLE:  Systolic function was moderately reduced.    LEFT ATRIUM:  The atrium was mildly dilated.    RIGHT ATRIUM:  The atrium was mildly dilated.    MITRAL VALVE:  There was trace regurgitation.    IVC, HEPATIC VEINS:  The inferior vena cava was dilated.    HISTORY: PRIOR HISTORY: GERD, Hiatal Hernia    PROCEDURE: The procedure was performed at the bedside. This was a routine study. The transthoracic approach was used. The study included complete 2D imaging, M-mode, complete spectral Doppler, and color Doppler. The heart rate was 92 bpm,  at the start of the study. Image quality was adequate.    LEFT VENTRICLE: Size was normal. Systolic function was markedly reduced. Ejection fraction was estimated in the range of 25 % to 30 % to be 30 %. There was severe diffuse hypokinesis. Wall thickness was normal. No evidence of apical  thrombus. DOPPLER: Doppler parameters were consistent with abnormal left ventricular relaxation (grade 1 diastolic dysfunction).    RIGHT VENTRICLE: The size was normal. Systolic function was moderately reduced. Wall thickness was normal.    LEFT ATRIUM: The atrium was mildly dilated.    RIGHT ATRIUM: The atrium was mildly dilated.    MITRAL VALVE: Valve structure was  normal. There was normal leaflet separation. DOPPLER: The transmitral velocity was within the normal range. There was no evidence for stenosis. There was trace regurgitation.    AORTIC VALVE: The valve was trileaflet. Leaflets exhibited normal thickness and normal cuspal separation. DOPPLER: Transaortic velocity was within the normal range. There was no evidence for stenosis. There was no significant  regurgitation.    TRICUSPID VALVE: The valve structure was normal. There was normal leaflet separation. DOPPLER: The transtricuspid velocity was within the normal range. There was no evidence for stenosis. There was no significant regurgitation.    PULMONIC VALVE: Leaflets exhibited normal thickness, no calcification, and normal cuspal separation. DOPPLER: The transpulmonic velocity was within the normal range. There was no significant regurgitation.    PERICARDIUM: There was no pericardial effusion. The pericardium was normal in appearance.    AORTA: The root exhibited normal size.    SYSTEMIC VEINS: IVC: The inferior vena cava was dilated.    SYSTEM MEASUREMENT TABLES    2D mode  AoR Diam 2D: 3.2 cm  LA Diam (2D): 4.5 cm  LA/Ao (2D): 1.41  FS (2D Teich): 13.7 %  IVSd (2D): 0.82 cm  LVDEV: 193 cmï¾³  LVESV: 138 cmï¾³  LVIDd(2D): 6.19 cm  LVISd (2D): 5.34 cm  LVPWd (2D): 0.81 cm  SV (Teich): 55 cmï¾³    Apical four chamber  LVEF A4C: 28 %    Unspecified Scan Mode  MV Peak E Zach. Mean: 848 mm/s  MVA (PHT): 4.15 cmï¾²  PHT: 53 ms  Max P mm[Hg]  V Max: 2440 mm/s  Vmax: 2500 mm/s  RA Area: 18.1 cmï¾²  RA Volume: 48.8 cmï¾³  TAPSE: 1.1 cm    [employer] Accredited Echocardiography Laboratory    Prepared and electronically signed by    Rodolfo Jno MD  Signed 2019 13:10:35       ekg sinus rhythm with LBBB periodic pvc    Rodolfo Jon MD [facility]  Please call with any questions or suggestions    A description of the counseling:   Goals and Barriers:  Patient's ability to self care:  Medication  "side effect reviewed with patient in detail and all their questions answered.    \"This note has been constructed using a voice recognition system.Therefore there may be syntax, spelling, and/or grammatical errors. Please call if you have any questions. \"    "

## 2024-03-11 ENCOUNTER — OFFICE VISIT (OUTPATIENT)
Dept: BARIATRICS | Facility: CLINIC | Age: 54
End: 2024-03-11
Payer: COMMERCIAL

## 2024-03-11 VITALS
SYSTOLIC BLOOD PRESSURE: 102 MMHG | DIASTOLIC BLOOD PRESSURE: 70 MMHG | WEIGHT: 212.4 LBS | RESPIRATION RATE: 16 BRPM | HEART RATE: 98 BPM | BODY MASS INDEX: 37.63 KG/M2 | HEIGHT: 63 IN

## 2024-03-11 DIAGNOSIS — G47.33 OSA (OBSTRUCTIVE SLEEP APNEA): ICD-10-CM

## 2024-03-11 DIAGNOSIS — E66.01 CLASS 2 SEVERE OBESITY DUE TO EXCESS CALORIES WITH SERIOUS COMORBIDITY AND BODY MASS INDEX (BMI) OF 37.0 TO 37.9 IN ADULT: Primary | ICD-10-CM

## 2024-03-11 PROBLEM — E66.812 CLASS 2 SEVERE OBESITY DUE TO EXCESS CALORIES WITH SERIOUS COMORBIDITY AND BODY MASS INDEX (BMI) OF 37.0 TO 37.9 IN ADULT (HCC): Status: ACTIVE | Noted: 2023-07-04

## 2024-03-11 PROCEDURE — 99204 OFFICE O/P NEW MOD 45 MIN: CPT | Performed by: NURSE PRACTITIONER

## 2024-03-11 NOTE — ASSESSMENT & PLAN NOTE
- Discussed options of HealthyCORE-Intensive Lifestyle Intervention Program, Very Low Calorie Diet-VLCD, and Conservative Program and the role of weight loss medications.  - Explained the importance of making lifestyle changes in addition to starting any anti-obesity medications.   - Patient is interested in pursuing Conservative Program and follow up visits with medical weight management provider.  - Initial weight loss goal of 5-10% weight loss for improved health  - Weight loss can improve patient's co-morbid conditions and/or prevent weight-related complications.  - Weight is not at goal and patient has been unable to achieve a meaningful weight loss above 5% using various programs and tools for more than 6 months  - Labs reviewed from 7/2023 and 1/2024  -Patient lifestyle habits were reviewed and barriers to weight loss were addressed today.  Nutrition was discussed at length and patient was given encouragement that she can change her nutritional habits without eating foods that she does not enjoy.  Patient will meet with the dietitian to better structure her meals and to have nutritional tools on hand to avoid going such long periods without eating.  Patient may consider the healthy core program as well in the future for the accountability.  Patient was provided a healthy snack list including high-protein snacks as well as a sample meal plan within her calorie range of 1300 to 1500 ramez/day.  Activity level was discussed and the Thrive program was reviewed with the patient.  Patient may consider this as she does well with accountability and structure.  This would help increase her activity level as well as keep her motivated.    Medications were discussed:  Patient does not want an injectable medication for weight loss -will continue to discuss the role of GLP-1 and weight loss in the future  Phentermine contraindicated due to heart disease  Topiramate was tried in the past for migraine management and did not  see any weight loss and cost of brain fog  Bupropion/naltrexone was discussed and patient may consider but will use cautiously due to risks of arrhythmia with bupropion  *Patient will work on lifestyle modifications and increase activity at this time to start her weight loss journey    Goals:  Calorie Goal: 4007-7973 calories per day  Do not skip meals.  Food log via vera or provided paper log (vera options include www."[employer].com, sparkpeople.com, loseit.com, calorieking.com, Backupify)  No sugary beverages.   At least 64oz of water daily.  Increase physical activity by 10 minutes daily. Gradually increase physical activity to a goal of 5 days per week for 30 minutes of MODERATE intensity PLUS 2 days per week of FULL BODY resistance training

## 2024-03-11 NOTE — PROGRESS NOTES
Assessment/Plan:    Class 2 severe obesity due to excess calories with serious comorbidity and body mass index (BMI) of 37.0 to 37.9 in adult   - Discussed options of HealthyCORE-Intensive Lifestyle Intervention Program, Very Low Calorie Diet-VLCD, and Conservative Program and the role of weight loss medications.  - Explained the importance of making lifestyle changes in addition to starting any anti-obesity medications.   - Patient is interested in pursuing Conservative Program and follow up visits with medical weight management provider.  - Initial weight loss goal of 5-10% weight loss for improved health  - Weight loss can improve patient's co-morbid conditions and/or prevent weight-related complications.  - Weight is not at goal and patient has been unable to achieve a meaningful weight loss above 5% using various programs and tools for more than 6 months  - Labs reviewed from 7/2023 and 1/2024  -Patient lifestyle habits were reviewed and barriers to weight loss were addressed today.  Nutrition was discussed at length and patient was given encouragement that she can change her nutritional habits without eating foods that she does not enjoy.  Patient will meet with the dietitian to better structure her meals and to have nutritional tools on hand to avoid going such long periods without eating.  Patient may consider the healthy core program as well in the future for the accountability.  Patient was provided a healthy snack list including high-protein snacks as well as a sample meal plan within her calorie range of 1300 to 1500 ramez/day.  Activity level was discussed and the Thrive program was reviewed with the patient.  Patient may consider this as she does well with accountability and structure.  This would help increase her activity level as well as keep her motivated.    Medications were discussed:  Patient does not want an injectable medication for weight loss -will continue to discuss the role of GLP-1 and  weight loss in the future  Phentermine contraindicated due to heart disease  Topiramate was tried in the past for migraine management and did not see any weight loss and cost of brain fog  Bupropion/naltrexone was discussed and patient may consider but will use cautiously due to risks of arrhythmia with bupropion  *Patient will work on lifestyle modifications and increase activity at this time to start her weight loss journey    Goals:  Calorie Goal: 1580-5888 calories per day  Do not skip meals.  Food log via vera or provided paper log (vera options include www.myfitnesspal.com, sparkpeople.com, loseit.com, calorieking.com, Mindshapes)  No sugary beverages.   At least 64oz of water daily.  Increase physical activity by 10 minutes daily. Gradually increase physical activity to a goal of 5 days per week for 30 minutes of MODERATE intensity PLUS 2 days per week of FULL BODY resistance training     TAE (obstructive sleep apnea)  Encourage nightly use of CPAP machine         Oneida was seen today for consult.    Diagnoses and all orders for this visit:    Class 2 severe obesity due to excess calories with serious comorbidity and body mass index (BMI) of 37.0 to 37.9 in adult     TAE (obstructive sleep apnea)  -     Ambulatory Referral to Weight Management    BMI 39.0-39.9,adult  -     Ambulatory Referral to Weight Management           Total time spent reviewing chart, interviewing patient, examining patient, discussing plan, answering all questions, and documentin min, with >50% face-to-face time spent counseling patient on nonsurgical interventions for the treatment of excess weight. Discussed in detail nonsurgical options including intensive lifestyle intervention program, very low-calorie diet program and conservative program.  Discussed the role of weight loss medications.  Counseled patient on diet behavior and exercise modification for weight loss.    Follow up in approximately 2 months with Non-Surgical  Physician/Advanced Practitioner.    Subjective:   Chief Complaint   Patient presents with    Consult     Initial visit with marionian       Patient ID: Oneida Landers  is a 54 y.o. female with excess weight/obesity here to pursue weight management.  Previous notes and records have been reviewed.    Past Medical History:   Diagnosis Date    CHF (congestive heart failure) (HCC)     Diverticulitis of colon     GERD (gastroesophageal reflux disease)     HH (hiatus hernia)     Nonischemic cardiomyopathy (HCC) 2019    Post-op pain 10/26/2022    Sleep apnea     Ventricular fibrillation (HCC)     Ventricular tachycardia (HCC)      Past Surgical History:   Procedure Laterality Date    CARDIAC ELECTROPHYSIOLOGY PROCEDURE N/A 10/26/2022    Procedure: Cardiac biv icd implant/ BIV ICD SUBMUSCULAR IMPLANT;  Surgeon: Boston Santillan DO;  Location: [facility];  Service: Cardiology     SECTION      CHOLECYSTECTOMY      HAND SURGERY      HYSTERECTOMY         HPI:  Wt Readings from Last 20 Encounters:   24 96.3 kg (212 lb 6.4 oz)   24 94.8 kg (209 lb)   10/01/23 96.2 kg (212 lb)   23 94.9 kg (209 lb 3.2 oz)   23 94.8 kg (209 lb)   23 94 kg (207 lb 3.2 oz)   23 94.3 kg (208 lb)   10/26/22 92.5 kg (204 lb)   09/15/22 92.5 kg (204 lb)   09/15/22 92.6 kg (204 lb 1.6 oz)   22 90.7 kg (200 lb)   22 91.8 kg (202 lb 6.4 oz)   22 92.5 kg (204 lb)   22 92.5 kg (204 lb)   02/10/22 90.7 kg (200 lb)   22 93 kg (205 lb)   10/28/21 93.4 kg (206 lb)   07/15/21 93.4 kg (206 lb)   21 94.8 kg (209 lb)   21 95.3 kg (210 lb)       Patient presents today to medical weight management office for consult.  Patient was referred by her cardiologist to discuss her weight.  Patient has been struggling with her weight for quite a few years now, mainly since her hysterectomy at 38 years old.  She has steadily gained over 30 pounds since the surgery.  Patient does have  "significant history of heart disease.   Patient works as a as difficulty structuring her nutrition throughout the day.  Patient also states she has some nutritional barriers as she does not like eggs, milk, yogurt, or many other \"healthy\" foods.  Patient states she eats fairly healthy with her  at night and rarely eats out.  Patient would like some guidance to structure her lifestyle but also reports she is not going to likely eat things she does not like.  Patient also does not want an injectable medication.    Obesity/Excess Weight:  Severity: Moderate  Onset:  15 years    Modifiers: Diet and Exercise  Contributing factors: Poor Food Choices, Insufficient Physical Activity, Menopause, Lack of knowledge of appropriate lifestyle changes, and Insufficient time to make appropriate lifestyle changes  Associated symptoms: comorbid conditions, fatigue, increased joint pain, decreased exercise capacity, increased shortness of breath, decreased mobility, and inability to do certain activities    Diet recall:  B: skips  S: grazes on pretzels, chips, crackers  L: skips OR leftovers  D: skips occasionally OR grilled chicken with vegetables and starch OR pasta OR salad with protein  S: no  *take out once week    Hydration: water, unsweetened iced tea, coke (<1 can daily)  Alcohol: no  Smoking: here and there  Exercise: no - walks is weather is better  Occupation:   Sleep: sleeps with CPAP      Current weight: 212.4 lbs  Current BMI: 37.4  Current Waist Measurement: 45.5 in  Goal weight: 170 lbs        The following portions of the patient's history were reviewed and updated as appropriate: allergies, current medications, past family history, past medical history, past social history, past surgical history, and problem list.    Family History   Problem Relation Age of Onset    Heart attack Mother     Heart attack Father         Review of Systems   Constitutional:  Negative for fatigue.   HENT:  Negative for " "sore throat.    Respiratory:  Negative for cough and shortness of breath.    Cardiovascular:  Negative for chest pain, palpitations and leg swelling.   Gastrointestinal:  Negative for abdominal pain, constipation, diarrhea and nausea.   Genitourinary:  Negative for dysuria.   Musculoskeletal:  Negative for arthralgias and back pain.   Skin:  Negative for rash.   Neurological:  Negative for headaches.   Psychiatric/Behavioral:  Negative for dysphoric mood. The patient is not nervous/anxious.        Objective:  /70 (BP Location: Left arm, Patient Position: Sitting, Cuff Size: Large)   Pulse 98   Resp 16   Ht 5' 3.19\" (1.605 m)   Wt 96.3 kg (212 lb 6.4 oz)   BMI 37.40 kg/m²     Physical Exam  Vitals and nursing note reviewed.   Constitutional:       Appearance: Normal appearance. She is obese.   HENT:      Head: Normocephalic.   Pulmonary:      Effort: Pulmonary effort is normal.   Neurological:      General: No focal deficit present.      Mental Status: She is alert and oriented to person, place, and time.   Psychiatric:         Mood and Affect: Mood normal.         Behavior: Behavior normal.         Thought Content: Thought content normal.         Judgment: Judgment normal.              Labs and Imaging  Recent labs and imaging have been personally reviewed.  Lab Results   Component Value Date    WBC 8.39 07/05/2023    HGB 15.0 07/05/2023    HCT 47.0 (H) 07/05/2023    MCV 89 07/05/2023     07/05/2023     Lab Results   Component Value Date    SODIUM 141 01/18/2024    K 4.8 01/18/2024     01/18/2024    CO2 23 01/18/2024    AGAP 1 07/05/2023    BUN 18 01/18/2024    CREATININE 0.98 01/18/2024    GLUC 106 (H) 01/18/2024    GLUF 111 (H) 07/04/2023    CALCIUM 9.0 07/05/2023    AST 19 01/18/2024    ALT 26 01/18/2024    ALKPHOS 88 07/04/2023    TP 6.9 01/18/2024    TBILI 0.2 01/18/2024    EGFR 68 01/18/2024     Lab Results   Component Value Date    HGBA1C 5.8 06/12/2023     Lab Results   Component " Value Date    LBH1WEGRGBAS 2.482 06/24/2019     Lab Results   Component Value Date    CHOLESTEROL 171 03/26/2019     Lab Results   Component Value Date    HDL 44 03/26/2019     Lab Results   Component Value Date    TRIG 53 03/26/2019     Lab Results   Component Value Date    LDLCALC 116 (H) 03/26/2019

## 2024-03-21 ENCOUNTER — REMOTE DEVICE CLINIC VISIT (OUTPATIENT)
Dept: CARDIOLOGY CLINIC | Facility: CLINIC | Age: 54
End: 2024-03-21
Payer: COMMERCIAL

## 2024-03-21 ENCOUNTER — CLINICAL SUPPORT (OUTPATIENT)
Dept: BARIATRICS | Facility: CLINIC | Age: 54
End: 2024-03-21

## 2024-03-21 VITALS — WEIGHT: 207.2 LBS | BODY MASS INDEX: 36.71 KG/M2 | HEIGHT: 63 IN

## 2024-03-21 DIAGNOSIS — R63.5 ABNORMAL WEIGHT GAIN: Primary | ICD-10-CM

## 2024-03-21 DIAGNOSIS — Z95.810 PRESENCE OF AUTOMATIC CARDIOVERTER/DEFIBRILLATOR (AICD): Primary | ICD-10-CM

## 2024-03-21 PROCEDURE — 93295 DEV INTERROG REMOTE 1/2/MLT: CPT | Performed by: INTERNAL MEDICINE

## 2024-03-21 PROCEDURE — 93296 REM INTERROG EVL PM/IDS: CPT | Performed by: INTERNAL MEDICINE

## 2024-03-21 PROCEDURE — RECHECK

## 2024-03-21 PROCEDURE — WMDI30

## 2024-03-21 NOTE — PROGRESS NOTES
Results for orders placed or performed in visit on 03/21/24   Cardiac EP device report    Narrative    MDT BI-V ICD / ACTIVE SYSTEM IS MRI CONDITIONAL  CARELINK TRANSMISSION: BATTERY VOLTAGE ADEQUATE. (9.2 YRS) AP 98%  98%. ALL AVAILABLE LEAD PARAMETERS WITHIN NORMAL LIMITS. 1 NSVT EPISODE DETECTED 6 BEATS @ 300ms. VENTRICULAR SENSE EPISODE DETECTED. OPTI-VOL WITHIN NORMAL LIMITS. NORMAL DEVICE FUNCTION.----ORTIZ

## 2024-03-21 NOTE — PROGRESS NOTES
"Weight Management Medical Nutrition Assessment  Pt presents today for menu planning with a 5# weight loss since her initial visit with [facility] provider on 3/11/24. Pt reports she did cut out her soda in the morning and did start eating breakfast even though she does not like many breakfast foods. She reports she does not like eggs, cottage cheese and is currently \"choking down\" a light yogurt with granola.  Pt has a h/o of heart dz so is mindful of her sodium intake.  Reviewed diet recall and she appears to skips meals often, grazes on crackers, pretzels, veggies straws while at work and was drinking soda.  She is currently drinking 32-48oz of calorie free fluids. Did some menu planning with pt suggesting to eat within 1-2hrs of waking and at least every 4 hrs from there.  Pt was willing to try a protein shake for breakfast and will try, but not likely she will like, a greek yogurt (suggested Dannon Light and Fit Greek seems to be the thinnest).  Came up with some breakfast, lunch and dinner ideas and provided with a list of protein snacks to choose from.      Pt has a f/u appt with [facility] provider. She will see how her weight loss is at that time to determine if she would like further follow-up with RD due to cost.  Pt does want to continue working on changes with the diet and avoid injections and preferably even avoid pills.    Patient seen by Medical Provider in past 6 months:  yes  Requested to schedule appointment with Medical Provider: Yes--pt already scheduled      Anthropometric Measurements  Start Weight (#): 212.4# (3/11/24 w/provider)  Current Weight (#): 207.2#  TBW % Change from start weight:2%  Ideal Body Weight (#):115#  Goal Weight (#):175#  Highest:  215#  Average was 200-210lbs  Does retain water.  Lowest:  185# about 25 years ago    Weight Loss History  Previous weight loss attempts: Commercial Programs (Weight Watchers, [address], etc.)  Exercise  Self Created Diets (Portion Control, Healthy Food " "Choices, etc.)    Food and Nutrition Related History  Wake up: 6:30am   Bed Time:12-1am  Works as a [employer] 4 days per week and hours vary. Wed is 8:30am to 8pm, other days are 8:30am to 4pm.    Food Recall  Coke, but has cut out since appt on 3/11  Breakfast:skips because doesn't like breakfast food. Trying to yogurt with granola, but \"choking it down\"    Snack:starts getting hungry at 10-11am-sandwich or leftovers or depending on work schedule may just snack on pretzels, wheat thins, Ritz, cheese  PB sandwich  Snack:crackers and pretzels  Dinner:a lot of chicken--grilled chicken or fajitas, occasional breaded chicken cutlet, steak, pasta, shrimp with veggies (broccoli, corn, green beans) and carb (1-3/4 cup rice, mashed potatoes, baked potato,etc)   Snack:none  Most often doesn't eat past 6pm because of reflux  Bed: 12-1am    Beverages: water, sugar free beverages, and regular soda (but cut out the soda since 3/11 visit)  Volume of beverage intake: 16-32oz water, 16oz unsweet iced tea    Weekends: Same--works on Saturday so same as above, Sunday-does take out (pizza) or out to eat, but less snacking  Cravings: prefers salt over sweet, but on a salt restriction.  Loves potatoes  Trouble area of day:skips meals often    Frequency of Eating out: 1-2 times per week  Food restrictions:  lactose intolerant, dislikes eggs, cottage cheese, not great with yogurt, fish, too many seeds/nuts due to diverticulitis   Cooking: her and  both cook and daughter is back home (23 year old)   Food Shopping: self    Physical Activity Intake  Activity: Likes walking in the nicer weather, but not now.  Will walk 1 mile when does walk  Frequency: when nice out  Physical limitations/barriers to exercise: CHF    Estimated Needs  Energy  Janeth Soares Energy Needs:   BMR : 1498cals  1-2# loss weekly sedentary:  798-1298               1-2# loss weekly lightly active:6936-9742  Maintenance calories for sedentary activity level: " 1798  Protein:63-78gm      (1.2-1.5g/kg IBW)  Fluid: 1568-1829ml     (35mL/kg IBW)    Nutrition Diagnosis  Yes;    Overweight/obesity  related to Excess energy intake as evidenced by  BMI more than normative standard for age and sex (obesity-grade II 35-39.9)       Nutrition Intervention    Nutrition Prescription  Calories:1000-1300cals  Protein:63-78gm  Fluid:1550-1830ml    Meal Plan (David/Pro/Carb)  Breakfast:  200clals, 14-30g  Snack:-  Lunch:300cals, 21gm  Snack:150, 0-7gm  Dinner:400, 21gm  Snack:--    Nutrition Education:    Calorie controlled menu  Lean protein food choices  Healthy snack options  Food journaling tips      Nutrition Counseling:  Strategies: meal planning, portion sizes, healthy snack choices, hydration, fiber intake, protein intake, exercise, food journal      Monitoring and Evaluation:  Evaluation criteria:  Energy Intake  Meet protein needs  Maintain adequate hydration  Monitor weekly weight  Meal planning/preparation  Food journal   Decreased portions at mealtimes and snacks  Physical activity     Barriers to learning:none  Readiness to change: Preparation:  (Getting ready to change)   Comprehension: good  Expected Compliance: good

## 2024-03-27 ENCOUNTER — TELEPHONE (OUTPATIENT)
Dept: CARDIOLOGY CLINIC | Facility: CLINIC | Age: 54
End: 2024-03-27

## 2024-03-27 NOTE — TELEPHONE ENCOUNTER
----- Message from Rodolfo Jon MD sent at 3/26/2024  5:59 PM EDT -----  Reviewed all notes/transmission. Normal device function. Please let patient know.

## 2024-05-06 ENCOUNTER — OFFICE VISIT (OUTPATIENT)
Dept: BARIATRICS | Facility: CLINIC | Age: 54
End: 2024-05-06
Payer: COMMERCIAL

## 2024-05-06 VITALS
HEART RATE: 90 BPM | WEIGHT: 206.4 LBS | BODY MASS INDEX: 36.57 KG/M2 | SYSTOLIC BLOOD PRESSURE: 100 MMHG | HEIGHT: 63 IN | DIASTOLIC BLOOD PRESSURE: 70 MMHG

## 2024-05-06 DIAGNOSIS — E66.01 CLASS 2 SEVERE OBESITY DUE TO EXCESS CALORIES WITH SERIOUS COMORBIDITY AND BODY MASS INDEX (BMI) OF 37.0 TO 37.9 IN ADULT (HCC): Primary | ICD-10-CM

## 2024-05-06 PROCEDURE — 99214 OFFICE O/P EST MOD 30 MIN: CPT | Performed by: NURSE PRACTITIONER

## 2024-05-06 RX ORDER — BUPROPION HYDROCHLORIDE 100 MG/1
100 TABLET ORAL 2 TIMES DAILY
Qty: 60 TABLET | Refills: 1 | Status: SHIPPED | OUTPATIENT
Start: 2024-05-06 | End: 2024-07-05

## 2024-05-06 RX ORDER — NALTREXONE HYDROCHLORIDE 50 MG/1
TABLET, FILM COATED ORAL
Qty: 30 TABLET | Refills: 1 | Status: SHIPPED | OUTPATIENT
Start: 2024-05-06

## 2024-05-06 RX ORDER — TORSEMIDE 10 MG/1
10 TABLET ORAL DAILY
COMMUNITY

## 2024-05-06 RX ORDER — CARVEDILOL 6.25 MG/1
6.25 TABLET ORAL 2 TIMES DAILY WITH MEALS
COMMUNITY

## 2024-05-06 NOTE — PROGRESS NOTES
Assessment/Plan:     Class 2 severe obesity due to excess calories with serious comorbidity and body mass index (BMI) of 37.0 to 37.9 in adult   - Patient is pursuing Conservative Program and follow up visits with medical weight management provider  - Initial weight loss goal of 5-10% weight loss for improved health  -Patient reviewed and patient was congratulated on her weight loss since last office visit.  Patient was given emotional support and encouragement to continue with her lifestyle modifications.  Patient will continue to try to avoid skipping meals and incorporate small protein rich snacks throughout the day.    Medications were discussed:  Patient would like to avoid GLP-1 medications  Phentermine contraindicated due to heart disease  Topiramate caused side effects in the past  Bupropion/naltrexone was discussed and patient was interested in this medication to help with cravings  Will closely monitor heart rate and blood pressure while on bupropion  Bupropion/naltrexone instructions:  Begin with bupropion 100mg twice daily then after 2 weeks will ad naltrexone 25mg (1/2 tablet) once daily in the morning for 1 week, then increase to 1/2 tablet twice daily    The potential side effects of bupropion/naltrexone may include: abdominal upset, headache, dizziness, trouble sleeping, increased blood pressure, depression/anxiety, and fatigue. Please call/return if you develops symptoms of depression or anxiety. You should go to the  ER for evaluation if you develop any thoughts of harming yourself or others occur.        Goals:  Calorie Goal: 5993-4518 calories per day  Do not skip meals.  Food log via vera or provided paper log (vera options include www.trip.me.com, sparkpeople.com, loseit.com, calorieking.com, [employer])  No sugary beverages.   At least 64oz of water daily.  Increase physical activity by 10 minutes daily. Gradually increase physical activity to a goal of 5 days per week for 30 minutes of  MODERATE intensity PLUS 2 days per week of FULL BODY resistance training          Oneida was seen today for follow-up.    Diagnoses and all orders for this visit:    Class 2 severe obesity due to excess calories with serious comorbidity and body mass index (BMI) of 37.0 to 37.9 in adult (HCC)  -     buPROPion (WELLBUTRIN) 100 mg tablet; Take 1 tablet (100 mg total) by mouth 2 (two) times a day  -     naltrexone (REVIA) 50 mg tablet; Take 1/2 tablet (25mg) once daily in the morning then after 1 week increase to 1/2 tablet (25mg) twice daily in the morning and evening        Total time spent reviewing chart, interviewing patient, examining patient, discussing plan, answering all questions, and documentin minutes with >50% face-to-face time with the patient.    Follow up in approximately 2 months with Non-Surgical Physician/Advanced Practitioner.    Subjective:   Chief Complaint   Patient presents with    Follow-up     Pt is here for MWM f/u       Patient ID: Oneida Landers  is a 54 y.o. female with excess weight/obesity here to pursue weight management.  Patient is pursuing Conservative Program.   Most recent notes and records were reviewed.    HPI    Wt Readings from Last 20 Encounters:   24 93.6 kg (206 lb 6.4 oz)   24 94 kg (207 lb 3.2 oz)   24 96.3 kg (212 lb 6.4 oz)   24 94.8 kg (209 lb)   10/01/23 96.2 kg (212 lb)   23 94.9 kg (209 lb 3.2 oz)   23 94.8 kg (209 lb)   23 94 kg (207 lb 3.2 oz)   23 94.3 kg (208 lb)   10/26/22 92.5 kg (204 lb)   09/15/22 92.5 kg (204 lb)   09/15/22 92.6 kg (204 lb 1.6 oz)   22 90.7 kg (200 lb)   22 91.8 kg (202 lb 6.4 oz)   22 92.5 kg (204 lb)   22 92.5 kg (204 lb)   02/10/22 90.7 kg (200 lb)   22 93 kg (205 lb)   10/28/21 93.4 kg (206 lb)   07/15/21 93.4 kg (206 lb)       Patient presents today to medical weight management office for follow up.  Patient has met with dietitian since last and has made  some healthy lifestyle changes.  Patient has changed her nutrition patterns and is structuring her meals more evenly.  Patient has been under a lot of stress so hasn't been as consistent as she would like to be, but is better than she was. Patient is not skipping as many meals and is also going to more protein rich snacks.  Patient still struggles with her picky eating patterns but has found option she is able to use.  Patient has found with the increased fiber and protein that her diverticulitis has flared a couple of times.    Patient would like to discuss weight loss medications.  Patient would like to avoid injectable medications and had been on topiramate in the past which caused a significant brain fog and she would like to avoid this as well.      Weight loss medication and dose: none  Started weight and date: 212.4 lbs in 3/2024  Current weight: 206.4 lbs  Difference: -6 lbs  Goal weight: 170 lbs    Starting BMI: 37.4 in 3/2024  Current BMI: 36.34    Waist Measurements:  3/2024: 45.5 in    Nutrition Prescription  Calories:1000-1300cals  Protein:63-78gm  Fluid:1550-1830ml    Diet recall:  B: toast OR protein shake   S: fruits  L: skips OR leftovers  D: grilled chicken with vegetables and starch OR pasta OR salad with protein  S: no  *take out once week    Hydration: water, unsweetened iced tea, coke (<1 can daily)  Alcohol: no  Smoking: here and there  Exercise: walking  Occupation:   Sleep: sleeps with CPAP            The following portions of the patient's history were reviewed and updated as appropriate: allergies, current medications, past family history, past medical history, past social history, past surgical history, and problem list.    Family History   Problem Relation Age of Onset    Heart attack Mother     Heart attack Father         Review of Systems   Constitutional:  Negative for fatigue.   HENT:  Negative for sore throat.    Respiratory:  Negative for cough and shortness of breath.   "  Cardiovascular:  Negative for chest pain, palpitations and leg swelling.   Gastrointestinal:  Negative for abdominal pain, constipation, diarrhea and nausea.   Genitourinary:  Negative for dysuria.   Musculoskeletal:  Negative for arthralgias and back pain.   Skin:  Negative for rash.   Neurological:  Negative for headaches.   Psychiatric/Behavioral:  Negative for dysphoric mood. The patient is not nervous/anxious.        Objective:  /70 (BP Location: Left arm, Patient Position: Sitting, Cuff Size: Large)   Pulse 90   Ht 5' 3.19\" (1.605 m)   Wt 93.6 kg (206 lb 6.4 oz)   BMI 36.34 kg/m²     Physical Exam  Vitals and nursing note reviewed.   Constitutional:       Appearance: Normal appearance. She is obese.   HENT:      Head: Normocephalic.   Pulmonary:      Effort: Pulmonary effort is normal.   Neurological:      General: No focal deficit present.      Mental Status: She is alert and oriented to person, place, and time.   Psychiatric:         Mood and Affect: Mood normal.         Behavior: Behavior normal.         Thought Content: Thought content normal.         Judgment: Judgment normal.            Labs   Most recent labs reviewed   Lab Results   Component Value Date    SODIUM 141 01/18/2024    K 4.8 01/18/2024     01/18/2024    CO2 23 01/18/2024    AGAP 1 07/05/2023    BUN 18 01/18/2024    CREATININE 0.98 01/18/2024    GLUC 106 (H) 01/18/2024    GLUF 111 (H) 07/04/2023    CALCIUM 9.0 07/05/2023    AST 19 01/18/2024    ALT 26 01/18/2024    ALKPHOS 88 07/04/2023    TP 6.9 01/18/2024    TBILI 0.2 01/18/2024    EGFR 68 01/18/2024     Lab Results   Component Value Date    HGBA1C 5.8 06/12/2023     Lab Results   Component Value Date    KUJ3YZHUQHRR 2.482 06/24/2019     Lab Results   Component Value Date    CHOLESTEROL 171 03/26/2019     Lab Results   Component Value Date    HDL 44 03/26/2019     Lab Results   Component Value Date    TRIG 53 03/26/2019     Lab Results   Component Value Date    LDLCALC " 116 (H) 03/26/2019

## 2024-05-07 NOTE — ASSESSMENT & PLAN NOTE
- Patient is pursuing Conservative Program and follow up visits with medical weight management provider  - Initial weight loss goal of 5-10% weight loss for improved health  -Patient reviewed and patient was congratulated on her weight loss since last office visit.  Patient was given emotional support and encouragement to continue with her lifestyle modifications.  Patient will continue to try to avoid skipping meals and incorporate small protein rich snacks throughout the day.    Medications were discussed:  Patient would like to avoid GLP-1 medications  Phentermine contraindicated due to heart disease  Topiramate caused side effects in the past  Bupropion/naltrexone was discussed and patient was interested in this medication to help with cravings  Will closely monitor heart rate and blood pressure while on bupropion  Bupropion/naltrexone instructions:  Begin with bupropion 100mg twice daily then after 2 weeks will ad naltrexone 25mg (1/2 tablet) once daily in the morning for 1 week, then increase to 1/2 tablet twice daily    The potential side effects of bupropion/naltrexone may include: abdominal upset, headache, dizziness, trouble sleeping, increased blood pressure, depression/anxiety, and fatigue. Please call/return if you develops symptoms of depression or anxiety. You should go to the  ER for evaluation if you develop any thoughts of harming yourself or others occur.        Goals:  Calorie Goal: 1491-4946 calories per day  Do not skip meals.  Food log via vera or provided paper log (vera options include [email or web address] [email or web address], sparkpeople.com, loseit.com, calorieking.com, BorderJump)  No sugary beverages.   At least 64oz of water daily.  Increase physical activity by 10 minutes daily. Gradually increase physical activity to a goal of 5 days per week for 30 minutes of MODERATE intensity PLUS 2 days per week of FULL BODY resistance training

## 2024-05-28 DIAGNOSIS — E66.01 CLASS 2 SEVERE OBESITY DUE TO EXCESS CALORIES WITH SERIOUS COMORBIDITY AND BODY MASS INDEX (BMI) OF 37.0 TO 37.9 IN ADULT (HCC): ICD-10-CM

## 2024-05-28 RX ORDER — BUPROPION HYDROCHLORIDE 100 MG/1
100 TABLET ORAL 2 TIMES DAILY
Qty: 180 TABLET | Refills: 0 | Status: SHIPPED | OUTPATIENT
Start: 2024-05-28

## 2024-05-30 DIAGNOSIS — I50.22 CHRONIC HFREF (HEART FAILURE WITH REDUCED EJECTION FRACTION) (HCC): Chronic | ICD-10-CM

## 2024-05-30 DIAGNOSIS — I47.20 VENTRICULAR TACHYCARDIA, UNSPECIFIED (HCC): ICD-10-CM

## 2024-05-30 DIAGNOSIS — I44.7 LEFT BUNDLE BRANCH BLOCK (LBBB): ICD-10-CM

## 2024-05-31 RX ORDER — METOPROLOL SUCCINATE 100 MG/1
100 TABLET, EXTENDED RELEASE ORAL EVERY 12 HOURS
Qty: 180 TABLET | Refills: 3 | Status: SHIPPED | OUTPATIENT
Start: 2024-05-31

## 2024-05-31 RX ORDER — MEXILETINE HYDROCHLORIDE 200 MG/1
200 CAPSULE ORAL 2 TIMES DAILY
Qty: 180 CAPSULE | Refills: 3 | Status: SHIPPED | OUTPATIENT
Start: 2024-05-31

## 2024-06-21 ENCOUNTER — REMOTE DEVICE CLINIC VISIT (OUTPATIENT)
Dept: CARDIOLOGY CLINIC | Facility: CLINIC | Age: 54
End: 2024-06-21
Payer: COMMERCIAL

## 2024-06-21 DIAGNOSIS — Z95.810 PRESENCE OF AUTOMATIC CARDIOVERTER/DEFIBRILLATOR (AICD): Primary | ICD-10-CM

## 2024-06-21 PROCEDURE — 93296 REM INTERROG EVL PM/IDS: CPT | Performed by: INTERNAL MEDICINE

## 2024-06-21 PROCEDURE — 93295 DEV INTERROG REMOTE 1/2/MLT: CPT | Performed by: INTERNAL MEDICINE

## 2024-06-21 NOTE — PROGRESS NOTES
Results for orders placed or performed in visit on 06/21/24   Cardiac EP device report    Narrative    MDT BI-V ICD / ACTIVE SYSTEM IS MRI CONDITIONAL  CARELINK TRANSMISSION: BATTERY VOLTAGE ADEQUATE. (8.9 YRS) AP 98%  98%. ALL AVAILABLE LEAD PARAMETERS WITHIN NORMAL LIMITS. NO SIGNIFICANT HIGH RATE EPISODES. VENTRICULAR SENSE EPISODES DETECTED. OPTI-VOL WITHIN NORMAL LIMITS. NORMAL DEVICE FUNCTION.---ORTIZ

## 2024-07-02 ENCOUNTER — TELEPHONE (OUTPATIENT)
Dept: CARDIOLOGY CLINIC | Facility: CLINIC | Age: 54
End: 2024-07-02

## 2024-07-11 ENCOUNTER — OFFICE VISIT (OUTPATIENT)
Dept: BARIATRICS | Facility: CLINIC | Age: 54
End: 2024-07-11
Payer: COMMERCIAL

## 2024-07-11 VITALS
WEIGHT: 204.8 LBS | SYSTOLIC BLOOD PRESSURE: 100 MMHG | HEIGHT: 63 IN | HEART RATE: 86 BPM | DIASTOLIC BLOOD PRESSURE: 68 MMHG | BODY MASS INDEX: 36.29 KG/M2

## 2024-07-11 DIAGNOSIS — G47.33 OSA (OBSTRUCTIVE SLEEP APNEA): ICD-10-CM

## 2024-07-11 DIAGNOSIS — I50.41 ACUTE COMBINED SYSTOLIC AND DIASTOLIC CONGESTIVE HEART FAILURE (HCC): ICD-10-CM

## 2024-07-11 DIAGNOSIS — I42.8 NON-ISCHEMIC CARDIOMYOPATHY (HCC): ICD-10-CM

## 2024-07-11 DIAGNOSIS — E66.01 CLASS 2 SEVERE OBESITY DUE TO EXCESS CALORIES WITH SERIOUS COMORBIDITY AND BODY MASS INDEX (BMI) OF 37.0 TO 37.9 IN ADULT (HCC): Primary | ICD-10-CM

## 2024-07-11 PROCEDURE — 99214 OFFICE O/P EST MOD 30 MIN: CPT | Performed by: NURSE PRACTITIONER

## 2024-07-11 RX ORDER — SACUBITRIL AND VALSARTAN 49; 51 MG/1; MG/1
1 TABLET, FILM COATED ORAL 2 TIMES DAILY
COMMUNITY

## 2024-07-11 NOTE — ASSESSMENT & PLAN NOTE
- Patient is pursuing Conservative Program and follow up visits with medical weight management provider  - Initial weight loss goal of 5-10% weight loss for improved health. Weight loss can improve patient's co-morbid conditions and/or prevent weight-related complications.  - Explained the importance of continuing lifestyle changes in addition to any anti-obesity medications.   - Labs reviewed 6/2023 and 1/2024    General Recommendations:  Nutrition:  Eat breakfast daily.  Do not skip meals.      Food log (ie.) [email or web address], sparkpeople.com, loseit.com, calorieking.com, etc.     Practice mindful eating.  Be sure to set aside time to eat, eat slowly, and savor your food.     Hydration:    At least 64oz of water daily.  No sugar sweetened beverages.  No juice (eat the fruit instead).     Exercise:  Studies have shown that the ideal exercise goal is somewhere between 150 to 300 minutes of moderate intensity exercise a week.  Start with exercising 10 minutes every other day and gradually increase physical activity with a goal of at least 150 minutes of moderate intensity exercise a week, divided over at least 3 days a week.  An example of this would be exercising 30 minutes a day, 5 days a week.  Resistance training can increase muscle mass and increase our resting metabolic rate.   FULL BODY resistance training is recommended 2-3 times a week.  Do not do this on consecutive days to allow for muscle recovery.     Aim for a bare minimum 5000 steps, even on days you do not exercise.     Monitoring:   Weigh yourself daily.  If this causes undue stress, then just weigh yourself once a week.  Weigh yourself the same time of the day with the same amount of clothing on.  Preferably this should be done after waking up, before you eat, and with no clothing or minimal clothing on.     Specific Goals:  Patient lifestyle habits were reviewed.  Nutrition was discussed and patient was strongly encouraged to get back to eating  more regularly throughout the day and try to incorporate more protein throughout the day.  Discussed with patient how undereating could continue to stall her weight loss efforts as it is slowing her metabolism.  She will start to incorporate protein shakes and other high-protein rich snacks to graze on throughout the day while she is working.  Activity level was discussed and she was encouraged to get back to her regular walking habits.    Medications were discussed and Wegovy was suggested to help her weight loss as well as her heart disease.  Patient was reluctant to start on a GLP-1 due to the concept of injection, but was willing to try.  Wegovy Instructions:    - Begin Wegovy 0.25 mg subcutaneously once a week. Dose changes may occur after 4 doses if medication is tolerated. You will be assessed prior to each dose change to make sure you are tolerating the medication well.  - Please message me when you have 2 pens left from the prescription so there are no lapses in treatment.  - If you have been off the medication for more than 14 days please contact the office as you will need to restart the titration at the starting dose again to avoid significant side effects or adverse events.  - Visit [email or web address].com for further information/injection instructions.   -Please eat small frequent meals to help reduce nausea. Lemon water and saltine crackers may help with this.   - Side effects of Wegovy discussed: nausea, vomiting, diarrhea, and constipation. If you experience fever, nausea/vomiting, and pain radiating to your back this may be a sign of pancreatitis. Please go to the emergency room if this occurs.  - Patient understands the side effects of the medication and proper administration. Patient agrees with the treatment plan and all questions were answered.  -Supply concerns were discussed with patient and patient voiced understanding that they will need to call with adequate time for refills to avoid any lapses in  treatment.  Patient may also have to call around to different pharmacies to see if any pharmacy has the appropriate dose in stock.  -Pen was demonstrated in the office today

## 2024-07-11 NOTE — ASSESSMENT & PLAN NOTE
Continues to follow with cardiology  Wegovy may help with heart disease    Wt Readings from Last 3 Encounters:   07/11/24 92.9 kg (204 lb 12.8 oz)   05/06/24 93.6 kg (206 lb 6.4 oz)   03/21/24 94 kg (207 lb 3.2 oz)

## 2024-07-11 NOTE — PROGRESS NOTES
Assessment/Plan:     Class 2 severe obesity due to excess calories with serious comorbidity and body mass index (BMI) of 37.0 to 37.9 in adult   - Patient is pursuing Conservative Program and follow up visits with medical weight management provider  - Initial weight loss goal of 5-10% weight loss for improved health. Weight loss can improve patient's co-morbid conditions and/or prevent weight-related complications.  - Explained the importance of continuing lifestyle changes in addition to any anti-obesity medications.   - Labs reviewed 6/2023 and 1/2024    General Recommendations:  Nutrition:  Eat breakfast daily.  Do not skip meals.      Food log (ie.) www.Oxxy.com, sparkpeople.com, loseit.com, Preclick.com, etc.     Practice mindful eating.  Be sure to set aside time to eat, eat slowly, and savor your food.     Hydration:    At least 64oz of water daily.  No sugar sweetened beverages.  No juice (eat the fruit instead).     Exercise:  Studies have shown that the ideal exercise goal is somewhere between 150 to 300 minutes of moderate intensity exercise a week.  Start with exercising 10 minutes every other day and gradually increase physical activity with a goal of at least 150 minutes of moderate intensity exercise a week, divided over at least 3 days a week.  An example of this would be exercising 30 minutes a day, 5 days a week.  Resistance training can increase muscle mass and increase our resting metabolic rate.   FULL BODY resistance training is recommended 2-3 times a week.  Do not do this on consecutive days to allow for muscle recovery.     Aim for a bare minimum 5000 steps, even on days you do not exercise.     Monitoring:   Weigh yourself daily.  If this causes undue stress, then just weigh yourself once a week.  Weigh yourself the same time of the day with the same amount of clothing on.  Preferably this should be done after waking up, before you eat, and with no clothing or minimal clothing  on.     Specific Goals:  Patient lifestyle habits were reviewed.  Nutrition was discussed and patient was strongly encouraged to get back to eating more regularly throughout the day and try to incorporate more protein throughout the day.  Discussed with patient how undereating could continue to stall her weight loss efforts as it is slowing her metabolism.  She will start to incorporate protein shakes and other high-protein rich snacks to graze on throughout the day while she is working.  Activity level was discussed and she was encouraged to get back to her regular walking habits.    Medications were discussed and Wegovy was suggested to help her weight loss as well as her heart disease.  Patient was reluctant to start on a GLP-1 due to the concept of injection, but was willing to try.  Wegovy Instructions:    - Begin Wegovy 0.25 mg subcutaneously once a week. Dose changes may occur after 4 doses if medication is tolerated. You will be assessed prior to each dose change to make sure you are tolerating the medication well.  - Please message me when you have 2 pens left from the prescription so there are no lapses in treatment.  - If you have been off the medication for more than 14 days please contact the office as you will need to restart the titration at the starting dose again to avoid significant side effects or adverse events.  - Visit [email or web address] for further information/injection instructions.   -Please eat small frequent meals to help reduce nausea. Lemon water and saltine crackers may help with this.   - Side effects of Wegovy discussed: nausea, vomiting, diarrhea, and constipation. If you experience fever, nausea/vomiting, and pain radiating to your back this may be a sign of pancreatitis. Please go to the emergency room if this occurs.  - Patient understands the side effects of the medication and proper administration. Patient agrees with the treatment plan and all questions were answered.  -Supply concerns  were discussed with patient and patient voiced understanding that they will need to call with adequate time for refills to avoid any lapses in treatment.  Patient may also have to call around to different pharmacies to see if any pharmacy has the appropriate dose in stock.  -Pen was demonstrated in the office today    TAE (obstructive sleep apnea)  Encourage nightly use of CPAP machine    Acute combined systolic and diastolic congestive heart failure (HCC)  Continues to follow with cardiology  Wegovy may help with heart disease    Wt Readings from Last 3 Encounters:   24 92.9 kg (204 lb 12.8 oz)   24 93.6 kg (206 lb 6.4 oz)   24 94 kg (207 lb 3.2 oz)                  Oneida was seen today for follow-up.    Diagnoses and all orders for this visit:    Class 2 severe obesity due to excess calories with serious comorbidity and body mass index (BMI) of 37.0 to 37.9 in adult (HCC)  -     Semaglutide-Weight Management (WEGOVY) 0.25 MG/0.5ML; Inject 0.5 mL (0.25 mg total) under the skin once a week    Acute combined systolic and diastolic congestive heart failure (HCC)  -     Semaglutide-Weight Management (WEGOVY) 0.25 MG/0.5ML; Inject 0.5 mL (0.25 mg total) under the skin once a week    Non-ischemic cardiomyopathy (HCC)  -     Semaglutide-Weight Management (WEGOVY) 0.25 MG/0.5ML; Inject 0.5 mL (0.25 mg total) under the skin once a week    TAE (obstructive sleep apnea)  -     Semaglutide-Weight Management (WEGOVY) 0.25 MG/0.5ML; Inject 0.5 mL (0.25 mg total) under the skin once a week      Patient denies personal history of pancreatitis. Patient also denies personal and family history of medullary thyroid cancer and multiple endocrine neoplasia type 2 (MEN 2 tumor).   Patient is postmenopausal    Total time spent reviewing chart, interviewing patient, examining patient, discussing plan, answering all questions, and documentin minutes with >50% face-to-face time with the patient.    Follow up in  "approximately 2 months with Non-Surgical Physician/Advanced Practitioner.    Subjective:   Chief Complaint   Patient presents with    Follow-up     Pt is her for MWM f/u. Waist - 43.8\"       Patient ID: Oneida Landers  is a 54 y.o. female with excess weight/obesity here to pursue weight management.  Patient is pursuing Conservative Program.   Most recent notes and records were reviewed.    HPI    Wt Readings from Last 20 Encounters:   07/11/24 92.9 kg (204 lb 12.8 oz)   05/06/24 93.6 kg (206 lb 6.4 oz)   03/21/24 94 kg (207 lb 3.2 oz)   03/11/24 96.3 kg (212 lb 6.4 oz)   01/25/24 94.8 kg (209 lb)   10/01/23 96.2 kg (212 lb)   09/07/23 94.9 kg (209 lb 3.2 oz)   08/17/23 94.8 kg (209 lb)   07/05/23 94 kg (207 lb 3.2 oz)   06/12/23 94.3 kg (208 lb)   10/26/22 92.5 kg (204 lb)   09/15/22 92.5 kg (204 lb)   09/15/22 92.6 kg (204 lb 1.6 oz)   08/08/22 90.7 kg (200 lb)   07/21/22 91.8 kg (202 lb 6.4 oz)   05/05/22 92.5 kg (204 lb)   04/14/22 92.5 kg (204 lb)   02/10/22 90.7 kg (200 lb)   01/27/22 93 kg (205 lb)   10/28/21 93.4 kg (206 lb)       Patient presents today to medical weight management office for follow up.  Patient was placed on bupropion/naltrexone at last office visit but stopped the medication due to side effects.  Patient developed dizziness and nausea on the medication.  Patient had previously been on topiramate which caused side effects as well.  Patient has contraindications for phentermine.  Patient continues to struggle with her structure for eating and often goes most of the day without eating.  She will sometimes go all day and only eat something small for dinner.  She has been trying to implement protein shakes or other protein rich foods during the day but often will get to eat them.  She is also packing fruits and she sticks to try to snack on during the day.  Patient also admits that due to the heat she has not been out doing her regular walking.  Patient may be willing to discuss GLP-1 medications " but does have a fear of needles.  She states the thought of injecting herself gives her anxiety but she may want to try to see if she can get her health better controlled.      Weight loss medication and dose: none  Started weight and date: 212.4 lbs in 3/2024  Current weight: 204.8 lbs (206.4 lbs at last OV)  Difference: -7.6 lbs (-1.6 lbs since last OV)  Goal weight: 170 lbs    Starting BMI: 37.4 in 3/2024  Current BMI: 36.34    Waist Measurements:  3/2024: 45.5 in  7/2024: 45.5 in    Nutrition Prescription  Calories:1000-1300cals  Protein:63-78gm  Fluid:1550-1830ml    Diet recall:  B: english muffin OR protein shake   S: fruits OR cheese stick  L: skips OR leftovers  D: grilled chicken with vegetables and starch OR pasta OR salad with protein  S: no  *take out once week    Hydration: water, unsweetened iced tea, coke (<1 can daily)  Alcohol: no  Smoking: here and there  Exercise: walking  Occupation:   Sleep: sleeps with CPAP      The following portions of the patient's history were reviewed and updated as appropriate: allergies, current medications, past family history, past medical history, past social history, past surgical history, and problem list.    Family History   Problem Relation Age of Onset    Heart attack Mother     Heart attack Father         Review of Systems   Constitutional:  Negative for fatigue.   HENT:  Negative for sore throat.    Respiratory:  Negative for cough and shortness of breath.    Cardiovascular:  Negative for chest pain, palpitations and leg swelling.   Gastrointestinal:  Negative for abdominal pain, constipation, diarrhea and nausea.   Genitourinary:  Negative for dysuria.   Musculoskeletal:  Negative for arthralgias and back pain.   Skin:  Negative for rash.   Neurological:  Negative for headaches.   Psychiatric/Behavioral:  Negative for dysphoric mood. The patient is not nervous/anxious.        Objective:  /68 (BP Location: Left arm, Patient Position: Sitting,  "Cuff Size: Large)   Pulse 86   Ht 5' 3.1\" (1.603 m)   Wt 92.9 kg (204 lb 12.8 oz)   BMI 36.16 kg/m²     Physical Exam  Vitals and nursing note reviewed.   Constitutional:       Appearance: Normal appearance. She is obese.   HENT:      Head: Normocephalic.   Pulmonary:      Effort: Pulmonary effort is normal.   Neurological:      General: No focal deficit present.      Mental Status: She is alert and oriented to person, place, and time.   Psychiatric:         Mood and Affect: Mood normal.         Behavior: Behavior normal.         Thought Content: Thought content normal.         Judgment: Judgment normal.            Labs   Most recent labs reviewed   Lab Results   Component Value Date    SODIUM 141 01/18/2024    K 4.8 01/18/2024     01/18/2024    CO2 23 01/18/2024    AGAP 1 07/05/2023    BUN 18 01/18/2024    CREATININE 0.98 01/18/2024    GLUC 106 (H) 01/18/2024    GLUF 111 (H) 07/04/2023    CALCIUM 9.0 07/05/2023    AST 19 01/18/2024    ALT 26 01/18/2024    ALKPHOS 88 07/04/2023    TP 6.9 01/18/2024    TBILI 0.2 01/18/2024    EGFR 68 01/18/2024     Lab Results   Component Value Date    HGBA1C 5.8 06/12/2023     Lab Results   Component Value Date    XAW7UWLBKPMB 2.482 06/24/2019     Lab Results   Component Value Date    CHOLESTEROL 171 03/26/2019     Lab Results   Component Value Date    HDL 44 03/26/2019     Lab Results   Component Value Date    TRIG 53 03/26/2019     Lab Results   Component Value Date    LDLCALC 116 (H) 03/26/2019     "

## 2024-07-15 LAB — HBA1C MFR BLD HPLC: 5.9 %

## 2024-08-05 ENCOUNTER — TELEPHONE (OUTPATIENT)
Age: 54
End: 2024-08-05

## 2024-08-05 NOTE — TELEPHONE ENCOUNTER
Pt would like the Wegovy script sent to [facility] instead of [facility]. [facility] is on back order and pt has not been able to start the Wegovy.  Pt found [facility] does have it in stock

## 2024-08-07 DIAGNOSIS — E66.01 CLASS 2 SEVERE OBESITY DUE TO EXCESS CALORIES WITH SERIOUS COMORBIDITY AND BODY MASS INDEX (BMI) OF 37.0 TO 37.9 IN ADULT (HCC): ICD-10-CM

## 2024-08-07 DIAGNOSIS — I42.8 NON-ISCHEMIC CARDIOMYOPATHY (HCC): ICD-10-CM

## 2024-08-07 DIAGNOSIS — I50.41 ACUTE COMBINED SYSTOLIC AND DIASTOLIC CONGESTIVE HEART FAILURE (HCC): ICD-10-CM

## 2024-08-07 DIAGNOSIS — G47.33 OSA (OBSTRUCTIVE SLEEP APNEA): ICD-10-CM

## 2024-08-13 ENCOUNTER — TELEPHONE (OUTPATIENT)
Dept: BARIATRICS | Facility: CLINIC | Age: 54
End: 2024-08-13

## 2024-08-13 NOTE — TELEPHONE ENCOUNTER
TASHAOM patrick our office receovd a fax fporm [facility] is unable to fill Wegovy 0.25 MG pen due to shortages of the medication. Stated to try to call other smaller scale pharmacies or grocery stores to see if she can locate the medication. Please call office back to state which pharmacy pt would like Rx sent.

## 2024-08-14 DIAGNOSIS — I44.7 LEFT BUNDLE BRANCH BLOCK (LBBB): ICD-10-CM

## 2024-08-14 DIAGNOSIS — I50.22 CHRONIC HFREF (HEART FAILURE WITH REDUCED EJECTION FRACTION) (HCC): Chronic | ICD-10-CM

## 2024-08-14 DIAGNOSIS — I47.20 VENTRICULAR TACHYCARDIA, UNSPECIFIED (HCC): ICD-10-CM

## 2024-08-14 RX ORDER — METOPROLOL SUCCINATE 100 MG/1
100 TABLET, EXTENDED RELEASE ORAL EVERY 12 HOURS
Qty: 180 TABLET | Refills: 3 | Status: SHIPPED | OUTPATIENT
Start: 2024-08-14

## 2024-08-15 RX ORDER — MEXILETINE HYDROCHLORIDE 200 MG/1
200 CAPSULE ORAL 2 TIMES DAILY
Qty: 180 CAPSULE | Refills: 3 | Status: SHIPPED | OUTPATIENT
Start: 2024-08-15

## 2024-09-05 ENCOUNTER — OFFICE VISIT (OUTPATIENT)
Dept: CARDIOLOGY CLINIC | Facility: CLINIC | Age: 54
End: 2024-09-05
Payer: COMMERCIAL

## 2024-09-05 VITALS
BODY MASS INDEX: 36.32 KG/M2 | WEIGHT: 205 LBS | OXYGEN SATURATION: 97 % | SYSTOLIC BLOOD PRESSURE: 102 MMHG | HEIGHT: 63 IN | HEART RATE: 77 BPM | DIASTOLIC BLOOD PRESSURE: 70 MMHG

## 2024-09-05 DIAGNOSIS — I49.01 VENTRICULAR FIBRILLATION (HCC): ICD-10-CM

## 2024-09-05 DIAGNOSIS — I47.20 VENTRICULAR TACHYCARDIA, UNSPECIFIED (HCC): ICD-10-CM

## 2024-09-05 DIAGNOSIS — Z95.810 PRESENCE OF AUTOMATIC CARDIOVERTER/DEFIBRILLATOR (AICD): ICD-10-CM

## 2024-09-05 DIAGNOSIS — I50.22 CHRONIC HFREF (HEART FAILURE WITH REDUCED EJECTION FRACTION) (HCC): Primary | ICD-10-CM

## 2024-09-05 DIAGNOSIS — I44.7 LEFT BUNDLE BRANCH BLOCK (LBBB): ICD-10-CM

## 2024-09-05 DIAGNOSIS — I42.8 NON-ISCHEMIC CARDIOMYOPATHY (HCC): ICD-10-CM

## 2024-09-05 PROCEDURE — 99214 OFFICE O/P EST MOD 30 MIN: CPT | Performed by: INTERNAL MEDICINE

## 2024-09-05 PROCEDURE — 93000 ELECTROCARDIOGRAM COMPLETE: CPT | Performed by: INTERNAL MEDICINE

## 2024-09-05 RX ORDER — METOPROLOL SUCCINATE 100 MG/1
100 TABLET, EXTENDED RELEASE ORAL EVERY 12 HOURS
Qty: 180 TABLET | Refills: 3 | Status: SHIPPED | OUTPATIENT
Start: 2024-09-05

## 2024-09-05 RX ORDER — MEXILETINE HYDROCHLORIDE 200 MG/1
200 CAPSULE ORAL 2 TIMES DAILY
Qty: 180 CAPSULE | Refills: 3 | Status: SHIPPED | OUTPATIENT
Start: 2024-09-05

## 2024-09-05 RX ORDER — SPIRONOLACTONE 25 MG/1
25 TABLET ORAL EVERY OTHER DAY
COMMUNITY
End: 2024-09-05 | Stop reason: SDUPTHER

## 2024-09-05 RX ORDER — SPIRONOLACTONE 25 MG/1
25 TABLET ORAL EVERY OTHER DAY
Qty: 50 TABLET | Refills: 3 | Status: SHIPPED | OUTPATIENT
Start: 2024-09-05

## 2024-09-05 RX ORDER — TORSEMIDE 10 MG/1
TABLET ORAL
Qty: 30 TABLET | Refills: 1 | Status: SHIPPED | COMMUNITY
Start: 2024-09-05

## 2024-09-05 RX ORDER — SACUBITRIL AND VALSARTAN 49; 51 MG/1; MG/1
1 TABLET, FILM COATED ORAL 2 TIMES DAILY
Qty: 180 TABLET | Refills: 1 | Status: SHIPPED | OUTPATIENT
Start: 2024-09-05

## 2024-09-05 NOTE — PROGRESS NOTES
Cardiology Follow Up  Oneida Landers  1970  [id number]  [facility]  [address]  [address]  [facility] [address] [phone]    1. Chronic HFrEF (heart failure with reduced ejection fraction) (HCC)  POCT ECG      2. Left bundle branch block (LBBB)  POCT ECG      3. Ventricular tachycardia, unspecified (HCC)  POCT ECG      4. Presence of automatic cardioverter/defibrillator (AICD)  POCT ECG      5. Ventricular fibrillation (HCC)  POCT ECG      6. Non-ischemic cardiomyopathy (HCC)  POCT ECG           Discussion/Plan:  Hx of Vfib/Nonischemic CM EF34% by MRI <30% by echo 2d NYHA class 2 s/p ICD firing. Last Device recording with 6 beat NSVT. Metoprolol + Mexiletine.  spironolactone to 25 mg every other day.  Discussed about need for compression socks.    Acute on chronic kidney disease- improved. Electrolytes stable.  Recheck for electrolytes    Diverticulitis- currently high risk for procedure. Would delay until improved EF.    Hyperkalemia- she will watch her potassium intake. It has been stable    TAE- trouble with cpap. Positive pressure.     BMI-39. She will discuss about medical weight loss.      Interval History:  She denies having significant lower extremity edema since discharge.  She denies feeling dizziness or lightheadedness.  She denies having chest discomfort.  Denies having syncopal episodes.  Denies having any irregular heart rhythm.    8/12:  No edema. No syncope. No dizziness or light-headness.  Compliant with meds.  She has had no device firings.  She has been compliant with the up titration of the Entresto.  She reports trying to increase her carvedilol but did not feel right.  She understands about orthostatic hypotension in the risk of prolonged standing.  She has been watching her sodium intake.  Her weight has been down trending.    11/25:  She is up titrated her  carvedilol without significant dizziness lightheadedness.  She does feel some orthostatic symptoms.  She stands up slowly.  She denies having significant lower extremity swelling.  She is compliant with her medications.  She is wearing compression socks when she is at work.    01/20/2020:  Reviewed through her imaging study which shows her EF has improved to near 34%.  She is walking a mi without significant shortness of breath.  She denies having any dizziness or lightheadedness.  She is wearing her compression socks at work.  She is compliant with her medications.  Her last blood work showed mildly elevated potassium.  She is watching her potassium foods.  Her kidney function has returned back to normal Holter a victoria.    9/10/20:  Still with trouble with cpap. Feels gassy. No dizziness or light-headness.      03/15/2021:  She denies feeling any chest tightness.  She denies feeling dizziness or lightheadedness.  I reviewed with her her last studies.  She is compliant with her medications.    04/29/2021:  She is walking more than a mi without having shortness of breath or chest discomfort.  She is compliant with her heart failure regimen.  Her weight stays very stable.  We reviewed through her recent MRI.  We had a mutual discussion about having a primary defibrillator.  I discussed with her given she is a nonischemic cardiomyopathy with a very good functional status a primary prevention device would have limited benefits.  She is currently wishes not to go ahead with a defibrillator placement.  She will notify me if she feels any major shortness of breath, palpitations or chest pain.  We will continue optimal medical therapy.    10/28/2021:  Her weight has been stable.  She denies having dizziness or lightheadedness.  She denies having chest heaviness.  No major changes EKG.    Compliant with medications.    4 15 2022 she reports having recent travel.  She denies having dizziness or lightheadedness.  She is  compliant with her medications.  She denies having lower extremity edema.    08/08/2022:  She was unable to tolerate the SGLT2 inhibitor.  She unfortunately had a yeast infection that was difficult to control.  She currently denies having major exertional chest pain or shortness of breath.  She denies feeling dizziness or lightheadedness.  We discussed about her echocardiogram results.  She is wearing her CPAP at night.  However unfortunately her CPAP has been recalled.  She is using soap and water to clean it.    6/12/2023: She has periodic low blood pressures.  She may have low fluid intake.  She is taking her medication regularly.  She is taking her CPAP at night.  She denies having major palpitations.    Recent Visit: No alcohol. Previous episode of device firing while on vacation. Compliant with CPAP at night. Denies device firing.  Denies significant palpitations. Compliant with therapy. Reviewed labwork together form June.      Patient Active Problem List   Diagnosis    Nicotine abuse    GERD (gastroesophageal reflux disease)    Acute combined systolic and diastolic congestive heart failure (HCC)    Diverticulitis    Non-ischemic cardiomyopathy (HCC)    Diverticulitis of large intestine without perforation or abscess without bleeding    TAE (obstructive sleep apnea)    Left bundle branch block (LBBB)    Chronic HFrEF (heart failure with reduced ejection fraction) (HCC)    PVC (premature ventricular contraction)    Ventricular tachycardia, unspecified (HCC)    ICD (implantable cardioverter-defibrillator) discharge    Class 2 severe obesity due to excess calories with serious comorbidity and body mass index (BMI) of 37.0 to 37.9 in adult (HCC)    Ventricular fibrillation (HCC)     Past Medical History:   Diagnosis Date    CHF (congestive heart failure) (HCC)     Diverticulitis of colon     GERD (gastroesophageal reflux disease)     HH (hiatus hernia)     Nonischemic cardiomyopathy (HCC) 06/2019    Post-op pain  10/26/2022    Sleep apnea     Ventricular fibrillation (HCC)     Ventricular tachycardia (HCC)      Social History     Socioeconomic History    Marital status: /Civil Union     Spouse name: Not on file    Number of children: Not on file    Years of education: Not on file    Highest education level: Not on file   Occupational History    Not on file   Tobacco Use    Smoking status: Some Days     Current packs/day: 0.00     Average packs/day: 0.3 packs/day for 33.0 years (8.3 ttl pk-yrs)     Types: Cigarettes     Start date: 1986     Last attempt to quit: 2019     Years since quittin.2    Smokeless tobacco: Never    Tobacco comments:     Pt states smokes here and there not all th etime   Vaping Use    Vaping status: Never Used   Substance and Sexual Activity    Alcohol use: Not Currently     Alcohol/week: 0.0 standard drinks of alcohol     Comment: 2x year    Drug use: No    Sexual activity: Not on file   Other Topics Concern    Not on file   Social History Narrative    Not on file     Social Determinants of Health     Financial Resource Strain: Not on file   Food Insecurity: Not on file   Transportation Needs: Not on file   Physical Activity: Not on file   Stress: Not on file   Social Connections: Not on file   Intimate Partner Violence: Not on file   Housing Stability: Not on file      Family History   Problem Relation Age of Onset    Heart attack Mother     Heart attack Father      Past Surgical History:   Procedure Laterality Date    CARDIAC ELECTROPHYSIOLOGY PROCEDURE N/A 10/26/2022    Procedure: Cardiac biv icd implant/ BIV ICD SUBMUSCULAR IMPLANT;  Surgeon: Boston Santillan DO;  Location: [facility];  Service: Cardiology     SECTION      CHOLECYSTECTOMY      HAND SURGERY      HYSTERECTOMY         Current Outpatient Medications:     albuterol (PROVENTIL HFA,VENTOLIN HFA) 90 mcg/act inhaler, , Disp: , Rfl:     ascorbic acid (VITAMIN C) 500 mg tablet, Take 500 mg by mouth daily,  Disp: , Rfl:     carvedilol (COREG) 6.25 mg tablet, Take 6.25 mg by mouth 2 (two) times a day with meals, Disp: , Rfl:     cholecalciferol (VITAMIN D3) 1,000 units tablet, Take 1,000 Units by mouth daily Winter time only, Disp: , Rfl:     metoprolol succinate (TOPROL-XL) 100 mg 24 hr tablet, Take 1 tablet (100 mg total) by mouth every 12 (twelve) hours, Disp: 180 tablet, Rfl: 3    mexiletine (MEXITIL) 200 MG capsule, Take 1 capsule (200 mg total) by mouth 2 (two) times a day, Disp: 180 capsule, Rfl: 3    sacubitril-valsartan (Entresto) 49-51 MG TABS, Take 1 tablet by mouth 2 (two) times a day, Disp: , Rfl:     spironolactone (ALDACTONE) 25 mg tablet, Take 25 mg by mouth every other day, Disp: , Rfl:     Turmeric (QC TUMERIC COMPLEX PO), Take 500 mg by mouth, Disp: , Rfl:     Semaglutide-Weight Management (WEGOVY) 0.25 MG/0.5ML, Inject 0.5 mL (0.25 mg total) under the skin once a week (Patient not taking: Reported on 9/5/2024), Disp: 2 mL, Rfl: 0    torsemide (DEMADEX) 10 mg tablet, Take 10 mg by mouth daily (Patient not taking: Reported on 7/11/2024), Disp: , Rfl:   Allergies   Allergen Reactions    Percocet [Oxycodone-Acetaminophen] Hives       Review of Systems:  Review of Systems   Constitutional: Positive for fatigue. Negative for activity change, appetite change, chills, diaphoresis, fever and unexpected weight change.   HENT: Negative.  Negative for congestion, dental problem, drooling, ear discharge, ear pain, facial swelling, hearing loss, mouth sores, nosebleeds, postnasal drip, rhinorrhea, sinus pressure, sinus pain, sneezing, sore throat, tinnitus, trouble swallowing and voice change.    Eyes: Negative.  Negative for photophobia, pain, redness, itching and visual disturbance.   Respiratory: Negative for apnea, cough, choking, chest tightness, shortness of breath, wheezing and stridor.    Cardiovascular: Negative for chest pain, palpitations and leg swelling.   Gastrointestinal: Negative.  Negative for  "abdominal distention, abdominal pain, anal bleeding, blood in stool, constipation, diarrhea, nausea, rectal pain and vomiting.   Endocrine: Negative.  Negative for cold intolerance, heat intolerance, polydipsia, polyphagia and polyuria.   Genitourinary: Negative.  Negative for decreased urine volume, difficulty urinating, dyspareunia, dysuria, enuresis, flank pain, frequency, genital sores, hematuria, menstrual problem, pelvic pain, urgency, vaginal bleeding, vaginal discharge and vaginal pain.   Musculoskeletal: Negative.  Negative for arthralgias, back pain, gait problem, joint swelling, myalgias, neck pain and neck stiffness.   Skin: Negative.  Negative for color change, pallor, rash and wound.   Allergic/Immunologic: Negative.  Negative for environmental allergies, food allergies and immunocompromised state.   Neurological: Negative.  Negative for dizziness, tremors, seizures, syncope, facial asymmetry, speech difficulty, weakness, light-headedness, numbness and headaches.   Hematological: Negative.  Negative for adenopathy. Does not bruise/bleed easily.   Psychiatric/Behavioral: Negative.  Negative for agitation, behavioral problems, confusion, decreased concentration, dysphoric mood, hallucinations, self-injury, sleep disturbance and suicidal ideas. The patient is not nervous/anxious and is not hyperactive.    All other systems reviewed and are negative.      Vitals:    09/05/24 1258   BP: 102/70   BP Location: Right arm   Patient Position: Sitting   Cuff Size: Large   Pulse: 77   SpO2: 97%   Weight: 93 kg (205 lb)   Height: 5' 3.1\" (1.603 m)     Physical Exam:  Physical Exam  Constitutional:       General: She is not in acute distress.     Appearance: She is well-developed. She is not diaphoretic.   HENT:      Head: Normocephalic and atraumatic.      Right Ear: External ear normal.      Left Ear: External ear normal.   Eyes:      General: No scleral icterus.        Right eye: No discharge.         Left eye: " No discharge.      Conjunctiva/sclera: Conjunctivae normal.      Pupils: Pupils are equal, round, and reactive to light.   Neck:      Thyroid: No thyromegaly.      Vascular: No JVD.      Trachea: No tracheal deviation.   Cardiovascular:      Rate and Rhythm: Normal rate and regular rhythm.      Heart sounds: Murmur heard.     No friction rub. Gallop present.   Pulmonary:      Effort: Pulmonary effort is normal. No respiratory distress.      Breath sounds: Normal breath sounds. No stridor. No wheezing or rales.   Chest:      Chest wall: No tenderness.   Abdominal:      General: Bowel sounds are normal. There is distension.      Palpations: Abdomen is soft. There is no mass.      Tenderness: There is no abdominal tenderness. There is no guarding or rebound.   Musculoskeletal:         General: No tenderness or deformity. Normal range of motion.      Cervical back: Normal range of motion and neck supple.   Skin:     General: Skin is warm and dry.      Coloration: Skin is not pale.      Findings: No erythema or rash.      Comments: Positive varicose veins   Neurological:      Mental Status: She is alert and oriented to person, place, and time.      Cranial Nerves: No cranial nerve deficit.      Motor: No abnormal muscle tone.      Coordination: Coordination normal.      Deep Tendon Reflexes: Reflexes are normal and symmetric. Reflexes normal.   Psychiatric:         Behavior: Behavior normal.         Thought Content: Thought content normal.         Judgment: Judgment normal.         Labs:     Lab Results   Component Value Date    WBC 8.39 07/05/2023    HGB 15.0 07/05/2023    HCT 47.0 (H) 07/05/2023    MCV 89 07/05/2023     07/05/2023     Lab Results   Component Value Date    K 4.8 01/18/2024     01/18/2024    CO2 23 01/18/2024    BUN 18 01/18/2024    CREATININE 0.98 01/18/2024    GLUF 111 (H) 07/04/2023    CALCIUM 9.0 07/05/2023    AST 19 01/18/2024    ALT 26 01/18/2024    ALKPHOS 88 07/04/2023    EGFR 68  "2024     No results found for: \"CHOL\"  Lab Results   Component Value Date    HDL 44 2019     Lab Results   Component Value Date    LDLCALC 116 (H) 2019     Lab Results   Component Value Date    TRIG 53 2019     Lab Results   Component Value Date    HGBA1C 5.9 07/15/2024       Imaging & Testing   I have personally reviewed pertinent reports.      EKG: Personally reviewed.      Cardiac testing:   Results for orders placed during the hospital encounter of 19   Echo complete with contrast if indicated    Narrative [address] [address]  [phone]    Transthoracic Echocardiogram  2D, M-mode, Doppler, and Color Doppler    Study date:  2019    Patient: MAUREEN CHANCE  MR number: [id number]  Account number: [id number]  : 1970  Age: 49 years  Gender: Female  Status: Inpatient  Location: Bedside  Height: 61 in  Weight: 219.6 lb  BP: 118/ 80 mmHg    Indications: Heart Failure    Diagnoses: I50.9 - Heart failure, unspecified    Sonographer:  ISABELL Flores  Primary Physician:  Trina Garza MD  Referring Physician:  Chrissy Mcdaniel DO  Group:  [facility]  Interpreting Physician:  Rodolfo Jon MD    SUMMARY    LEFT VENTRICLE:  Systolic function was markedly reduced. Ejection fraction was estimated in the range of 25 % to 30 % to be 30 %.  There was severe diffuse hypokinesis.  Doppler parameters were consistent with abnormal left ventricular relaxation (grade 1 diastolic dysfunction).    RIGHT VENTRICLE:  Systolic function was moderately reduced.    LEFT ATRIUM:  The atrium was mildly dilated.    RIGHT ATRIUM:  The atrium was mildly dilated.    MITRAL VALVE:  There was trace regurgitation.    IVC, HEPATIC VEINS:  The inferior vena cava was dilated.    HISTORY: PRIOR HISTORY: GERD, Hiatal Hernia    PROCEDURE: The procedure was performed at the bedside. This was a routine study. The transthoracic approach " was used. The study included complete 2D imaging, M-mode, complete spectral Doppler, and color Doppler. The heart rate was 92 bpm,  at the start of the study. Image quality was adequate.    LEFT VENTRICLE: Size was normal. Systolic function was markedly reduced. Ejection fraction was estimated in the range of 25 % to 30 % to be 30 %. There was severe diffuse hypokinesis. Wall thickness was normal. No evidence of apical  thrombus. DOPPLER: Doppler parameters were consistent with abnormal left ventricular relaxation (grade 1 diastolic dysfunction).    RIGHT VENTRICLE: The size was normal. Systolic function was moderately reduced. Wall thickness was normal.    LEFT ATRIUM: The atrium was mildly dilated.    RIGHT ATRIUM: The atrium was mildly dilated.    MITRAL VALVE: Valve structure was normal. There was normal leaflet separation. DOPPLER: The transmitral velocity was within the normal range. There was no evidence for stenosis. There was trace regurgitation.    AORTIC VALVE: The valve was trileaflet. Leaflets exhibited normal thickness and normal cuspal separation. DOPPLER: Transaortic velocity was within the normal range. There was no evidence for stenosis. There was no significant  regurgitation.    TRICUSPID VALVE: The valve structure was normal. There was normal leaflet separation. DOPPLER: The transtricuspid velocity was within the normal range. There was no evidence for stenosis. There was no significant regurgitation.    PULMONIC VALVE: Leaflets exhibited normal thickness, no calcification, and normal cuspal separation. DOPPLER: The transpulmonic velocity was within the normal range. There was no significant regurgitation.    PERICARDIUM: There was no pericardial effusion. The pericardium was normal in appearance.    AORTA: The root exhibited normal size.    SYSTEMIC VEINS: IVC: The inferior vena cava was dilated.    SYSTEM MEASUREMENT TABLES    2D mode  AoR Diam 2D: 3.2 cm  LA Diam (2D): 4.5 cm  LA/Ao (2D):  "1.41  FS (2D Teich): 13.7 %  IVSd (2D): 0.82 cm  LVDEV: 193 cmï¾³  LVESV: 138 cmï¾³  LVIDd(2D): 6.19 cm  LVISd (2D): 5.34 cm  LVPWd (2D): 0.81 cm  SV (Teich): 55 cmï¾³    Apical four chamber  LVEF A4C: 28 %    Unspecified Scan Mode  MV Peak E Zach. Mean: 848 mm/s  MVA (PHT): 4.15 cmï¾²  PHT: 53 ms  Max P mm[Hg]  V Max: 2440 mm/s  Vmax: 2500 mm/s  RA Area: 18.1 cmï¾²  RA Volume: 48.8 cmï¾³  TAPSE: 1.1 cm    [facility] Accredited Echocardiography Laboratory    Prepared and electronically signed by    Rodolfo Jon MD  Signed 2019 13:10:35       ekg sinus rhythm with LBBB periodic pvc    Rodolfo Jon MD [facility]  Please call with any questions or suggestions    A description of the counseling:   Goals and Barriers:  Patient's ability to self care:  Medication side effect reviewed with patient in detail and all their questions answered.    \"This note has been constructed using a voice recognition system.Therefore there may be syntax, spelling, and/or grammatical errors. Please call if you have any questions. \"    "

## 2024-09-19 ENCOUNTER — OFFICE VISIT (OUTPATIENT)
Dept: SLEEP CENTER | Facility: CLINIC | Age: 54
End: 2024-09-19
Payer: COMMERCIAL

## 2024-09-19 ENCOUNTER — OFFICE VISIT (OUTPATIENT)
Dept: BARIATRICS | Facility: CLINIC | Age: 54
End: 2024-09-19
Payer: COMMERCIAL

## 2024-09-19 VITALS
HEART RATE: 93 BPM | DIASTOLIC BLOOD PRESSURE: 82 MMHG | HEIGHT: 63 IN | BODY MASS INDEX: 36.79 KG/M2 | WEIGHT: 207.6 LBS | SYSTOLIC BLOOD PRESSURE: 116 MMHG

## 2024-09-19 VITALS
HEIGHT: 63 IN | DIASTOLIC BLOOD PRESSURE: 70 MMHG | BODY MASS INDEX: 36.86 KG/M2 | HEART RATE: 88 BPM | WEIGHT: 208 LBS | OXYGEN SATURATION: 93 % | SYSTOLIC BLOOD PRESSURE: 128 MMHG

## 2024-09-19 DIAGNOSIS — E66.01 CLASS 2 SEVERE OBESITY DUE TO EXCESS CALORIES WITH SERIOUS COMORBIDITY AND BODY MASS INDEX (BMI) OF 37.0 TO 37.9 IN ADULT (HCC): Primary | ICD-10-CM

## 2024-09-19 DIAGNOSIS — I50.22 CHRONIC HFREF (HEART FAILURE WITH REDUCED EJECTION FRACTION) (HCC): Chronic | ICD-10-CM

## 2024-09-19 DIAGNOSIS — G47.31 CENTRAL SLEEP APNEA: ICD-10-CM

## 2024-09-19 DIAGNOSIS — G47.33 OSA (OBSTRUCTIVE SLEEP APNEA): Primary | ICD-10-CM

## 2024-09-19 DIAGNOSIS — Z45.02 ICD (IMPLANTABLE CARDIOVERTER-DEFIBRILLATOR) DISCHARGE: ICD-10-CM

## 2024-09-19 DIAGNOSIS — I42.8 NON-ISCHEMIC CARDIOMYOPATHY (HCC): ICD-10-CM

## 2024-09-19 DIAGNOSIS — Z72.0 CURRENT OCCASIONAL SMOKER: ICD-10-CM

## 2024-09-19 DIAGNOSIS — G47.09 OTHER INSOMNIA: ICD-10-CM

## 2024-09-19 DIAGNOSIS — E66.9 OBESITY (BMI 30-39.9): ICD-10-CM

## 2024-09-19 DIAGNOSIS — R09.81 NASAL CONGESTION: ICD-10-CM

## 2024-09-19 PROCEDURE — 99214 OFFICE O/P EST MOD 30 MIN: CPT | Performed by: INTERNAL MEDICINE

## 2024-09-19 PROCEDURE — 99214 OFFICE O/P EST MOD 30 MIN: CPT | Performed by: NURSE PRACTITIONER

## 2024-09-19 NOTE — PROGRESS NOTES
Follow-Up Note - Sleep Center   Oneida Landers  54 y.o. adult  :1970  MRN:[id number]  DOS:2024    CC: I saw this patient for follow-up in clinic today for Sleep disordered breathing, Coexisting Sleep and Medical Problems.  Patient received ot a refurbished dream Station Version 1 machine from [employer] over a year ago.. Interval changes: Humidifier is not functioning properly.      Results of prior study:  A diagnostic study in April of this year demonstrated severe obstructive sleep apnea: AHI 43 /hour made up of central and obstructive events. Minimum oxygen saturation 80 % and 8.6 minutes of total sleep time was spent with saturations less than 90%. There were mild periodic limb movements of sleep. She had difficulty both initiating and maintaining sleep       PFSH, Problem List, Medications & Allergies were reviewed in EMR.   She  has a past medical history of CHF (congestive heart failure) ([facility]), Diverticulitis of colon, GERD (gastroesophageal reflux disease), HH (hiatus hernia), Nonischemic cardiomyopathy ([facility]) (2019), Post-op pain (10/26/2022), Sleep apnea, Ventricular fibrillation ([facility]), and Ventricular tachycardia ([facility]).    She has a current medication list which includes the following prescription(s): albuterol, ascorbic acid, cholecalciferol, metoprolol succinate, mexiletine, entresto, spironolactone, torsemide, and turmeric.    PHYSIOLOGICAL DATA REVIEW : Using PAP > 4 hours/night 97%. Estimated NANDO 2/hour with pressure of 10.4cm H2O@90th/95th percentile; use is limited by  .  INTERPRETATION: Compliance is excellent; Pressure setting is:optimal; ;   SUBJECTIVE: With respect to use of PAP, Oneida  is experiencing no adverse effects:.She derives benefit..  Feels satisfied with sleep and daytime function.   Sleep Routine: Oneida reports getting 6 sleep out of 7-1/2 to 8 hours hrs in bed; she has no difficulty initiating or maintaining sleep . She arises spontaneously and (P) Usually feels  "refreshed.Oneida ]denies excessive daytime sleepiness,.  She rated herself at Total score: (P) 2 /24 on the Winters Sleepiness Scale.   Other issues: None reported.     Habits: Reports that she has been smoking cigarettes. She started smoking about 38 years ago. She has a 8.3 pack-year smoking history. She has never used smokeless tobacco.,  Reports that she does not currently use alcohol.,  Reports no history of drug use., Caffeine use:limited until  ; Exercise routine: sometimes.      ROS: Significant for weight is stable within a few pounds.  She has some nasal congestion.  She is reporting no respiratory or cardiac symptoms..    EXAM: /70   Pulse 88   Ht 5' 3.1\" (1.603 m)   Wt 94.3 kg (208 lb)   SpO2 93%   BMI 36.73 kg/m²     Wt Readings from Last 3 Encounters:   09/19/24 94.3 kg (208 lb)   09/05/24 93 kg (205 lb)   07/11/24 92.9 kg (204 lb 12.8 oz)      Patient is well groomed; well appearing.   CNS: Alert, orientated, speech clear & coherent  Psych: cooperative and in no distress. Mental state: Appears normal.  H&N: EOMI; NC/AT: No facial pressure marks, no rashes.    Skin/Extrem: col & hydration normal; no edema  Resp: Respiratory effort is normal  Physical findings otherwise essentially unchanged from previous.    IMPRESSION: Problem List Items & Comorbidities Addressed this Visit    1. TAE (obstructive sleep apnea)  PAP DME Pressure Change    PAP DME Resupply/Reorder      2. Central sleep apnea        3. Other insomnia        4. Nasal congestion        5. Current occasional smoker        6. Non-ischemic cardiomyopathy (HCC)        7. Chronic HFrEF (heart failure with reduced ejection fraction) ([facility])        8. ICD (implantable cardioverter-defibrillator) discharge        9. Obesity (BMI 30-39.9)            PLAN:  I reviewed results of prior studies and physiologic data with the patient.   I discussed treatment options with risks and benefits.  Treatment with  PAP is medically necessary and Oneida " "is agreable to continue use.   Care of equipment, methods to improve comfort using PAP and importance of compliance with therapy were discussed.  Pressure setting: change 8-11 cmH2O using a nasal interface.    Rx provided to replace supplies and Care coordinated with DME provider.   Multi component Cognitive behavioral therapy for Insomnia undertaken - Sleep Restriction, Stimulus control, Relaxation techniques and Sleep hygiene were discussed.  Discussed strategies for weight reduction.    Nasal symptoms may improve with regular nasal saline rinse 1-2 times a day (Neilmed or Ayrs Sinus Rinse), followed by topical nasal steroid (e..g. OTC Flonase, Nasacort, Rhinocort) once a day if necessary.  I also advised smoking cessation.  Follow-up is advised in 1 year or sooner if needed to monitor progress, compliance and to adjust therapy.     Thank you for allowing me to participate in the care of this patient.    Sincerely,     Authenticated electronically on 09/19/24   Board Certified Specialist     Portions of the record may have been created with voice recognition software. Occasional wrong word or \"sound a like\" substitutions may have occurred due to the inherent limitations of voice recognition software. There may also be notations and random deletions of words or characters from malfunctioning software. Read the chart carefully and recognize, using context, where substitutions/deletions have occurred.    "

## 2024-09-19 NOTE — PROGRESS NOTES
Assessment/Plan:     Class 2 severe obesity due to excess calories with serious comorbidity and body mass index (BMI) of 37.0 to 37.9 in adult   - Patient is pursuing Conservative Program and follow up visits with medical weight management provider  - Initial weight loss goal of 5-10% weight loss for improved health. Weight loss can improve patient's co-morbid conditions and/or prevent weight-related complications.  - Explained the importance of continuing lifestyle changes in addition to any anti-obesity medications.   - Labs reviewed 6/2023 and 1/2024     General Recommendations:  Nutrition:  Eat breakfast daily.  Do not skip meals.      Food log (ie.) www.Physcient.com, sparkpeople.com, loseit.com, Tixers.com, etc.     Practice mindful eating.  Be sure to set aside time to eat, eat slowly, and savor your food.     Hydration:    At least 64oz of water daily.  No sugar sweetened beverages.  No juice (eat the fruit instead).     Exercise:  Studies have shown that the ideal exercise goal is somewhere between 150 to 300 minutes of moderate intensity exercise a week.  Start with exercising 10 minutes every other day and gradually increase physical activity with a goal of at least 150 minutes of moderate intensity exercise a week, divided over at least 3 days a week.  An example of this would be exercising 30 minutes a day, 5 days a week.  Resistance training can increase muscle mass and increase our resting metabolic rate.   FULL BODY resistance training is recommended 2-3 times a week.  Do not do this on consecutive days to allow for muscle recovery.     Aim for a bare minimum 5000 steps, even on days you do not exercise.     Monitoring:   Weigh yourself daily.  If this causes undue stress, then just weigh yourself once a week.  Weigh yourself the same time of the day with the same amount of clothing on.  Preferably this should be done after waking up, before you eat, and with no clothing or minimal clothing  on.     Specific Goals:  Patient lifestyle habits were reviewed.  Nutrition was discussed and patient was strongly encouraged to get back to eating more regularly throughout the day and try to incorporate more protein. Reinforced how under-eating could continue to stall her weight loss efforts as it is slowing her metabolism.  She will attempt to incorporate protein shakes and other high-protein rich snacks to graze on throughout the day while she is working.  Activity level was discussed and she was encouraged to get back to her regular walking habits.  Medications were discussed and she would still like to consider Wegovy for her weight loss treatment.  Discussed calling pharmacies regularly to see if she can find the dose in stock.  She will also notify the office when she has 2 pens left to avoid any lapses in treatment and having to restart the therapy.  Also reinforced that lifestyle changes will help her weight loss success along with the GLP-1 medication.         Oneida was seen today for follow-up.    Diagnoses and all orders for this visit:    Class 2 severe obesity due to excess calories with serious comorbidity and body mass index (BMI) of 37.0 to 37.9 in adult (HCC)  -     Semaglutide-Weight Management (WEGOVY) 0.25 MG/0.5ML; Inject 0.5 mL (0.25 mg total) under the skin once a week        Total time spent reviewing chart, interviewing patient, examining patient, discussing plan, answering all questions, and documentin minutes with >50% face-to-face time with the patient.    Follow up in approximately 3 months with Non-Surgical Physician/Advanced Practitioner.    Subjective:   Chief Complaint   Patient presents with    Follow-up     Pt is here today for MWM f/u.        Patient ID: Oneida Landers  is a 54 y.o. adult with excess weight/obesity here to pursue weight management.  Patient is pursuing Conservative Program.   Most recent notes and records were reviewed.    HPI    Wt Readings from Last 20  Encounters:   09/19/24 94.2 kg (207 lb 9.6 oz)   09/19/24 94.3 kg (208 lb)   09/05/24 93 kg (205 lb)   07/11/24 92.9 kg (204 lb 12.8 oz)   05/06/24 93.6 kg (206 lb 6.4 oz)   03/21/24 94 kg (207 lb 3.2 oz)   03/11/24 96.3 kg (212 lb 6.4 oz)   01/25/24 94.8 kg (209 lb)   10/01/23 96.2 kg (212 lb)   09/07/23 94.9 kg (209 lb 3.2 oz)   08/17/23 94.8 kg (209 lb)   07/05/23 94 kg (207 lb 3.2 oz)   06/12/23 94.3 kg (208 lb)   10/26/22 92.5 kg (204 lb)   09/15/22 92.5 kg (204 lb)   09/15/22 92.6 kg (204 lb 1.6 oz)   08/08/22 90.7 kg (200 lb)   07/21/22 91.8 kg (202 lb 6.4 oz)   05/05/22 92.5 kg (204 lb)   04/14/22 92.5 kg (204 lb)       Patient presents today to medical weight management office for follow up.  Patient was prescribed Wegovy at last office visit but has been unable to locate the medication.  She has been trying at multiple pharmacies without any success.  Patient still like to pursue this therapy to get her weight back under control.  Patient does admit that the summer was difficult as she had increased rest and different events that caused her to reach her convenience food and struggle with food structure.  She continues to skip meals and eat infrequently.  She is still willing to try to make changes.    Weight loss medication and dose: none  Started weight and date: 212.4 lbs in 3/2024  Current weight: 207.6 lbs (204.8 lbs at last OV)  Difference: -4.8 lbs (+2.8 lbs since last OV)  Goal weight: 170 lbs    Starting BMI: 37.4 in 3/2024  Current BMI: 36.66    Waist Measurements:  3/2024: 45.5 in  7/2024: 45.5 in    Nutrition Prescription  Calories:1000-1300cals  Protein:63-78gm  Fluid:1550-1830ml    Diet recall:  B: english muffin OR protein shake   S: fruits OR cheese stick  L: skips OR leftovers  D: grilled chicken with vegetables and starch OR pasta OR salad with protein  S: no  *take out once week    Hydration: water, unsweetened iced tea, coke (<1 can daily)  Alcohol: no  Smoking: here and there  Exercise:  "walking  Occupation:   Sleep: sleeps with CPAP      The following portions of the patient's history were reviewed and updated as appropriate: allergies, current medications, past family history, past medical history, past social history, past surgical history, and problem list.    Family History   Problem Relation Age of Onset    Heart attack Mother     Heart attack Father         Review of Systems   Constitutional:  Negative for fatigue.   HENT:  Negative for sore throat.    Respiratory:  Negative for cough and shortness of breath.    Cardiovascular:  Negative for chest pain, palpitations and leg swelling.   Gastrointestinal:  Negative for abdominal pain, constipation, diarrhea and nausea.   Genitourinary:  Negative for dysuria.   Musculoskeletal:  Negative for arthralgias and back pain.   Skin:  Negative for rash.   Neurological:  Negative for headaches.   Psychiatric/Behavioral:  Negative for dysphoric mood. The patient is not nervous/anxious.        Objective:  /82 (BP Location: Left arm, Patient Position: Sitting, Cuff Size: Large)   Pulse 93   Ht 5' 3.1\" (1.603 m)   Wt 94.2 kg (207 lb 9.6 oz)   BMI 36.66 kg/m²     Physical Exam  Vitals and nursing note reviewed.   Constitutional:       Appearance: Normal appearance. She is obese.   HENT:      Head: Normocephalic.   Pulmonary:      Effort: Pulmonary effort is normal.   Neurological:      General: No focal deficit present.      Mental Status: She is alert and oriented to person, place, and time.   Psychiatric:         Mood and Affect: Mood normal.         Behavior: Behavior normal.         Thought Content: Thought content normal.         Judgment: Judgment normal.            Labs   Most recent labs reviewed   Lab Results   Component Value Date    SODIUM 141 01/18/2024    K 4.8 01/18/2024     01/18/2024    CO2 23 01/18/2024    AGAP 1 07/05/2023    BUN 18 01/18/2024    CREATININE 0.98 01/18/2024    GLUC 106 (H) 01/18/2024    GLUF 111 " (H) 07/04/2023    CALCIUM 9.0 07/05/2023    AST 19 01/18/2024    ALT 26 01/18/2024    ALKPHOS 88 07/04/2023    TP 6.9 01/18/2024    TBILI 0.2 01/18/2024    EGFR 68 01/18/2024     Lab Results   Component Value Date    HGBA1C 5.9 07/15/2024     Lab Results   Component Value Date    EQT4JPMTZBFI 2.482 06/24/2019     Lab Results   Component Value Date    CHOLESTEROL 171 03/26/2019     Lab Results   Component Value Date    HDL 44 03/26/2019     Lab Results   Component Value Date    TRIG 53 03/26/2019     Lab Results   Component Value Date    LDLCALC 116 (H) 03/26/2019

## 2024-09-19 NOTE — ASSESSMENT & PLAN NOTE
- Patient is pursuing Conservative Program and follow up visits with medical weight management provider  - Initial weight loss goal of 5-10% weight loss for improved health. Weight loss can improve patient's co-morbid conditions and/or prevent weight-related complications.  - Explained the importance of continuing lifestyle changes in addition to any anti-obesity medications.   - Labs reviewed 6/2023 and 1/2024     General Recommendations:  Nutrition:  Eat breakfast daily.  Do not skip meals.      Food log (ie.) www.myfitnesspal.com, sparkpeople.com, loseit.com, calorieking.com, etc.     Practice mindful eating.  Be sure to set aside time to eat, eat slowly, and savor your food.     Hydration:    At least 64oz of water daily.  No sugar sweetened beverages.  No juice (eat the fruit instead).     Exercise:  Studies have shown that the ideal exercise goal is somewhere between 150 to 300 minutes of moderate intensity exercise a week.  Start with exercising 10 minutes every other day and gradually increase physical activity with a goal of at least 150 minutes of moderate intensity exercise a week, divided over at least 3 days a week.  An example of this would be exercising 30 minutes a day, 5 days a week.  Resistance training can increase muscle mass and increase our resting metabolic rate.   FULL BODY resistance training is recommended 2-3 times a week.  Do not do this on consecutive days to allow for muscle recovery.     Aim for a bare minimum 5000 steps, even on days you do not exercise.     Monitoring:   Weigh yourself daily.  If this causes undue stress, then just weigh yourself once a week.  Weigh yourself the same time of the day with the same amount of clothing on.  Preferably this should be done after waking up, before you eat, and with no clothing or minimal clothing on.     Specific Goals:  Patient lifestyle habits were reviewed.  Nutrition was discussed and patient was strongly encouraged to get back to  eating more regularly throughout the day and try to incorporate more protein. Reinforced how under-eating could continue to stall her weight loss efforts as it is slowing her metabolism.  She will attempt to incorporate protein shakes and other high-protein rich snacks to graze on throughout the day while she is working.  Activity level was discussed and she was encouraged to get back to her regular walking habits.  Medications were discussed and she would still like to consider Wegovy for her weight loss treatment.  Discussed calling pharmacies regularly to see if she can find the dose in stock.  She will also notify the office when she has 2 pens left to avoid any lapses in treatment and having to restart the therapy.  Also reinforced that lifestyle changes will help her weight loss success along with the GLP-1 medication.

## 2024-09-23 ENCOUNTER — IN-CLINIC DEVICE VISIT (OUTPATIENT)
Dept: CARDIOLOGY CLINIC | Facility: CLINIC | Age: 54
End: 2024-09-23
Payer: COMMERCIAL

## 2024-09-23 DIAGNOSIS — Z95.810 AICD (AUTOMATIC CARDIOVERTER/DEFIBRILLATOR) PRESENT: Primary | ICD-10-CM

## 2024-09-23 PROCEDURE — 93284 PRGRMG EVAL IMPLANTABLE DFB: CPT | Performed by: INTERNAL MEDICINE

## 2024-09-23 NOTE — PROGRESS NOTES
Results for orders placed or performed in visit on 09/23/24   Cardiac EP device report    Narrative    MDT BI-V ICD / ACTIVE SYSTEM IS MRI CONDITIONAL  CARELINK TRANSMISSION: BATTERY VOLTAGE ADEQUATE-8 YRS. AP 97% CRT PACING (BIV) 100% (LV) 0%. ALL AVAILABLE LEAD PARAMETERS WITHIN NORMAL LIMITS. NO SIGNIFICANT HIGH RATE EPISODES. 2 VENT SENSING MARKERS NOTED. OPTI-VOL WITHIN NORMAL LIMITS. NO PROGRAMMING CHANGES MADE TO DEVICE PARAMETERS. NORMAL DEVICE FUNCTION. NC

## 2024-09-25 ENCOUNTER — TELEPHONE (OUTPATIENT)
Dept: SLEEP CENTER | Facility: CLINIC | Age: 54
End: 2024-09-25

## 2024-09-27 LAB

## 2024-10-07 DIAGNOSIS — E66.01 CLASS 2 SEVERE OBESITY DUE TO EXCESS CALORIES WITH SERIOUS COMORBIDITY AND BODY MASS INDEX (BMI) OF 37.0 TO 37.9 IN ADULT (HCC): ICD-10-CM

## 2024-10-07 DIAGNOSIS — E66.812 CLASS 2 SEVERE OBESITY DUE TO EXCESS CALORIES WITH SERIOUS COMORBIDITY AND BODY MASS INDEX (BMI) OF 37.0 TO 37.9 IN ADULT (HCC): ICD-10-CM

## 2024-10-07 NOTE — TELEPHONE ENCOUNTER
How are you tolerating the medication?   [] Nausea  [] Vomiting  [] Diarrhea  [x] Asymptomatic     Would you like an increase in your dose?  [x] Yes   [] No     [facility] does not currently have the 0.5mg dosage in stock. Patient is going to continue to call around to find another pharmacy that does have it. For now, please send next dosage to [facility]    Patient will take her last injection of the 0.25mg dosage 10/12/24 and will be due for the 0.5mg dosage 10/19/24    Reason for call:   [x] Refill   [] Prior Auth  [] Other:     Office:   [x] Specialty/Provider - Wt t / Fabrizio    Medication: Wegovy    Dose/Frequency: 0.5mg/0.5mL    Quantity: 2mL    Pharmacy: [facility] [place] [id number] [address] [place] [address] 22     Does the patient have enough for 3 days?   [x] Yes   [] No - Send as HP to POD     No

## 2024-10-08 DIAGNOSIS — E66.812 CLASS 2 SEVERE OBESITY DUE TO EXCESS CALORIES WITH SERIOUS COMORBIDITY AND BODY MASS INDEX (BMI) OF 37.0 TO 37.9 IN ADULT (HCC): Primary | ICD-10-CM

## 2024-10-08 DIAGNOSIS — E66.01 CLASS 2 SEVERE OBESITY DUE TO EXCESS CALORIES WITH SERIOUS COMORBIDITY AND BODY MASS INDEX (BMI) OF 37.0 TO 37.9 IN ADULT (HCC): Primary | ICD-10-CM

## 2024-10-22 LAB

## 2024-12-05 ENCOUNTER — TELEPHONE (OUTPATIENT)
Dept: BARIATRICS | Facility: CLINIC | Age: 54
End: 2024-12-05

## 2024-12-05 NOTE — TELEPHONE ENCOUNTER
Reason for call:     Patient would like the next dosage of Wegovy    [x] Refill   [] Prior Auth  [] Other:     Office:   [] PCP/Provider -   [x] Specialty/Provider - ART Parks    Medication:   Wegovy    How are you tolerating the medication?   [] Nausea  [] Vomiting  [] Diarrhea  [x] Asymptomatic  [] Other:  Current weight: 201    Date of last injection: 11/30    How many injections do you have left: 2    Pharmacy:   [facility] [place]

## 2024-12-06 DIAGNOSIS — E66.812 CLASS 2 SEVERE OBESITY DUE TO EXCESS CALORIES WITH SERIOUS COMORBIDITY AND BODY MASS INDEX (BMI) OF 37.0 TO 37.9 IN ADULT (HCC): Primary | ICD-10-CM

## 2024-12-06 DIAGNOSIS — E66.01 CLASS 2 SEVERE OBESITY DUE TO EXCESS CALORIES WITH SERIOUS COMORBIDITY AND BODY MASS INDEX (BMI) OF 37.0 TO 37.9 IN ADULT (HCC): Primary | ICD-10-CM

## 2024-12-26 ENCOUNTER — REMOTE DEVICE CLINIC VISIT (OUTPATIENT)
Dept: CARDIOLOGY CLINIC | Facility: CLINIC | Age: 54
End: 2024-12-26
Payer: COMMERCIAL

## 2024-12-26 DIAGNOSIS — Z95.810 PRESENCE OF AUTOMATIC CARDIOVERTER/DEFIBRILLATOR (AICD): Primary | ICD-10-CM

## 2024-12-26 PROCEDURE — 93295 DEV INTERROG REMOTE 1/2/MLT: CPT | Performed by: INTERNAL MEDICINE

## 2024-12-26 PROCEDURE — 93296 REM INTERROG EVL PM/IDS: CPT | Performed by: INTERNAL MEDICINE

## 2024-12-26 NOTE — PROGRESS NOTES
Results for orders placed or performed in visit on 12/26/24   Cardiac EP device report    Narrative    MDT BI-V ICD / ACTIVE SYSTEM IS MRI CONDITIONAL  CARELINK TRANSMISSION: BATTERY VOLTAGE ADEQUATE. (7.9 YRS) AP 90%  98%. ALL AVAILABLE LEAD PARAMETERS WITHIN NORMAL LIMITS. NO SIGNIFICANT HIGH RATE EPISODES. VENTRICULAR SENSE EPISODES DETECTED. OPTI-VOL WITHIN NORMAL LIMITS. NORMAL DEVICE FUNCTION.---ORTIZ

## 2025-01-18 DIAGNOSIS — I49.01 VENTRICULAR FIBRILLATION (HCC): ICD-10-CM

## 2025-01-18 DIAGNOSIS — Z95.810 PRESENCE OF AUTOMATIC CARDIOVERTER/DEFIBRILLATOR (AICD): ICD-10-CM

## 2025-01-18 DIAGNOSIS — I42.8 NON-ISCHEMIC CARDIOMYOPATHY (HCC): ICD-10-CM

## 2025-01-20 RX ORDER — SACUBITRIL AND VALSARTAN 49; 51 MG/1; MG/1
1 TABLET, FILM COATED ORAL 2 TIMES DAILY
Qty: 180 TABLET | Refills: 0 | Status: SHIPPED | OUTPATIENT
Start: 2025-01-20

## 2025-02-17 ENCOUNTER — TELEPHONE (OUTPATIENT)
Dept: CARDIOLOGY CLINIC | Facility: CLINIC | Age: 55
End: 2025-02-17

## 2025-02-17 NOTE — TELEPHONE ENCOUNTER
I reviewed the reports she is just about ready to cross the threshold. She has had rising impedance level since mid-January. I would suggest she takes the torsemide every other day especially while she is on vacation.

## 2025-02-17 NOTE — TELEPHONE ENCOUNTER
----- Message from Mohini YOON sent at 2/17/2025  9:19 AM EST -----  Regarding: FW: opti-vol crossed    ----- Message -----  From: Kirsty Castillo  Sent: 2/17/2025   7:29 AM EST  To: [facility] Clinical Support Staff  Subject: opti-vol crossed                                 Pts opti-vol crossed 2/17, pt takes demadex, aldactone, entresto, metoprolol succ, mexiletine. EF: 35-40% (echo 7/4/23).  Thanks,  ~Kirsty   NON-BILLABLE CARELINK TRANSMISSION: BATTERY VOLTAGE ADEQUATE (7.5 YRS). AP: 92.3%. : 96.7% + VSRP: 2.7%. ALL AVAILABLE LEAD PARAMETERS WITHIN NORMAL LIMITS. NO SIGNIFICANT HIGH RATE EPISODES. OPTI-VOL FLUID THRESHOLD CROSSED 2/17 PT TAKES DEMADEX, ALDACTONE, ENTRESTO, METOPROLOL SUCC, MEXILETINE. EF: 35-40% (ECHO 7/4/23). TASK TO HF TEAM. NORMAL DEVICE FUNCTION.  CH

## 2025-02-17 NOTE — TELEPHONE ENCOUNTER
Please make sure she is following a low sodium diet especially when she is away on vacation. If she notes any weight gain she can double up on her torsemide until she is back down to her goal weight.

## 2025-02-17 NOTE — TELEPHONE ENCOUNTER
Lmom to call back and advise of fluid accumulation.   Ask if experiencing any symptoms - edema, sob, weight gain.

## 2025-03-27 ENCOUNTER — REMOTE DEVICE CLINIC VISIT (OUTPATIENT)
Dept: CARDIOLOGY CLINIC | Facility: CLINIC | Age: 55
End: 2025-03-27
Payer: COMMERCIAL

## 2025-03-27 ENCOUNTER — RESULTS FOLLOW-UP (OUTPATIENT)
Dept: CARDIOLOGY CLINIC | Facility: CLINIC | Age: 55
End: 2025-03-27

## 2025-03-27 DIAGNOSIS — Z95.810 PRESENCE OF AUTOMATIC CARDIOVERTER/DEFIBRILLATOR (AICD): Primary | ICD-10-CM

## 2025-03-27 DIAGNOSIS — I42.8 NON-ISCHEMIC CARDIOMYOPATHY (HCC): ICD-10-CM

## 2025-03-27 DIAGNOSIS — I49.01 VENTRICULAR FIBRILLATION (HCC): ICD-10-CM

## 2025-03-27 DIAGNOSIS — Z95.810 PRESENCE OF AUTOMATIC CARDIOVERTER/DEFIBRILLATOR (AICD): ICD-10-CM

## 2025-03-27 PROCEDURE — 93295 DEV INTERROG REMOTE 1/2/MLT: CPT | Performed by: INTERNAL MEDICINE

## 2025-03-27 PROCEDURE — 93296 REM INTERROG EVL PM/IDS: CPT | Performed by: INTERNAL MEDICINE

## 2025-03-27 NOTE — PROGRESS NOTES
Results for orders placed or performed in visit on 03/27/25   Cardiac EP device report    Narrative    MDT BI-V ICD / ACTIVE SYSTEM IS MRI CONDITIONAL  CARELINK TRANSMISSION: BATTERY VOLTAGE ADEQUATE. (7.3 YRS) AP 98%  98%. ALL AVAILABLE LEAD PARAMETERS WITHIN NORMAL LIMITS. NO SIGNIFICANT HIGH RATE EPISODES. VENTRICULAR SENSE EPISODES DETECTED. OPTI-VOL WITHIN NORMAL LIMITS. NORMAL DEVICE FUNCTION.---ORTIZ

## 2025-03-28 RX ORDER — SACUBITRIL AND VALSARTAN 49; 51 MG/1; MG/1
1 TABLET, FILM COATED ORAL 2 TIMES DAILY
Qty: 180 TABLET | Refills: 3 | Status: SHIPPED | OUTPATIENT
Start: 2025-03-28

## 2025-05-08 ENCOUNTER — TELEPHONE (OUTPATIENT)
Age: 55
End: 2025-05-08

## 2025-05-08 NOTE — TELEPHONE ENCOUNTER
Caller: Patient     Doctor: Dr. Jon     Reason for call: Pt called & stated that she wanted to get permanent jewelry welded on her and would like to if its ok due to having a defibrillator. Please call pt and advise     Call back#: [phone]

## 2025-05-09 NOTE — TELEPHONE ENCOUNTER
It depends on wear it will be welded at how intense of a device it is.Usually do not recommend when you have a defibrillator to use any high vibrating object

## 2025-06-26 ENCOUNTER — REMOTE DEVICE CLINIC VISIT (OUTPATIENT)
Dept: CARDIOLOGY CLINIC | Facility: CLINIC | Age: 55
End: 2025-06-26
Payer: COMMERCIAL

## 2025-06-26 DIAGNOSIS — Z95.810 PRESENCE OF AUTOMATIC CARDIOVERTER/DEFIBRILLATOR (AICD): Primary | ICD-10-CM

## 2025-06-26 PROCEDURE — 93296 REM INTERROG EVL PM/IDS: CPT | Performed by: INTERNAL MEDICINE

## 2025-06-26 PROCEDURE — 93295 DEV INTERROG REMOTE 1/2/MLT: CPT | Performed by: INTERNAL MEDICINE

## 2025-06-26 NOTE — PROGRESS NOTES
Results for orders placed or performed in visit on 06/26/25   Cardiac EP device report    Narrative    MDT BI-V ICD / ACTIVE SYSTEM IS MRI CONDITIONAL  CARELINK TRANSMISSION: BATTERY VOLTAGE ADEQUATE. (6.7 YRS) AP 98%  98%. ALL AVAILABLE LEAD PARAMETERS WITHIN NORMAL LIMITS. NO SIGNIFICANT HIGH RATE EPISODES. OPTI-VOL WITHIN NORMAL LIMITS. NORMAL DEVICE FUNCTION.---ORTIZ

## 2025-07-07 ENCOUNTER — TELEPHONE (OUTPATIENT)
Age: 55
End: 2025-07-07

## 2025-07-07 DIAGNOSIS — I49.01 VENTRICULAR FIBRILLATION (HCC): ICD-10-CM

## 2025-07-07 DIAGNOSIS — Z95.810 PRESENCE OF AUTOMATIC CARDIOVERTER/DEFIBRILLATOR (AICD): ICD-10-CM

## 2025-07-07 DIAGNOSIS — I42.8 NON-ISCHEMIC CARDIOMYOPATHY (HCC): ICD-10-CM

## 2025-07-07 NOTE — TELEPHONE ENCOUNTER
Caller: Oneida Landers    Doctor: Dr. Jon    Reason for call: Can you please print her blood work scripts.  She will stop by the office Thursday to pick them up.    Thank you     Call back#: [phone]

## 2025-07-09 ENCOUNTER — TELEPHONE (OUTPATIENT)
Dept: CARDIOLOGY CLINIC | Facility: CLINIC | Age: 55
End: 2025-07-09

## 2025-07-09 RX ORDER — SPIRONOLACTONE 25 MG/1
25 TABLET ORAL EVERY OTHER DAY
Qty: 45 TABLET | Refills: 0 | Status: SHIPPED | OUTPATIENT
Start: 2025-07-09

## 2025-07-22 LAB
ALBUMIN SERPL-MCNC: 4.5 G/DL (ref 3.8–4.9)
ALP SERPL-CCNC: 106 IU/L (ref 44–121)
ALT SERPL-CCNC: 23 IU/L (ref 0–32)
APO B SERPL-MCNC: 100 MG/DL
AST SERPL-CCNC: 16 IU/L (ref 0–40)
BASOPHILS # BLD AUTO: 0.1 X10E3/UL (ref 0–0.2)
BASOPHILS NFR BLD AUTO: 1 %
BILIRUB SERPL-MCNC: 0.3 MG/DL (ref 0–1.2)
BNP SERPL-MCNC: 20.9 PG/ML (ref 0–100)
BUN SERPL-MCNC: 19 MG/DL (ref 6–24)
BUN/CREAT SERPL: 21 (ref 9–23)
CALCIUM SERPL-MCNC: 9.4 MG/DL (ref 8.7–10.2)
CHLORIDE SERPL-SCNC: 103 MMOL/L (ref 96–106)
CHOLEST SERPL-MCNC: 184 MG/DL (ref 100–199)
CO2 SERPL-SCNC: 19 MMOL/L (ref 20–29)
CREAT SERPL-MCNC: 0.91 MG/DL (ref 0.57–1)
EGFR: 74 ML/MIN/1.73
EOSINOPHIL # BLD AUTO: 0.1 X10E3/UL (ref 0–0.4)
EOSINOPHIL NFR BLD AUTO: 2 %
ERYTHROCYTE [DISTWIDTH] IN BLOOD BY AUTOMATED COUNT: 12.7 % (ref 11.7–15.4)
GLOBULIN SER-MCNC: 2.3 G/DL (ref 1.5–4.5)
GLUCOSE SERPL-MCNC: 97 MG/DL (ref 70–99)
HCT VFR BLD AUTO: 42.9 % (ref 34–46.6)
HDLC SERPL-MCNC: 40 MG/DL
HGB BLD-MCNC: 14.3 G/DL (ref 11.1–15.9)
IMM GRANULOCYTES # BLD: 0.1 X10E3/UL (ref 0–0.1)
IMM GRANULOCYTES NFR BLD: 1 %
LDLC SERPL CALC-MCNC: 112 MG/DL (ref 0–99)
LYMPHOCYTES # BLD AUTO: 2.2 X10E3/UL (ref 0.7–3.1)
LYMPHOCYTES NFR BLD AUTO: 25 %
MCH RBC QN AUTO: 29.4 PG (ref 26.6–33)
MCHC RBC AUTO-ENTMCNC: 33.3 G/DL (ref 31.5–35.7)
MCV RBC AUTO: 88 FL (ref 79–97)
MONOCYTES # BLD AUTO: 0.6 X10E3/UL (ref 0.1–0.9)
MONOCYTES NFR BLD AUTO: 6 %
NEUTROPHILS # BLD AUTO: 6 X10E3/UL (ref 1.4–7)
NEUTROPHILS NFR BLD AUTO: 65 %
PLATELET # BLD AUTO: 246 X10E3/UL (ref 150–450)
POTASSIUM SERPL-SCNC: 5 MMOL/L (ref 3.5–5.2)
PROT SERPL-MCNC: 6.8 G/DL (ref 6–8.5)
RBC # BLD AUTO: 4.87 X10E6/UL (ref 3.77–5.28)
SL AMB VLDL CHOLESTEROL CALC: 32 MG/DL (ref 5–40)
SODIUM SERPL-SCNC: 139 MMOL/L (ref 134–144)
TRIGL SERPL-MCNC: 181 MG/DL (ref 0–149)
WBC # BLD AUTO: 9.1 X10E3/UL (ref 3.4–10.8)

## 2025-07-24 ENCOUNTER — OFFICE VISIT (OUTPATIENT)
Dept: CARDIOLOGY CLINIC | Facility: CLINIC | Age: 55
End: 2025-07-24
Payer: COMMERCIAL

## 2025-07-24 VITALS
DIASTOLIC BLOOD PRESSURE: 60 MMHG | HEIGHT: 63 IN | OXYGEN SATURATION: 97 % | WEIGHT: 206 LBS | BODY MASS INDEX: 36.5 KG/M2 | SYSTOLIC BLOOD PRESSURE: 112 MMHG | HEART RATE: 90 BPM

## 2025-07-24 DIAGNOSIS — I50.22 CHRONIC HFREF (HEART FAILURE WITH REDUCED EJECTION FRACTION) (HCC): ICD-10-CM

## 2025-07-24 DIAGNOSIS — Z95.810 PRESENCE OF AUTOMATIC CARDIOVERTER/DEFIBRILLATOR (AICD): ICD-10-CM

## 2025-07-24 DIAGNOSIS — I42.8 NON-ISCHEMIC CARDIOMYOPATHY (HCC): ICD-10-CM

## 2025-07-24 DIAGNOSIS — I44.7 LEFT BUNDLE BRANCH BLOCK (LBBB): ICD-10-CM

## 2025-07-24 DIAGNOSIS — I49.01 VENTRICULAR FIBRILLATION (HCC): ICD-10-CM

## 2025-07-24 DIAGNOSIS — I47.20 VENTRICULAR TACHYCARDIA, UNSPECIFIED (HCC): ICD-10-CM

## 2025-07-24 PROCEDURE — 99214 OFFICE O/P EST MOD 30 MIN: CPT | Performed by: INTERNAL MEDICINE

## 2025-07-24 RX ORDER — SACUBITRIL AND VALSARTAN 49; 51 MG/1; MG/1
1 TABLET, FILM COATED ORAL 2 TIMES DAILY
Qty: 180 TABLET | Refills: 3 | Status: SHIPPED | OUTPATIENT
Start: 2025-07-24

## 2025-07-24 RX ORDER — MEXILETINE HYDROCHLORIDE 200 MG/1
200 CAPSULE ORAL 2 TIMES DAILY
Qty: 180 CAPSULE | Refills: 3 | Status: SHIPPED | OUTPATIENT
Start: 2025-07-24

## 2025-07-24 RX ORDER — SPIRONOLACTONE 25 MG/1
25 TABLET ORAL EVERY OTHER DAY
Qty: 45 TABLET | Refills: 0 | Status: SHIPPED | OUTPATIENT
Start: 2025-07-24

## 2025-07-24 RX ORDER — METOPROLOL SUCCINATE 100 MG/1
100 TABLET, EXTENDED RELEASE ORAL EVERY 12 HOURS
Qty: 180 TABLET | Refills: 3 | Status: SHIPPED | OUTPATIENT
Start: 2025-07-24

## 2025-07-24 NOTE — PROGRESS NOTES
Cardiology Follow Up  Oneida Landers  1970  [id number]  [facility]  [address]  [address]  [facility] [address] [phone]    1. Non-ischemic cardiomyopathy (HCC)  spironolactone (ALDACTONE) 25 mg tablet    sacubitril-valsartan (Entresto) 49-51 MG TABS      2. Ventricular tachycardia, unspecified (HCC)  metoprolol succinate (TOPROL-XL) 100 mg 24 hr tablet    mexiletine (MEXITIL) 200 MG capsule      3. Ventricular fibrillation (HCC)  spironolactone (ALDACTONE) 25 mg tablet    sacubitril-valsartan (Entresto) 49-51 MG TABS      4. Presence of automatic cardioverter/defibrillator (AICD)  spironolactone (ALDACTONE) 25 mg tablet    sacubitril-valsartan (Entresto) 49-51 MG TABS      5. Chronic HFrEF (heart failure with reduced ejection fraction) (HCC)  metoprolol succinate (TOPROL-XL) 100 mg 24 hr tablet    mexiletine (MEXITIL) 200 MG capsule      6. Left bundle branch block (LBBB)  metoprolol succinate (TOPROL-XL) 100 mg 24 hr tablet    mexiletine (MEXITIL) 200 MG capsule           Discussion/Plan:  Hx of Vfib/Nonischemic CM EF34% by MRI <30% by echo 2d NYHA class 2 s/p ICD firing.Metoprolol + Mexiletine.  spironolactone to 25 mg every other day.  Discussed about need for compression socks.    Diverticulitis- currently high risk for procedure. Would delay until improved EF.    Hyperkalemia- she will watch her potassium intake. It has been stable    TAE- cpap daily Positive pressure.     BMI-39. She will discuss about medical weight loss.      Interval History:  She denies having significant lower extremity edema since discharge.  She denies feeling dizziness or lightheadedness.  She denies having chest discomfort.  Denies having syncopal episodes.  Denies having any irregular heart rhythm.    8/12:  No edema. No syncope. No dizziness or light-headness.  Compliant with meds.  She has had no  device firings.  She has been compliant with the up titration of the Entresto.  She reports trying to increase her carvedilol but did not feel right.  She understands about orthostatic hypotension in the risk of prolonged standing.  She has been watching her sodium intake.  Her weight has been down trending.    11/25:  She is up titrated her carvedilol without significant dizziness lightheadedness.  She does feel some orthostatic symptoms.  She stands up slowly.  She denies having significant lower extremity swelling.  She is compliant with her medications.  She is wearing compression socks when she is at work.    01/20/2020:  Reviewed through her imaging study which shows her EF has improved to near 34%.  She is walking a mi without significant shortness of breath.  She denies having any dizziness or lightheadedness.  She is wearing her compression socks at work.  She is compliant with her medications.  Her last blood work showed mildly elevated potassium.  She is watching her potassium foods.  Her kidney function has returned back to normal Holter a victoria.    9/10/20:  Still with trouble with cpap. Feels gassy. No dizziness or light-headness.      03/15/2021:  She denies feeling any chest tightness.  She denies feeling dizziness or lightheadedness.  I reviewed with her her last studies.  She is compliant with her medications.    04/29/2021:  She is walking more than a mi without having shortness of breath or chest discomfort.  She is compliant with her heart failure regimen.  Her weight stays very stable.  We reviewed through her recent MRI.  We had a mutual discussion about having a primary defibrillator.  I discussed with her given she is a nonischemic cardiomyopathy with a very good functional status a primary prevention device would have limited benefits.  She is currently wishes not to go ahead with a defibrillator placement.  She will notify me if she feels any major shortness of breath, palpitations or chest  pain.  We will continue optimal medical therapy.    10/28/2021:  Her weight has been stable.  She denies having dizziness or lightheadedness.  She denies having chest heaviness.  No major changes EKG.    Compliant with medications.    4 15 2022 she reports having recent travel.  She denies having dizziness or lightheadedness.  She is compliant with her medications.  She denies having lower extremity edema.    08/08/2022:  She was unable to tolerate the SGLT2 inhibitor.  She unfortunately had a yeast infection that was difficult to control.  She currently denies having major exertional chest pain or shortness of breath.  She denies feeling dizziness or lightheadedness.  We discussed about her echocardiogram results.  She is wearing her CPAP at night.  However unfortunately her CPAP has been recalled.  She is using soap and water to clean it.    6/12/2023: She has periodic low blood pressures.  She may have low fluid intake.  She is taking her medication regularly.  She is taking her CPAP at night.  She denies having major palpitations.    Recent Visit: No alcohol. Previous episode of device firing while on vacation. Compliant with CPAP at night. Denies device firing.  Denies significant palpitations. Compliant with therapy. Reviewed labwork together form June.      Patient Active Problem List   Diagnosis    Nicotine abuse    GERD (gastroesophageal reflux disease)    Acute combined systolic and diastolic congestive heart failure (HCC)    Diverticulitis    Non-ischemic cardiomyopathy (HCC)    Diverticulitis of large intestine without perforation or abscess without bleeding    TAE (obstructive sleep apnea)    Left bundle branch block (LBBB)    Chronic HFrEF (heart failure with reduced ejection fraction) (HCC)    PVC (premature ventricular contraction)    Ventricular tachycardia, unspecified (HCC)    ICD (implantable cardioverter-defibrillator) discharge    Class 2 severe obesity due to excess calories with serious  comorbidity and body mass index (BMI) of 37.0 to 37.9 in adult (HCC)    Ventricular fibrillation (HCC)     Past Medical History:   Diagnosis Date    CHF (congestive heart failure) (HCC)     Diverticulitis of colon     GERD (gastroesophageal reflux disease)     HH (hiatus hernia)     Nonischemic cardiomyopathy (HCC) 2019    Post-op pain 10/26/2022    Sleep apnea     Ventricular fibrillation (HCC)     Ventricular tachycardia (HCC)      Social History     Socioeconomic History    Marital status: /Civil Union     Spouse name: Not on file    Number of children: Not on file    Years of education: Not on file    Highest education level: Not on file   Occupational History    Not on file   Tobacco Use    Smoking status: Some Days     Current packs/day: 0.00     Average packs/day: 0.3 packs/day for 33.0 years (8.3 ttl pk-yrs)     Types: Cigarettes     Start date: 1986     Last attempt to quit: 2019     Years since quittin.0    Smokeless tobacco: Never    Tobacco comments:     Pt states smokes here and there not all th etime   Vaping Use    Vaping status: Never Used   Substance and Sexual Activity    Alcohol use: Not Currently     Alcohol/week: 0.0 standard drinks of alcohol     Comment: 2x year    Drug use: No    Sexual activity: Not on file   Other Topics Concern    Not on file   Social History Narrative    Not on file     Social Drivers of Health     Financial Resource Strain: Not on file   Food Insecurity: Not on file   Transportation Needs: Not on file   Physical Activity: Not on file   Stress: Not on file   Social Connections: Not on file   Intimate Partner Violence: Not on file   Housing Stability: Not on file      Family History   Problem Relation Name Age of Onset    Heart attack Mother      Heart attack Father       Past Surgical History:   Procedure Laterality Date    CARDIAC ELECTROPHYSIOLOGY PROCEDURE N/A 10/26/2022    Procedure: Cardiac biv icd implant/ BIV ICD SUBMUSCULAR IMPLANT;   Surgeon: Boston Santillan DO;  Location: [facility];  Service: Cardiology     SECTION      CHOLECYSTECTOMY      HAND SURGERY      HYSTERECTOMY         Current Outpatient Medications:     albuterol (PROVENTIL HFA,VENTOLIN HFA) 90 mcg/act inhaler, , Disp: , Rfl:     ascorbic acid (VITAMIN C) 500 mg tablet, Take 500 mg by mouth in the morning., Disp: , Rfl:     cholecalciferol (VITAMIN D3) 1,000 units tablet, Take 1,000 Units by mouth in the morning. Winter time only., Disp: , Rfl:     metoprolol succinate (TOPROL-XL) 100 mg 24 hr tablet, Take 1 tablet (100 mg total) by mouth every 12 (twelve) hours, Disp: 180 tablet, Rfl: 3    mexiletine (MEXITIL) 200 MG capsule, Take 1 capsule (200 mg total) by mouth 2 (two) times a day, Disp: 180 capsule, Rfl: 3    sacubitril-valsartan (Entresto) 49-51 MG TABS, Take 1 tablet by mouth 2 (two) times a day, Disp: 180 tablet, Rfl: 3    spironolactone (ALDACTONE) 25 mg tablet, Take 1 tablet (25 mg total) by mouth every other day, Disp: 45 tablet, Rfl: 0    torsemide (DEMADEX) 10 mg tablet, Take one tablet as needed for increased leg swelling, Disp: 30 tablet, Rfl: 1    Turmeric (QC TUMERIC COMPLEX PO), Take 500 mg by mouth, Disp: , Rfl:   Allergies   Allergen Reactions    Percocet [Oxycodone-Acetaminophen] Hives       Review of Systems:  Review of Systems   Constitutional: Positive for fatigue. Negative for activity change, appetite change, chills, diaphoresis, fever and unexpected weight change.   HENT: Negative.  Negative for congestion, dental problem, drooling, ear discharge, ear pain, facial swelling, hearing loss, mouth sores, nosebleeds, postnasal drip, rhinorrhea, sinus pressure, sinus pain, sneezing, sore throat, tinnitus, trouble swallowing and voice change.    Eyes: Negative.  Negative for photophobia, pain, redness, itching and visual disturbance.   Respiratory: Negative for apnea, cough, choking, chest tightness, shortness of breath, wheezing and stridor.   "  Cardiovascular: Negative for chest pain, palpitations and leg swelling.   Gastrointestinal: Negative.  Negative for abdominal distention, abdominal pain, anal bleeding, blood in stool, constipation, diarrhea, nausea, rectal pain and vomiting.   Endocrine: Negative.  Negative for cold intolerance, heat intolerance, polydipsia, polyphagia and polyuria.   Genitourinary: Negative.  Negative for decreased urine volume, difficulty urinating, dyspareunia, dysuria, enuresis, flank pain, frequency, genital sores, hematuria, menstrual problem, pelvic pain, urgency, vaginal bleeding, vaginal discharge and vaginal pain.   Musculoskeletal: Negative.  Negative for arthralgias, back pain, gait problem, joint swelling, myalgias, neck pain and neck stiffness.   Skin: Negative.  Negative for color change, pallor, rash and wound.   Allergic/Immunologic: Negative.  Negative for environmental allergies, food allergies and immunocompromised state.   Neurological: Negative.  Negative for dizziness, tremors, seizures, syncope, facial asymmetry, speech difficulty, weakness, light-headedness, numbness and headaches.   Hematological: Negative.  Negative for adenopathy. Does not bruise/bleed easily.   Psychiatric/Behavioral: Negative.  Negative for agitation, behavioral problems, confusion, decreased concentration, dysphoric mood, hallucinations, self-injury, sleep disturbance and suicidal ideas. The patient is not nervous/anxious and is not hyperactive.    All other systems reviewed and are negative.      Vitals:    07/24/25 1622   BP: 112/60   BP Location: Right arm   Patient Position: Sitting   Cuff Size: Large   Pulse: 90   SpO2: 97%   Weight: 93.4 kg (206 lb)   Height: 5' 3.1\" (1.603 m)     Physical Exam:  Physical Exam  Constitutional:       General: She is not in acute distress.     Appearance: She is well-developed. She is not diaphoretic.   HENT:      Head: Normocephalic and atraumatic.      Right Ear: External ear normal.      Left " Ear: External ear normal.   Eyes:      General: No scleral icterus.        Right eye: No discharge.         Left eye: No discharge.      Conjunctiva/sclera: Conjunctivae normal.      Pupils: Pupils are equal, round, and reactive to light.   Neck:      Thyroid: No thyromegaly.      Vascular: No JVD.      Trachea: No tracheal deviation.   Cardiovascular:      Rate and Rhythm: Normal rate and regular rhythm.      Heart sounds: Murmur heard.     No friction rub. Gallop present.   Pulmonary:      Effort: Pulmonary effort is normal. No respiratory distress.      Breath sounds: Normal breath sounds. No stridor. No wheezing or rales.   Chest:      Chest wall: No tenderness.   Abdominal:      General: Bowel sounds are normal. There is distension.      Palpations: Abdomen is soft. There is no mass.      Tenderness: There is no abdominal tenderness. There is no guarding or rebound.   Musculoskeletal:         General: No tenderness or deformity. Normal range of motion.      Cervical back: Normal range of motion and neck supple.   Skin:     General: Skin is warm and dry.      Coloration: Skin is not pale.      Findings: No erythema or rash.      Comments: Positive varicose veins   Neurological:      Mental Status: She is alert and oriented to person, place, and time.      Cranial Nerves: No cranial nerve deficit.      Motor: No abnormal muscle tone.      Coordination: Coordination normal.      Deep Tendon Reflexes: Reflexes are normal and symmetric. Reflexes normal.   Psychiatric:         Behavior: Behavior normal.         Thought Content: Thought content normal.         Judgment: Judgment normal.         Labs:     Lab Results   Component Value Date    WBC 9.1 07/21/2025    HGB 14.3 07/21/2025    HCT 42.9 07/21/2025    MCV 88 07/21/2025     07/21/2025     Lab Results   Component Value Date    K 5.0 07/21/2025     07/21/2025    CO2 19 (L) 07/21/2025    BUN 19 07/21/2025    CREATININE 0.91 07/21/2025    GLUF 111 (H)  "2023    CALCIUM 9.0 2023    AST 16 2025    ALT 23 2025    ALKPHOS 88 2023    EGFR 74 2025     No results found for: \"CHOL\"  Lab Results   Component Value Date    HDL 40 2025    HDL 44 2019     Lab Results   Component Value Date    LDLCALC 112 (H) 2025    LDLCALC 116 (H) 2019     Lab Results   Component Value Date    TRIG 181 (H) 2025    TRIG 53 2019     Lab Results   Component Value Date    HGBA1C 5.9 07/15/2024       Imaging & Testing   I have personally reviewed pertinent reports.      EKG: Personally reviewed.      Cardiac testing:   Results for orders placed during the hospital encounter of 19   Echo complete with contrast if indicated    Narrative [address] [id number] [phone]    Transthoracic Echocardiogram  2D, M-mode, Doppler, and Color Doppler    Study date:  2019    Patient: MAUREEN CHANCE  MR number: [id number]  Account number: [id number]  : 1970  Age: 49 years  Gender: Female  Status: Inpatient  Location: Bedside  Height: 61 in  Weight: 219.6 lb  BP: 118/ 80 mmHg    Indications: Heart Failure    Diagnoses: I50.9 - Heart failure, unspecified    Sonographer:  ISABELL Flores  Primary Physician:  Trina Garza MD  Referring Physician:  Chrissy Mcdaniel DO  Group:  [facility]  Interpreting Physician:  Rodolfo Jon MD    SUMMARY    LEFT VENTRICLE:  Systolic function was markedly reduced. Ejection fraction was estimated in the range of 25 % to 30 % to be 30 %.  There was severe diffuse hypokinesis.  Doppler parameters were consistent with abnormal left ventricular relaxation (grade 1 diastolic dysfunction).    RIGHT VENTRICLE:  Systolic function was moderately reduced.    LEFT ATRIUM:  The atrium was mildly dilated.    RIGHT ATRIUM:  The atrium was mildly dilated.    MITRAL VALVE:  There was trace regurgitation.    IVC, HEPATIC " VEINS:  The inferior vena cava was dilated.    HISTORY: PRIOR HISTORY: GERD, Hiatal Hernia    PROCEDURE: The procedure was performed at the bedside. This was a routine study. The transthoracic approach was used. The study included complete 2D imaging, M-mode, complete spectral Doppler, and color Doppler. The heart rate was 92 bpm,  at the start of the study. Image quality was adequate.    LEFT VENTRICLE: Size was normal. Systolic function was markedly reduced. Ejection fraction was estimated in the range of 25 % to 30 % to be 30 %. There was severe diffuse hypokinesis. Wall thickness was normal. No evidence of apical  thrombus. DOPPLER: Doppler parameters were consistent with abnormal left ventricular relaxation (grade 1 diastolic dysfunction).    RIGHT VENTRICLE: The size was normal. Systolic function was moderately reduced. Wall thickness was normal.    LEFT ATRIUM: The atrium was mildly dilated.    RIGHT ATRIUM: The atrium was mildly dilated.    MITRAL VALVE: Valve structure was normal. There was normal leaflet separation. DOPPLER: The transmitral velocity was within the normal range. There was no evidence for stenosis. There was trace regurgitation.    AORTIC VALVE: The valve was trileaflet. Leaflets exhibited normal thickness and normal cuspal separation. DOPPLER: Transaortic velocity was within the normal range. There was no evidence for stenosis. There was no significant  regurgitation.    TRICUSPID VALVE: The valve structure was normal. There was normal leaflet separation. DOPPLER: The transtricuspid velocity was within the normal range. There was no evidence for stenosis. There was no significant regurgitation.    PULMONIC VALVE: Leaflets exhibited normal thickness, no calcification, and normal cuspal separation. DOPPLER: The transpulmonic velocity was within the normal range. There was no significant regurgitation.    PERICARDIUM: There was no pericardial effusion. The pericardium was normal in  "appearance.    AORTA: The root exhibited normal size.    SYSTEMIC VEINS: IVC: The inferior vena cava was dilated.    SYSTEM MEASUREMENT TABLES    2D mode  AoR Diam 2D: 3.2 cm  LA Diam (2D): 4.5 cm  LA/Ao (2D): 1.41  FS (2D Teich): 13.7 %  IVSd (2D): 0.82 cm  LVDEV: 193 cmï¾³  LVESV: 138 cmï¾³  LVIDd(2D): 6.19 cm  LVISd (2D): 5.34 cm  LVPWd (2D): 0.81 cm  SV (Teich): 55 cmï¾³    Apical four chamber  LVEF A4C: 28 %    Unspecified Scan Mode  MV Peak E Zach. Mean: 848 mm/s  MVA (PHT): 4.15 cmï¾²  PHT: 53 ms  Max P mm[Hg]  V Max: 2440 mm/s  Vmax: 2500 mm/s  RA Area: 18.1 cmï¾²  RA Volume: 48.8 cmï¾³  TAPSE: 1.1 cm    [facility] Accredited Echocardiography Laboratory    Prepared and electronically signed by    Rodolfo Jon MD  Signed 2019 13:10:35       ekg sinus rhythm with LBBB periodic pvc    Rodolfo Jon MD [facility]  Please call with any questions or suggestions    A description of the counseling:   Goals and Barriers:  Patient's ability to self care:  Medication side effect reviewed with patient in detail and all their questions answered.    \"This note has been constructed using a voice recognition system.Therefore there may be syntax, spelling, and/or grammatical errors. Please call if you have any questions. \"    "

## 2025-08-04 ENCOUNTER — APPOINTMENT (EMERGENCY)
Dept: RADIOLOGY | Facility: HOSPITAL | Age: 55
End: 2025-08-04
Payer: COMMERCIAL

## 2025-08-04 ENCOUNTER — HOSPITAL ENCOUNTER (EMERGENCY)
Facility: HOSPITAL | Age: 55
Discharge: HOME | End: 2025-08-04
Attending: EMERGENCY MEDICINE
Payer: COMMERCIAL

## 2025-08-04 VITALS
SYSTOLIC BLOOD PRESSURE: 138 MMHG | DIASTOLIC BLOOD PRESSURE: 88 MMHG | BODY MASS INDEX: 36.2 KG/M2 | HEART RATE: 76 BPM | WEIGHT: 205 LBS | RESPIRATION RATE: 16 BRPM | TEMPERATURE: 97.7 F | OXYGEN SATURATION: 98 %

## 2025-08-04 DIAGNOSIS — R10.9 FLANK PAIN: Primary | ICD-10-CM

## 2025-08-04 LAB
ALBUMIN SERPL BCG-MCNC: 4.5 G/DL (ref 3.5–5)
ALP SERPL-CCNC: 90 U/L (ref 34–104)
ALT SERPL W P-5'-P-CCNC: 19 U/L (ref 7–52)
ANION GAP SERPL CALCULATED.3IONS-SCNC: 7 MMOL/L (ref 4–13)
AST SERPL W P-5'-P-CCNC: 13 U/L (ref 13–39)
BACTERIA UR QL AUTO: ABNORMAL /HPF
BASOPHILS # BLD AUTO: 0.08 THOUSANDS/ÂΜL (ref 0–0.1)
BASOPHILS NFR BLD AUTO: 1 % (ref 0–1)
BILIRUB SERPL-MCNC: 0.37 MG/DL (ref 0.2–1)
BILIRUB UR QL STRIP: NEGATIVE
BUN SERPL-MCNC: 20 MG/DL (ref 5–25)
CALCIUM SERPL-MCNC: 9.1 MG/DL (ref 8.4–10.2)
CHLORIDE SERPL-SCNC: 105 MMOL/L (ref 96–108)
CLARITY UR: CLEAR
CO2 SERPL-SCNC: 25 MMOL/L (ref 21–32)
COLOR UR: YELLOW
CREAT SERPL-MCNC: 0.98 MG/DL (ref 0.6–1.3)
EOSINOPHIL # BLD AUTO: 0.15 THOUSAND/ÂΜL (ref 0–0.61)
EOSINOPHIL NFR BLD AUTO: 1 % (ref 0–6)
ERYTHROCYTE [DISTWIDTH] IN BLOOD BY AUTOMATED COUNT: 13.3 % (ref 11.6–15.1)
GFR SERPL CREATININE-BSD FRML MDRD: 64 ML/MIN/1.73SQ M
GLUCOSE SERPL-MCNC: 119 MG/DL (ref 65–140)
GLUCOSE UR STRIP-MCNC: NEGATIVE MG/DL
HCT VFR BLD AUTO: 45.9 % (ref 34.8–46.1)
HGB BLD-MCNC: 15 G/DL (ref 11.5–15.4)
HGB UR QL STRIP.AUTO: ABNORMAL
HYALINE CASTS #/AREA URNS LPF: ABNORMAL /LPF
IMM GRANULOCYTES # BLD AUTO: 0.05 THOUSAND/UL (ref 0–0.2)
IMM GRANULOCYTES NFR BLD AUTO: 0 % (ref 0–2)
KETONES UR STRIP-MCNC: NEGATIVE MG/DL
LEUKOCYTE ESTERASE UR QL STRIP: NEGATIVE
LIPASE SERPL-CCNC: 24 U/L (ref 11–82)
LYMPHOCYTES # BLD AUTO: 2.75 THOUSANDS/ÂΜL (ref 0.6–4.47)
LYMPHOCYTES NFR BLD AUTO: 21 % (ref 14–44)
MCH RBC QN AUTO: 29.9 PG (ref 26.8–34.3)
MCHC RBC AUTO-ENTMCNC: 32.7 G/DL (ref 31.4–37.4)
MCV RBC AUTO: 91 FL (ref 82–98)
MONOCYTES # BLD AUTO: 0.79 THOUSAND/ÂΜL (ref 0.17–1.22)
MONOCYTES NFR BLD AUTO: 6 % (ref 4–12)
NEUTROPHILS # BLD AUTO: 9.12 THOUSANDS/ÂΜL (ref 1.85–7.62)
NEUTS SEG NFR BLD AUTO: 71 % (ref 43–75)
NITRITE UR QL STRIP: NEGATIVE
NON-SQ EPI CELLS URNS QL MICRO: ABNORMAL /HPF
NRBC BLD AUTO-RTO: 0 /100 WBCS
PH UR STRIP.AUTO: 5.5 [PH]
PLATELET # BLD AUTO: 243 THOUSANDS/UL (ref 149–390)
PMV BLD AUTO: 10.2 FL (ref 8.9–12.7)
POTASSIUM SERPL-SCNC: 4 MMOL/L (ref 3.5–5.3)
PROT SERPL-MCNC: 7.3 G/DL (ref 6.4–8.4)
PROT UR STRIP-MCNC: NEGATIVE MG/DL
RBC # BLD AUTO: 5.02 MILLION/UL (ref 3.81–5.12)
RBC #/AREA URNS AUTO: ABNORMAL /HPF
SODIUM SERPL-SCNC: 137 MMOL/L (ref 135–147)
SP GR UR STRIP.AUTO: 1.02 (ref 1–1.03)
UROBILINOGEN UR STRIP-ACNC: <2 MG/DL
WBC # BLD AUTO: 12.94 THOUSAND/UL (ref 4.31–10.16)
WBC #/AREA URNS AUTO: ABNORMAL /HPF

## 2025-08-04 PROCEDURE — 96374 THER/PROPH/DIAG INJ IV PUSH: CPT

## 2025-08-04 PROCEDURE — 36415 COLL VENOUS BLD VENIPUNCTURE: CPT

## 2025-08-04 PROCEDURE — 96361 HYDRATE IV INFUSION ADD-ON: CPT

## 2025-08-04 PROCEDURE — 99284 EMERGENCY DEPT VISIT MOD MDM: CPT

## 2025-08-04 PROCEDURE — 99284 EMERGENCY DEPT VISIT MOD MDM: CPT | Performed by: EMERGENCY MEDICINE

## 2025-08-04 PROCEDURE — 74176 CT ABD & PELVIS W/O CONTRAST: CPT

## 2025-08-04 PROCEDURE — 85025 COMPLETE CBC W/AUTO DIFF WBC: CPT

## 2025-08-04 PROCEDURE — 96375 TX/PRO/DX INJ NEW DRUG ADDON: CPT

## 2025-08-04 PROCEDURE — 83690 ASSAY OF LIPASE: CPT

## 2025-08-04 PROCEDURE — 81001 URINALYSIS AUTO W/SCOPE: CPT

## 2025-08-04 PROCEDURE — 80053 COMPREHEN METABOLIC PANEL: CPT

## 2025-08-04 RX ORDER — METHOCARBAMOL 500 MG/1
500 TABLET, FILM COATED ORAL 2 TIMES DAILY
Qty: 20 TABLET | Refills: 0 | Status: SHIPPED | OUTPATIENT
Start: 2025-08-04

## 2025-08-04 RX ORDER — ONDANSETRON 2 MG/ML
4 INJECTION INTRAMUSCULAR; INTRAVENOUS ONCE
Status: COMPLETED | OUTPATIENT
Start: 2025-08-04 | End: 2025-08-04

## 2025-08-04 RX ORDER — KETOROLAC TROMETHAMINE 30 MG/ML
15 INJECTION, SOLUTION INTRAMUSCULAR; INTRAVENOUS ONCE
Status: COMPLETED | OUTPATIENT
Start: 2025-08-04 | End: 2025-08-04

## 2025-08-04 RX ORDER — NAPROXEN 250 MG/1
250 TABLET ORAL 2 TIMES DAILY WITH MEALS
Qty: 20 TABLET | Refills: 0 | Status: SHIPPED | OUTPATIENT
Start: 2025-08-04

## 2025-08-04 RX ADMIN — KETOROLAC TROMETHAMINE 15 MG: 30 INJECTION, SOLUTION INTRAMUSCULAR; INTRAVENOUS at 07:03

## 2025-08-04 RX ADMIN — ONDANSETRON 4 MG: 2 INJECTION INTRAMUSCULAR; INTRAVENOUS at 06:42

## 2025-08-04 RX ADMIN — SODIUM CHLORIDE 1000 ML: 0.9 INJECTION, SOLUTION INTRAVENOUS at 06:41

## (undated) DEVICE — RADIFOCUS GLIDEWIRE: Brand: GLIDEWIRE

## (undated) DEVICE — Device: Brand: WEBSTER

## (undated) DEVICE — GUIDE SHEATH 9FR ATTAIN COMMAND 9FR EH CURVE

## (undated) DEVICE — INTRO SHEATH PEEL AWAY 7FR

## (undated) DEVICE — SLITTER ADJUSTABLE

## (undated) DEVICE — RUNTHROUGH NS EXTRA FLOPPY PTCA GUIDEWIRE: Brand: RUNTHROUGH

## (undated) DEVICE — INTRO SHEATH PEEL AWAY 9 FR